# Patient Record
Sex: MALE | Race: WHITE | NOT HISPANIC OR LATINO | Employment: OTHER | URBAN - METROPOLITAN AREA
[De-identification: names, ages, dates, MRNs, and addresses within clinical notes are randomized per-mention and may not be internally consistent; named-entity substitution may affect disease eponyms.]

---

## 2017-03-24 ENCOUNTER — CONVERSION ENCOUNTER (OUTPATIENT)
Dept: OTHER | Facility: HOSPITAL | Age: 67
End: 2017-03-24

## 2017-03-24 ENCOUNTER — HOSPITAL ENCOUNTER (OUTPATIENT)
Dept: OTHER | Facility: HOSPITAL | Age: 67
Setting detail: SPECIMEN
Discharge: HOME | End: 2017-03-24
Attending: NURSE PRACTITIONER | Admitting: NURSE PRACTITIONER

## 2017-03-24 LAB
ALBUMIN SERPL-MCNC: 4.2 G/DL (ref 3.5–4.8)
ALBUMIN/GLOB SERPL: 1.5 {RATIO} (ref 1–1.7)
ALP SERPL-CCNC: 49 IU/L (ref 32–91)
ALT SERPL-CCNC: 20 IU/L (ref 17–63)
ANION GAP SERPL CALC-SCNC: 18.4 MMOL/L (ref 10–20)
AST SERPL-CCNC: 24 IU/L (ref 15–41)
BILIRUB SERPL-MCNC: 0.8 MG/DL (ref 0.3–1.2)
BUN SERPL-MCNC: 19 MG/DL (ref 8–20)
BUN/CREAT SERPL: 21.1 (ref 6.2–20.3)
CALCIUM SERPL-MCNC: 10.3 MG/DL (ref 8.9–10.3)
CHLORIDE SERPL-SCNC: 103 MMOL/L (ref 101–111)
CHOLEST SERPL-MCNC: 144 MG/DL
CHOLEST/HDLC SERPL: 4.1 {RATIO}
CONV CO2: 23 MMOL/L (ref 22–32)
CONV LDL CHOLESTEROL DIRECT: 83 MG/DL (ref 0–100)
CONV MICROALBUM.,U,RANDOM: <2 MG/L
CONV TOTAL PROTEIN: 7 G/DL (ref 6.1–7.9)
CREAT 24H UR-MCNC: 17.9 MG/DL
CREAT UR-MCNC: 0.9 MG/DL (ref 0.7–1.2)
GLOBULIN UR ELPH-MCNC: 2.8 G/DL (ref 2.5–3.8)
GLUCOSE SERPL-MCNC: 84 MG/DL (ref 65–99)
HDLC SERPL-MCNC: 36 MG/DL
LDLC/HDLC SERPL: 2.3 {RATIO}
LIPID INTERPRETATION: ABNORMAL
MICROALBUMIN/CREAT UR: NORMAL UG/MG
POTASSIUM SERPL-SCNC: 4.4 MMOL/L (ref 3.6–5.1)
PSA SERPL-MCNC: 0.74 NG/ML (ref 0–4)
SODIUM SERPL-SCNC: 140 MMOL/L (ref 136–144)
TRIGL SERPL-MCNC: 171 MG/DL
VLDLC SERPL CALC-MCNC: 25.7 MG/DL

## 2017-04-07 ENCOUNTER — CONVERSION ENCOUNTER (OUTPATIENT)
Dept: OTHER | Facility: HOSPITAL | Age: 67
End: 2017-04-07

## 2017-06-12 ENCOUNTER — CONVERSION ENCOUNTER (OUTPATIENT)
Dept: OTHER | Facility: HOSPITAL | Age: 67
End: 2017-06-12

## 2017-06-12 ENCOUNTER — HOSPITAL ENCOUNTER (OUTPATIENT)
Dept: OTHER | Facility: HOSPITAL | Age: 67
Setting detail: SPECIMEN
Discharge: HOME | End: 2017-06-12
Attending: NURSE PRACTITIONER | Admitting: NURSE PRACTITIONER

## 2017-06-12 LAB
ALBUMIN SERPL-MCNC: 4.7 G/DL (ref 3.5–4.8)
ALBUMIN/GLOB SERPL: 1.5 {RATIO} (ref 1–1.7)
ALP SERPL-CCNC: 45 IU/L (ref 32–91)
ALT SERPL-CCNC: 23 IU/L (ref 17–63)
ANION GAP SERPL CALC-SCNC: 14.9 MMOL/L (ref 10–20)
AST SERPL-CCNC: 23 IU/L (ref 15–41)
BILIRUB SERPL-MCNC: 0.9 MG/DL (ref 0.3–1.2)
BUN SERPL-MCNC: 21 MG/DL (ref 8–20)
BUN/CREAT SERPL: 21 (ref 6.2–20.3)
CALCIUM SERPL-MCNC: 10.3 MG/DL (ref 8.9–10.3)
CHLORIDE SERPL-SCNC: 101 MMOL/L (ref 101–111)
CHOLEST SERPL-MCNC: 156 MG/DL
CHOLEST/HDLC SERPL: 4 {RATIO}
CONV CO2: 26 MMOL/L (ref 22–32)
CONV LDL CHOLESTEROL DIRECT: 95 MG/DL (ref 0–100)
CONV TOTAL PROTEIN: 7.8 G/DL (ref 6.1–7.9)
CREAT UR-MCNC: 1 MG/DL (ref 0.7–1.2)
GLOBULIN UR ELPH-MCNC: 3.1 G/DL (ref 2.5–3.8)
GLUCOSE SERPL-MCNC: 127 MG/DL (ref 65–99)
HDLC SERPL-MCNC: 39 MG/DL
LDLC/HDLC SERPL: 2.4 {RATIO}
LIPID INTERPRETATION: ABNORMAL
POTASSIUM SERPL-SCNC: 5.9 MMOL/L (ref 3.6–5.1)
SODIUM SERPL-SCNC: 136 MMOL/L (ref 136–144)
TRIGL SERPL-MCNC: 200 MG/DL
VLDLC SERPL CALC-MCNC: 21.7 MG/DL

## 2017-06-19 ENCOUNTER — HOSPITAL ENCOUNTER (OUTPATIENT)
Dept: OTHER | Facility: HOSPITAL | Age: 67
Setting detail: SPECIMEN
Discharge: HOME | End: 2017-06-19
Attending: NURSE PRACTITIONER | Admitting: NURSE PRACTITIONER

## 2017-06-19 LAB
ALBUMIN SERPL-MCNC: 4.3 G/DL (ref 3.5–4.8)
ALBUMIN/GLOB SERPL: 1.7 {RATIO} (ref 1–1.7)
ALP SERPL-CCNC: 43 IU/L (ref 32–91)
ALT SERPL-CCNC: 19 IU/L (ref 17–63)
ANION GAP SERPL CALC-SCNC: 15.5 MMOL/L (ref 10–20)
AST SERPL-CCNC: 20 IU/L (ref 15–41)
BILIRUB SERPL-MCNC: 0.9 MG/DL (ref 0.3–1.2)
BUN SERPL-MCNC: 20 MG/DL (ref 8–20)
BUN/CREAT SERPL: 22.2 (ref 6.2–20.3)
CALCIUM SERPL-MCNC: 9.7 MG/DL (ref 8.9–10.3)
CHLORIDE SERPL-SCNC: 103 MMOL/L (ref 101–111)
CONV CO2: 22 MMOL/L (ref 22–32)
CONV TOTAL PROTEIN: 6.9 G/DL (ref 6.1–7.9)
CREAT UR-MCNC: 0.9 MG/DL (ref 0.7–1.2)
GLOBULIN UR ELPH-MCNC: 2.6 G/DL (ref 2.5–3.8)
GLUCOSE SERPL-MCNC: 111 MG/DL (ref 65–99)
POTASSIUM SERPL-SCNC: 4.5 MMOL/L (ref 3.6–5.1)
SODIUM SERPL-SCNC: 136 MMOL/L (ref 136–144)

## 2017-09-13 ENCOUNTER — CONVERSION ENCOUNTER (OUTPATIENT)
Dept: OTHER | Facility: HOSPITAL | Age: 67
End: 2017-09-13

## 2017-09-13 ENCOUNTER — HOSPITAL ENCOUNTER (OUTPATIENT)
Dept: OTHER | Facility: HOSPITAL | Age: 67
Setting detail: SPECIMEN
Discharge: HOME | End: 2017-09-13
Attending: NURSE PRACTITIONER | Admitting: NURSE PRACTITIONER

## 2017-09-14 ENCOUNTER — HOSPITAL ENCOUNTER (OUTPATIENT)
Dept: ULTRASOUND IMAGING | Facility: HOSPITAL | Age: 67
Discharge: HOME | End: 2017-09-14
Attending: NURSE PRACTITIONER | Admitting: NURSE PRACTITIONER

## 2017-10-16 ENCOUNTER — HOSPITAL ENCOUNTER (OUTPATIENT)
Dept: OTHER | Facility: HOSPITAL | Age: 67
Setting detail: SPECIMEN
Discharge: HOME | End: 2017-10-16
Attending: NURSE PRACTITIONER | Admitting: NURSE PRACTITIONER

## 2017-10-16 LAB
ALBUMIN SERPL-MCNC: 4.1 G/DL (ref 3.5–4.8)
ALBUMIN/GLOB SERPL: 1.6 {RATIO} (ref 1–1.7)
ALP SERPL-CCNC: 48 IU/L (ref 32–91)
ALT SERPL-CCNC: 22 IU/L (ref 17–63)
ANION GAP SERPL CALC-SCNC: 14.3 MMOL/L (ref 10–20)
AST SERPL-CCNC: 23 IU/L (ref 15–41)
BILIRUB SERPL-MCNC: 0.5 MG/DL (ref 0.3–1.2)
BUN SERPL-MCNC: 21 MG/DL (ref 8–20)
BUN/CREAT SERPL: 19.1 (ref 6.2–20.3)
CALCIUM SERPL-MCNC: 9.4 MG/DL (ref 8.9–10.3)
CHLORIDE SERPL-SCNC: 102 MMOL/L (ref 101–111)
CONV CO2: 23 MMOL/L (ref 22–32)
CONV TOTAL PROTEIN: 6.6 G/DL (ref 6.1–7.9)
CREAT UR-MCNC: 1.1 MG/DL (ref 0.7–1.2)
GLOBULIN UR ELPH-MCNC: 2.5 G/DL (ref 2.5–3.8)
GLUCOSE SERPL-MCNC: 239 MG/DL (ref 65–99)
POTASSIUM SERPL-SCNC: 5.3 MMOL/L (ref 3.6–5.1)
SODIUM SERPL-SCNC: 134 MMOL/L (ref 136–144)

## 2017-10-19 ENCOUNTER — CONVERSION ENCOUNTER (OUTPATIENT)
Dept: OTHER | Facility: HOSPITAL | Age: 67
End: 2017-10-19

## 2017-11-09 ENCOUNTER — CONVERSION ENCOUNTER (OUTPATIENT)
Dept: OTHER | Facility: HOSPITAL | Age: 67
End: 2017-11-09

## 2017-12-01 ENCOUNTER — CONVERSION ENCOUNTER (OUTPATIENT)
Dept: OTHER | Facility: HOSPITAL | Age: 67
End: 2017-12-01

## 2017-12-13 ENCOUNTER — CONVERSION ENCOUNTER (OUTPATIENT)
Dept: OTHER | Facility: HOSPITAL | Age: 67
End: 2017-12-13

## 2018-03-16 ENCOUNTER — HOSPITAL ENCOUNTER (OUTPATIENT)
Dept: OTHER | Facility: HOSPITAL | Age: 68
Setting detail: SPECIMEN
Discharge: HOME | End: 2018-03-16
Attending: NURSE PRACTITIONER | Admitting: NURSE PRACTITIONER

## 2018-03-16 ENCOUNTER — HOSPITAL ENCOUNTER (OUTPATIENT)
Dept: OTHER | Facility: HOSPITAL | Age: 68
Discharge: HOME | End: 2018-03-16
Attending: NURSE PRACTITIONER | Admitting: NURSE PRACTITIONER

## 2018-03-16 ENCOUNTER — CONVERSION ENCOUNTER (OUTPATIENT)
Dept: OTHER | Facility: HOSPITAL | Age: 68
End: 2018-03-16

## 2018-03-20 ENCOUNTER — HOSPITAL ENCOUNTER (OUTPATIENT)
Dept: CARDIOLOGY | Facility: HOSPITAL | Age: 68
Discharge: HOME | End: 2018-03-20
Attending: NURSE PRACTITIONER | Admitting: NURSE PRACTITIONER

## 2018-04-19 ENCOUNTER — CONVERSION ENCOUNTER (OUTPATIENT)
Dept: OTHER | Facility: HOSPITAL | Age: 68
End: 2018-04-19

## 2018-05-10 ENCOUNTER — HOSPITAL ENCOUNTER (OUTPATIENT)
Dept: LAB | Facility: HOSPITAL | Age: 68
Discharge: HOME | End: 2018-05-10
Attending: NURSE PRACTITIONER | Admitting: NURSE PRACTITIONER

## 2018-05-10 ENCOUNTER — CONVERSION ENCOUNTER (OUTPATIENT)
Dept: OTHER | Facility: HOSPITAL | Age: 68
End: 2018-05-10

## 2018-05-10 LAB
BACTERIA SPEC AEROBE CULT: NORMAL
Lab: NORMAL
MICRO REPORT STATUS: NORMAL
SPECIMEN SOURCE: NORMAL

## 2018-05-11 LAB
C DIFF TOX GENS STL QL NAA+PROBE: NEGATIVE
CDIFF INTERPRETATION: NORMAL
CONV CDIFF GDH AG: NEGATIVE
CRYPTOSP AG STL QL IA: NEGATIVE

## 2018-06-20 ENCOUNTER — CONVERSION ENCOUNTER (OUTPATIENT)
Dept: OTHER | Facility: HOSPITAL | Age: 68
End: 2018-06-20

## 2018-09-17 ENCOUNTER — CONVERSION ENCOUNTER (OUTPATIENT)
Dept: OTHER | Facility: HOSPITAL | Age: 68
End: 2018-09-17

## 2018-09-17 ENCOUNTER — HOSPITAL ENCOUNTER (OUTPATIENT)
Dept: OTHER | Facility: HOSPITAL | Age: 68
Setting detail: SPECIMEN
Discharge: HOME | End: 2018-09-17
Attending: NURSE PRACTITIONER | Admitting: NURSE PRACTITIONER

## 2018-09-17 LAB
ALBUMIN SERPL-MCNC: 4.4 G/DL (ref 3.5–4.8)
ALBUMIN/GLOB SERPL: 1.3 {RATIO} (ref 1–1.7)
ALP SERPL-CCNC: 46 IU/L (ref 32–91)
ALT SERPL-CCNC: 28 IU/L (ref 17–63)
ANION GAP SERPL CALC-SCNC: 13 MMOL/L (ref 10–20)
AST SERPL-CCNC: 29 IU/L (ref 15–41)
BASOPHILS # BLD AUTO: 0.1 10*3/UL (ref 0–0.2)
BASOPHILS NFR BLD AUTO: 1 % (ref 0–2)
BILIRUB SERPL-MCNC: 0.6 MG/DL (ref 0.3–1.2)
BUN SERPL-MCNC: 22 MG/DL (ref 8–20)
BUN/CREAT SERPL: 20 (ref 6.2–20.3)
CALCIUM SERPL-MCNC: 9.8 MG/DL (ref 8.9–10.3)
CHLORIDE SERPL-SCNC: 102 MMOL/L (ref 101–111)
CHOLEST SERPL-MCNC: 188 MG/DL
CHOLEST/HDLC SERPL: 5.2 {RATIO}
CONV CO2: 27 MMOL/L (ref 22–32)
CONV LDL CHOLESTEROL DIRECT: 123 MG/DL (ref 0–100)
CONV TOTAL PROTEIN: 7.7 G/DL (ref 6.1–7.9)
CREAT UR-MCNC: 1.1 MG/DL (ref 0.7–1.2)
DIFFERENTIAL METHOD BLD: (no result)
EOSINOPHIL # BLD AUTO: 0.2 10*3/UL (ref 0–0.3)
EOSINOPHIL # BLD AUTO: 2 % (ref 0–3)
ERYTHROCYTE [DISTWIDTH] IN BLOOD BY AUTOMATED COUNT: 12.9 % (ref 11.5–14.5)
GLOBULIN UR ELPH-MCNC: 3.3 G/DL (ref 2.5–3.8)
GLUCOSE SERPL-MCNC: 85 MG/DL (ref 65–99)
HCT VFR BLD AUTO: 39.6 % (ref 40–54)
HDLC SERPL-MCNC: 37 MG/DL
HGB BLD-MCNC: 13.3 G/DL (ref 14–18)
LDLC/HDLC SERPL: 3.4 {RATIO}
LIPID INTERPRETATION: ABNORMAL
LYMPHOCYTES # BLD AUTO: 2.1 10*3/UL (ref 0.8–4.8)
LYMPHOCYTES NFR BLD AUTO: 28 % (ref 18–42)
MCH RBC QN AUTO: 29 PG (ref 26–32)
MCHC RBC AUTO-ENTMCNC: 33.6 G/DL (ref 32–36)
MCV RBC AUTO: 86.3 FL (ref 80–94)
MONOCYTES # BLD AUTO: 0.6 10*3/UL (ref 0.1–1.3)
MONOCYTES NFR BLD AUTO: 8 % (ref 2–11)
NEUTROPHILS # BLD AUTO: 4.6 10*3/UL (ref 2.3–8.6)
NEUTROPHILS NFR BLD AUTO: 61 % (ref 50–75)
NRBC BLD AUTO-RTO: 0 /100{WBCS}
NRBC/RBC NFR BLD MANUAL: 0 10*3/UL
PLATELET # BLD AUTO: 262 10*3/UL (ref 150–450)
PMV BLD AUTO: 8.7 FL (ref 7.4–10.4)
POTASSIUM SERPL-SCNC: 5 MMOL/L (ref 3.6–5.1)
RBC # BLD AUTO: 4.58 10*6/UL (ref 4.6–6)
SODIUM SERPL-SCNC: 137 MMOL/L (ref 136–144)
TRIGL SERPL-MCNC: 166 MG/DL
VLDLC SERPL CALC-MCNC: 28.4 MG/DL
WBC # BLD AUTO: 7.6 10*3/UL (ref 4.5–11.5)

## 2018-12-19 ENCOUNTER — CONVERSION ENCOUNTER (OUTPATIENT)
Dept: OTHER | Facility: HOSPITAL | Age: 68
End: 2018-12-19

## 2018-12-23 ENCOUNTER — CONVERSION ENCOUNTER (OUTPATIENT)
Dept: OTHER | Facility: HOSPITAL | Age: 68
End: 2018-12-23

## 2019-03-21 ENCOUNTER — CONVERSION ENCOUNTER (OUTPATIENT)
Dept: OTHER | Facility: HOSPITAL | Age: 69
End: 2019-03-21

## 2019-03-21 ENCOUNTER — HOSPITAL ENCOUNTER (OUTPATIENT)
Dept: OTHER | Facility: HOSPITAL | Age: 69
Setting detail: SPECIMEN
Discharge: HOME | End: 2019-03-21
Attending: NURSE PRACTITIONER | Admitting: NURSE PRACTITIONER

## 2019-03-21 LAB
ALBUMIN SERPL-MCNC: 4.2 G/DL (ref 3.5–4.8)
ALBUMIN/GLOB SERPL: 1.3 {RATIO} (ref 1–1.7)
ALP SERPL-CCNC: 48 IU/L (ref 32–91)
ALT SERPL-CCNC: 23 IU/L (ref 17–63)
ANION GAP SERPL CALC-SCNC: 17.6 MMOL/L (ref 10–20)
AST SERPL-CCNC: 30 IU/L (ref 15–41)
BASOPHILS # BLD AUTO: 0.1 10*3/UL (ref 0–0.2)
BASOPHILS NFR BLD AUTO: 1 % (ref 0–2)
BILIRUB SERPL-MCNC: 0.9 MG/DL (ref 0.3–1.2)
BUN SERPL-MCNC: 23 MG/DL (ref 8–20)
BUN/CREAT SERPL: 19.2 (ref 6.2–20.3)
CALCIUM SERPL-MCNC: 9.8 MG/DL (ref 8.9–10.3)
CHLORIDE SERPL-SCNC: 97 MMOL/L (ref 101–111)
CHOLEST SERPL-MCNC: 185 MG/DL
CHOLEST/HDLC SERPL: 4.8 {RATIO}
CONV CO2: 22 MMOL/L (ref 22–32)
CONV LDL CHOLESTEROL DIRECT: 141 MG/DL (ref 0–100)
CONV TOTAL PROTEIN: 7.5 G/DL (ref 6.1–7.9)
CREAT UR-MCNC: 1.2 MG/DL (ref 0.7–1.2)
DIFFERENTIAL METHOD BLD: (no result)
EOSINOPHIL # BLD AUTO: 0.1 10*3/UL (ref 0–0.3)
EOSINOPHIL # BLD AUTO: 1 % (ref 0–3)
ERYTHROCYTE [DISTWIDTH] IN BLOOD BY AUTOMATED COUNT: 13.1 % (ref 11.5–14.5)
GLOBULIN UR ELPH-MCNC: 3.3 G/DL (ref 2.5–3.8)
GLUCOSE SERPL-MCNC: 104 MG/DL (ref 65–99)
HCT VFR BLD AUTO: 38.9 % (ref 40–54)
HDLC SERPL-MCNC: 39 MG/DL
HGB BLD-MCNC: 13.4 G/DL (ref 14–18)
IRON SATN MFR SERPL: 27 % (ref 20–50)
IRON SERPL-MCNC: 96 UG/DL (ref 45–182)
LDLC/HDLC SERPL: 3.7 {RATIO}
LIPID INTERPRETATION: ABNORMAL
LYMPHOCYTES # BLD AUTO: 2.2 10*3/UL (ref 0.8–4.8)
LYMPHOCYTES NFR BLD AUTO: 26 % (ref 18–42)
MCH RBC QN AUTO: 30.1 PG (ref 26–32)
MCHC RBC AUTO-ENTMCNC: 34.5 G/DL (ref 32–36)
MCV RBC AUTO: 87.4 FL (ref 80–94)
MONOCYTES # BLD AUTO: 0.8 10*3/UL (ref 0.1–1.3)
MONOCYTES NFR BLD AUTO: 10 % (ref 2–11)
NEUTROPHILS # BLD AUTO: 5.2 10*3/UL (ref 2.3–8.6)
NEUTROPHILS NFR BLD AUTO: 62 % (ref 50–75)
NRBC BLD AUTO-RTO: 0 /100{WBCS}
NRBC/RBC NFR BLD MANUAL: 0 10*3/UL
PLATELET # BLD AUTO: 258 10*3/UL (ref 150–450)
PMV BLD AUTO: 8.6 FL (ref 7.4–10.4)
POTASSIUM SERPL-SCNC: 4.6 MMOL/L (ref 3.6–5.1)
RBC # BLD AUTO: 4.45 10*6/UL (ref 4.6–6)
SODIUM SERPL-SCNC: 132 MMOL/L (ref 136–144)
TIBC SERPL-MCNC: 349 UG/DL (ref 228–428)
TRIGL SERPL-MCNC: 143 MG/DL
VLDLC SERPL CALC-MCNC: 5 MG/DL
WBC # BLD AUTO: 8.4 10*3/UL (ref 4.5–11.5)

## 2019-05-17 ENCOUNTER — HOSPITAL ENCOUNTER (OUTPATIENT)
Dept: OTHER | Facility: HOSPITAL | Age: 69
Discharge: HOME | End: 2019-05-17

## 2019-06-03 VITALS
WEIGHT: 208 LBS | SYSTOLIC BLOOD PRESSURE: 138 MMHG | BODY MASS INDEX: 31.39 KG/M2 | OXYGEN SATURATION: 96 % | OXYGEN SATURATION: 96 % | OXYGEN SATURATION: 99 % | SYSTOLIC BLOOD PRESSURE: 132 MMHG | HEART RATE: 74 BPM | SYSTOLIC BLOOD PRESSURE: 138 MMHG | BODY MASS INDEX: 32.58 KG/M2 | BODY MASS INDEX: 31.01 KG/M2 | OXYGEN SATURATION: 98 % | HEART RATE: 82 BPM | WEIGHT: 209 LBS | RESPIRATION RATE: 18 BRPM | HEART RATE: 81 BPM | HEART RATE: 76 BPM | OXYGEN SATURATION: 97 % | OXYGEN SATURATION: 98 % | DIASTOLIC BLOOD PRESSURE: 87 MMHG | HEART RATE: 80 BPM | HEART RATE: 108 BPM | DIASTOLIC BLOOD PRESSURE: 78 MMHG | WEIGHT: 211 LBS | WEIGHT: 200 LBS | BODY MASS INDEX: 31.98 KG/M2 | SYSTOLIC BLOOD PRESSURE: 138 MMHG | BODY MASS INDEX: 30.7 KG/M2 | WEIGHT: 204 LBS | DIASTOLIC BLOOD PRESSURE: 73 MMHG | OXYGEN SATURATION: 98 % | WEIGHT: 209 LBS | SYSTOLIC BLOOD PRESSURE: 149 MMHG | DIASTOLIC BLOOD PRESSURE: 77 MMHG | RESPIRATION RATE: 16 BRPM | DIASTOLIC BLOOD PRESSURE: 81 MMHG | WEIGHT: 212.4 LBS | WEIGHT: 206 LBS | OXYGEN SATURATION: 98 % | WEIGHT: 198 LBS | DIASTOLIC BLOOD PRESSURE: 84 MMHG | DIASTOLIC BLOOD PRESSURE: 79 MMHG | DIASTOLIC BLOOD PRESSURE: 96 MMHG | BODY MASS INDEX: 31.78 KG/M2 | DIASTOLIC BLOOD PRESSURE: 78 MMHG | WEIGHT: 199.3 LBS | SYSTOLIC BLOOD PRESSURE: 146 MMHG | HEIGHT: 68 IN | SYSTOLIC BLOOD PRESSURE: 165 MMHG | DIASTOLIC BLOOD PRESSURE: 78 MMHG | SYSTOLIC BLOOD PRESSURE: 154 MMHG | BODY MASS INDEX: 31.78 KG/M2 | SYSTOLIC BLOOD PRESSURE: 148 MMHG | OXYGEN SATURATION: 96 % | HEART RATE: 93 BPM | HEART RATE: 90 BPM | OXYGEN SATURATION: 98 % | BODY MASS INDEX: 31.01 KG/M2 | WEIGHT: 211 LBS | HEART RATE: 70 BPM | DIASTOLIC BLOOD PRESSURE: 75 MMHG | BODY MASS INDEX: 32.26 KG/M2 | HEART RATE: 74 BPM | SYSTOLIC BLOOD PRESSURE: 139 MMHG | SYSTOLIC BLOOD PRESSURE: 145 MMHG | SYSTOLIC BLOOD PRESSURE: 117 MMHG | SYSTOLIC BLOOD PRESSURE: 156 MMHG | SYSTOLIC BLOOD PRESSURE: 163 MMHG | WEIGHT: 196 LBS | SYSTOLIC BLOOD PRESSURE: 148 MMHG | WEIGHT: 202.5 LBS | HEART RATE: 82 BPM | HEART RATE: 84 BPM | HEART RATE: 78 BPM | HEART RATE: 81 BPM | OXYGEN SATURATION: 97 % | OXYGEN SATURATION: 97 % | OXYGEN SATURATION: 97 % | SYSTOLIC BLOOD PRESSURE: 130 MMHG | BODY MASS INDEX: 32.08 KG/M2 | DIASTOLIC BLOOD PRESSURE: 88 MMHG | OXYGEN SATURATION: 97 % | DIASTOLIC BLOOD PRESSURE: 81 MMHG | BODY MASS INDEX: 32.08 KG/M2 | BODY MASS INDEX: 31.02 KG/M2 | DIASTOLIC BLOOD PRESSURE: 88 MMHG | HEIGHT: 67 IN | OXYGEN SATURATION: 98 % | HEART RATE: 88 BPM | WEIGHT: 211 LBS | DIASTOLIC BLOOD PRESSURE: 83 MMHG | WEIGHT: 197 LBS | HEART RATE: 73 BPM | DIASTOLIC BLOOD PRESSURE: 78 MMHG | BODY MASS INDEX: 32.3 KG/M2 | WEIGHT: 198 LBS

## 2019-06-14 ENCOUNTER — TRANSCRIBE ORDERS (OUTPATIENT)
Dept: CARDIOLOGY | Facility: CLINIC | Age: 69
End: 2019-06-14

## 2019-06-14 DIAGNOSIS — R06.02 SHORTNESS OF BREATH: Primary | ICD-10-CM

## 2019-06-25 RX ORDER — FUROSEMIDE 20 MG/1
TABLET ORAL
Qty: 30 TABLET | Refills: 0 | Status: ON HOLD | OUTPATIENT
Start: 2019-06-25 | End: 2019-07-19 | Stop reason: SDUPTHER

## 2019-06-27 RX ORDER — HYDRALAZINE HYDROCHLORIDE 25 MG/1
TABLET, FILM COATED ORAL
Qty: 180 TABLET | Refills: 1 | Status: SHIPPED | OUTPATIENT
Start: 2019-06-27 | End: 2019-08-13 | Stop reason: HOSPADM

## 2019-07-15 ENCOUNTER — HOSPITAL ENCOUNTER (OUTPATIENT)
Dept: CARDIOLOGY | Facility: HOSPITAL | Age: 69
Discharge: HOME | End: 2019-07-15

## 2019-07-15 DIAGNOSIS — R06.02 SHORTNESS OF BREATH: ICD-10-CM

## 2019-07-15 PROCEDURE — A9500 TC99M SESTAMIBI: HCPCS | Performed by: INTERNAL MEDICINE

## 2019-07-15 PROCEDURE — 78452 HT MUSCLE IMAGE SPECT MULT: CPT | Performed by: INTERNAL MEDICINE

## 2019-07-15 PROCEDURE — 0 TECHNETIUM SESTAMIBI: Performed by: INTERNAL MEDICINE

## 2019-07-15 PROCEDURE — 93016 CV STRESS TEST SUPVJ ONLY: CPT | Performed by: INTERNAL MEDICINE

## 2019-07-15 PROCEDURE — 25010000002 REGADENOSON 0.4 MG/5ML SOLUTION: Performed by: INTERNAL MEDICINE

## 2019-07-15 PROCEDURE — 93017 CV STRESS TEST TRACING ONLY: CPT

## 2019-07-15 PROCEDURE — 93018 CV STRESS TEST I&R ONLY: CPT | Performed by: INTERNAL MEDICINE

## 2019-07-15 PROCEDURE — 78452 HT MUSCLE IMAGE SPECT MULT: CPT

## 2019-07-15 RX ADMIN — REGADENOSON 0.4 MG: 0.08 INJECTION, SOLUTION INTRAVENOUS at 13:45

## 2019-07-15 RX ADMIN — TECHNETIUM TC 99M SESTAMIBI 1 DOSE: 1 INJECTION INTRAVENOUS at 13:45

## 2019-07-15 RX ADMIN — TECHNETIUM TC 99M SESTAMIBI 1 DOSE: 1 INJECTION INTRAVENOUS at 13:30

## 2019-07-17 LAB
BH CV STRESS COMMENTS STAGE 1: NORMAL
BH CV STRESS DOSE REGADENOSON STAGE 1: 0.4
BH CV STRESS DURATION MIN STAGE 1: 0
BH CV STRESS DURATION SEC STAGE 1: 10
BH CV STRESS PROTOCOL 1: NORMAL
BH CV STRESS RECOVERY BP: NORMAL MMHG
BH CV STRESS RECOVERY HR: 96 BPM
BH CV STRESS STAGE 1: 1
LV EF NUC BP: 62 %
MAXIMAL PREDICTED HEART RATE: 152 BPM
STRESS BASELINE BP: NORMAL MMHG
STRESS BASELINE HR: 81 BPM
STRESS TARGET HR: 129 BPM

## 2019-07-17 RX ORDER — ATORVASTATIN CALCIUM 10 MG/1
10 TABLET, FILM COATED ORAL DAILY
Qty: 30 TABLET | Refills: 3 | Status: ON HOLD | OUTPATIENT
Start: 2019-07-17 | End: 2019-08-13 | Stop reason: SDUPTHER

## 2019-07-17 RX ORDER — ATORVASTATIN CALCIUM 10 MG/1
10 TABLET, FILM COATED ORAL DAILY
Refills: 3 | COMMUNITY
Start: 2019-04-16 | End: 2019-07-17 | Stop reason: SDUPTHER

## 2019-07-18 DIAGNOSIS — R94.39 ABNORMAL NUCLEAR STRESS TEST: ICD-10-CM

## 2019-07-18 DIAGNOSIS — I20.8 ANGINA AT REST (HCC): Primary | ICD-10-CM

## 2019-07-18 PROBLEM — I20.89 ANGINA AT REST: Status: ACTIVE | Noted: 2019-07-18

## 2019-07-18 RX ORDER — AMLODIPINE BESYLATE 5 MG/1
5 TABLET ORAL DAILY
COMMUNITY
End: 2019-07-29 | Stop reason: SDUPTHER

## 2019-07-18 RX ORDER — ASPIRIN 81 MG/1
81 TABLET, CHEWABLE ORAL NIGHTLY
COMMUNITY

## 2019-07-18 RX ORDER — MULTIVIT WITH MINERALS/LUTEIN
1000 TABLET ORAL DAILY
COMMUNITY
End: 2019-10-10

## 2019-07-18 RX ORDER — GLIPIZIDE 10 MG/1
5 TABLET ORAL
COMMUNITY
End: 2019-07-29 | Stop reason: SDUPTHER

## 2019-07-18 RX ORDER — LISINOPRIL 40 MG/1
40 TABLET ORAL DAILY
COMMUNITY
End: 2019-08-13 | Stop reason: HOSPADM

## 2019-07-18 RX ORDER — INSULIN DEGLUDEC 100 U/ML
32 INJECTION, SOLUTION SUBCUTANEOUS DAILY
COMMUNITY
End: 2019-10-08 | Stop reason: SDUPTHER

## 2019-07-18 RX ORDER — POTASSIUM CHLORIDE 750 MG/1
10 TABLET, EXTENDED RELEASE ORAL DAILY
COMMUNITY
End: 2019-08-13 | Stop reason: HOSPADM

## 2019-07-19 ENCOUNTER — LAB (OUTPATIENT)
Dept: LAB | Facility: HOSPITAL | Age: 69
End: 2019-07-19

## 2019-07-19 ENCOUNTER — HOSPITAL ENCOUNTER (OUTPATIENT)
Dept: CARDIOLOGY | Facility: HOSPITAL | Age: 69
Setting detail: HOSPITAL OUTPATIENT SURGERY
Discharge: HOME | End: 2019-07-19

## 2019-07-19 ENCOUNTER — HOSPITAL ENCOUNTER (OUTPATIENT)
Facility: HOSPITAL | Age: 69
Setting detail: HOSPITAL OUTPATIENT SURGERY
Discharge: HOME | End: 2019-07-19
Attending: INTERNAL MEDICINE | Admitting: INTERNAL MEDICINE

## 2019-07-19 VITALS
HEIGHT: 66 IN | BODY MASS INDEX: 33.87 KG/M2 | WEIGHT: 210.76 LBS | SYSTOLIC BLOOD PRESSURE: 148 MMHG | DIASTOLIC BLOOD PRESSURE: 80 MMHG | TEMPERATURE: 98.6 F | OXYGEN SATURATION: 96 % | RESPIRATION RATE: 16 BRPM | HEART RATE: 91 BPM

## 2019-07-19 DIAGNOSIS — I20.8 ANGINA AT REST (HCC): ICD-10-CM

## 2019-07-19 DIAGNOSIS — R94.39 ABNORMAL NUCLEAR STRESS TEST: ICD-10-CM

## 2019-07-19 LAB
ANION GAP SERPL CALCULATED.3IONS-SCNC: 16.5 MMOL/L (ref 5–15)
ARTICHOKE IGE QN: 58 MG/DL (ref 0–100)
BASOPHILS # BLD AUTO: 0.1 10*3/MM3 (ref 0–0.2)
BASOPHILS NFR BLD AUTO: 0.6 % (ref 0–1.5)
BH CV XLRA MEAS - DIST GSV THIGH DIST LEFT: 0.25 CM
BH CV XLRA MEAS - DIST GSV THIGH DIST RIGHT: 0.22 CM
BH CV XLRA MEAS - GSV ANKLE DIST LEFT: 0.15 CM
BH CV XLRA MEAS - GSV ANKLE DIST RIGHT: 0.13 CM
BH CV XLRA MEAS - MID GSV CALF LEFT: 0.1 CM
BH CV XLRA MEAS - MID GSV CALF RIGHT: 0.19 CM
BH CV XLRA MEAS - MID GSV THIGH  LEFT: 0.27 CM
BH CV XLRA MEAS - MID GSV THIGH  RIGHT: 0.23 CM
BH CV XLRA MEAS - PROX GSV CALF DIST LEFT: 0.13 CM
BH CV XLRA MEAS - PROX GSV CALF DIST RIGHT: 0.19 CM
BH CV XLRA MEAS - PROX GSV THIGH  LEFT: 0.31 CM
BH CV XLRA MEAS - PROX GSV THIGH  RIGHT: 0.38 CM
BH CV XLRA MEAS LEFT CCA RATIO VEL: -112 CM/SEC
BH CV XLRA MEAS LEFT DIST CCA EDV: -19.2 CM/SEC
BH CV XLRA MEAS LEFT DIST CCA PSV: -99.9 CM/SEC
BH CV XLRA MEAS LEFT DIST ICA EDV: -16.5 CM/SEC
BH CV XLRA MEAS LEFT DIST ICA PSV: -56 CM/SEC
BH CV XLRA MEAS LEFT ICA RATIO VEL: 104 CM/SEC
BH CV XLRA MEAS LEFT ICA/CCA RATIO: -0.93
BH CV XLRA MEAS LEFT PROX CCA EDV: -14.1 CM/SEC
BH CV XLRA MEAS LEFT PROX CCA PSV: -112 CM/SEC
BH CV XLRA MEAS LEFT PROX ECA PSV: -98.8 CM/SEC
BH CV XLRA MEAS LEFT PROX ICA EDV: 19.1 CM/SEC
BH CV XLRA MEAS LEFT PROX ICA PSV: 104 CM/SEC
BH CV XLRA MEAS LEFT PROX SCLA PSV: 128 CM/SEC
BH CV XLRA MEAS LEFT VERTEBRAL A PSV: -56.5 CM/SEC
BH CV XLRA MEAS RIGHT CCA RATIO VEL: 106 CM/SEC
BH CV XLRA MEAS RIGHT DIST CCA EDV: 18 CM/SEC
BH CV XLRA MEAS RIGHT DIST CCA PSV: 100 CM/SEC
BH CV XLRA MEAS RIGHT DIST ICA EDV: -23.8 CM/SEC
BH CV XLRA MEAS RIGHT DIST ICA PSV: -74.5 CM/SEC
BH CV XLRA MEAS RIGHT ICA RATIO VEL: -95.1 CM/SEC
BH CV XLRA MEAS RIGHT ICA/CCA RATIO: -0.9
BH CV XLRA MEAS RIGHT PROX CCA EDV: 14.9 CM/SEC
BH CV XLRA MEAS RIGHT PROX CCA PSV: 106 CM/SEC
BH CV XLRA MEAS RIGHT PROX ECA PSV: 125 CM/SEC
BH CV XLRA MEAS RIGHT PROX ICA EDV: -24.9 CM/SEC
BH CV XLRA MEAS RIGHT PROX ICA PSV: -95.1 CM/SEC
BH CV XLRA MEAS RIGHT PROX SCLA PSV: 108 CM/SEC
BH CV XLRA MEAS RIGHT VERTEBRAL A PSV: -51.1 CM/SEC
BUN BLD-MCNC: 21 MG/DL (ref 8–20)
BUN/CREAT SERPL: 19.1 (ref 6.2–20.3)
CALCIUM SPEC-SCNC: 9.6 MG/DL (ref 8.9–10.3)
CHLORIDE SERPL-SCNC: 97 MMOL/L (ref 101–111)
CHOLEST SERPL-MCNC: 107 MG/DL
CO2 SERPL-SCNC: 23 MMOL/L (ref 22–32)
CREAT BLD-MCNC: 1.1 MG/DL (ref 0.7–1.2)
DEPRECATED RDW RBC AUTO: 40.7 FL (ref 37–54)
EOSINOPHIL # BLD AUTO: 0.1 10*3/MM3 (ref 0–0.4)
EOSINOPHIL NFR BLD AUTO: 1.6 % (ref 0.3–6.2)
ERYTHROCYTE [DISTWIDTH] IN BLOOD BY AUTOMATED COUNT: 12.9 % (ref 12.3–15.4)
GFR SERPL CREATININE-BSD FRML MDRD: 67 ML/MIN/1.73
GLUCOSE BLD-MCNC: 142 MG/DL (ref 65–99)
GLUCOSE BLDC GLUCOMTR-MCNC: 76 MG/DL (ref 70–105)
HCT VFR BLD AUTO: 38.8 % (ref 37.5–51)
HDLC SERPL QL: 3.34
HDLC SERPL-MCNC: 32 MG/DL
HGB BLD-MCNC: 13.2 G/DL (ref 13–17.7)
INR PPP: 0.93 (ref 0.9–1.1)
LDLC/HDLC SERPL: 1.54 {RATIO}
LYMPHOCYTES # BLD AUTO: 2.2 10*3/MM3 (ref 0.7–3.1)
LYMPHOCYTES NFR BLD AUTO: 26.4 % (ref 19.6–45.3)
MCH RBC QN AUTO: 30 PG (ref 26.6–33)
MCHC RBC AUTO-ENTMCNC: 34 G/DL (ref 31.5–35.7)
MCV RBC AUTO: 88.3 FL (ref 79–97)
MONOCYTES # BLD AUTO: 0.8 10*3/MM3 (ref 0.1–0.9)
MONOCYTES NFR BLD AUTO: 9.4 % (ref 5–12)
NEUTROPHILS # BLD AUTO: 5.2 10*3/MM3 (ref 1.7–7)
NEUTROPHILS NFR BLD AUTO: 62 % (ref 42.7–76)
NRBC BLD AUTO-RTO: 0 /100 WBC (ref 0–0.2)
PLATELET # BLD AUTO: 246 10*3/MM3 (ref 140–450)
PMV BLD AUTO: 8.6 FL (ref 6–12)
POTASSIUM BLD-SCNC: 4.5 MMOL/L (ref 3.6–5.1)
PROTHROMBIN TIME: 9.7 SECONDS (ref 9.6–11.7)
RBC # BLD AUTO: 4.4 10*6/MM3 (ref 4.14–5.8)
SODIUM BLD-SCNC: 132 MMOL/L (ref 136–144)
TRIGL SERPL-MCNC: 128 MG/DL
VLDLC SERPL-MCNC: 25.6 MG/DL
WBC NRBC COR # BLD: 8.4 10*3/MM3 (ref 3.4–10.8)

## 2019-07-19 PROCEDURE — 85025 COMPLETE CBC W/AUTO DIFF WBC: CPT

## 2019-07-19 PROCEDURE — 93458 L HRT ARTERY/VENTRICLE ANGIO: CPT | Performed by: INTERNAL MEDICINE

## 2019-07-19 PROCEDURE — 80061 LIPID PANEL: CPT

## 2019-07-19 PROCEDURE — C1894 INTRO/SHEATH, NON-LASER: HCPCS | Performed by: INTERNAL MEDICINE

## 2019-07-19 PROCEDURE — 82962 GLUCOSE BLOOD TEST: CPT

## 2019-07-19 PROCEDURE — 93880 EXTRACRANIAL BILAT STUDY: CPT

## 2019-07-19 PROCEDURE — 80048 BASIC METABOLIC PNL TOTAL CA: CPT

## 2019-07-19 PROCEDURE — 99219 PR INITIAL OBSERVATION CARE/DAY 50 MINUTES: CPT | Performed by: NURSE PRACTITIONER

## 2019-07-19 PROCEDURE — 93970 EXTREMITY STUDY: CPT

## 2019-07-19 PROCEDURE — 85610 PROTHROMBIN TIME: CPT

## 2019-07-19 PROCEDURE — C1769 GUIDE WIRE: HCPCS | Performed by: INTERNAL MEDICINE

## 2019-07-19 PROCEDURE — 0 IOPAMIDOL PER 1 ML: Performed by: INTERNAL MEDICINE

## 2019-07-19 PROCEDURE — 25010000002 FENTANYL CITRATE (PF) 100 MCG/2ML SOLUTION: Performed by: INTERNAL MEDICINE

## 2019-07-19 PROCEDURE — 36415 COLL VENOUS BLD VENIPUNCTURE: CPT

## 2019-07-19 PROCEDURE — 25010000002 MIDAZOLAM PER 1 MG: Performed by: INTERNAL MEDICINE

## 2019-07-19 RX ORDER — FUROSEMIDE 20 MG/1
20 TABLET ORAL AS NEEDED
Qty: 90 TABLET | Refills: 0 | Status: SHIPPED | OUTPATIENT
Start: 2019-07-19 | End: 2019-08-13 | Stop reason: HOSPADM

## 2019-07-19 RX ORDER — ATROPINE SULFATE 1 MG/ML
.5-1 INJECTION, SOLUTION INTRAMUSCULAR; INTRAVENOUS; SUBCUTANEOUS
Status: DISCONTINUED | OUTPATIENT
Start: 2019-07-19 | End: 2019-07-19 | Stop reason: HOSPADM

## 2019-07-19 RX ORDER — MIDAZOLAM HYDROCHLORIDE 1 MG/ML
INJECTION INTRAMUSCULAR; INTRAVENOUS AS NEEDED
Status: DISCONTINUED | OUTPATIENT
Start: 2019-07-19 | End: 2019-07-19 | Stop reason: HOSPADM

## 2019-07-19 RX ORDER — FENTANYL CITRATE 50 UG/ML
INJECTION, SOLUTION INTRAMUSCULAR; INTRAVENOUS AS NEEDED
Status: DISCONTINUED | OUTPATIENT
Start: 2019-07-19 | End: 2019-07-19 | Stop reason: HOSPADM

## 2019-07-19 RX ORDER — SODIUM CHLORIDE 9 MG/ML
250 INJECTION, SOLUTION INTRAVENOUS ONCE AS NEEDED
Status: DISCONTINUED | OUTPATIENT
Start: 2019-07-19 | End: 2019-07-19 | Stop reason: HOSPADM

## 2019-07-19 RX ORDER — SODIUM CHLORIDE 9 MG/ML
75 INJECTION, SOLUTION INTRAVENOUS CONTINUOUS
Status: DISCONTINUED | OUTPATIENT
Start: 2019-07-19 | End: 2019-07-19 | Stop reason: HOSPADM

## 2019-07-19 RX ORDER — SODIUM CHLORIDE 9 MG/ML
100 INJECTION, SOLUTION INTRAVENOUS CONTINUOUS
Status: DISCONTINUED | OUTPATIENT
Start: 2019-07-19 | End: 2019-07-19 | Stop reason: HOSPADM

## 2019-07-19 RX ORDER — LIDOCAINE HYDROCHLORIDE 20 MG/ML
INJECTION, SOLUTION INFILTRATION; PERINEURAL AS NEEDED
Status: DISCONTINUED | OUTPATIENT
Start: 2019-07-19 | End: 2019-07-19 | Stop reason: HOSPADM

## 2019-07-19 NOTE — DISCHARGE INSTR - ACTIVITY
Post Cath Instructions    1) Drink plenty of fluids for the next 24 hours.  This helps to eliminate the dye used in your procedure through urination.  You may resume a normal diet; however, try to avoid foods that would cause gas or constipation.    2) Sedative medication given to you during your catheterization may decrease your judgement and reaction time for up to 24-48 hours.  Therefore:  a. DO NOT drive or operate hazardous machinery (48 hours)  b. DO NOT consume alcoholic beverages  c. DO NOT make any important/legal decisions  d. Have someone stay with you for at least 24 hours    3) To allow proper healing and prevent bleeding, the following activities are to be strictly avoided for the next 24-48 hours:  a. Excessive bending at wound site  b. Straining (anything that would tense up muscles around the affected puncture site)  c. Lifting objects greater than 5 pounds, pushing, or pulling for 5 days  For Groin Cases:  1. Refrain from sexual activity  2. Refrain from running or vigorous walking  3. No prolonged sitting or standing  4. Limit stair climbing as much as possible    4) Keep the puncture site clean and dry.  You may remove the dressing tomorrow and replace it with a band-aid for at least one additional day.  Gently clean the site with mild soap and water.  No scrubbing/rubbing and lightly pat the area dry.  Showers are acceptable; however, avoid submerging in water (tub baths, hot tubs, swimming pools, dishwater, etc…) for at least one week.  The site should be completely healed before resuming these activities to reduce the risk of infection.  Check the site often.  Watch for signs and symptoms of infection and notify your physician if any of the following occur:  a. Bleeding or an increase in swelling at the puncture site  b. Fever  c. Increased soreness around puncture site  d. Foul odor or significant drainage from the puncture site  e. Swelling, redness, or warmth at the puncture site    **A  bruise or small “pea sized” lump under the skin at the puncture site is not unusual.  This should disappear within 3-4 weeks.**  5) CONTACT YOUR PHYSICIAN OR CALL 911 IF YOU EXPERIENCE ANY OF THE FOLLOWING:  a. Increased angina (chest pain) or frequent sensations of pressure, burning, pain, or other discomfort in the chest, arm, jaws, or stomach  b. Lightheadedness, dizziness, faint feeling, sweating, or difficulty breathing  c. Odd sensation changes like numbness, tingling, coldness, or pain in the arm or leg in which the catheter was inserted  d. Limb in which the catheter was inserted becomes pale/bluish in color    IMPORTANT:  Although this occurs very rarely, if you should develop bright red or excessive bleeding, feel a “pop” inside at the insertion site, or notice a sudden increase in swelling larger than a walnut, you should call 911.  Hold continuous firm pressure to the access site until emergency personnel arrive.  It is best if someone else can do this for you.

## 2019-07-19 NOTE — CONSULTS
Patient Care Team:  Tangela Jc NP as PCP - General  Tangela Jc NP as PCP - Family Medicine  Referring Provider:  Dr. Lopez  Reason for consultation:  CAD    Chief complaint: Leg pain    Subjective     History of Present Illness: Patient is a 69 year old  male with known PMH of HTN, HLD, DMII, and PVD. He states he presented to his PCP for calf pain that began about a year ago. The pain begins after he was walks about a half mile. No associated symptoms. Rest is the alleviating factor and continued exertion is the aggravating factor. He states he was referred to Dr. Lopez who ordered a stress test. The stress test had abnormal results and the patient was referred for a cardiac cath. The cath revealed 3V CAD and [facility] was consulted. He denies any current or previous cardiac symptoms, such as CP or SOA.    Review of Systems   Constitutional: Positive for activity change.   Respiratory: Negative for shortness of breath.    Cardiovascular: Negative for chest pain.   Musculoskeletal: Positive for gait problem.        Past Medical History:   Diagnosis Date   • Diabetes mellitus, type II (CMS/HCC)    • H/O cataract     cataracts    • Hyperlipidemia    • Hypertension      Past Surgical History:   Procedure Laterality Date   • HERNIA REPAIR     • OTHER SURGICAL HISTORY  12/2015    2d echo-abstracted cent.      Family History   Problem Relation Age of Onset   • Heart failure Mother    • Hypertension Brother    • Cancer Brother      Social History     Tobacco Use   • Smoking status: Never Smoker   Substance Use Topics   • Alcohol use: No     Frequency: Never   • Drug use: No     Medications Prior to Admission   Medication Sig Dispense Refill Last Dose   • amLODIPine (NORVASC) 5 MG tablet Take 5 mg by mouth Daily.   7/19/2019 at Unknown time   • ascorbic acid (VITAMIN C) 1000 MG tablet Take 1,000 mg by mouth Daily.   7/18/2019 at Unknown time   • aspirin 81 MG chewable tablet Chew 81 mg  "Daily.   7/18/2019 at Unknown time   • atorvastatin (LIPITOR) 10 MG tablet Take 1 tablet by mouth Daily. 30 tablet 3 7/18/2019 at Unknown time   • calcium carbonate-cholecalciferol 500-400 MG-UNIT tablet tablet Take 1 tablet by mouth Daily.   7/18/2019 at Unknown time   • Cinnamon 500 MG tablet Take 500 mg by mouth Daily.   7/18/2019 at Unknown time   • glipiZIDE (GLUCOTROL) 10 MG tablet Take 5 mg by mouth 2 (Two) Times a Day Before Meals. Take 10mg Qam and 5mg Qpm   7/18/2019 at Unknown time   • hydrALAZINE (APRESOLINE) 25 MG tablet TAKE 1 TABLET BY MOUTH TWICE A  tablet 1 7/19/2019 at Unknown time   • Insulin Degludec (TRESIBA) 100 UNIT/ML solution Inject 30 Units under the skin into the appropriate area as directed Daily.   7/18/2019 at Unknown time   • Iron Combinations (IRON COMPLEX PO) Take 1 tablet by mouth Daily.   7/18/2019 at Unknown time   • lisinopril (PRINIVIL,ZESTRIL) 40 MG tablet Take 40 mg by mouth Daily.   7/19/2019 at Unknown time   • metFORMIN (GLUCOPHAGE) 1000 MG tablet Take 1,000 mg by mouth 2 (Two) Times a Day With Meals.   7/18/2019 at Unknown time   • potassium chloride (K-DUR,KLOR-CON) 10 MEQ CR tablet Take 10 mEq by mouth Daily.   7/18/2019 at Unknown time        Allergies:  Patient has no known allergies.    Objective      Vital Signs  Temp:  [98.6 °F (37 °C)] 98.6 °F (37 °C)  Heart Rate:  [68-85] 85  Resp:  [16-18] 16  BP: (128-160)/(66-84) 160/84    Flowsheet Rows      First Filed Value   Admission Height  167.6 cm (66\") Documented at 07/19/2019 1133   Admission Weight  95.6 kg (210 lb 12.2 oz) Documented at 07/19/2019 1133        167.6 cm (66\")    Physical Exam   Constitutional: He is oriented to person, place, and time. He appears well-developed and well-nourished.   HENT:   Head: Normocephalic and atraumatic.   Eyes: EOM are normal. Pupils are equal, round, and reactive to light.   Neck: Normal range of motion. Neck supple.   Cardiovascular: Normal rate, regular rhythm, normal " heart sounds and intact distal pulses.   Pulmonary/Chest: Effort normal and breath sounds normal.   Abdominal: Soft. Bowel sounds are normal.   Musculoskeletal: Normal range of motion.   Neurological: He is alert and oriented to person, place, and time.   Skin: Skin is warm and dry.   Psychiatric: He has a normal mood and affect. His behavior is normal.       Results Review:   Lab Results (last 24 hours)     Procedure Component Value Units Date/Time    POC Glucose Once [[id number]]  (Normal) Collected:  07/19/19 1424    Specimen:  Blood Updated:  07/19/19 1430     Glucose 76 mg/dL      Comment: Serial Number: [id number]:  [id number]                 Assessment/Plan       Angina at rest (CMS/HCC)    Abnormal nuclear stress test    MV CAD-- surgical workup ongoing  HTN  HLD  DM II  PVD    Assessment & Plan  Patient is currently undergoing workup for CABG with Dr. Mcarthur. Dr. Mcarthur met the patient and explained risks and benefits. Patient is agreeable to proceed with CABG. Patient may be discharged to home per Dr. Mcarthur. Our office with contact him for surgery appointment  Thank you for allowing us to participate in the care of this patient.      ERICA MONTERROSO  07/19/19  3:41 PM    **all problems new to this examiner  **EKG and CXR independently reviewed and interpreted

## 2019-07-19 NOTE — CONSULTS
Name: Shaka Trinidad ADMIT: 2019   : 1950  PCP: Tangela Jc NP    MRN: [id number] LOS: 0 days   AGE/SEX: 69 y.o. male  ROOM: [facility]     No chief complaint on file.      HPI: Patient is a 69 y.o. male presents with history of diabetes hypertension hyperlipidemia presented to my office for a new consultation was having symptoms of shortness of breath and swelling of the lower extremities.  Patient had a stress Myoview which was abnormal and hence he was referred for cardiac catheterization.  Patient does not have any symptoms of chest pain.  No complaint of any PND orthopnea.  No palpitations dizziness syncope.  He has been taking his medicines regularly.    Past Medical History:   Diagnosis Date   • Diabetes mellitus, type II (CMS/HCC)    • H/O cataract     cataracts    • Hyperlipidemia    • Hypertension      Past Surgical History:   Procedure Laterality Date   • HERNIA REPAIR     • OTHER SURGICAL HISTORY  2015    2d echo-abstracted cent.      Family History   Problem Relation Age of Onset   • Heart failure Mother    • Hypertension Brother    • Cancer Brother      Social History     Tobacco Use   • Smoking status: Never Smoker   Substance Use Topics   • Alcohol use: No     Frequency: Never   • Drug use: No     Medications Prior to Admission   Medication Sig Dispense Refill Last Dose   • amLODIPine (NORVASC) 5 MG tablet Take 5 mg by mouth Daily.   2019 at Unknown time   • ascorbic acid (VITAMIN C) 1000 MG tablet Take 1,000 mg by mouth Daily.   2019 at Unknown time   • aspirin 81 MG chewable tablet Chew 81 mg Daily.   2019 at Unknown time   • atorvastatin (LIPITOR) 10 MG tablet Take 1 tablet by mouth Daily. 30 tablet 3 2019 at Unknown time   • calcium carbonate-cholecalciferol 500-400 MG-UNIT tablet tablet Take 1 tablet by mouth Daily.   2019 at Unknown time   • Cinnamon 500 MG tablet Take 500 mg by mouth Daily.   2019 at Unknown time   • furosemide (LASIX) 20 MG  tablet TAKE 1 TABLET BY MOUTH AS NEEDED FOR SWELLING 30 tablet 0 7/18/2019 at Unknown time   • glipiZIDE (GLUCOTROL) 10 MG tablet Take 5 mg by mouth 2 (Two) Times a Day Before Meals. Take 10mg Qam and 5mg Qpm   7/18/2019 at Unknown time   • hydrALAZINE (APRESOLINE) 25 MG tablet TAKE 1 TABLET BY MOUTH TWICE A  tablet 1 7/19/2019 at Unknown time   • Insulin Degludec (TRESIBA) 100 UNIT/ML solution Inject 30 Units under the skin into the appropriate area as directed Daily.   7/18/2019 at Unknown time   • Iron Combinations (IRON COMPLEX PO) Take 1 tablet by mouth Daily.   7/18/2019 at Unknown time   • lisinopril (PRINIVIL,ZESTRIL) 40 MG tablet Take 40 mg by mouth Daily.   7/19/2019 at Unknown time   • metFORMIN (GLUCOPHAGE) 1000 MG tablet Take 1,000 mg by mouth 2 (Two) Times a Day With Meals.   7/18/2019 at Unknown time   • potassium chloride (K-DUR,KLOR-CON) 10 MEQ CR tablet Take 10 mEq by mouth Daily.   7/18/2019 at Unknown time     Allergies:  Patient has no known allergies.    Review of Systems   Respiratory: Positive for shortness of breath.    All other systems reviewed and are negative.       Objective    Vital Signs  Temp:  [98.6 °F (37 °C)] 98.6 °F (37 °C)  Heart Rate:  [77] 77  Resp:  [18] 18  BP: (135)/(72) 135/72  SpO2:  [97 %] 97 %  on   ;      Body mass index is 34.02 kg/m².    Physical Exam   Constitutional: He appears well-developed.   Eyes: Pupils are equal, round, and reactive to light.   Neck: Normal range of motion.   Cardiovascular: Normal rate.   Pulmonary/Chest: Breath sounds normal.   Neurological: He is alert.   Skin: Skin is warm.   Psychiatric: He has a normal mood and affect.       Results Review:  Lab:     Cr Clearance Estimated Creatinine Clearance: 69.5 mL/min (by C-G formula based on SCr of 1.1 mg/dL).    Radiology:     Cardiology: Sinus rhythm      Assessment: #1 shortness of breath with swelling of the feet  2.  Abnormal IV study  3.  Diabetes  4.  Hypertension  5.   Hyperlipidemia    Plan: Cardiac catheterization      Angina at rest (CMS/[facility])    Abnormal nuclear stress test      Assessment & Plan        I discussed the patients findings and my recommendations with patient and family.          Bautitsa Lopez MD  07/19/19  12:13 PM

## 2019-07-23 ENCOUNTER — TELEPHONE (OUTPATIENT)
Dept: CARDIOLOGY | Facility: CLINIC | Age: 69
End: 2019-07-23

## 2019-07-24 ENCOUNTER — TELEPHONE (OUTPATIENT)
Dept: CARDIAC SURGERY | Facility: CLINIC | Age: 69
End: 2019-07-24

## 2019-07-24 ENCOUNTER — PREP FOR SURGERY (OUTPATIENT)
Dept: OTHER | Facility: HOSPITAL | Age: 69
End: 2019-07-24

## 2019-07-24 DIAGNOSIS — R79.1 ABNORMAL COAGULATION PROFILE: ICD-10-CM

## 2019-07-24 DIAGNOSIS — R79.9 ABNORMAL FINDING OF BLOOD CHEMISTRY: ICD-10-CM

## 2019-07-24 DIAGNOSIS — I25.118 CORONARY ARTERY DISEASE OF NATIVE ARTERY OF NATIVE HEART WITH STABLE ANGINA PECTORIS (HCC): Primary | ICD-10-CM

## 2019-07-24 RX ORDER — SODIUM CHLORIDE 0.9 % (FLUSH) 0.9 %
30 SYRINGE (ML) INJECTION ONCE AS NEEDED
Status: CANCELLED | OUTPATIENT
Start: 2019-07-24

## 2019-07-24 RX ORDER — ACETAMINOPHEN 325 MG/1
650 TABLET ORAL EVERY 4 HOURS PRN
Status: CANCELLED | OUTPATIENT
Start: 2019-07-24

## 2019-07-24 RX ORDER — SODIUM CHLORIDE 9 MG/ML
30 INJECTION, SOLUTION INTRAVENOUS CONTINUOUS PRN
Status: CANCELLED | OUTPATIENT
Start: 2019-07-24

## 2019-07-24 RX ORDER — ALPRAZOLAM 0.25 MG/1
0.25 TABLET ORAL ONCE
Status: CANCELLED | OUTPATIENT
Start: 2019-07-24 | End: 2019-07-24

## 2019-07-24 RX ORDER — SODIUM CHLORIDE 0.9 % (FLUSH) 0.9 %
3-10 SYRINGE (ML) INJECTION AS NEEDED
Status: CANCELLED | OUTPATIENT
Start: 2019-07-24

## 2019-07-24 RX ORDER — CHLORHEXIDINE GLUCONATE 0.12 MG/ML
15 RINSE ORAL EVERY 12 HOURS
Status: CANCELLED | OUTPATIENT
Start: 2019-07-24 | End: 2019-07-25

## 2019-07-24 RX ORDER — CHLORHEXIDINE GLUCONATE 500 MG/1
1 CLOTH TOPICAL EVERY 12 HOURS PRN
Status: CANCELLED | OUTPATIENT
Start: 2019-07-24

## 2019-07-24 RX ORDER — NITROGLYCERIN 0.4 MG/1
0.4 TABLET SUBLINGUAL
Status: CANCELLED | OUTPATIENT
Start: 2019-07-24

## 2019-07-24 RX ORDER — SODIUM CHLORIDE 0.9 % (FLUSH) 0.9 %
3 SYRINGE (ML) INJECTION EVERY 12 HOURS SCHEDULED
Status: CANCELLED | OUTPATIENT
Start: 2019-07-24

## 2019-07-24 RX ORDER — CHLORHEXIDINE GLUCONATE 0.12 MG/ML
15 RINSE ORAL ONCE
Status: CANCELLED | OUTPATIENT
Start: 2019-07-24 | End: 2019-07-24

## 2019-07-24 NOTE — TELEPHONE ENCOUNTER
Called and spoke to Mr. Trinidad on Tuesday, July 23 about a possible surgery date.  Explained to him that I was going to check with Lyubov about adding him on as a to follow case when I saw him next.  Darling called me on Wednesday, July 24 to ask about if I had talked to him. I called the patient and left message to let him know that I am still waiting to see Lyubov.

## 2019-07-26 PROBLEM — I25.118 CORONARY ARTERY DISEASE OF NATIVE ARTERY OF NATIVE HEART WITH STABLE ANGINA PECTORIS: Status: ACTIVE | Noted: 2019-07-26

## 2019-07-29 ENCOUNTER — TELEPHONE (OUTPATIENT)
Dept: CARDIAC SURGERY | Facility: CLINIC | Age: 69
End: 2019-07-29

## 2019-07-29 RX ORDER — GLIPIZIDE 10 MG/1
TABLET ORAL
Qty: 270 TABLET | Refills: 0 | Status: SHIPPED | OUTPATIENT
Start: 2019-07-29 | End: 2019-08-13 | Stop reason: HOSPADM

## 2019-07-29 RX ORDER — AMLODIPINE BESYLATE 5 MG/1
5 TABLET ORAL DAILY
Qty: 90 TABLET | Refills: 0 | Status: SHIPPED | OUTPATIENT
Start: 2019-07-29 | End: 2019-10-21 | Stop reason: SDUPTHER

## 2019-07-29 NOTE — TELEPHONE ENCOUNTER
"Refill?    The pharmacy changed SIG from \"5 mg bid before meals\" to \"take 2 tabs po every AM and take 1 tab po every evening\".  "

## 2019-07-29 NOTE — TELEPHONE ENCOUNTER
Spoke to Vladimir Amos to let him know that his surgery date has been changed from 8-7-2019 to 8-8-2019 per Dr. Mcarthur's request.  He expressed a verbal understanding of this.

## 2019-08-06 ENCOUNTER — PREP FOR SURGERY (OUTPATIENT)
Dept: OTHER | Facility: HOSPITAL | Age: 69
End: 2019-08-06

## 2019-08-06 ENCOUNTER — LAB (OUTPATIENT)
Dept: LAB | Facility: HOSPITAL | Age: 69
End: 2019-08-06

## 2019-08-06 ENCOUNTER — HOSPITAL ENCOUNTER (OUTPATIENT)
Dept: CARDIOLOGY | Facility: HOSPITAL | Age: 69
Discharge: HOME | End: 2019-08-06

## 2019-08-06 ENCOUNTER — HOSPITAL ENCOUNTER (OUTPATIENT)
Dept: GENERAL RADIOLOGY | Facility: HOSPITAL | Age: 69
Discharge: HOME | End: 2019-08-06
Admitting: NURSE PRACTITIONER

## 2019-08-06 ENCOUNTER — APPOINTMENT (OUTPATIENT)
Dept: PREADMISSION TESTING | Facility: HOSPITAL | Age: 69
End: 2019-08-06

## 2019-08-06 VITALS
OXYGEN SATURATION: 96 % | RESPIRATION RATE: 16 BRPM | DIASTOLIC BLOOD PRESSURE: 79 MMHG | HEART RATE: 75 BPM | TEMPERATURE: 98.2 F | WEIGHT: 211.2 LBS | BODY MASS INDEX: 33.15 KG/M2 | HEIGHT: 67 IN | SYSTOLIC BLOOD PRESSURE: 133 MMHG

## 2019-08-06 DIAGNOSIS — I25.118 CORONARY ARTERY DISEASE OF NATIVE ARTERY OF NATIVE HEART WITH STABLE ANGINA PECTORIS (HCC): ICD-10-CM

## 2019-08-06 DIAGNOSIS — R79.9 ABNORMAL FINDING OF BLOOD CHEMISTRY: ICD-10-CM

## 2019-08-06 DIAGNOSIS — R79.1 ABNORMAL COAGULATION PROFILE: ICD-10-CM

## 2019-08-06 LAB
ABO GROUP BLD: NORMAL
ALBUMIN SERPL-MCNC: 3.8 G/DL (ref 3.5–4.8)
ALBUMIN/GLOB SERPL: 1.2 G/DL (ref 1–1.7)
ALP SERPL-CCNC: 47 U/L (ref 32–91)
ALT SERPL W P-5'-P-CCNC: 23 U/L (ref 17–63)
ANION GAP SERPL CALCULATED.3IONS-SCNC: 16.8 MMOL/L (ref 5–15)
APTT PPP: 24.8 SECONDS (ref 24–31)
ARTERIAL PATENCY WRIST A: POSITIVE
AST SERPL-CCNC: 23 U/L (ref 15–41)
ATMOSPHERIC PRESS: ABNORMAL MMHG
BASE EXCESS BLDA CALC-SCNC: 0.5 MMOL/L (ref 0–3)
BASOPHILS # BLD AUTO: 0.1 10*3/MM3 (ref 0–0.2)
BASOPHILS NFR BLD AUTO: 1.1 % (ref 0–1.5)
BDY SITE: ABNORMAL
BILIRUB SERPL-MCNC: 0.7 MG/DL (ref 0.3–1.2)
BILIRUB UR QL STRIP: NEGATIVE
BLD GP AB SCN SERPL QL: NEGATIVE
BUN BLD-MCNC: 20 MG/DL (ref 8–20)
BUN/CREAT SERPL: 15.4 (ref 6.2–20.3)
CALCIUM SPEC-SCNC: 9 MG/DL (ref 8.9–10.3)
CHLORIDE SERPL-SCNC: 100 MMOL/L (ref 101–111)
CLARITY UR: CLEAR
CLOSE TME COLL+ADP + EPINEP PNL BLD: 96 % (ref 86–100)
CO2 BLDA-SCNC: 26 MMOL/L (ref 22–29)
CO2 SERPL-SCNC: 22 MMOL/L (ref 22–32)
COLOR UR: YELLOW
CREAT BLD-MCNC: 1.3 MG/DL (ref 0.7–1.2)
DEPRECATED RDW RBC AUTO: 41.1 FL (ref 37–54)
EOSINOPHIL # BLD AUTO: 0.1 10*3/MM3 (ref 0–0.4)
EOSINOPHIL NFR BLD AUTO: 1.8 % (ref 0.3–6.2)
ERYTHROCYTE [DISTWIDTH] IN BLOOD BY AUTOMATED COUNT: 13.2 % (ref 12.3–15.4)
GFR SERPL CREATININE-BSD FRML MDRD: 55 ML/MIN/1.73
GLOBULIN UR ELPH-MCNC: 3.1 GM/DL (ref 2.5–3.8)
GLUCOSE BLD-MCNC: 241 MG/DL (ref 65–99)
GLUCOSE UR STRIP-MCNC: ABNORMAL MG/DL
HBA1C MFR BLD: 7.4 % (ref 3.5–5.6)
HCO3 BLDA-SCNC: 24.8 MMOL/L (ref 21–28)
HCT VFR BLD AUTO: 37.5 % (ref 37.5–51)
HEMODILUTION: NO
HGB BLD-MCNC: 12.6 G/DL (ref 13–17.7)
HGB UR QL STRIP.AUTO: NEGATIVE
INR PPP: 1.03 (ref 0.9–1.1)
KETONES UR QL STRIP: NEGATIVE
LEUKOCYTE ESTERASE UR QL STRIP.AUTO: NEGATIVE
LYMPHOCYTES # BLD AUTO: 1.4 10*3/MM3 (ref 0.7–3.1)
LYMPHOCYTES NFR BLD AUTO: 21.7 % (ref 19.6–45.3)
MCH RBC QN AUTO: 29.9 PG (ref 26.6–33)
MCHC RBC AUTO-ENTMCNC: 33.7 G/DL (ref 31.5–35.7)
MCV RBC AUTO: 88.8 FL (ref 79–97)
MODALITY: ABNORMAL
MONOCYTES # BLD AUTO: 0.7 10*3/MM3 (ref 0.1–0.9)
MONOCYTES NFR BLD AUTO: 9.8 % (ref 5–12)
NEUTROPHILS # BLD AUTO: 4.4 10*3/MM3 (ref 1.7–7)
NEUTROPHILS NFR BLD AUTO: 65.6 % (ref 42.7–76)
NITRITE UR QL STRIP: NEGATIVE
NRBC BLD AUTO-RTO: 0 /100 WBC (ref 0–0.2)
PCO2 BLDA: 38 MM HG (ref 35–48)
PH BLDA: 7.42 PH UNITS (ref 7.35–7.45)
PH UR STRIP.AUTO: 6 [PH] (ref 5–8)
PLATELET # BLD AUTO: 227 10*3/MM3 (ref 140–450)
PMV BLD AUTO: 8.3 FL (ref 6–12)
PO2 BLDA: 73 MM HG (ref 83–108)
POTASSIUM BLD-SCNC: 4.8 MMOL/L (ref 3.6–5.1)
PROT SERPL-MCNC: 6.9 G/DL (ref 6.1–7.9)
PROT UR QL STRIP: NEGATIVE
PROTHROMBIN TIME: 10.5 SECONDS (ref 9.6–11.7)
RBC # BLD AUTO: 4.23 10*6/MM3 (ref 4.14–5.8)
RH BLD: NEGATIVE
SAO2 % BLDCOA: 94.9 % (ref 94–98)
SODIUM BLD-SCNC: 134 MMOL/L (ref 136–144)
SP GR UR STRIP: 1.01 (ref 1–1.03)
T&S EXPIRATION DATE: NORMAL
UROBILINOGEN UR QL STRIP: ABNORMAL
WBC NRBC COR # BLD: 6.7 10*3/MM3 (ref 3.4–10.8)

## 2019-08-06 PROCEDURE — 86900 BLOOD TYPING SEROLOGIC ABO: CPT | Performed by: NURSE PRACTITIONER

## 2019-08-06 PROCEDURE — 80053 COMPREHEN METABOLIC PANEL: CPT | Performed by: NURSE PRACTITIONER

## 2019-08-06 PROCEDURE — 36600 WITHDRAWAL OF ARTERIAL BLOOD: CPT | Performed by: THORACIC SURGERY (CARDIOTHORACIC VASCULAR SURGERY)

## 2019-08-06 PROCEDURE — 85610 PROTHROMBIN TIME: CPT | Performed by: NURSE PRACTITIONER

## 2019-08-06 PROCEDURE — 85576 BLOOD PLATELET AGGREGATION: CPT | Performed by: NURSE PRACTITIONER

## 2019-08-06 PROCEDURE — 71046 X-RAY EXAM CHEST 2 VIEWS: CPT

## 2019-08-06 PROCEDURE — 93005 ELECTROCARDIOGRAM TRACING: CPT | Performed by: NURSE PRACTITIONER

## 2019-08-06 PROCEDURE — 87081 CULTURE SCREEN ONLY: CPT | Performed by: NURSE PRACTITIONER

## 2019-08-06 PROCEDURE — 93010 ELECTROCARDIOGRAM REPORT: CPT | Performed by: INTERNAL MEDICINE

## 2019-08-06 PROCEDURE — 83036 HEMOGLOBIN GLYCOSYLATED A1C: CPT | Performed by: NURSE PRACTITIONER

## 2019-08-06 PROCEDURE — 86900 BLOOD TYPING SEROLOGIC ABO: CPT

## 2019-08-06 PROCEDURE — 86901 BLOOD TYPING SEROLOGIC RH(D): CPT | Performed by: NURSE PRACTITIONER

## 2019-08-06 PROCEDURE — 86850 RBC ANTIBODY SCREEN: CPT | Performed by: NURSE PRACTITIONER

## 2019-08-06 PROCEDURE — 86923 COMPATIBILITY TEST ELECTRIC: CPT

## 2019-08-06 PROCEDURE — 85730 THROMBOPLASTIN TIME PARTIAL: CPT | Performed by: NURSE PRACTITIONER

## 2019-08-06 PROCEDURE — 81003 URINALYSIS AUTO W/O SCOPE: CPT | Performed by: NURSE PRACTITIONER

## 2019-08-06 PROCEDURE — 82803 BLOOD GASES ANY COMBINATION: CPT | Performed by: THORACIC SURGERY (CARDIOTHORACIC VASCULAR SURGERY)

## 2019-08-06 PROCEDURE — 36415 COLL VENOUS BLD VENIPUNCTURE: CPT

## 2019-08-06 PROCEDURE — 86901 BLOOD TYPING SEROLOGIC RH(D): CPT

## 2019-08-06 PROCEDURE — 85025 COMPLETE CBC W/AUTO DIFF WBC: CPT

## 2019-08-07 ENCOUNTER — ANESTHESIA EVENT (OUTPATIENT)
Dept: PERIOP | Facility: HOSPITAL | Age: 69
End: 2019-08-07

## 2019-08-08 ENCOUNTER — APPOINTMENT (OUTPATIENT)
Dept: GENERAL RADIOLOGY | Facility: HOSPITAL | Age: 69
End: 2019-08-08

## 2019-08-08 ENCOUNTER — ANESTHESIA (OUTPATIENT)
Dept: PERIOP | Facility: HOSPITAL | Age: 69
End: 2019-08-08

## 2019-08-08 ENCOUNTER — HOSPITAL ENCOUNTER (INPATIENT)
Facility: HOSPITAL | Age: 69
LOS: 5 days | Discharge: NURSING, REHAB OR LONG-TERM CARE | End: 2019-08-13
Attending: THORACIC SURGERY (CARDIOTHORACIC VASCULAR SURGERY) | Admitting: THORACIC SURGERY (CARDIOTHORACIC VASCULAR SURGERY)

## 2019-08-08 DIAGNOSIS — Z95.1 S/P CABG (CORONARY ARTERY BYPASS GRAFT): Primary | ICD-10-CM

## 2019-08-08 DIAGNOSIS — I25.118 CORONARY ARTERY DISEASE OF NATIVE ARTERY OF NATIVE HEART WITH STABLE ANGINA PECTORIS (HCC): ICD-10-CM

## 2019-08-08 PROBLEM — I25.10 CAD (CORONARY ARTERY DISEASE): Status: ACTIVE | Noted: 2019-08-08

## 2019-08-08 LAB
ABO + RH BLD: NORMAL
ABO + RH BLD: NORMAL
ACT BLD: 114 SECONDS (ref 89–137)
ACT BLD: 131 SECONDS (ref 89–137)
ACT BLD: 324 SECONDS (ref 89–137)
ACT BLD: 412 SECONDS (ref 89–137)
ACT BLD: 428 SECONDS (ref 89–137)
ALBUMIN SERPL-MCNC: 3.9 G/DL (ref 3.5–4.8)
ANION GAP SERPL CALCULATED.3IONS-SCNC: 13.8 MMOL/L (ref 5–15)
APTT PPP: 24.4 SECONDS (ref 24–31)
APTT PPP: 25.3 SECONDS (ref 24–31)
ARTERIAL PATENCY WRIST A: ABNORMAL
ATMOSPHERIC PRESS: ABNORMAL MMHG
BASE DEFICIT: ABNORMAL MEQ/LITER
BASE EXCESS BLDA CALC-SCNC: -0.6 MMOL/L (ref 0–3)
BASE EXCESS BLDA CALC-SCNC: -0.7 MMOL/L (ref 0–3)
BASE EXCESS BLDA CALC-SCNC: -0.7 MMOL/L (ref 0–3)
BASE EXCESS BLDA CALC-SCNC: -1.5 MMOL/L (ref 0–3)
BASE EXCESS BLDA CALC-SCNC: -1.7 MMOL/L (ref 0–3)
BASE EXCESS BLDA CALC-SCNC: -3.1 MMOL/L (ref 0–3)
BASE EXCESS BLDA CALC-SCNC: 1 MMOL/L (ref 0–3)
BASE EXCESS BLDA CALC-SCNC: 3 MMOL/L (ref 0–3)
BASE EXCESS BLDA CALC-SCNC: 6 MMOL/L (ref 0–3)
BASE EXCESS BLDA CALC-SCNC: <0 MMOL/L (ref 0–3)
BASE EXCESS BLDV CALC-SCNC: ABNORMAL MMOL/L (ref 0–3)
BDY SITE: ABNORMAL
BH BB BLOOD EXPIRATION DATE: NORMAL
BH BB BLOOD EXPIRATION DATE: NORMAL
BH BB BLOOD TYPE BARCODE: 600
BH BB BLOOD TYPE BARCODE: 600
BH BB DISPENSE STATUS: NORMAL
BH BB DISPENSE STATUS: NORMAL
BH BB PRODUCT CODE: NORMAL
BH BB PRODUCT CODE: NORMAL
BH BB UNIT NUMBER: NORMAL
BH BB UNIT NUMBER: NORMAL
BODY TEMPERATURE: 93.5 C
BODY TEMPERATURE: 94.3 C
BUN BLD-MCNC: 13 MG/DL (ref 8–20)
BUN/CREAT SERPL: 13 (ref 6.2–20.3)
CA-I BLDA-SCNC: 0.92 MMOL/L (ref 1.15–1.33)
CA-I BLDA-SCNC: 1.02 MMOL/L (ref 1.12–1.32)
CA-I BLDA-SCNC: 1.02 MMOL/L (ref 1.15–1.33)
CA-I BLDA-SCNC: 1.05 MMOL/L (ref 1.15–1.33)
CA-I BLDA-SCNC: 1.07 MMOL/L (ref 1.15–1.33)
CA-I BLDA-SCNC: 1.09 MMOL/L (ref 1.12–1.32)
CA-I BLDA-SCNC: 1.1 MMOL/L (ref 1.12–1.32)
CA-I BLDA-SCNC: 1.16 MMOL/L (ref 1.15–1.33)
CA-I BLDA-SCNC: 1.17 MMOL/L (ref 1.12–1.32)
CA-I BLDA-SCNC: 1.22 MMOL/L (ref 1.15–1.33)
CA-I BLDA-SCNC: 1.41 MMOL/L (ref 1.12–1.32)
CA-I SERPL ISE-MCNC: 1.27 MMOL/L (ref 1.2–1.3)
CALCIUM SPEC-SCNC: 8.9 MG/DL (ref 8.9–10.3)
CHLORIDE SERPL-SCNC: 105 MMOL/L (ref 101–111)
CLOSE TME COLL+ADP + EPINEP PNL BLD: 93 % (ref 86–100)
CO2 BLDA-SCNC: 20.7 MMOL/L (ref 22–29)
CO2 BLDA-SCNC: 24 MMOL/L (ref 22–29)
CO2 BLDA-SCNC: 25 MMOL/L (ref 22–29)
CO2 BLDA-SCNC: 25 MMOL/L (ref 23–27)
CO2 BLDA-SCNC: 25.1 MMOL/L (ref 22–29)
CO2 BLDA-SCNC: 25.2 MMOL/L (ref 22–29)
CO2 BLDA-SCNC: 25.3 MMOL/L (ref 22–29)
CO2 BLDA-SCNC: 27 MMOL/L (ref 23–27)
CO2 BLDA-SCNC: 30 MMOL/L (ref 23–27)
CO2 BLDA-SCNC: 33 MMOL/L (ref 23–27)
CO2 CONTENT VENOUS: 29 MMOL/L (ref 24–29)
CO2 SERPL-SCNC: 21 MMOL/L (ref 22–32)
CREAT BLD-MCNC: 1 MG/DL (ref 0.7–1.2)
DEPRECATED RDW RBC AUTO: 39.8 FL (ref 37–54)
DEPRECATED RDW RBC AUTO: 39.8 FL (ref 37–54)
ERYTHROCYTE [DISTWIDTH] IN BLOOD BY AUTOMATED COUNT: 12.9 % (ref 12.3–15.4)
ERYTHROCYTE [DISTWIDTH] IN BLOOD BY AUTOMATED COUNT: 13.1 % (ref 12.3–15.4)
FIBRINOGEN PPP-MCNC: 217 MG/DL (ref 210–450)
GFR SERPL CREATININE-BSD FRML MDRD: 74 ML/MIN/1.73
GLUCOSE BLD-MCNC: 119 MG/DL (ref 65–99)
GLUCOSE BLDC GLUCOMTR-MCNC: 111 MG/DL (ref 70–105)
GLUCOSE BLDC GLUCOMTR-MCNC: 117 MG/DL (ref 70–105)
GLUCOSE BLDC GLUCOMTR-MCNC: 119 MG/DL (ref 70–105)
GLUCOSE BLDC GLUCOMTR-MCNC: 128 MG/DL (ref 74–100)
GLUCOSE BLDC GLUCOMTR-MCNC: 131 MG/DL (ref 70–105)
GLUCOSE BLDC GLUCOMTR-MCNC: 131 MG/DL (ref 70–105)
GLUCOSE BLDC GLUCOMTR-MCNC: 133 MG/DL (ref 70–105)
GLUCOSE BLDC GLUCOMTR-MCNC: 139 MG/DL (ref 74–100)
GLUCOSE BLDC GLUCOMTR-MCNC: 141 MG/DL (ref 74–100)
GLUCOSE BLDC GLUCOMTR-MCNC: 142 MG/DL (ref 74–100)
GLUCOSE BLDC GLUCOMTR-MCNC: 143 MG/DL (ref 70–105)
GLUCOSE BLDC GLUCOMTR-MCNC: 147 MG/DL (ref 70–105)
GLUCOSE BLDC GLUCOMTR-MCNC: 150 MG/DL (ref 74–100)
GLUCOSE BLDC GLUCOMTR-MCNC: 161 MG/DL (ref 74–100)
HCO3 BLDA-SCNC: 19.8 MMOL/L (ref 21–28)
HCO3 BLDA-SCNC: 22.8 MMOL/L (ref 21–28)
HCO3 BLDA-SCNC: 23.5 MMOL/L (ref 22–26)
HCO3 BLDA-SCNC: 23.8 MMOL/L (ref 21–28)
HCO3 BLDA-SCNC: 23.9 MMOL/L (ref 21–28)
HCO3 BLDA-SCNC: 24 MMOL/L (ref 21–28)
HCO3 BLDA-SCNC: 24 MMOL/L (ref 21–28)
HCO3 BLDA-SCNC: 26.1 MMOL/L (ref 22–26)
HCO3 BLDA-SCNC: 28.1 MMOL/L (ref 22–26)
HCO3 BLDA-SCNC: 31.3 MMOL/L (ref 22–26)
HCO3 BLDV-SCNC: 27.9 MMOL/L (ref 23–28)
HCT VFR BLD AUTO: 24.8 % (ref 37.5–51)
HCT VFR BLD AUTO: 24.8 % (ref 37.5–51)
HCT VFR BLD AUTO: 30.4 % (ref 37.5–51)
HCT VFR BLDA CALC: 25 % (ref 38–51)
HCT VFR BLDA CALC: 26 % (ref 38–51)
HCT VFR BLDA CALC: 26 % (ref 38–51)
HCT VFR BLDA CALC: 27 % (ref 38–51)
HCT VFR BLDA CALC: 29 % (ref 38–51)
HCT VFR BLDA CALC: 30 % (ref 38–51)
HCT VFR BLDA CALC: 30 % (ref 38–51)
HCT VFR BLDA CALC: 31 % (ref 38–51)
HCT VFR BLDA CALC: 32 % (ref 38–51)
HEMODILUTION: YES
HGB BLD-MCNC: 10.3 G/DL (ref 13–17.7)
HGB BLD-MCNC: 8.7 G/DL (ref 13–17.7)
HGB BLD-MCNC: 8.7 G/DL (ref 13–17.7)
HGB BLDA-MCNC: 10.1 G/DL (ref 12–17)
HGB BLDA-MCNC: 10.2 G/DL (ref 12–17)
HGB BLDA-MCNC: 10.5 G/DL (ref 12–17)
HGB BLDA-MCNC: 10.6 G/DL (ref 12–17)
HGB BLDA-MCNC: 10.6 G/DL (ref 12–17)
HGB BLDA-MCNC: 10.7 G/DL (ref 12–17)
HGB BLDA-MCNC: 8.5 G/DL (ref 12–17)
HGB BLDA-MCNC: 8.8 G/DL (ref 12–17)
HGB BLDA-MCNC: 8.8 G/DL (ref 12–17)
HGB BLDA-MCNC: 9.2 G/DL (ref 12–17)
HGB BLDA-MCNC: 9.7 G/DL (ref 12–17)
HOROWITZ INDEX BLD+IHG-RTO: 40 %
HOROWITZ INDEX BLD+IHG-RTO: 40 %
HOROWITZ INDEX BLD+IHG-RTO: 50 %
HOROWITZ INDEX BLD+IHG-RTO: 50 %
HOROWITZ INDEX BLD+IHG-RTO: 70 %
HOROWITZ INDEX BLD+IHG-RTO: 70 %
INR PPP: 1.18 (ref 0.9–1.1)
INR PPP: 1.37 (ref 0.9–1.1)
LYMPHOCYTES # BLD MANUAL: 2.26 10*3/MM3 (ref 0.7–3.1)
LYMPHOCYTES NFR BLD MANUAL: 19 % (ref 19.6–45.3)
LYMPHOCYTES NFR BLD MANUAL: 6 % (ref 5–12)
MCH RBC QN AUTO: 29.3 PG (ref 26.6–33)
MCH RBC QN AUTO: 30.7 PG (ref 26.6–33)
MCHC RBC AUTO-ENTMCNC: 33.8 G/DL (ref 31.5–35.7)
MCHC RBC AUTO-ENTMCNC: 35.2 G/DL (ref 31.5–35.7)
MCV RBC AUTO: 86.7 FL (ref 79–97)
MCV RBC AUTO: 87.2 FL (ref 79–97)
MODALITY: ABNORMAL
MONOCYTES # BLD AUTO: 0.71 10*3/MM3 (ref 0.1–0.9)
MRSA SPEC QL CULT: NORMAL
NEUTROPHILS # BLD AUTO: 8.93 10*3/MM3 (ref 1.7–7)
NEUTROPHILS NFR BLD MANUAL: 74 % (ref 42.7–76)
NEUTS BAND NFR BLD MANUAL: 1 % (ref 0–5)
PCO2 BLDA: 28.2 MM HG (ref 35–48)
PCO2 BLDA: 36.8 MM HG (ref 35–48)
PCO2 BLDA: 37.2 MM HG (ref 35–48)
PCO2 BLDA: 38.4 MM HG (ref 35–48)
PCO2 BLDA: 38.8 MM HG (ref 35–48)
PCO2 BLDA: 42 MM HG (ref 35–45)
PCO2 BLDA: 43 MM HG (ref 35–45)
PCO2 BLDA: 43 MM HG (ref 35–48)
PCO2 BLDA: 47 MM HG (ref 35–45)
PCO2 BLDA: 52 MM HG (ref 35–45)
PCO2 BLDV: 51 MM HG (ref 41–51)
PCO2 TEMP ADJ BLD: 33.6 MM HG (ref 35–48)
PCO2 TEMP ADJ BLD: 34.3 MM HG (ref 35–48)
PEEP RESPIRATORY: 8 CM[H2O]
PH BLDA: 7.35 PH UNITS (ref 7.35–7.45)
PH BLDA: 7.36 PH UNITS (ref 7.35–7.45)
PH BLDA: 7.39 PH UNITS (ref 7.35–7.45)
PH BLDA: 7.39 PH UNITS (ref 7.35–7.45)
PH BLDA: 7.4 PH UNITS (ref 7.35–7.45)
PH BLDA: 7.41 PH UNITS (ref 7.35–7.45)
PH BLDA: 7.46 PH UNITS (ref 7.35–7.45)
PH BLDV: 7.34 PH UNITS (ref 7.31–7.41)
PH, TEMP CORRECTED: 7.44 PH UNITS (ref 7.35–7.45)
PH, TEMP CORRECTED: 7.45 PH UNITS (ref 7.35–7.45)
PHOSPHATE SERPL-MCNC: 3.4 MG/DL (ref 2.4–4.7)
PLAT MORPH BLD: NORMAL
PLATELET # BLD AUTO: 124 10*3/MM3 (ref 140–450)
PLATELET # BLD AUTO: 124 10*3/MM3 (ref 140–450)
PLATELET # BLD AUTO: 139 10*3/MM3 (ref 140–450)
PMV BLD AUTO: 8 FL (ref 6–12)
PMV BLD AUTO: 8.3 FL (ref 6–12)
PO2 BLDA: 102.6 MM HG (ref 83–108)
PO2 BLDA: 103.3 MM HG (ref 83–108)
PO2 BLDA: 123.6 MM HG (ref 83–108)
PO2 BLDA: 164 MM HG (ref 83–108)
PO2 BLDA: 173.9 MM HG (ref 83–108)
PO2 BLDA: 284 MM HG (ref 80–105)
PO2 BLDA: 321 MM HG (ref 80–105)
PO2 BLDA: 358 MM HG (ref 80–105)
PO2 BLDA: 463 MM HG (ref 80–105)
PO2 BLDA: 89.9 MM HG (ref 83–108)
PO2 BLDV: 49 MM HG
PO2 TEMP ADJ BLD: 161.2 MM HG (ref 83–108)
PO2 TEMP ADJ BLD: 87.5 MM HG (ref 83–108)
POTASSIUM BLD-SCNC: 3.8 MMOL/L (ref 3.6–5.1)
POTASSIUM BLDA-SCNC: 3.6 MMOL/L (ref 3.5–4.5)
POTASSIUM BLDA-SCNC: 3.8 MMOL/L (ref 3.5–4.5)
POTASSIUM BLDA-SCNC: 3.8 MMOL/L (ref 3.5–4.9)
POTASSIUM BLDA-SCNC: 3.9 MMOL/L (ref 3.5–4.5)
POTASSIUM BLDA-SCNC: 4 MMOL/L (ref 3.5–4.5)
POTASSIUM BLDA-SCNC: 4 MMOL/L (ref 3.5–4.5)
POTASSIUM BLDA-SCNC: 4.2 MMOL/L (ref 3.5–4.5)
POTASSIUM BLDA-SCNC: 4.2 MMOL/L (ref 3.5–4.9)
POTASSIUM BLDA-SCNC: 4.2 MMOL/L (ref 3.5–4.9)
POTASSIUM BLDA-SCNC: 5 MMOL/L (ref 3.5–4.9)
POTASSIUM BLDA-SCNC: 5.3 MMOL/L (ref 3.5–4.9)
PROTHROMBIN TIME: 11.9 SECONDS (ref 9.6–11.7)
PROTHROMBIN TIME: 13.5 SECONDS (ref 9.6–11.7)
PSV: 10 CMH2O
RBC # BLD AUTO: 2.85 10*6/MM3 (ref 4.14–5.8)
RBC # BLD AUTO: 3.51 10*6/MM3 (ref 4.14–5.8)
RBC MORPH BLD: NORMAL
RESPIRATORY RATE: 14
SAO2 % BLDCOA: 100 % (ref 95–98)
SAO2 % BLDCOA: 97.5 % (ref 94–98)
SAO2 % BLDCOA: 97.6 % (ref 94–98)
SAO2 % BLDCOA: 98 % (ref 94–98)
SAO2 % BLDCOA: 98.8 % (ref 94–98)
SAO2 % BLDCOA: 99.5 % (ref 94–98)
SAO2 % BLDCOA: 99.6 % (ref 94–98)
SAO2 % BLDCOV: 81 %
SCAN SLIDE: NORMAL
SODIUM BLD-SCNC: 136 MMOL/L (ref 136–144)
SODIUM BLDA-SCNC: 134 MMOL/L (ref 138–146)
SODIUM BLDA-SCNC: 138 MMOL/L (ref 138–146)
SODIUM BLDA-SCNC: 139 MMOL/L (ref 138–146)
SODIUM BLDA-SCNC: 139 MMOL/L (ref 138–146)
SODIUM BLDA-SCNC: 141 MMOL/L (ref 138–146)
UNIT  ABO: NORMAL
UNIT  ABO: NORMAL
UNIT  RH: NORMAL
UNIT  RH: NORMAL
VENTILATOR MODE: ABNORMAL
VT ON VENT VENT: 600 ML
WBC MORPH BLD: NORMAL
WBC NRBC COR # BLD: 10.6 10*3/MM3 (ref 3.4–10.8)
WBC NRBC COR # BLD: 11.9 10*3/MM3 (ref 3.4–10.8)

## 2019-08-08 PROCEDURE — 99233 SBSQ HOSP IP/OBS HIGH 50: CPT | Performed by: INTERNAL MEDICINE

## 2019-08-08 PROCEDURE — 33519 CABG ARTERY-VEIN THREE: CPT | Performed by: PHYSICIAN ASSISTANT

## 2019-08-08 PROCEDURE — 25010000002 HEPARIN (PORCINE) PER 1000 UNITS: Performed by: ANESTHESIOLOGY

## 2019-08-08 PROCEDURE — 93005 ELECTROCARDIOGRAM TRACING: CPT | Performed by: NURSE PRACTITIONER

## 2019-08-08 PROCEDURE — C1751 CATH, INF, PER/CENT/MIDLINE: HCPCS | Performed by: ANESTHESIOLOGY

## 2019-08-08 PROCEDURE — P9041 ALBUMIN (HUMAN),5%, 50ML: HCPCS | Performed by: NURSE PRACTITIONER

## 2019-08-08 PROCEDURE — 5A1221Z PERFORMANCE OF CARDIAC OUTPUT, CONTINUOUS: ICD-10-PCS | Performed by: THORACIC SURGERY (CARDIOTHORACIC VASCULAR SURGERY)

## 2019-08-08 PROCEDURE — 85610 PROTHROMBIN TIME: CPT | Performed by: THORACIC SURGERY (CARDIOTHORACIC VASCULAR SURGERY)

## 2019-08-08 PROCEDURE — 33533 CABG ARTERIAL SINGLE: CPT | Performed by: PHYSICIAN ASSISTANT

## 2019-08-08 PROCEDURE — 25010000002 FENTANYL CITRATE (PF) 250 MCG/5ML SOLUTION: Performed by: ANESTHESIOLOGY

## 2019-08-08 PROCEDURE — 33508 ENDOSCOPIC VEIN HARVEST: CPT | Performed by: THORACIC SURGERY (CARDIOTHORACIC VASCULAR SURGERY)

## 2019-08-08 PROCEDURE — 85576 BLOOD PLATELET AGGREGATION: CPT | Performed by: THORACIC SURGERY (CARDIOTHORACIC VASCULAR SURGERY)

## 2019-08-08 PROCEDURE — 82962 GLUCOSE BLOOD TEST: CPT

## 2019-08-08 PROCEDURE — C1713 ANCHOR/SCREW BN/BN,TIS/BN: HCPCS | Performed by: THORACIC SURGERY (CARDIOTHORACIC VASCULAR SURGERY)

## 2019-08-08 PROCEDURE — 85384 FIBRINOGEN ACTIVITY: CPT | Performed by: THORACIC SURGERY (CARDIOTHORACIC VASCULAR SURGERY)

## 2019-08-08 PROCEDURE — 85025 COMPLETE CBC W/AUTO DIFF WBC: CPT | Performed by: NURSE PRACTITIONER

## 2019-08-08 PROCEDURE — 84132 ASSAY OF SERUM POTASSIUM: CPT

## 2019-08-08 PROCEDURE — 93318 ECHO TRANSESOPHAGEAL INTRAOP: CPT | Performed by: ANESTHESIOLOGY

## 2019-08-08 PROCEDURE — 06BQ4ZZ EXCISION OF LEFT SAPHENOUS VEIN, PERCUTANEOUS ENDOSCOPIC APPROACH: ICD-10-PCS | Performed by: THORACIC SURGERY (CARDIOTHORACIC VASCULAR SURGERY)

## 2019-08-08 PROCEDURE — 94799 UNLISTED PULMONARY SVC/PX: CPT

## 2019-08-08 PROCEDURE — 25010000002 MAGNESIUM SULFATE IN D5W 1G/100ML (PREMIX) 1-5 GM/100ML-% SOLUTION: Performed by: NURSE PRACTITIONER

## 2019-08-08 PROCEDURE — 33533 CABG ARTERIAL SINGLE: CPT | Performed by: THORACIC SURGERY (CARDIOTHORACIC VASCULAR SURGERY)

## 2019-08-08 PROCEDURE — 25010000002 PROPOFOL 200 MG/20ML EMULSION: Performed by: ANESTHESIOLOGY

## 2019-08-08 PROCEDURE — 85730 THROMBOPLASTIN TIME PARTIAL: CPT | Performed by: NURSE PRACTITIONER

## 2019-08-08 PROCEDURE — 85049 AUTOMATED PLATELET COUNT: CPT | Performed by: THORACIC SURGERY (CARDIOTHORACIC VASCULAR SURGERY)

## 2019-08-08 PROCEDURE — 93010 ELECTROCARDIOGRAM REPORT: CPT | Performed by: INTERNAL MEDICINE

## 2019-08-08 PROCEDURE — 85347 COAGULATION TIME ACTIVATED: CPT

## 2019-08-08 PROCEDURE — 85027 COMPLETE CBC AUTOMATED: CPT | Performed by: THORACIC SURGERY (CARDIOTHORACIC VASCULAR SURGERY)

## 2019-08-08 PROCEDURE — 84295 ASSAY OF SERUM SODIUM: CPT

## 2019-08-08 PROCEDURE — A4648 IMPLANTABLE TISSUE MARKER: HCPCS | Performed by: THORACIC SURGERY (CARDIOTHORACIC VASCULAR SURGERY)

## 2019-08-08 PROCEDURE — 85007 BL SMEAR W/DIFF WBC COUNT: CPT | Performed by: NURSE PRACTITIONER

## 2019-08-08 PROCEDURE — 25010000002 MAGNESIUM SULFATE PER 500 MG OF MAGNESIUM: Performed by: ANESTHESIOLOGY

## 2019-08-08 PROCEDURE — 82330 ASSAY OF CALCIUM: CPT | Performed by: NURSE PRACTITIONER

## 2019-08-08 PROCEDURE — 85018 HEMOGLOBIN: CPT | Performed by: THORACIC SURGERY (CARDIOTHORACIC VASCULAR SURGERY)

## 2019-08-08 PROCEDURE — 25010000002 ONDANSETRON PER 1 MG: Performed by: ANESTHESIOLOGY

## 2019-08-08 PROCEDURE — 25010000003 POTASSIUM CHLORIDE 10 MEQ/100ML SOLUTION: Performed by: NURSE PRACTITIONER

## 2019-08-08 PROCEDURE — 85730 THROMBOPLASTIN TIME PARTIAL: CPT | Performed by: THORACIC SURGERY (CARDIOTHORACIC VASCULAR SURGERY)

## 2019-08-08 PROCEDURE — 85610 PROTHROMBIN TIME: CPT | Performed by: NURSE PRACTITIONER

## 2019-08-08 PROCEDURE — 85014 HEMATOCRIT: CPT

## 2019-08-08 PROCEDURE — 94002 VENT MGMT INPAT INIT DAY: CPT

## 2019-08-08 PROCEDURE — 021109W BYPASS CORONARY ARTERY, TWO ARTERIES FROM AORTA WITH AUTOLOGOUS VENOUS TISSUE, OPEN APPROACH: ICD-10-PCS | Performed by: THORACIC SURGERY (CARDIOTHORACIC VASCULAR SURGERY)

## 2019-08-08 PROCEDURE — 82803 BLOOD GASES ANY COMBINATION: CPT

## 2019-08-08 PROCEDURE — 82947 ASSAY GLUCOSE BLOOD QUANT: CPT

## 2019-08-08 PROCEDURE — 80051 ELECTROLYTE PANEL: CPT

## 2019-08-08 PROCEDURE — 82330 ASSAY OF CALCIUM: CPT

## 2019-08-08 PROCEDURE — 25010000002 MIDAZOLAM PER 1 MG: Performed by: ANESTHESIOLOGY

## 2019-08-08 PROCEDURE — 80069 RENAL FUNCTION PANEL: CPT | Performed by: NURSE PRACTITIONER

## 2019-08-08 PROCEDURE — 25010000002 ALBUMIN HUMAN 5% PER 50 ML: Performed by: NURSE PRACTITIONER

## 2019-08-08 PROCEDURE — 25010000002 PAPAVERINE PER 60 MG: Performed by: THORACIC SURGERY (CARDIOTHORACIC VASCULAR SURGERY)

## 2019-08-08 PROCEDURE — 06BP4ZZ EXCISION OF RIGHT SAPHENOUS VEIN, PERCUTANEOUS ENDOSCOPIC APPROACH: ICD-10-PCS | Performed by: THORACIC SURGERY (CARDIOTHORACIC VASCULAR SURGERY)

## 2019-08-08 PROCEDURE — 25010000002 MORPHINE PER 10 MG: Performed by: NURSE PRACTITIONER

## 2019-08-08 PROCEDURE — C1729 CATH, DRAINAGE: HCPCS | Performed by: THORACIC SURGERY (CARDIOTHORACIC VASCULAR SURGERY)

## 2019-08-08 PROCEDURE — 25010000002 CEFAZOLIN PER 500 MG: Performed by: NURSE PRACTITIONER

## 2019-08-08 PROCEDURE — 33519 CABG ARTERY-VEIN THREE: CPT | Performed by: THORACIC SURGERY (CARDIOTHORACIC VASCULAR SURGERY)

## 2019-08-08 PROCEDURE — 71045 X-RAY EXAM CHEST 1 VIEW: CPT

## 2019-08-08 PROCEDURE — 25010000002 PROTAMINE SULFATE PER 10 MG: Performed by: ANESTHESIOLOGY

## 2019-08-08 PROCEDURE — 25010000002 HEPARIN (PORCINE) PER 1000 UNITS: Performed by: THORACIC SURGERY (CARDIOTHORACIC VASCULAR SURGERY)

## 2019-08-08 PROCEDURE — 33508 ENDOSCOPIC VEIN HARVEST: CPT | Performed by: PHYSICIAN ASSISTANT

## 2019-08-08 PROCEDURE — 63710000001 INSULIN REGULAR HUMAN PER 5 UNITS: Performed by: ANESTHESIOLOGY

## 2019-08-08 PROCEDURE — 02110Z9 BYPASS CORONARY ARTERY, TWO ARTERIES FROM LEFT INTERNAL MAMMARY, OPEN APPROACH: ICD-10-PCS | Performed by: THORACIC SURGERY (CARDIOTHORACIC VASCULAR SURGERY)

## 2019-08-08 PROCEDURE — 85014 HEMATOCRIT: CPT | Performed by: THORACIC SURGERY (CARDIOTHORACIC VASCULAR SURGERY)

## 2019-08-08 PROCEDURE — 85018 HEMOGLOBIN: CPT

## 2019-08-08 DEVICE — SS SUTURE, 6 PER SLEEVE
Type: IMPLANTABLE DEVICE | Status: FUNCTIONAL
Brand: MYO/WIRE II

## 2019-08-08 DEVICE — WAX,BONE,NATURAL
Type: IMPLANTABLE DEVICE | Status: FUNCTIONAL
Brand: MEDLINE INDUSTRIES

## 2019-08-08 DEVICE — SS SUTURE, 3 PER SLEEVE
Type: IMPLANTABLE DEVICE | Status: FUNCTIONAL
Brand: MYO/WIRE II

## 2019-08-08 RX ORDER — SODIUM CHLORIDE 9 MG/ML
9 INJECTION, SOLUTION INTRAVENOUS CONTINUOUS PRN
Status: DISCONTINUED | OUTPATIENT
Start: 2019-08-08 | End: 2019-08-13 | Stop reason: HOSPADM

## 2019-08-08 RX ORDER — SODIUM CHLORIDE 9 MG/ML
30 INJECTION, SOLUTION INTRAVENOUS CONTINUOUS PRN
Status: DISCONTINUED | OUTPATIENT
Start: 2019-08-08 | End: 2019-08-13 | Stop reason: HOSPADM

## 2019-08-08 RX ORDER — POTASSIUM CHLORIDE 1.5 G/1.77G
20 POWDER, FOR SOLUTION ORAL AS NEEDED
Status: DISCONTINUED | OUTPATIENT
Start: 2019-08-09 | End: 2019-08-13 | Stop reason: HOSPADM

## 2019-08-08 RX ORDER — CHLORHEXIDINE GLUCONATE 500 MG/1
1 CLOTH TOPICAL EVERY 12 HOURS PRN
Status: DISCONTINUED | OUTPATIENT
Start: 2019-08-08 | End: 2019-08-08 | Stop reason: HOSPADM

## 2019-08-08 RX ORDER — SODIUM CHLORIDE 0.9 % (FLUSH) 0.9 %
3 SYRINGE (ML) INJECTION EVERY 12 HOURS SCHEDULED
Status: DISCONTINUED | OUTPATIENT
Start: 2019-08-08 | End: 2019-08-08

## 2019-08-08 RX ORDER — ATORVASTATIN CALCIUM 40 MG/1
40 TABLET, FILM COATED ORAL NIGHTLY
Status: DISCONTINUED | OUTPATIENT
Start: 2019-08-09 | End: 2019-08-13 | Stop reason: HOSPADM

## 2019-08-08 RX ORDER — SODIUM CHLORIDE, SODIUM LACTATE, POTASSIUM CHLORIDE, CALCIUM CHLORIDE 600; 310; 30; 20 MG/100ML; MG/100ML; MG/100ML; MG/100ML
9 INJECTION, SOLUTION INTRAVENOUS CONTINUOUS PRN
Status: DISCONTINUED | OUTPATIENT
Start: 2019-08-08 | End: 2019-08-13 | Stop reason: HOSPADM

## 2019-08-08 RX ORDER — VECURONIUM BROMIDE 1 MG/ML
INJECTION, POWDER, LYOPHILIZED, FOR SOLUTION INTRAVENOUS AS NEEDED
Status: DISCONTINUED | OUTPATIENT
Start: 2019-08-08 | End: 2019-08-08 | Stop reason: SURG

## 2019-08-08 RX ORDER — ALBUMIN, HUMAN INJ 5% 5 %
1000 SOLUTION INTRAVENOUS AS NEEDED
Status: DISCONTINUED | OUTPATIENT
Start: 2019-08-08 | End: 2019-08-09

## 2019-08-08 RX ORDER — SODIUM CHLORIDE 0.9 % (FLUSH) 0.9 %
3 SYRINGE (ML) INJECTION EVERY 12 HOURS SCHEDULED
Status: DISCONTINUED | OUTPATIENT
Start: 2019-08-08 | End: 2019-08-08 | Stop reason: HOSPADM

## 2019-08-08 RX ORDER — POTASSIUM CHLORIDE 7.45 MG/ML
10 INJECTION INTRAVENOUS
Status: DISCONTINUED | OUTPATIENT
Start: 2019-08-08 | End: 2019-08-13 | Stop reason: HOSPADM

## 2019-08-08 RX ORDER — MEPERIDINE HYDROCHLORIDE 25 MG/ML
25 INJECTION INTRAMUSCULAR; INTRAVENOUS; SUBCUTANEOUS EVERY 4 HOURS PRN
Status: DISCONTINUED | OUTPATIENT
Start: 2019-08-08 | End: 2019-08-09

## 2019-08-08 RX ORDER — FENTANYL CITRATE 50 UG/ML
INJECTION, SOLUTION INTRAMUSCULAR; INTRAVENOUS AS NEEDED
Status: DISCONTINUED | OUTPATIENT
Start: 2019-08-08 | End: 2019-08-08 | Stop reason: SURG

## 2019-08-08 RX ORDER — LIDOCAINE HYDROCHLORIDE 10 MG/ML
0.5 INJECTION, SOLUTION EPIDURAL; INFILTRATION; INTRACAUDAL; PERINEURAL ONCE AS NEEDED
Status: DISCONTINUED | OUTPATIENT
Start: 2019-08-08 | End: 2019-08-08 | Stop reason: HOSPADM

## 2019-08-08 RX ORDER — NALOXONE HCL 0.4 MG/ML
0.4 VIAL (ML) INJECTION
Status: DISCONTINUED | OUTPATIENT
Start: 2019-08-08 | End: 2019-08-09

## 2019-08-08 RX ORDER — PANTOPRAZOLE SODIUM 40 MG/1
40 TABLET, DELAYED RELEASE ORAL
Status: DISCONTINUED | OUTPATIENT
Start: 2019-08-09 | End: 2019-08-13 | Stop reason: HOSPADM

## 2019-08-08 RX ORDER — KETAMINE HYDROCHLORIDE 10 MG/ML
INJECTION INTRAMUSCULAR; INTRAVENOUS AS NEEDED
Status: DISCONTINUED | OUTPATIENT
Start: 2019-08-08 | End: 2019-08-08 | Stop reason: SURG

## 2019-08-08 RX ORDER — DOCUSATE SODIUM 100 MG/1
100 CAPSULE, LIQUID FILLED ORAL 2 TIMES DAILY
Status: DISCONTINUED | OUTPATIENT
Start: 2019-08-08 | End: 2019-08-13 | Stop reason: HOSPADM

## 2019-08-08 RX ORDER — SODIUM CHLORIDE 0.9 % (FLUSH) 0.9 %
3-10 SYRINGE (ML) INJECTION AS NEEDED
Status: DISCONTINUED | OUTPATIENT
Start: 2019-08-08 | End: 2019-08-08

## 2019-08-08 RX ORDER — ASPIRIN 325 MG
325 TABLET ORAL ONCE
Status: COMPLETED | OUTPATIENT
Start: 2019-08-08 | End: 2019-08-08

## 2019-08-08 RX ORDER — ACETAMINOPHEN 650 MG
TABLET, EXTENDED RELEASE ORAL AS NEEDED
Status: DISCONTINUED | OUTPATIENT
Start: 2019-08-08 | End: 2019-08-08 | Stop reason: HOSPADM

## 2019-08-08 RX ORDER — ALBUMIN, HUMAN INJ 5% 5 %
SOLUTION INTRAVENOUS
Status: COMPLETED
Start: 2019-08-08 | End: 2019-08-08

## 2019-08-08 RX ORDER — ASPIRIN 81 MG/1
81 TABLET ORAL DAILY
Status: DISCONTINUED | OUTPATIENT
Start: 2019-08-09 | End: 2019-08-13 | Stop reason: HOSPADM

## 2019-08-08 RX ORDER — MIDAZOLAM HYDROCHLORIDE 1 MG/ML
INJECTION INTRAMUSCULAR; INTRAVENOUS AS NEEDED
Status: DISCONTINUED | OUTPATIENT
Start: 2019-08-08 | End: 2019-08-08 | Stop reason: SURG

## 2019-08-08 RX ORDER — AMINOCAPROIC ACID 250 MG/ML
INJECTION, SOLUTION INTRAVENOUS AS NEEDED
Status: DISCONTINUED | OUTPATIENT
Start: 2019-08-08 | End: 2019-08-08 | Stop reason: SURG

## 2019-08-08 RX ORDER — HEPARIN SODIUM 1000 [USP'U]/ML
INJECTION, SOLUTION INTRAVENOUS; SUBCUTANEOUS AS NEEDED
Status: DISCONTINUED | OUTPATIENT
Start: 2019-08-08 | End: 2019-08-08 | Stop reason: SURG

## 2019-08-08 RX ORDER — SODIUM CHLORIDE 0.9 % (FLUSH) 0.9 %
30 SYRINGE (ML) INJECTION ONCE AS NEEDED
Status: DISCONTINUED | OUTPATIENT
Start: 2019-08-08 | End: 2019-08-08 | Stop reason: HOSPADM

## 2019-08-08 RX ORDER — SODIUM CHLORIDE 9 MG/ML
30 INJECTION, SOLUTION INTRAVENOUS CONTINUOUS
Status: DISCONTINUED | OUTPATIENT
Start: 2019-08-08 | End: 2019-08-13 | Stop reason: HOSPADM

## 2019-08-08 RX ORDER — AMIODARONE HCL/D5W 450 MG/250
1 PLASTIC BAG, INJECTION (ML) INTRAVENOUS CONTINUOUS
Status: DISCONTINUED | OUTPATIENT
Start: 2019-08-08 | End: 2019-08-08

## 2019-08-08 RX ORDER — NITROGLYCERIN 10 MG/100ML
5-50 INJECTION INTRAVENOUS CONTINUOUS PRN
Status: DISCONTINUED | OUTPATIENT
Start: 2019-08-08 | End: 2019-08-09

## 2019-08-08 RX ORDER — ONDANSETRON 2 MG/ML
INJECTION INTRAMUSCULAR; INTRAVENOUS AS NEEDED
Status: DISCONTINUED | OUTPATIENT
Start: 2019-08-08 | End: 2019-08-08 | Stop reason: SURG

## 2019-08-08 RX ORDER — MORPHINE SULFATE 4 MG/ML
2 INJECTION, SOLUTION INTRAMUSCULAR; INTRAVENOUS
Status: DISCONTINUED | OUTPATIENT
Start: 2019-08-08 | End: 2019-08-09

## 2019-08-08 RX ORDER — PANTOPRAZOLE SODIUM 40 MG/10ML
40 INJECTION, POWDER, LYOPHILIZED, FOR SOLUTION INTRAVENOUS
Status: COMPLETED | OUTPATIENT
Start: 2019-08-08 | End: 2019-08-08

## 2019-08-08 RX ORDER — AMIODARONE HCL/D5W 450 MG/250
0.5 PLASTIC BAG, INJECTION (ML) INTRAVENOUS CONTINUOUS
Status: DISCONTINUED | OUTPATIENT
Start: 2019-08-08 | End: 2019-08-09

## 2019-08-08 RX ORDER — XYLITOL/YERBA SANTA
2 AEROSOL, SPRAY WITH PUMP (ML) MUCOUS MEMBRANE EVERY 4 HOURS PRN
Status: DISCONTINUED | OUTPATIENT
Start: 2019-08-08 | End: 2019-08-09

## 2019-08-08 RX ORDER — CHLORHEXIDINE GLUCONATE 0.12 MG/ML
15 RINSE ORAL EVERY 12 HOURS
Status: DISCONTINUED | OUTPATIENT
Start: 2019-08-09 | End: 2019-08-11

## 2019-08-08 RX ORDER — NITROGLYCERIN 0.4 MG/1
0.4 TABLET SUBLINGUAL
Status: DISCONTINUED | OUTPATIENT
Start: 2019-08-08 | End: 2019-08-08 | Stop reason: HOSPADM

## 2019-08-08 RX ORDER — POTASSIUM CHLORIDE 7.45 MG/ML
INJECTION INTRAVENOUS
Status: DISPENSED
Start: 2019-08-08 | End: 2019-08-09

## 2019-08-08 RX ORDER — SODIUM CHLORIDE 0.9 % (FLUSH) 0.9 %
3-10 SYRINGE (ML) INJECTION AS NEEDED
Status: DISCONTINUED | OUTPATIENT
Start: 2019-08-08 | End: 2019-08-08 | Stop reason: HOSPADM

## 2019-08-08 RX ORDER — ALPRAZOLAM 0.25 MG/1
0.25 TABLET ORAL ONCE
Status: DISCONTINUED | OUTPATIENT
Start: 2019-08-08 | End: 2019-08-08 | Stop reason: HOSPADM

## 2019-08-08 RX ORDER — BISACODYL 10 MG
10 SUPPOSITORY, RECTAL RECTAL DAILY PRN
Status: DISCONTINUED | OUTPATIENT
Start: 2019-08-09 | End: 2019-08-13 | Stop reason: HOSPADM

## 2019-08-08 RX ORDER — SENNA PLUS 8.6 MG/1
2 TABLET ORAL NIGHTLY
Status: DISCONTINUED | OUTPATIENT
Start: 2019-08-09 | End: 2019-08-13 | Stop reason: HOSPADM

## 2019-08-08 RX ORDER — MAGNESIUM SULFATE 1 G/100ML
1 INJECTION INTRAVENOUS EVERY 8 HOURS
Status: COMPLETED | OUTPATIENT
Start: 2019-08-08 | End: 2019-08-09

## 2019-08-08 RX ORDER — ONDANSETRON 2 MG/ML
4 INJECTION INTRAMUSCULAR; INTRAVENOUS EVERY 6 HOURS PRN
Status: DISCONTINUED | OUTPATIENT
Start: 2019-08-08 | End: 2019-08-13 | Stop reason: HOSPADM

## 2019-08-08 RX ORDER — HYDROCODONE BITARTRATE AND ACETAMINOPHEN 5; 325 MG/1; MG/1
1 TABLET ORAL EVERY 4 HOURS PRN
Status: DISCONTINUED | OUTPATIENT
Start: 2019-08-08 | End: 2019-08-13 | Stop reason: HOSPADM

## 2019-08-08 RX ORDER — ACETAMINOPHEN 325 MG/1
650 TABLET ORAL EVERY 4 HOURS PRN
Status: DISCONTINUED | OUTPATIENT
Start: 2019-08-08 | End: 2019-08-08 | Stop reason: HOSPADM

## 2019-08-08 RX ORDER — NICARDIPINE HYDROCHLORIDE 2.5 MG/ML
INJECTION INTRAVENOUS AS NEEDED
Status: DISCONTINUED | OUTPATIENT
Start: 2019-08-08 | End: 2019-08-08 | Stop reason: SURG

## 2019-08-08 RX ORDER — NOREPINEPHRINE BITARTRATE/D5W 8 MG/250ML
PLASTIC BAG, INJECTION (ML) INTRAVENOUS CONTINUOUS PRN
Status: DISCONTINUED | OUTPATIENT
Start: 2019-08-08 | End: 2019-08-08 | Stop reason: SURG

## 2019-08-08 RX ORDER — POLYETHYLENE GLYCOL 3350 17 G/17G
17 POWDER, FOR SOLUTION ORAL 2 TIMES DAILY
Status: DISCONTINUED | OUTPATIENT
Start: 2019-08-09 | End: 2019-08-13 | Stop reason: HOSPADM

## 2019-08-08 RX ORDER — PROPOFOL 10 MG/ML
INJECTION, EMULSION INTRAVENOUS AS NEEDED
Status: DISCONTINUED | OUTPATIENT
Start: 2019-08-08 | End: 2019-08-08 | Stop reason: SURG

## 2019-08-08 RX ORDER — POTASSIUM CHLORIDE 20 MEQ/1
20 TABLET, EXTENDED RELEASE ORAL AS NEEDED
Status: DISCONTINUED | OUTPATIENT
Start: 2019-08-09 | End: 2019-08-13 | Stop reason: HOSPADM

## 2019-08-08 RX ORDER — CHLORHEXIDINE GLUCONATE 0.12 MG/ML
15 RINSE ORAL ONCE
Status: DISCONTINUED | OUTPATIENT
Start: 2019-08-08 | End: 2019-08-08 | Stop reason: HOSPADM

## 2019-08-08 RX ORDER — DEXMEDETOMIDINE HYDROCHLORIDE 4 UG/ML
.2-.7 INJECTION, SOLUTION INTRAVENOUS
Status: DISCONTINUED | OUTPATIENT
Start: 2019-08-08 | End: 2019-08-09

## 2019-08-08 RX ADMIN — MAGNESIUM SULFATE HEPTAHYDRATE 1 G: 1 INJECTION, SOLUTION INTRAVENOUS at 22:31

## 2019-08-08 RX ADMIN — METOPROLOL TARTRATE 12.5 MG: 25 TABLET, FILM COATED ORAL at 08:03

## 2019-08-08 RX ADMIN — ALBUMIN HUMAN 250 ML: 0.05 INJECTION, SOLUTION INTRAVENOUS at 23:57

## 2019-08-08 RX ADMIN — POTASSIUM CHLORIDE 10 MEQ: 10 INJECTION, SOLUTION INTRAVENOUS at 21:09

## 2019-08-08 RX ADMIN — ASPIRIN 325 MG ORAL TABLET 325 MG: 325 PILL ORAL at 18:41

## 2019-08-08 RX ADMIN — POTASSIUM CHLORIDE 10 MEQ: 10 INJECTION, SOLUTION INTRAVENOUS at 17:13

## 2019-08-08 RX ADMIN — FENTANYL CITRATE 250 MCG: 50 INJECTION, SOLUTION INTRAMUSCULAR; INTRAVENOUS at 12:45

## 2019-08-08 RX ADMIN — FENTANYL CITRATE 500 MCG: 50 INJECTION, SOLUTION INTRAMUSCULAR; INTRAVENOUS at 15:02

## 2019-08-08 RX ADMIN — VECURONIUM BROMIDE 10 MG: 1 INJECTION, POWDER, LYOPHILIZED, FOR SOLUTION INTRAVENOUS at 11:53

## 2019-08-08 RX ADMIN — SODIUM CHLORIDE 2 UNITS/HR: 900 INJECTION INTRAVENOUS at 14:44

## 2019-08-08 RX ADMIN — SODIUM CHLORIDE: 0.9 INJECTION, SOLUTION INTRAVENOUS at 11:43

## 2019-08-08 RX ADMIN — NOREPINEPHRINE BITARTRATE 0.03 MCG/KG/MIN: 8 SOLUTION at 11:59

## 2019-08-08 RX ADMIN — PROTAMINE SULFATE 400 MG: 10 INJECTION, SOLUTION INTRAVENOUS at 15:10

## 2019-08-08 RX ADMIN — ALBUMIN HUMAN 250 ML: 0.05 INJECTION, SOLUTION INTRAVENOUS at 17:15

## 2019-08-08 RX ADMIN — MORPHINE SULFATE 2 MG: 4 INJECTION INTRAVENOUS at 22:58

## 2019-08-08 RX ADMIN — MAGNESIUM SULFATE HEPTAHYDRATE 2 G: 500 INJECTION, SOLUTION INTRAMUSCULAR; INTRAVENOUS at 15:02

## 2019-08-08 RX ADMIN — MIDAZOLAM HYDROCHLORIDE 4 MG: 1 INJECTION, SOLUTION INTRAMUSCULAR; INTRAVENOUS at 15:02

## 2019-08-08 RX ADMIN — MUPIROCIN 1 APPLICATION: 20 OINTMENT TOPICAL at 20:43

## 2019-08-08 RX ADMIN — SODIUM CHLORIDE 0.5 MCG/HR: 900 INJECTION INTRAVENOUS at 12:22

## 2019-08-08 RX ADMIN — MIDAZOLAM HYDROCHLORIDE 4 MG: 1 INJECTION, SOLUTION INTRAMUSCULAR; INTRAVENOUS at 11:43

## 2019-08-08 RX ADMIN — MEPERIDINE HYDROCHLORIDE 25 MG: 25 INJECTION INTRAMUSCULAR; INTRAVENOUS; SUBCUTANEOUS at 19:38

## 2019-08-08 RX ADMIN — FENTANYL CITRATE 200 MCG: 50 INJECTION, SOLUTION INTRAMUSCULAR; INTRAVENOUS at 11:56

## 2019-08-08 RX ADMIN — AMINOCAPROIC ACID 10 G: 250 INJECTION, SOLUTION INTRAVENOUS at 15:19

## 2019-08-08 RX ADMIN — POTASSIUM CHLORIDE 10 MEQ: 10 INJECTION, SOLUTION INTRAVENOUS at 18:41

## 2019-08-08 RX ADMIN — CEFAZOLIN SODIUM 2 G: 1 INJECTION, POWDER, FOR SOLUTION INTRAMUSCULAR; INTRAVENOUS at 15:19

## 2019-08-08 RX ADMIN — FENTANYL CITRATE 500 MCG: 50 INJECTION, SOLUTION INTRAMUSCULAR; INTRAVENOUS at 12:30

## 2019-08-08 RX ADMIN — KETAMINE HYDROCHLORIDE 50 MG: 10 INJECTION INTRAMUSCULAR; INTRAVENOUS at 11:53

## 2019-08-08 RX ADMIN — CEFAZOLIN SODIUM 2 G: 10 INJECTION, POWDER, FOR SOLUTION INTRAVENOUS at 22:30

## 2019-08-08 RX ADMIN — AMINOCAPROIC ACID 10 G: 250 INJECTION, SOLUTION INTRAVENOUS at 12:09

## 2019-08-08 RX ADMIN — SODIUM CHLORIDE 30 ML/HR: 900 INJECTION, SOLUTION INTRAVENOUS at 18:46

## 2019-08-08 RX ADMIN — HEPARIN SODIUM 29000 UNITS: 1000 INJECTION, SOLUTION INTRAVENOUS; SUBCUTANEOUS at 13:53

## 2019-08-08 RX ADMIN — VECURONIUM BROMIDE 10 MG: 1 INJECTION, POWDER, LYOPHILIZED, FOR SOLUTION INTRAVENOUS at 13:57

## 2019-08-08 RX ADMIN — CEFAZOLIN SODIUM 2 G: 1 INJECTION, POWDER, FOR SOLUTION INTRAMUSCULAR; INTRAVENOUS at 12:21

## 2019-08-08 RX ADMIN — VECURONIUM BROMIDE 10 MG: 1 INJECTION, POWDER, LYOPHILIZED, FOR SOLUTION INTRAVENOUS at 15:02

## 2019-08-08 RX ADMIN — FENTANYL CITRATE 50 MCG: 50 INJECTION, SOLUTION INTRAMUSCULAR; INTRAVENOUS at 11:43

## 2019-08-08 RX ADMIN — PROPOFOL 70 MG: 10 INJECTION, EMULSION INTRAVENOUS at 11:53

## 2019-08-08 RX ADMIN — ONDANSETRON 4 MG: 2 INJECTION INTRAMUSCULAR; INTRAVENOUS at 15:46

## 2019-08-08 RX ADMIN — Medication 1 MG/MIN: at 15:23

## 2019-08-08 RX ADMIN — NICARDIPINE HYDROCHLORIDE 1 MG: 2.5 INJECTION INTRAVENOUS at 12:31

## 2019-08-08 RX ADMIN — PANTOPRAZOLE SODIUM 40 MG: 40 INJECTION, POWDER, FOR SOLUTION INTRAVENOUS at 18:42

## 2019-08-08 RX ADMIN — Medication 10000 UNITS: at 12:57

## 2019-08-08 NOTE — ANESTHESIA POSTPROCEDURE EVALUATION
Patient: Shaka Trinidad    Procedure Summary     Date:  08/08/19 Room / Location:  [facility] CVOR 01 / [facility]    Anesthesia Start:  1143 Anesthesia Stop:  1614    Procedure:  CORONARY ARTERY BYPASS GRAFTING (N/A Chest) Diagnosis:       Coronary artery disease of native artery of native heart with stable angina pectoris (CMS/HCC)      (Coronary artery disease of native artery of native heart with stable angina pectoris (CMS/HCC) [I25.118])    Surgeon:  Chet Mcarthur MD Provider:  Mike Rainey MD    Anesthesia Type:  general ASA Status:  4          Anesthesia Type: general  Last vitals  BP   148/78 (08/08/19 0746)   Temp   97.9 °F (36.6 °C) (08/08/19 0746)   Pulse   77 (08/08/19 0746)   Resp   21 (08/08/19 0746)     SpO2   95 % (08/08/19 0746)     Post Anesthesia Care and Evaluation    Patient location during evaluation: ICU  Patient participation: complete - patient cannot participate  Level of consciousness: obtunded/minimal responses  Pain scale: See nurse's notes for pain score.  Pain management: adequate  Airway patency: patent  Anesthetic complications: No anesthetic complications  PONV Status: none  Cardiovascular status: acceptable  Respiratory status: acceptable  Hydration status: acceptable    Comments: Patient seen and examined postoperatively; vital signs stable; SpO2 greater than or equal to 90%; cardiopulmonary status stable; nausea/vomiting adequately controlled; pain adequately controlled; no apparent anesthesia complications; patient discharged from anesthesia care when discharge criteria were met

## 2019-08-08 NOTE — ANESTHESIA PREPROCEDURE EVALUATION
Anesthesia Evaluation     Patient summary reviewed   no history of anesthetic complications:  NPO Solid Status: > 8 hours  NPO Liquid Status: > 8 hours           Airway   Mallampati: II  TM distance: >3 FB  Neck ROM: full  No difficulty expected  Dental      Pulmonary - normal exam   (+) sleep apnea,   (-) not a smoker  Cardiovascular - normal exam    ECG reviewed  Beta blocker given within 24 hours of surgery    (+) hypertension poorly controlled, CAD, angina with exertion, hyperlipidemia,       Neuro/Psych  GI/Hepatic/Renal/Endo    (+)   diabetes mellitus type 2 well controlled,     Musculoskeletal     Abdominal  - normal exam   Substance History      OB/GYN          Other                      Anesthesia Plan    ASA 4     general   (A line. Central line. [place]. JOHN.)  intravenous induction   Anesthetic plan, all risks, benefits, and alternatives have been provided, discussed and informed consent has been obtained with: patient.

## 2019-08-08 NOTE — ANESTHESIA PROCEDURE NOTES
Central Line      Patient location during procedure: OR  Indications: central pressure monitoring, vascular access and MD/Surgeon request  Staff  Anesthesiologist: Mike Rainey MD  Preanesthetic Checklist  Completed: patient identified, site marked, surgical consent, pre-op evaluation, timeout performed, IV checked, risks and benefits discussed and monitors and equipment checked  Central Line Prep  Sterile Tech:gloves, cap, gown, mask and sterile barriers  Prep: chloraprep  Patient monitoring: blood pressure monitoring, continuous pulse oximetry and EKG  Central Line Procedure  Laterality:right  Location:internal jugular  Catheter Type:MAC  Catheter Size:9 Fr  Guidance:ultrasound guided  PROCEDURE NOTE/ULTRASOUND INTERPRETATION.  Using ultrasound guidance the potential vascular sites for insertion of the catheter were visualized to determine the patency of the vessel to be used for vascular access.  After selecting the appropriate site for insertion, the needle was visualized under ultrasound being inserted into the internal jugular vein, followed by ultrasound confirmation of wire and catheter placement. There were no abnormalities seen on ultrasound; an image was taken; and the patient tolerated the procedure with no complications. Images: still images not obtained  Assessment  Post procedure:biopatch applied, line sutured and occlusive dressing applied  Assessement:blood return through all ports, free fluid flow, chest x-ray ordered and Mario Test  Complications:no  Patient Tolerance:patient tolerated the procedure well with no apparent complications

## 2019-08-08 NOTE — PROGRESS NOTES
Referring Provider: Cardiac surgeon    Reason for follow-up: Coronary artery bypass surgery     Patient Care Team:  Tangela Jc NP as PCP - General  Tangela Jc NP as PCP - Claims Attributed  Tangela Jc NP as PCP - Family Medicine    Subjective .  Intubated    Objective  Intubated     Review of Systems   Unable to perform ROS: intubated       Patient has no known allergies.    Scheduled Meds:    [START ON 8/9/2019] aspirin 81 mg Oral Daily   aspirin 325 mg Oral Once   [START ON 8/9/2019] atorvastatin 40 mg Oral Nightly   ceFAZolin 2 g Intravenous Q8H   [START ON 8/9/2019] chlorhexidine 15 mL Mouth/Throat Q12H   docusate sodium 100 mg Oral BID   [START ON 8/10/2019] enoxaparin 40 mg Subcutaneous Daily   [START ON 8/11/2019] insulin lispro 0-9 Units Subcutaneous 4x Daily With Meals & Nightly   magnesium sulfate 1 g Intravenous Q8H   mupirocin 1 application Each Nare BID   [START ON 8/9/2019] pantoprazole 40 mg Oral Q AM   pantoprazole 40 mg Intravenous Q AM   [START ON 8/9/2019] polyethylene glycol 17 g Oral BID   potassium chloride      [START ON 8/9/2019] senna 2 tablet Oral Nightly     Continuous Infusions:    amiodarone 0.5 mg/min   dexmedetomidine 0.2-0.7 mcg/kg/hr   insulin 0-50 Units/hr   insulin 0-50 Units/hr   lactated ringers 9 mL/hr   nitroglycerin 5-50 mcg/min   sodium chloride 9 mL/hr   sodium chloride 30 mL/hr   sodium chloride 30 mL/hr     PRN Meds:.•  albumin human  •  [START ON 8/9/2019] bisacodyl  •  HYDROcodone-acetaminophen  •  insulin  •  insulin  •  lactated ringers  •  meperidine  •  Morphine **AND** naloxone  •  MOUTH KOTE  •  nitroglycerin  •  ondansetron  •  [START ON 8/9/2019] potassium chloride **OR** [START ON 8/9/2019] potassium chloride  •  potassium chloride **OR** potassium chloride  •  sodium chloride  •  sodium chloride        VITAL SIGNS  Vitals:    08/08/19 0746   BP: 148/78   BP Location: Right arm   Patient Position: Lying   Pulse: 77   Resp: 21   Temp: 97.9 °F (36.6  "°C)   TempSrc: Temporal   SpO2: 95%   Weight: 95.8 kg (211 lb 3.2 oz)   Height: 170.2 cm (67\")       Flowsheet Rows      First Filed Value   Admission Height  170.2 cm (67\") Documented at 08/08/2019 0746   Admission Weight  95.8 kg (211 lb 3.2 oz) Documented at 08/08/2019 0746           TELEMETRY: Ventricular pacemaker rhythm    Physical Exam:  Physical Exam   Constitutional: He appears well-developed.   HENT:   Head: Normocephalic and atraumatic.   Eyes: Conjunctivae are normal. No scleral icterus.   Neck: Neck supple. No JVD present. Carotid bruit is not present.   Cardiovascular: Normal rate, regular rhythm, S1 normal, S2 normal, normal heart sounds and intact distal pulses. PMI is not displaced.   Pulmonary/Chest: Effort normal and breath sounds normal. He has no wheezes. He has no rales.   Abdominal: Soft. Bowel sounds are normal.   Musculoskeletal: Normal range of motion.   Neurological: He is alert. He has normal strength.   Intubated   Skin: Skin is warm and dry. No rash noted.   Psychiatric:   Intubated        Results Review:   I reviewed the patient's new clinical results.  Lab Results (last 24 hours)     Procedure Component Value Units Date/Time    Calcium, Ionized [[id number]]  (Normal) Collected:  08/08/19 1635    Specimen:  Blood Updated:  08/08/19 1729     Ionized Calcium 1.27 mmol/L     CBC & Differential [[id number]] Collected:  08/08/19 1635    Specimen:  Blood Updated:  08/08/19 1716    Narrative:       The following orders were created for panel order CBC & Differential.  Procedure                               Abnormality         Status                     ---------                               -----------         ------                     Scan Slide[[id number]]                                       Final result               CBC Auto Differential[[id number]]        Abnormal            Final result                 Please view results for these tests on the individual orders.    CBC Auto " Differential [[id number]]  (Abnormal) Collected:  08/08/19 1635    Specimen:  Blood Updated:  08/08/19 1716     WBC 11.90 10*3/mm3      RBC 3.51 10*6/mm3      Hemoglobin 10.3 g/dL      Hematocrit 30.4 %      Comment: Result checked         MCV 86.7 fL      MCH 29.3 pg      MCHC 33.8 g/dL      RDW 13.1 %      RDW-SD 39.8 fl      MPV 8.3 fL      Platelets 139 10*3/mm3     Narrative:       The previously reported component NRBC is no longer being reported.    Scan Slide [[id number]] Collected:  08/08/19 1635    Specimen:  Blood Updated:  08/08/19 1716     Scan Slide --     Comment: See Manual Differential Results       Manual Differential [[id number]]  (Abnormal) Collected:  08/08/19 1635    Specimen:  Blood Updated:  08/08/19 1716     Neutrophil % 74.0 %      Lymphocyte % 19.0 %      Monocyte % 6.0 %      Bands %  1.0 %      Neutrophils Absolute 8.93 10*3/mm3      Lymphocytes Absolute 2.26 10*3/mm3      Monocytes Absolute 0.71 10*3/mm3      RBC Morphology Normal     WBC Morphology Normal     Platelet Morphology Normal    Renal Function Panel [[id number]]  (Abnormal) Collected:  08/08/19 1635    Specimen:  Blood Updated:  08/08/19 1714     Glucose 119 mg/dL      BUN 13 mg/dL      Creatinine 1.00 mg/dL      Sodium 136 mmol/L      Potassium 3.8 mmol/L      Chloride 105 mmol/L      CO2 21.0 mmol/L      Calcium 8.9 mg/dL      Albumin 3.90 g/dL      Phosphorus 3.4 mg/dL      Anion Gap 13.8 mmol/L      BUN/Creatinine Ratio 13.0     eGFR Non African Amer 74 mL/min/1.73     Blood Gas, Arterial [[id number]]  (Abnormal) Collected:  08/08/19 1651    Specimen:  Arterial Blood Updated:  08/08/19 1657     Site Arterial Line     Rayo's Test N/A     pH, Arterial 7.400 pH units      pCO2, Arterial 38.8 mm Hg      pO2, Arterial 103.3 mm Hg      HCO3, Arterial 24.0 mmol/L      Base Excess, Arterial -0.7 mmol/L      Comment: Serial Number: [id number]:  [id number]        O2 Saturation, Arterial 98.0 %      CO2 Content 25.2 mmol/L       Temperature 93.5 C      Barometric Pressure for Blood Gas -- mmHg      Comment: N/A        Modality Adult Vent     FIO2 70 %      Ventilator Mode ;AC     Set Tidal Volume 600     PEEP 8     pCO2, Temperature Corrected 34.3 mm Hg      pH, Temp Corrected 7.442 pH Units      pO2, Temperature Corrected 87.5 mm Hg      Hemodilution Yes     Respiratory Rate 14    POCT Electrolytes +HGB +HCT [[id number]]  (Abnormal) Collected:  08/08/19 1651    Specimen:  Blood Updated:  08/08/19 1657     Sodium 139 mmol/L      Potassium 3.6 mmol/L      Ionized Calcium 0.92 mmol/L      Comment: Serial Number: [id number]:  [id number]        Glucose 128 mg/dL      Hematocrit 29 %      Hemoglobin 9.7 g/dL     aPTT [[id number]]  (Normal) Collected:  08/08/19 1635    Specimen:  Blood Updated:  08/08/19 1657     PTT 24.4 seconds     Protime-INR [[id number]]  (Abnormal) Collected:  08/08/19 1635    Specimen:  Blood Updated:  08/08/19 1657     Protime 11.9 Seconds      INR 1.18    Basic Metabolic Panel [[id number]] Collected:  08/08/19 1635    Specimen:  Blood Updated:  08/08/19 1643    Protime-INR [[id number]]  (Abnormal) Collected:  08/08/19 1528    Specimen:  Blood, Arterial Line Updated:  08/08/19 1556     Protime 13.5 Seconds      Comment: Result checked         INR 1.37    Fibrinogen [[id number]]  (Normal) Collected:  08/08/19 1528    Specimen:  Blood, Arterial Line Updated:  08/08/19 1555     Fibrinogen 217 mg/dL     aPTT [[id number]]  (Normal) Collected:  08/08/19 1528    Specimen:  Blood, Arterial Line Updated:  08/08/19 1555     PTT 25.3 seconds     Platelet Function ADP [[id number]]  (Normal) Collected:  08/08/19 1528    Specimen:  Blood, Arterial Line Updated:  08/08/19 1554     ADP Aggregation, % Platelet 93 %     CBC (No Diff) [[id number]]  (Abnormal) Collected:  08/08/19 1528    Specimen:  Blood, Arterial Line Updated:  08/08/19 1543     WBC 10.60 10*3/mm3      RBC 2.85 10*6/mm3      Hemoglobin 8.7 g/dL      Hematocrit 24.8 %      MCV 87.2 fL       MCH 30.7 pg      MCHC 35.2 g/dL      RDW 12.9 %      RDW-SD 39.8 fl      MPV 8.0 fL      Platelets 124 10*3/mm3     Platelet Count [[id number]]  (Abnormal) Collected:  08/08/19 1528    Specimen:  Blood, Arterial Line Updated:  08/08/19 1543     Platelets 124 10*3/mm3     Hemoglobin & Hematocrit, Blood [[id number]]  (Abnormal) Collected:  08/08/19 1528    Specimen:  Blood, Arterial Line Updated:  08/08/19 1543     Hemoglobin 8.7 g/dL      Hematocrit 24.8 %     POC Chem 8, arterial (ISTAT) [[id number]]  (Abnormal) Collected:  08/08/19 1521    Specimen:  Blood Updated:  08/08/19 1537     Ionized Calcium 1.41 mmol/L      pH, Arterial 7.400 pH units      pCO2, Arterial 42.0 mm Hg      pO2, Arterial 284.0 mm Hg      HCO3, Arterial 26.1 mmol/L      Base Excess, Arterial 1.0 mmol/L      Base Deficit -- mEq/liter      O2 Saturation, Arterial 100.0 %      Glucose 133 mg/dL      Comment: Serial Number: [id number]:  [id number]        Sodium 138 mmol/L      Potassium 5.0 mmol/L      Hematocrit 26.0 %      Hemoglobin 8.8 g/dL      CO2 Content 27 mmol/L     POC Chem 8, arterial (ISTAT) [[id number]]  (Abnormal) Collected:  08/08/19 1443    Specimen:  Blood Updated:  08/08/19 1537     Ionized Calcium 1.10 mmol/L      pH, Arterial 7.387 pH units      pCO2, Arterial 47.0 mm Hg      pO2, Arterial 358.0 mm Hg      HCO3, Arterial 28.1 mmol/L      Base Excess, Arterial 3.0 mmol/L      Base Deficit -- mEq/liter      O2 Saturation, Arterial 100.0 %      Glucose 131 mg/dL      Comment: Serial Number: [id number]:  [id number]        Sodium 139 mmol/L      Potassium 5.3 mmol/L      Hematocrit 26.0 %      Hemoglobin 8.8 g/dL      CO2 Content 30 mmol/L     CG8 Venous, ISTAT [[id number]]  (Abnormal) Collected:  08/08/19 1418    Specimen:  Blood Updated:  08/08/19 1537     Glucose 117 mg/dL      Comment: Serial Number: [id number]:  [id number]        Sodium 138 mmol/L      Potassium 4.2 mmol/L      Ionized Calcium 1.09 mmol/L      Hematocrit 27.0 %       Hemoglobin 9.2 g/dL      pH, Venous 7.345 pH Units      pCO2, Venous 51.0 mm Hg      CO2 CONTENT  29 mmol/L      HCO3, Venous 27.9 mmol/L      Base Excess, Venous -- mmol/L      Base Deficit -- mEq/liter      O2 Saturation, Venous 81.0 %      pO2, Venous 49.0 mm Hg     POC Chem 8, arterial (ISTAT) [[id number]]  (Abnormal) Collected:  08/08/19 1414    Specimen:  Blood Updated:  08/08/19 1536     Ionized Calcium 1.02 mmol/L      pH, Arterial 7.386 pH units      pCO2, Arterial 52.0 mm Hg      pO2, Arterial 463.0 mm Hg      HCO3, Arterial 31.3 mmol/L      Base Excess, Arterial 6.0 mmol/L      Base Deficit -- mEq/liter      O2 Saturation, Arterial 100.0 %      Glucose 119 mg/dL      Comment: Serial Number: [id number]:  [id number]        Sodium 138 mmol/L      Potassium 4.2 mmol/L      Hematocrit 25.0 %      Hemoglobin 8.5 g/dL      CO2 Content 33 mmol/L     POC Chem 8, arterial (ISTAT) [[id number]]  (Abnormal) Collected:  08/08/19 1226    Specimen:  Blood Updated:  08/08/19 1536     Ionized Calcium 1.17 mmol/L      pH, Arterial 7.349 pH units      pCO2, Arterial 43.0 mm Hg      pO2, Arterial 321.0 mm Hg      HCO3, Arterial 23.5 mmol/L      Base Excess, Arterial <0.0 mmol/L      Base Deficit -- mEq/liter      O2 Saturation, Arterial 100.0 %      Glucose 111 mg/dL      Comment: Serial Number: [id number]:  [id number]        Sodium 134 mmol/L      Potassium 3.8 mmol/L      Hematocrit 30.0 %      Hemoglobin 10.2 g/dL      CO2 Content 25 mmol/L     POC Activated Clotting Time [[id number]]  (Abnormal) Collected:  08/08/19 1357    Specimen:  Blood Updated:  08/08/19 1527     Activated Clotting Time  428 Seconds      Comment: Serial Number: [id number]:  [id number]       POC Activated Clotting Time [[id number]]  (Normal) Collected:  08/08/19 1520    Specimen:  Blood Updated:  08/08/19 1527     Activated Clotting Time  114 Seconds      Comment: Serial Number: [id number]:  [id number]       POC Activated Clotting Time [[id number]]   (Abnormal) Collected:  08/08/19 1442    Specimen:  Blood Updated:  08/08/19 1527     Activated Clotting Time  412 Seconds      Comment: Serial Number: [id number]:  [id number]       POC Activated Clotting Time [[id number]]  (Abnormal) Collected:  08/08/19 1413    Specimen:  Blood Updated:  08/08/19 1527     Activated Clotting Time  324 Seconds      Comment: Serial Number: [id number]:  [id number]       POC Activated Clotting Time [[id number]]  (Normal) Collected:  08/08/19 1226    Specimen:  Blood Updated:  08/08/19 1526     Activated Clotting Time  131 Seconds      Comment: Serial Number: [id number]:  [id number]       POC Glucose Once [[id number]]  (Abnormal) Collected:  08/08/19 0756    Specimen:  Blood Updated:  08/08/19 0811     Glucose 147 mg/dL      Comment: Serial Number: [id number]:  [id number]             Imaging Results (last 24 hours)     Procedure Component Value Units Date/Time    XR Chest 1 View [[id number]] Collected:  08/08/19 1704     Updated:  08/08/19 1709    Narrative:       Examination: XR CHEST 1 VW-     Date of Exam: 8/8/2019 4:50 PM     Indication: Post-Op Check Line & Tube Placement;  I25.118-Atherosclerotic heart disease of native coronary artery with  other forms of angina pectoris.     Comparison: Chest radiograph dated 08/06/2019     Technique: Portable AP view of the chest was obtained.     Findings:  The endotracheal tube is 2.8 cm above the javid. Temporary pacing wires  are partially visualized. There is a gastric suction tube which courses  below the left hemidiaphragm. There is a midline mediastinal drain and a  left basilar chest tube. There is a right IJ introducer sheath with  Collins-Erich catheter tip projecting over the pulmonary artery outflow  tract. There are postsurgical changes of recent CABG. The  cardiomediastinal silhouette is within normal limits. There is aortic  arch atherosclerotic calcification. There are low lung volumes with  minimal bibasilar atelectasis and  probable trace effusions. There is no  evidence of pneumothorax. Median sternotomy wires are present and  intact.       Impression:       1.Support lines and tubes in expected position.  2.Low lung volumes with minimal bibasilar atelectasis and probable trace  effusions.     Electronically Signed By-Juan Cross On:8/8/2019 5:07 PM  This report was finalized on [id number] by  Juan Cross, .          EKG      I personally viewed and interpreted the patient's EKG/Telemetry data:    ECHOCARDIOGRAM:    STRESS MYOVIEW:    CARDIAC CATHETERIZATION:    OTHER:         Assessment/Plan     1.  Coronary artery disease status post coronary bypass surgery  2.  Hypertension  3.  Hyper lipedema  4.  Diabetes    Patient underwent coronary bypass surgery x4 vessels with LIMA to LAD and saphenous graft to the marginal branch to the diagonal branch and to the RCA.  Patient is currently intubated and sedated.  Patient's chest tubes are draining very well.  Patient will be extubated soon.  Blood pressure and heart rate are stable  I discussed the patients findings and my recommendations with family  Bautista Lopez MD  08/08/19  5:36 PM

## 2019-08-08 NOTE — ANESTHESIA PROCEDURE NOTES
Procedure Performed: Emergent/Open-Heart Anesthesia JOHN     Start Time:        End Time:      Preanesthesia Checklist:  Patient identified, IV assessed, risks and benefits discussed, monitors and equipment assessed, procedure being performed at surgeon's request and anesthesia consent obtained.    General Procedure Information  JOHN Placed for monitoring purposes only -- This is not a diagnostic JOHN  Diagnostic Indications for Echo:  assessment of surgical repair  Physician Requesting Echo: Chet Mcarthur MD  Location performed:  OR  Intubated  Bite block placed  Heart visualized  Probe Insertion:  Easy  Probe Type:  Multiplane  Modalities:  Color flow mapping    Echocardiographic and Doppler Measurements    Ventricles    Right Ventricle:  Cavity size normal.  Global function mildly impaired.    Left Ventricle:  Cavity size normal.  Hypertrophy not present.  Thrombus not present.  Global Function mildly impaired.  Ejection Fraction 40%.          Valves    Aortic Valve:  Annulus normal.  Stenosis not present.  Regurgitation absent.  Leaflets normal.  Leaflet motions normal.      Mitral Valve:  Annulus normal.  Stenosis not present.  Regurgitation trace.  Leaflets normal.  Leaflet motions normal.      Tricuspid Valve:  Annulus normal.  Stenosis not present.  Regurgitation trace.  Leaflets normal.  Leaflet motions normal.                          Other Findings  Pericardium:  normal  Pleural Effusion:  none      Anesthesia Information  Performed Personally  Anesthesiologist:  Mike Rainey MD      Echocardiogram Comments:       Trace MR post bypass. EF 50 %.

## 2019-08-08 NOTE — ANESTHESIA PROCEDURE NOTES
Airway  Urgency: elective    Airway not difficult    General Information and Staff    Patient location during procedure: OR  Anesthesiologist: Mike Rainey MD    Indications and Patient Condition  Indications for airway management: airway protection    Preoxygenated: yes  MILS maintained throughout  Mask difficulty assessment: 2 - vent by mask + OA or adjuvant +/- NMBA    Final Airway Details  Final airway type: endotracheal airway      Successful airway: ETT  Cuffed: yes   Successful intubation technique: direct laryngoscopy  Blade: Berenice  Blade size: 4  ETT size (mm): 8.0  Cormack-Lehane Classification: grade I - full view of glottis  Placement verified by: bronchoscopy and capnometry   Measured from: lips  ETT to lips (cm): 23  Number of attempts at approach: 1

## 2019-08-08 NOTE — ANESTHESIA PROCEDURE NOTES
Arterial Line      Patient location during procedure: OR   Line placed for ABGs/Labs/ISTAT, MD/Surgeon request and hemodynamic monitoring.  Preanesthetic Checklist  Completed: patient identified, site marked, surgical consent, pre-op evaluation, timeout performed, IV checked, risks and benefits discussed and monitors and equipment checked  Arterial Line Prep   Sterile Tech: cap, gloves and sterile barriers  Prep: ChloraPrep  Patient monitoring: EKG, continuous pulse oximetry and blood pressure monitoring  Arterial Line Procedure   Laterality:left  Location:  radial artery  Catheter size: 20 G   Guidance: palpation technique  Number of attempts: 1  Successful placement: yes  Heparin (Porcine) in NaCl 21039-0.9 UT/500ML-% for arterial line pressure bag, 10,000 Units  Post Assessment   Dressing Type: wrist guard applied, secured with tape and occlusive dressing applied.   Complications no  Circ/Move/Sens Assessment: normal and unchanged.   Patient Tolerance: patient tolerated the procedure well with no apparent complications  Additional Notes  L radial a. Line placed by OR staff.

## 2019-08-09 ENCOUNTER — APPOINTMENT (OUTPATIENT)
Dept: GENERAL RADIOLOGY | Facility: HOSPITAL | Age: 69
End: 2019-08-09

## 2019-08-09 LAB
ALBUMIN SERPL-MCNC: 3.9 G/DL (ref 3.5–4.8)
ANION GAP SERPL CALCULATED.3IONS-SCNC: 10.9 MMOL/L (ref 5–15)
BUN BLD-MCNC: 17 MG/DL (ref 8–20)
BUN/CREAT SERPL: 14.2 (ref 6.2–20.3)
CA-I SERPL ISE-MCNC: 1.24 MMOL/L (ref 1.2–1.3)
CALCIUM SPEC-SCNC: 8.6 MG/DL (ref 8.9–10.3)
CHLORIDE SERPL-SCNC: 110 MMOL/L (ref 101–111)
CO2 SERPL-SCNC: 22 MMOL/L (ref 22–32)
CREAT BLD-MCNC: 1.2 MG/DL (ref 0.7–1.2)
DEPRECATED RDW RBC AUTO: 41.6 FL (ref 37–54)
ERYTHROCYTE [DISTWIDTH] IN BLOOD BY AUTOMATED COUNT: 13.4 % (ref 12.3–15.4)
GFR SERPL CREATININE-BSD FRML MDRD: 60 ML/MIN/1.73
GLUCOSE BLD-MCNC: 121 MG/DL (ref 65–99)
GLUCOSE BLDC GLUCOMTR-MCNC: 117 MG/DL (ref 70–105)
GLUCOSE BLDC GLUCOMTR-MCNC: 123 MG/DL (ref 70–105)
GLUCOSE BLDC GLUCOMTR-MCNC: 126 MG/DL (ref 70–105)
GLUCOSE BLDC GLUCOMTR-MCNC: 135 MG/DL (ref 70–105)
GLUCOSE BLDC GLUCOMTR-MCNC: 137 MG/DL (ref 70–105)
GLUCOSE BLDC GLUCOMTR-MCNC: 138 MG/DL (ref 70–105)
GLUCOSE BLDC GLUCOMTR-MCNC: 139 MG/DL (ref 70–105)
GLUCOSE BLDC GLUCOMTR-MCNC: 189 MG/DL (ref 70–105)
GLUCOSE BLDC GLUCOMTR-MCNC: 217 MG/DL (ref 70–105)
GLUCOSE BLDC GLUCOMTR-MCNC: 225 MG/DL (ref 70–105)
GLUCOSE BLDC GLUCOMTR-MCNC: 237 MG/DL (ref 70–105)
GLUCOSE BLDC GLUCOMTR-MCNC: 257 MG/DL (ref 70–105)
GLUCOSE BLDC GLUCOMTR-MCNC: 258 MG/DL (ref 70–105)
GLUCOSE BLDC GLUCOMTR-MCNC: 267 MG/DL (ref 70–105)
GLUCOSE BLDC GLUCOMTR-MCNC: 301 MG/DL (ref 70–105)
HCT VFR BLD AUTO: 28.9 % (ref 37.5–51)
HGB BLD-MCNC: 10 G/DL (ref 13–17.7)
MAGNESIUM SERPL-MCNC: 2.6 MG/DL (ref 1.8–2.5)
MCH RBC QN AUTO: 30.6 PG (ref 26.6–33)
MCHC RBC AUTO-ENTMCNC: 34.7 G/DL (ref 31.5–35.7)
MCV RBC AUTO: 88.4 FL (ref 79–97)
PHOSPHATE SERPL-MCNC: 4.2 MG/DL (ref 2.4–4.7)
PLATELET # BLD AUTO: 140 10*3/MM3 (ref 140–450)
PMV BLD AUTO: 8.8 FL (ref 6–12)
POTASSIUM BLD-SCNC: 3.9 MMOL/L (ref 3.6–5.1)
RBC # BLD AUTO: 3.27 10*6/MM3 (ref 4.14–5.8)
SODIUM BLD-SCNC: 139 MMOL/L (ref 136–144)
WBC NRBC COR # BLD: 12.2 10*3/MM3 (ref 3.4–10.8)

## 2019-08-09 PROCEDURE — 85027 COMPLETE CBC AUTOMATED: CPT | Performed by: NURSE PRACTITIONER

## 2019-08-09 PROCEDURE — 25010000002 AMIODARONE PER 30 MG: Performed by: NURSE PRACTITIONER

## 2019-08-09 PROCEDURE — 71045 X-RAY EXAM CHEST 1 VIEW: CPT

## 2019-08-09 PROCEDURE — 82330 ASSAY OF CALCIUM: CPT | Performed by: NURSE PRACTITIONER

## 2019-08-09 PROCEDURE — 82962 GLUCOSE BLOOD TEST: CPT

## 2019-08-09 PROCEDURE — 25010000002 MORPHINE PER 10 MG: Performed by: NURSE PRACTITIONER

## 2019-08-09 PROCEDURE — 97162 PT EVAL MOD COMPLEX 30 MIN: CPT

## 2019-08-09 PROCEDURE — 99024 POSTOP FOLLOW-UP VISIT: CPT | Performed by: THORACIC SURGERY (CARDIOTHORACIC VASCULAR SURGERY)

## 2019-08-09 PROCEDURE — 93010 ELECTROCARDIOGRAM REPORT: CPT | Performed by: INTERNAL MEDICINE

## 2019-08-09 PROCEDURE — 97116 GAIT TRAINING THERAPY: CPT

## 2019-08-09 PROCEDURE — 25010000002 MAGNESIUM SULFATE IN D5W 1G/100ML (PREMIX) 1-5 GM/100ML-% SOLUTION: Performed by: NURSE PRACTITIONER

## 2019-08-09 PROCEDURE — 99233 SBSQ HOSP IP/OBS HIGH 50: CPT | Performed by: INTERNAL MEDICINE

## 2019-08-09 PROCEDURE — 83735 ASSAY OF MAGNESIUM: CPT | Performed by: NURSE PRACTITIONER

## 2019-08-09 PROCEDURE — 25010000002 CEFAZOLIN PER 500 MG: Performed by: THORACIC SURGERY (CARDIOTHORACIC VASCULAR SURGERY)

## 2019-08-09 PROCEDURE — 80069 RENAL FUNCTION PANEL: CPT | Performed by: NURSE PRACTITIONER

## 2019-08-09 PROCEDURE — 97166 OT EVAL MOD COMPLEX 45 MIN: CPT

## 2019-08-09 PROCEDURE — 93005 ELECTROCARDIOGRAM TRACING: CPT | Performed by: NURSE PRACTITIONER

## 2019-08-09 RX ORDER — DEXTROSE MONOHYDRATE 25 G/50ML
25 INJECTION, SOLUTION INTRAVENOUS
Status: DISCONTINUED | OUTPATIENT
Start: 2019-08-09 | End: 2019-08-13 | Stop reason: HOSPADM

## 2019-08-09 RX ORDER — AMIODARONE HYDROCHLORIDE 200 MG/1
400 TABLET ORAL 2 TIMES DAILY WITH MEALS
Status: DISCONTINUED | OUTPATIENT
Start: 2019-08-09 | End: 2019-08-13 | Stop reason: HOSPADM

## 2019-08-09 RX ORDER — NICOTINE POLACRILEX 4 MG
15 LOZENGE BUCCAL
Status: DISCONTINUED | OUTPATIENT
Start: 2019-08-09 | End: 2019-08-13 | Stop reason: HOSPADM

## 2019-08-09 RX ADMIN — POLYETHYLENE GLYCOL 3350 17 G: 17 POWDER, FOR SOLUTION ORAL at 20:32

## 2019-08-09 RX ADMIN — CHLORHEXIDINE GLUCONATE 15 ML: 1.2 RINSE ORAL at 20:42

## 2019-08-09 RX ADMIN — MUPIROCIN 1 APPLICATION: 20 OINTMENT TOPICAL at 20:31

## 2019-08-09 RX ADMIN — AMIODARONE HYDROCHLORIDE 400 MG: 200 TABLET ORAL at 17:27

## 2019-08-09 RX ADMIN — PANTOPRAZOLE SODIUM 40 MG: 40 TABLET, DELAYED RELEASE ORAL at 05:20

## 2019-08-09 RX ADMIN — CEFAZOLIN SODIUM 2 G: 10 INJECTION, POWDER, FOR SOLUTION INTRAVENOUS at 20:30

## 2019-08-09 RX ADMIN — HYDROCODONE BITARTRATE AND ACETAMINOPHEN 1 TABLET: 5; 325 TABLET ORAL at 18:12

## 2019-08-09 RX ADMIN — HYDROCODONE BITARTRATE AND ACETAMINOPHEN 1 TABLET: 5; 325 TABLET ORAL at 22:27

## 2019-08-09 RX ADMIN — MUPIROCIN 1 APPLICATION: 20 OINTMENT TOPICAL at 08:28

## 2019-08-09 RX ADMIN — HYDROCODONE BITARTRATE AND ACETAMINOPHEN 1 TABLET: 5; 325 TABLET ORAL at 14:08

## 2019-08-09 RX ADMIN — AMIODARONE HYDROCHLORIDE 400 MG: 200 TABLET ORAL at 11:09

## 2019-08-09 RX ADMIN — HYDROCODONE BITARTRATE AND ACETAMINOPHEN 1 TABLET: 5; 325 TABLET ORAL at 10:16

## 2019-08-09 RX ADMIN — MAGNESIUM SULFATE HEPTAHYDRATE 1 G: 1 INJECTION, SOLUTION INTRAVENOUS at 11:09

## 2019-08-09 RX ADMIN — DOCUSATE SODIUM 100 MG: 100 CAPSULE, LIQUID FILLED ORAL at 20:31

## 2019-08-09 RX ADMIN — MAGNESIUM SULFATE HEPTAHYDRATE 1 G: 1 INJECTION, SOLUTION INTRAVENOUS at 14:05

## 2019-08-09 RX ADMIN — AMIODARONE HYDROCHLORIDE 0.5 MG/MIN: 50 INJECTION, SOLUTION INTRAVENOUS at 08:27

## 2019-08-09 RX ADMIN — DOCUSATE SODIUM 100 MG: 100 CAPSULE, LIQUID FILLED ORAL at 08:28

## 2019-08-09 RX ADMIN — MORPHINE SULFATE 2 MG: 4 INJECTION INTRAVENOUS at 01:08

## 2019-08-09 RX ADMIN — MORPHINE SULFATE 2 MG: 4 INJECTION INTRAVENOUS at 05:16

## 2019-08-09 RX ADMIN — METOPROLOL TARTRATE 12.5 MG: 25 TABLET, FILM COATED ORAL at 20:31

## 2019-08-09 RX ADMIN — ATORVASTATIN CALCIUM 40 MG: 40 TABLET, FILM COATED ORAL at 20:29

## 2019-08-09 RX ADMIN — SENNOSIDES 2 TABLET: 8.6 TABLET, FILM COATED ORAL at 20:32

## 2019-08-09 RX ADMIN — ASPIRIN 81 MG: 81 TABLET, DELAYED RELEASE ORAL at 08:28

## 2019-08-09 RX ADMIN — METOPROLOL TARTRATE 12.5 MG: 25 TABLET, FILM COATED ORAL at 11:09

## 2019-08-09 RX ADMIN — CEFAZOLIN SODIUM 2 G: 10 INJECTION, POWDER, FOR SOLUTION INTRAVENOUS at 14:00

## 2019-08-09 RX ADMIN — POLYETHYLENE GLYCOL 3350 17 G: 17 POWDER, FOR SOLUTION ORAL at 08:28

## 2019-08-09 NOTE — PROGRESS NOTES
Continued Stay Note  JESUS Welch     Patient Name: Shaka Trinidad  MRN: [id number]  Today's Date: 8/9/2019    Admit Date: 8/8/2019    Discharge Plan     Row Name 08/09/19 1500       Plan    Plan  PT/OT evals pending but if rehab needed pt would like [facility]. Referral made, pending acceptance and PT/OT evals. PASRR approved if needed. No precert required.        Jaz Burris, MSW, LCSW

## 2019-08-09 NOTE — PLAN OF CARE
Problem: Cardiac Surgery (Adult)  Intervention: Prevent/Manage Postoperative Nausea and Vomiting  Patient hasnt any issues with N/V

## 2019-08-09 NOTE — PROGRESS NOTES
S/P POD# 1 CABG--Lyubov  EF 60% (cath)    Subjective:  No c/o's today, no events overnight  Wt up 3 kgs from preop    Drips:  amio .5, insulin  CI 2.62, CVP 6      Intake/Output Summary (Last 24 hours) at 8/9/2019 1330  Last data filed at 8/9/2019 0555  Gross per 24 hour   Intake 3748 ml   Output 4673 ml   Net -925 ml     Temp:  [97.1 °F (36.2 °C)-99.2 °F (37.3 °C)] 98.2 °F (36.8 °C)  Heart Rate:  [75-96] 77  Resp:  [14-20] 20  BP: ()/(41-55) 115/41  Arterial Line BP: ()/(43-81) 116/45  FiO2 (%):  [40 %-70 %] 40 %  /8    Results from last 7 days   Lab Units 08/09/19  0344 08/08/19  2239  08/08/19  1635 08/08/19  1528  08/06/19  0828   WBC 10*3/mm3 12.20*  --   --  11.90* 10.60  --  6.70   HEMOGLOBIN g/dL 10.0*  --   --  10.3* 8.7*  8.7*  --  12.6*   HEMOGLOBIN, POC g/dL  --  10.6*   < >  --   --    < >  --    HEMATOCRIT % 28.9*  --   --  30.4* 24.8*  24.8*  --  37.5   HEMATOCRIT POC %  --  31*   < >  --   --    < >  --    PLATELETS 10*3/mm3 140  --   --  139* 124*  124*  --  227   INR   --   --   --  1.18* 1.37*  --  1.03    < > = values in this interval not displayed.     Results from last 7 days   Lab Units 08/09/19  0344   CREATININE mg/dL 1.20   POTASSIUM mmol/L 3.9   SODIUM mmol/L 139   MAGNESIUM mg/dL 2.6*   PHOSPHORUS mg/dL 4.2     ica 1.28    Physical Exam:  Neuro intact, nad, up in chair, no family present  Tele:  SR 80s  Diminished bases, 4L 98%  dsg c/d/i, no drainage  Benign abd, no BM  No edema    Assessment/Plan:  Principal Problem:    Coronary artery disease of native artery of native heart with stable angina pectoris (CMS/HCC)  Active Problems:    CAD (coronary artery disease)    MV CAD, Ef 60%--s/p POD#1 CABG--hemodyn stable, add asa/bb/statin  HTN--stable  HLD--statin  DM II--preop a1c 7.4 , insulin drip  PVD--stable    Routine care--d/c rhoda caballero, chest tubes, cagle  D/w Dr. Lyubov Moore-escal pt  Anticipate home at discharge    Darling Tanner, APRN  8/9/2019  1:30  PM

## 2019-08-09 NOTE — PROGRESS NOTES
Referring Provider: Cardiac surgeon    Reason for follow-up: Coronary artery bypass surgery     Patient Care Team:  Tangela Jc NP as PCP - General  Tangela Jc NP as PCP - Claims Attributed  Tangela Jc NP as PCP - Family Medicine    Subjective .  No chest pain or shortness of breath      Objective  Lying in bed comfortably     Review of Systems   Constitution: Negative for fever and malaise/fatigue.   HENT: Negative for ear pain and nosebleeds.    Eyes: Negative for blurred vision and double vision.   Cardiovascular: Negative for chest pain, dyspnea on exertion and palpitations.   Respiratory: Negative for cough and shortness of breath.    Skin: Negative for rash.   Musculoskeletal: Negative for joint pain.   Gastrointestinal: Negative for abdominal pain, nausea and vomiting.   Neurological: Negative for focal weakness and headaches.   Psychiatric/Behavioral: Negative for depression. The patient is not nervous/anxious.    All other systems reviewed and are negative.      Patient has no known allergies.    Scheduled Meds:    amiodarone 400 mg Oral BID With Meals   aspirin 81 mg Oral Daily   atorvastatin 40 mg Oral Nightly   ceFAZolin 2 g Intravenous Q8H   chlorhexidine 15 mL Mouth/Throat Q12H   docusate sodium 100 mg Oral BID   [START ON 8/10/2019] enoxaparin 40 mg Subcutaneous Daily   [START ON 8/11/2019] insulin lispro 0-9 Units Subcutaneous 4x Daily With Meals & Nightly   metoprolol tartrate 12.5 mg Oral Q12H   mupirocin 1 application Each Nare BID   pantoprazole 40 mg Oral Q AM   polyethylene glycol 17 g Oral BID   senna 2 tablet Oral Nightly     Continuous Infusions:    insulin 0-50 Units/hr Last Rate: 5.6 Units/hr (08/09/19 1630)   lactated ringers 9 mL/hr    sodium chloride 9 mL/hr    sodium chloride 30 mL/hr    sodium chloride 30 mL/hr Last Rate: 30 mL/hr (08/08/19 1846)     PRN Meds:.bisacodyl  •  dextrose  •  dextrose  •  glucagon (human recombinant)  •  HYDROcodone-acetaminophen  •   "insulin  •  lactated ringers  •  ondansetron  •  potassium chloride **OR** potassium chloride  •  potassium chloride **OR** potassium chloride  •  sodium chloride  •  sodium chloride        VITAL SIGNS  Vitals:    08/09/19 1615 08/09/19 1630 08/09/19 1645 08/09/19 1727   BP:    114/55   BP Location:       Patient Position:       Pulse: 73 71 77 73   Resp:       Temp:       TempSrc:       SpO2: 97% 98% 98%    Weight:       Height:           Flowsheet Rows      First Filed Value   Admission Height  170.2 cm (67\") Documented at 08/08/2019 0746   Admission Weight  95.8 kg (211 lb 3.2 oz) Documented at 08/08/2019 0746           TELEMETRY: Sinus rhythm    Physical Exam:  Physical Exam   Constitutional: He appears well-developed and well-nourished.   HENT:   Head: Normocephalic and atraumatic.   Eyes: Conjunctivae and EOM are normal. Pupils are equal, round, and reactive to light. No scleral icterus.   Neck: Normal range of motion. Neck supple. No JVD present. Carotid bruit is not present.   Cardiovascular: Normal rate, regular rhythm, S1 normal, S2 normal, normal heart sounds and intact distal pulses. PMI is not displaced.   Pulmonary/Chest: Effort normal and breath sounds normal. He has no wheezes. He has no rales.   Abdominal: Soft. Bowel sounds are normal.   Musculoskeletal: Normal range of motion.   Neurological: He is alert. He has normal strength.   No focal deficits   Skin: Skin is warm and dry. No rash noted.   Psychiatric: He has a normal mood and affect.        Results Review:   I reviewed the patient's new clinical results.  Lab Results (last 24 hours)     Procedure Component Value Units Date/Time    POC Glucose Once [[id number]]  (Abnormal) Collected:  08/09/19 1634    Specimen:  Blood Updated:  08/09/19 1636     Glucose 189 mg/dL      Comment: Serial Number: [id number]:  [id number]       POC Glucose Once [[id number]]  (Abnormal) Collected:  08/09/19 1303    Specimen:  Blood Updated:  08/09/19 1305     " Glucose 258 mg/dL      Comment: Serial Number: [id number]:  [id number]       POC Glucose Once [[id number]]  (Abnormal) Collected:  08/09/19 1127    Specimen:  Blood Updated:  08/09/19 1133     Glucose 301 mg/dL      Comment: Serial Number: [id number]:  [id number]       POC Glucose Once [[id number]]  (Abnormal) Collected:  08/09/19 1018    Specimen:  Blood Updated:  08/09/19 1020     Glucose 237 mg/dL      Comment: Serial Number: [id number]:  [id number]       POC Glucose Once [[id number]]  (Abnormal) Collected:  08/09/19 0827    Specimen:  Blood Updated:  08/09/19 0828     Glucose 135 mg/dL      Comment: Serial Number: [id number]:  [id number]       POC Glucose Once [[id number]]  (Abnormal) Collected:  08/09/19 0559    Specimen:  Blood Updated:  08/09/19 0600     Glucose 138 mg/dL      Comment: Serial Number: [id number]:  [id number]       POC Glucose Once [[id number]]  (Abnormal) Collected:  08/09/19 0455    Specimen:  Blood Updated:  08/09/19 0457     Glucose 117 mg/dL      Comment: Serial Number: [id number]:  [id number]       Renal Function Panel [[id number]]  (Abnormal) Collected:  08/09/19 0344    Specimen:  Blood Updated:  08/09/19 0415     Glucose 121 mg/dL      BUN 17 mg/dL      Creatinine 1.20 mg/dL      Sodium 139 mmol/L      Potassium 3.9 mmol/L      Chloride 110 mmol/L      CO2 22.0 mmol/L      Calcium 8.6 mg/dL      Albumin 3.90 g/dL      Phosphorus 4.2 mg/dL      Anion Gap 10.9 mmol/L      BUN/Creatinine Ratio 14.2     eGFR Non African Amer 60 mL/min/1.73     Magnesium [[id number]]  (Abnormal) Collected:  08/09/19 0344    Specimen:  Blood Updated:  08/09/19 0415     Magnesium 2.6 mg/dL     CBC (No Diff) [[id number]]  (Abnormal) Collected:  08/09/19 0344    Specimen:  Blood Updated:  08/09/19 0409     WBC 12.20 10*3/mm3      RBC 3.27 10*6/mm3      Hemoglobin 10.0 g/dL      Hematocrit 28.9 %      MCV 88.4 fL      MCH 30.6 pg      MCHC 34.7 g/dL      RDW 13.4 %      RDW-SD 41.6 fl       MPV 8.8 fL      Platelets 140 10*3/mm3     Calcium, Ionized [[id number]]  (Normal) Collected:  08/09/19 0344    Specimen:  Blood Updated:  08/09/19 0356     Ionized Calcium 1.24 mmol/L     POC Glucose Once [[id number]]  (Abnormal) Collected:  08/09/19 0317    Specimen:  Blood Updated:  08/09/19 0321     Glucose 139 mg/dL      Comment: Serial Number: [id number]:  [id number]       POC Glucose Once [[id number]]  (Abnormal) Collected:  08/09/19 0214    Specimen:  Blood Updated:  08/09/19 0216     Glucose 123 mg/dL      Comment: Serial Number: [id number]:  [id number]       POC Glucose Once [[id number]]  (Abnormal) Collected:  08/09/19 0055    Specimen:  Blood Updated:  08/09/19 0059     Glucose 137 mg/dL      Comment: Serial Number: [id number]:  [id number]       POC Glucose Once [[id number]]  (Abnormal) Collected:  08/08/19 2347    Specimen:  Blood Updated:  08/08/19 2349     Glucose 143 mg/dL      Comment: Serial Number: [id number]:  [id number]       Blood Gas, Arterial [[id number]]  (Abnormal) Collected:  08/08/19 2239    Specimen:  Arterial Blood Updated:  08/08/19 2245     Site Arterial Line     Rayo's Test N/A     pH, Arterial 7.355 pH units      pCO2, Arterial 43.0 mm Hg      pO2, Arterial 102.6 mm Hg      HCO3, Arterial 24.0 mmol/L      Base Excess, Arterial -1.5 mmol/L      Comment: Serial Number: [id number]:  [id number]        O2 Saturation, Arterial 97.6 %      CO2 Content 25.3 mmol/L      Barometric Pressure for Blood Gas -- mmHg      Comment: N/A        Modality Adult Vent     FIO2 40 %      Ventilator Mode ;CPAP/PS     PEEP 8     PSV 10 cmH2O      Hemodilution Yes    POCT Electrolytes +HGB +HCT [[id number]]  (Abnormal) Collected:  08/08/19 2239    Specimen:  Blood Updated:  08/08/19 2245     Sodium 141 mmol/L      Potassium 4.2 mmol/L      Ionized Calcium 1.22 mmol/L      Comment: Serial Number: [id number]:  [id number]        Glucose 141 mg/dL      Hematocrit 31 %      Hemoglobin 10.6  g/dL     Blood Gas, Arterial [[id number]]  (Abnormal) Collected:  08/08/19 2101    Specimen:  Arterial Blood Updated:  08/08/19 2104     Site Arterial Line     Rayo's Test N/A     pH, Arterial 7.401 pH units      pCO2, Arterial 36.8 mm Hg      pO2, Arterial 123.6 mm Hg      HCO3, Arterial 22.8 mmol/L      Base Excess, Arterial -1.7 mmol/L      Comment: Serial Number: [id number]:  [id number]        O2 Saturation, Arterial 98.8 %      CO2 Content 24.0 mmol/L      Barometric Pressure for Blood Gas -- mmHg      Comment: N/A        Modality Adult Vent     FIO2 40 %      Ventilator Mode ;AC     Set Tidal Volume 600     PEEP 8     Hemodilution Yes     Respiratory Rate 14    POCT Electrolytes +HGB +HCT [[id number]]  (Abnormal) Collected:  08/08/19 2101    Specimen:  Blood Updated:  08/08/19 2104     Sodium 141 mmol/L      Potassium 3.9 mmol/L      Ionized Calcium 1.07 mmol/L      Comment: Serial Number: [id number]:  [id number]        Glucose 150 mg/dL      Hematocrit 31 %      Hemoglobin 10.5 g/dL     POC Glucose Once [[id number]]  (Abnormal) Collected:  08/08/19 2058    Specimen:  Blood Updated:  08/08/19 2059     Glucose 131 mg/dL      Comment: Serial Number: [id number]:  [id number]       Blood Gas, Arterial [[id number]]  (Abnormal) Collected:  08/08/19 1930    Specimen:  Arterial Blood Updated:  08/08/19 1936     Site Arterial Line     Rayo's Test N/A     pH, Arterial 7.402 pH units      pCO2, Arterial 38.4 mm Hg      pO2, Arterial 164.0 mm Hg      HCO3, Arterial 23.9 mmol/L      Base Excess, Arterial -0.7 mmol/L      Comment: Serial Number: [id number]:  [id number]        O2 Saturation, Arterial 99.5 %      CO2 Content 25.1 mmol/L      Barometric Pressure for Blood Gas -- mmHg      Comment: N/A        Modality Adult Vent     FIO2 50 %      Ventilator Mode ;AC     Set Tidal Volume 600     PEEP 8     Hemodilution Yes     Respiratory Rate 14    POCT Electrolytes +HGB +HCT [[id number]]  (Abnormal) Collected:  08/08/19 1930     Specimen:  Blood Updated:  08/08/19 1936     Sodium 141 mmol/L      Potassium 4.0 mmol/L      Ionized Calcium 1.05 mmol/L      Comment: Serial Number: [id number]:  [id number]        Glucose 161 mg/dL      Hematocrit 31 %      Hemoglobin 10.6 g/dL     Blood Gas, Arterial [[id number]]  (Abnormal) Collected:  08/08/19 1831    Specimen:  Arterial Blood Updated:  08/08/19 1833     Site Arterial Line     Rayo's Test N/A     pH, Arterial 7.455 pH units      pCO2, Arterial 28.2 mm Hg      pO2, Arterial 89.9 mm Hg      HCO3, Arterial 19.8 mmol/L      Base Excess, Arterial -3.1 mmol/L      Comment: Serial Number: [id number]:  [id number]        O2 Saturation, Arterial 97.5 %      CO2 Content 20.7 mmol/L      Barometric Pressure for Blood Gas -- mmHg      Comment: N/A        Modality Adult Vent     FIO2 50 %      Ventilator Mode ;AC     Set Tidal Volume 600     PEEP 8     Hemodilution Yes     Respiratory Rate 14    POCT Electrolytes +HGB +HCT [[id number]]  (Abnormal) Collected:  08/08/19 1831    Specimen:  Blood Updated:  08/08/19 1833     Sodium 141 mmol/L      Potassium 3.8 mmol/L      Ionized Calcium 1.16 mmol/L      Comment: Serial Number: [id number]:  [id number]        Glucose 142 mg/dL      Hematocrit 32 %      Hemoglobin 10.7 g/dL           Imaging Results (last 24 hours)     Procedure Component Value Units Date/Time    XR Chest 1 View [[id number]] Collected:  08/09/19 1619     Updated:  08/09/19 1623    Narrative:       DATE OF EXAM:  08/09/2019 at 3:59 PM      PROCEDURE:  XR CHEST 1 VW-     INDICATIONS:  Post chest tube removal; I25.118-Atherosclerotic heart disease of native  coronary artery with other forms of angina pectoris       COMPARISON:  AP portable chest 08/09/2019 at 04 29.     TECHNIQUE:   Single radiographic view of the chest was obtained.     FINDINGS:  Sur[place]-Erich catheter has been removed with the introducer sheath  remaining. There is no visible pneumothorax. Low volume inspiration with  linear  subsegmental atelectasis in the left perihilar region left lower  lobe. Right lung appears clear. Heart size appears mildly enlarged and  stable without signs of median sternotomy and CABG. Probable trace left  pleural effusion.       Impression:          1. [place]-Erich catheter removed with introducer sheath remaining. No  pneumothorax.  2. Mild left basilar atelectasis, improved. Stable trace left pleural  effusion.     Electronically Signed By-Dr. Delmis Macias MD On:8/9/2019 4:21 PM  This report was finalized on [id number] by Dr. Delmis Macias MD.    XR Chest 1 View [[id number]] Collected:  08/09/19 0459     Updated:  08/09/19 0504    Narrative:       DATE OF EXAM:  8/9/2019 4:27 AM     PROCEDURE:  XR CHEST 1 VW-     INDICATIONS:  Post-Op Heart Surgery; I25.118-Atherosclerotic heart disease of native  coronary artery with other forms of angina pectoris     COMPARISON:  08/08/2019, 4:54 PM.     TECHNIQUE:   Single radiographic AP view of the chest was obtained.     FINDINGS:  The right IJ Clearwater-Erich catheter tip is in the expected location of the  proximal left pulmonary artery. Pulmonary hypoinflation is seen.  Bilateral atelectasis and/or infiltrate is seen with low lung volumes.  The patient has undergone median sternotomy with CABG surgery. Chest  tubes remain in place. No pneumothorax is seen. A tiny left pleural  effusion is possible. External artifacts obscure detail. The patient has  been extubated with removal of the nasogastric tube and the endotracheal  tube.        Impression:       The patient has been extubated since 08/08/2019 with removal of  endotracheal tube and the nasogastric tube.     No significant interval change in position of the right IJ Clearwater-Erich  catheter is suspected.     Chest tubes remain in place.     No pneumothorax is seen.     New or increased atelectasis and/or infiltrate is seen bilaterally.     There may be a small left pleural effusion.     Electronically Signed By-  Eric Jordan MD On:8/9/2019 5:02 AM  This report was finalized on [id number] by Dr. Eric Jordan MD.          EKG      I personally viewed and interpreted the patient's EKG/Telemetry data:    ECHOCARDIOGRAM:    STRESS MYOVIEW:    CARDIAC CATHETERIZATION:    OTHER:         Assessment/Plan     1.  Coronary artery disease status post coronary bypass surgery  2.  Hypertension  3.  Hyper lipedema  4.  Diabetes    Patient underwent coronary bypass surgery x4 vessels with LIMA to LAD and saphenous graft to the marginal branch to the diagonal branch and to the RCA.  Patient is extubated  Patient's chest tubes are draining very well.  Patient's chest tubes will be removed soon   Patient is ambulating well.  Patient is started on oral medicines and tolerating well.  blood pressure and heart rate are stable  I discussed the patients findings and my recommendations with family      Bautista Lopez MD  08/09/19  6:03 PM

## 2019-08-09 NOTE — PROGRESS NOTES
Continued Stay Note  JESUS Welch     Patient Name: Shaka Trinidad  MRN: [id number]  Today's Date: 8/9/2019    Admit Date: 8/8/2019    Discharge Plan     Row Name 08/09/19 1641       Plan    Plan  PT/OT evals pending but if rehab needed pt would like [facility]. Referral made, pending acceptance and PT/OT evals. PASRR approved if needed. No precert required.    Patient/Family in Agreement with Plan  yes    Row Name 08/09/19 1500       Plan    Plan  PT/OT evals pending but if rehab needed pt would like [facility]. Referral made, pending acceptance and PT/OT evals. PASRR approved if needed. No precert required.        Discharge Codes    No documentation.             Mayi Hale RN

## 2019-08-09 NOTE — PLAN OF CARE
Problem: Cardiac Surgery (Adult)  Goal: Anesthesia/Sedation Recovery  Outcome: Outcome(s) achieved Date Met: 08/09/19  Patient awake and oriented extubated at 2145 on 8/8

## 2019-08-09 NOTE — PLAN OF CARE
Problem: Patient Care Overview  Goal: Plan of Care Review  Outcome: Ongoing (interventions implemented as appropriate)   08/09/19 1658   Coping/Psychosocial   Plan of Care Reviewed With patient   Plan of Care Review   Progress improving   OTHER   Outcome Summary Patient has chest tubes, rhoda, f/c and swan out at this time. Patient moves with 2 assist. Patient remains on insulin drip at this time. Patient is off of amio drip and on PO amio. Will continue to adjust activity per tolerance.      Goal: Interprofessional Rounds/Family Conf  Outcome: Ongoing (interventions implemented as appropriate)   08/09/19 1658   Interdisciplinary Rounds/Family Conf   Summary Patient remains on insulin drip at this time. Patient has been switched to amio po. AV wires remain. Chest tubes, a-line, and swan all pulled today.    Participants family;physician;patient;nursing;pharmacy       Problem: Skin Injury Risk (Adult)  Goal: Skin Health and Integrity  Outcome: Ongoing (interventions implemented as appropriate)      Problem: Fall Risk (Adult)  Goal: Absence of Fall  Outcome: Ongoing (interventions implemented as appropriate)

## 2019-08-09 NOTE — DISCHARGE PLACEMENT REQUEST
"Shaka Shi (69 y.o. Male)     Date of Birth Social Security Number Address Home Phone MRN    1950  [address]  [address] [address] [phone] [id number]    [employer] Marital Status          None        Admission Date Admission Type Admitting Provider Attending Provider Department, Room/Bed    8/8/19 Elective Chet Mcarthur MD Pagni, Sebastian, MD [facility] CARDIOVASCULAR CARE UNIT, 2216/1    Discharge Date Discharge Disposition Discharge Destination                       Attending Provider:  Chet Mcarthur MD    Allergies:  No Known Allergies    Isolation:  None   Infection:  None   Code Status:  CPR    Ht:  170.2 cm (67\")   Wt:  98.4 kg (216 lb 14.9 oz)    Admission Cmt:  None   Principal Problem:  Coronary artery disease of native artery of native heart with stable angina pectoris (CMS/[facility]) [I25.118] More...                 Active Insurance as of 8/8/2019     Primary Coverage     Payor Plan Insurance Group Employer/Plan Group    MEDICARE MEDICARE A & B      Payor Plan Address Payor Plan Phone Number Payor Plan Fax Number Effective Dates    [address] [phone]  4/1/2016 - None Entered    [facility] [id number]       Subscriber Name Subscriber Birth Date Member ID       SHAKA SHI 1950 [id number]           Secondary Coverage     Payor Plan Insurance Group Employer/Plan Group    [employer] COMMERCIAL [employer]  [employer]      Payor Plan Address Payor Plan Phone Number Payor Plan Fax Number Effective Dates    [address] [phone]  4/1/2016 - None Entered    [facility] [id number]       Subscriber Name Subscriber Birth Date Member ID       SHAKA SHI 1950 [id number]                 Emergency Contacts      (Rel.) Home Phone Work Phone Mobile Phone    PATRICK SHI (Spouse) [phone] -- --            "

## 2019-08-09 NOTE — PLAN OF CARE
Problem: Patient Care Overview  Goal: Plan of Care Review  Outcome: Ongoing (interventions implemented as appropriate)   08/09/19 0292   Coping/Psychosocial   Plan of Care Reviewed With patient   OTHER   Outcome Summary Pt is a 70 y/o male admitted for CABG x4. He required mod assist for bed mobility, though able to stand and mobilize with CGA for safety. He requires assist for toileting at this time due to decreased functional reach. Anticipate he will require cues due to decreased carryover for sternal precauitons. Anticipate he will progress to return home with family at discharge. Reports he can have multiple family members assist.

## 2019-08-09 NOTE — THERAPY EVALUATION
Acute Care - Occupational Therapy Initial Evaluation  [facility]     Patient Name: Shaka Trinidad  : 1950  MRN: [id number]  Today's Date: 2019             Admit Date: 2019       ICD-10-CM ICD-9-CM   1. Coronary artery disease of native artery of native heart with stable angina pectoris (CMS/HCC) I25.118 414.01     413.9     Patient Active Problem List   Diagnosis   • Angina at rest (CMS/[facility])   • Abnormal nuclear stress test   • Coronary artery disease of native artery of native heart with stable angina pectoris (CMS/[facility])   • CAD (coronary artery disease)     Past Medical History:   Diagnosis Date   • Coronary artery disease    • Diabetes mellitus, type II (CMS/[facility])    • Elevated cholesterol    • H/O cataract     cataracts    • Hyperlipidemia    • Hypertension    • Peripheral edema    • Peripheral edema    • Sleep apnea     oral appliance     Past Surgical History:   Procedure Laterality Date   • CARDIAC CATHETERIZATION N/A 2019    Procedure: Left Heart Cath with angiogram;  Surgeon: Bautista Lopez MD;  Location: [facility] CATH INVASIVE LOCATION;  Service: Cardiovascular   • CARDIAC CATHETERIZATION N/A 2019    Procedure: Coronary angiography;  Surgeon: Bautista Lopez MD;  Location: [facility] CATH INVASIVE LOCATION;  Service: Cardiovascular   • CARDIAC CATHETERIZATION N/A 2019    Procedure: Left ventriculography;  Surgeon: Bautista Lopez MD;  Location: [facility] CATH INVASIVE LOCATION;  Service: Cardiovascular   • COLONOSCOPY     • HERNIA REPAIR     • OTHER SURGICAL HISTORY  2015    2d echo-abstracted cent.           OT ASSESSMENT FLOWSHEET (last 12 hours)      Occupational Therapy Evaluation     Row Name 19 1434 19 1433                OT Evaluation Time/Intention    Subjective Information  --  complains of;dyspnea  -SR       Document Type  --  evaluation  -SR       Mode of Treatment  --  co-treatment  -SR          General Information    Prior Level of Function  --   independent:;ADL's;all household mobility;community mobility  -SR       Equipment Currently Used at Home  --  none walk in shower   -SR          Relationship/Environment    Lives With  --  -- requires min assist   -SR          Resource/Environmental Concerns    Current Living Arrangements  --  home/apartment/condo  -SR          Cognitive Assessment/Intervention- PT/OT    Orientation Status (Cognition)  --  oriented x 4  -SR          Bed Mobility Assessment/Treatment    Bed Mobility Assessment/Treatment  --  supine-sit  -SR       Supine-Sit [place] (Bed Mobility)  --  moderate assist (50% patient effort)  -SR          ADL Assessment/Intervention    BADL Assessment/Intervention  --  toileting;feeding  -SR          Self-Feeding Assessment/Training    [place] Level (Feeding)  feeding skills;supervision  -SR  --          Toileting Assessment/Training    Comment (Toileting)  Unable to reach posterior gustavo area at this time.    -SR  --          Pain Assessment    Additional Documentation  --  Pain Scale: Numbers Pre/Post-Treatment (Group)  -SR          Pain Scale: Numbers Pre/Post-Treatment    Pain Scale: Numbers, Pretreatment  --  8/10  -SR       Pain Location  --  chest  -SR          Wound 08/08/19 1256 Other (See comments) torso Incision    Wound - Properties Group Date first assessed: 08/08/19  -AT Time first assessed: 1256  -AT Side: Other (See comments)  -AT Location: torso  -AT Primary Wound Type: Incision  -AT       Clinical Impression (OT)    Criteria for Skilled Therapeutic Interventions Met (OT Eval)  yes;treatment indicated  -SR  --       Rehab Potential (OT Eval)  good, to achieve stated therapy goals  -SR  --       Therapy Frequency (OT Eval)  5 times/wk  -SR  --       Anticipated Discharge Disposition (OT)  home with home health  -SR  --          Vital Signs    Pre Systolic BP Rehab  --  131  -SR       Pre Treatment Diastolic BP  --  75  -SR       Intra Systolic BP Rehab  --  123  -SR       Intra  Treatment Diastolic BP  --  62  -SR       Post Systolic BP Rehab  --  130  -SR       Post Treatment Diastolic BP  --  61  -SR       Pretreatment Heart Rate (beats/min)  --  77  -SR       Posttreatment Heart Rate (beats/min)  --  77  -SR       Pre SpO2 (%)  --  96  -SR       O2 Delivery Pre Treatment  --  supplemental O2 4L  -SR       Post SpO2 (%)  --  96  -SR          Planned OT Interventions    Planned Therapy Interventions (OT Eval)  activity tolerance training;transfer/mobility retraining;strengthening exercise;patient/caregiver education/training;occupation/activity based interventions;functional balance retraining;cognitive/visual perception retraining  -SR  --          OT Goals    Bed Mobility Goal Selection (OT)  bed mobility, OT goal 1  -SR  --       Bathing Goal Selection (OT)  bathing, OT goal 1  -SR  --       Dressing Goal Selection (OT)  dressing, OT goal 1  -SR  --       Toileting Goal Selection (OT)  toileting, OT goal 1  -SR  --          Bed Mobility Goal 1 (OT)    Activity/Assistive Device (Bed Mobility Goal 1, OT)  bed mobility activities, all  -SR  --       [place] Level/Cues Needed (Bed Mobility Goal 1, OT)  supervision required  -SR  --       Time Frame (Bed Mobility Goal 1, OT)  2 weeks  -SR  --          Bathing Goal 1 (OT)    Activity/Assistive Device (Bathing Goal 1, OT)  bathing skills, all  -SR  --       [place] Level/Cues Needed (Bathing Goal 1, OT)  supervision required  -SR  --       Time Frame (Bathing Goal 1, OT)  2 weeks  -SR  --          Living Environment    Home Accessibility  --  wheelchair accessible  -SR         User Key  (r) = Recorded By, (t) = Taken By, (c) = Cosigned By    Initials Name Effective Dates    SR María Mabry OT 03/01/19 -     AT Kvng Merino RN 03/01/19 -          Occupational Therapy Education     Title: PT OT SLP Therapies (In Progress)     Topic: Occupational Therapy (In Progress)     Point: Precautions (Done)     Description: Instruct  learner(s) on prescribed precautions during self-care and functional transfers.    Learning Progress Summary           Patient Acceptance, E,TB, VU,NR by  at 8/9/2019  4:08 PM                               User Key     Initials Effective Dates Name Provider Type [facility] 03/01/19 -  María Mabry OT Occupational Therapist OT                  OT Recommendation and Plan  Outcome Summary/Treatment Plan (OT)  Anticipated Discharge Disposition (OT): home with home health  Planned Therapy Interventions (OT Eval): activity tolerance training, transfer/mobility retraining, strengthening exercise, patient/caregiver education/training, occupation/activity based interventions, functional balance retraining, cognitive/visual perception retraining  Therapy Frequency (OT Eval): 5 times/wk  Plan of Care Review  Plan of Care Reviewed With: patient  Plan of Care Reviewed With: patient  Outcome Summary: Pt is a 68 y/o male admitted for CABG x4.  He required mod assist for bed mobility, though able to stand and mobilize with CGA for safety.  He requires assist for toileting at this time due to decreased functional reach.  Anticipate he will require cues due to decreased carryover for sternal precauitons.  Anticipate he will progress to return home with family at discharge.  Reports he can have multiple family members assist.          Time Calculation:   Time Calculation- OT     Row Name 08/09/19 1611             Time Calculation- OT    OT Start Time  1430  -SR      OT Stop Time  1453  -SR      OT Time Calculation (min)  23 min  -SR      Total Timed Code Minutes- OT  0 minute(s)  -SR      OT Non-Billable Time (min)  23 min  -SR      OT Received On  08/09/19  -SR      OT - Next Appointment  08/12/19  -      OT Goal Re-Cert Due Date  08/23/19  -SR        User Key  (r) = Recorded By, (t) = Taken By, (c) = Cosigned By    Initials Name Provider Type    SR María Mabry, OT Occupational Therapist        Therapy  Charges for Today     Code Description Service Date Service Provider Modifiers Qty    53629403586 HC OT EVAL MOD COMPLEXITY 4 8/9/2019 María Mabry, OT GO 1               María Mabry, OT  8/9/2019

## 2019-08-09 NOTE — THERAPY EVALUATION
Acute Care - Physical Therapy Initial Evaluation  [facility]     Patient Name: Shaka Trinidad  : 1950  MRN: [id number]  Today's Date: 2019                Admit Date: 2019    Visit Dx:     ICD-10-CM ICD-9-CM   1. Coronary artery disease of native artery of native heart with stable angina pectoris (CMS/[facility]) I25.118 414.01     413.9     Patient Active Problem List   Diagnosis   • Angina at rest (CMS/[facility])   • Abnormal nuclear stress test   • Coronary artery disease of native artery of native heart with stable angina pectoris (CMS/[facility])   • CAD (coronary artery disease)     Past Medical History:   Diagnosis Date   • Coronary artery disease    • Diabetes mellitus, type II (CMS/[facility])    • Elevated cholesterol    • H/O cataract     cataracts    • Hyperlipidemia    • Hypertension    • Peripheral edema    • Peripheral edema    • Sleep apnea     oral appliance     Past Surgical History:   Procedure Laterality Date   • CARDIAC CATHETERIZATION N/A 2019    Procedure: Left Heart Cath with angiogram;  Surgeon: Bautista Lopez MD;  Location: [facility] INVASIVE LOCATION;  Service: Cardiovascular   • CARDIAC CATHETERIZATION N/A 2019    Procedure: Coronary angiography;  Surgeon: Bautista Lopez MD;  Location: [facility] CATH INVASIVE LOCATION;  Service: Cardiovascular   • CARDIAC CATHETERIZATION N/A 2019    Procedure: Left ventriculography;  Surgeon: Bautista Lopez MD;  Location: [facility] CATH INVASIVE LOCATION;  Service: Cardiovascular   • COLONOSCOPY     • HERNIA REPAIR     • OTHER SURGICAL HISTORY  2015    2d echo-abstracted cent.         PT ASSESSMENT (last 12 hours)      Physical Therapy Evaluation     Row Name 19 1434          PT Evaluation Time/Intention    Subjective Information  complains of;pain  -HD     Document Type  evaluation  -HD     Mode of Treatment  co-treatment;physical therapy  -HD     Row Name 19 1434          General Information    Prior Level of Function  independent:  -HD      Equipment Currently Used at Home  none walk in shower   -HD     Pertinent History of Current Functional Problem  Pt is 70 yo male s/p CABG x4 8/8/19.   -HD     Existing Precautions/Restrictions  sternal  -HD     Row Name 08/09/19 1434          Relationship/Environment    Lives With  spouse  -     Row Name 08/09/19 1434          Resource/Environmental Concerns    Current Living Arrangements  home/apartment/condo  -HD     Row Name 08/09/19 1434          Living Environment    Home Accessibility  wheelchair accessible  -HD     Row Name 08/09/19 1434          Cognitive Assessment/Intervention- PT/OT    Orientation Status (Cognition)  oriented x 4  -HD     Row Name 08/09/19 1434          Bed Mobility Assessment/Treatment    Bed Mobility Assessment/Treatment  supine-sit  -HD     Supine-Sit [place] (Bed Mobility)  moderate assist (50% patient effort)  -[facility] Name 08/09/19 1434          Transfer Assessment/Treatment    Transfer Assessment/Treatment  sit-stand transfer;stand-sit transfer  -HD     Sit-Stand [place] (Transfers)  moderate assist (50% patient effort);minimum assist (75% patient effort)  -HD     Stand-Sit [place] (Transfers)  minimum assist (75% patient effort)  -     Row Name 08/09/19 1434          Gait/Stairs Assessment/Training    Gait/Stairs Assessment/Training  gait/ambulation independence;gait/ambulation assistive device  -HD     [place] Level (Gait)  minimum assist (75% patient effort)  -HD     Assistive Device (Gait)  walker, front-wheeled  -HD     Distance in Feet (Gait)  100 ft  -HD     Row Name 08/09/19 1434          General ROM    GENERAL ROM COMMENTS  BUEs limited secondary to sternal precautions, BLEs WFL  -HD     [facility] Name 08/09/19 1434          MMT (Manual Muscle Testing)    General MMT Comments  BUEs limited secondary to sternal precautions, BLEs 4/5 MMT  -HD     Row Name 08/09/19 1434          Pain Assessment    Additional Documentation  Pain Scale: Numbers  Pre/Post-Treatment (Group)  -HD     Row Name 08/09/19 1434          Pain Scale: Numbers Pre/Post-Treatment    Pain Scale: Numbers, Pretreatment  8/10  -HD     Pain Scale: Numbers, Post-Treatment  6/10  -HD     Pain Location  chest  -HD     Row Name             Wound 08/08/19 1256 Other (See comments) torso Incision    Wound - Properties Group Date first assessed: 08/08/19  -AT Time first assessed: 1256  -AT Side: Other (See comments)  -AT Location: torso  -AT Primary Wound Type: Incision  -AT    Row Name 08/09/19 1434          Plan of Care Review    Plan of Care Reviewed With  patient  -HD     Row Name 08/09/19 1434          Physical Therapy Clinical Impression    Patient/Family Goals Statement (PT Clinical Impression)  Increase strength  -HD     Criteria for Skilled Interventions Met (PT Clinical Impression)  yes;treatment indicated  -HD     Impairments Found (describe specific impairments)  gait, locomotion, and balance;aerobic capacity/endurance  -HD     Rehab Potential (PT Clinical Summary)  good, to achieve stated therapy goals  -HD     Row Name 08/09/19 1434          Vital Signs    Pre Systolic BP Rehab  131  -HD     Pre Treatment Diastolic BP  75  -HD     Intra Systolic BP Rehab  123  -HD     Intra Treatment Diastolic BP  62  -HD     Post Systolic BP Rehab  130  -HD     Post Treatment Diastolic BP  61  -HD     Pretreatment Heart Rate (beats/min)  77  -HD     Posttreatment Heart Rate (beats/min)  77  -HD     Pre SpO2 (%)  96  -HD     O2 Delivery Pre Treatment  supplemental O2 4L  -HD     Post SpO2 (%)  96  -HD     O2 Delivery Post Treatment  supplemental O2 4L  -HD     Row Name 08/09/19 1434          Physical Therapy Goals    Bed Mobility Goal Selection (PT)  bed mobility, PT goal 1  -HD     Transfer Goal Selection (PT)  transfer, PT goal 1  -HD     Gait Training Goal Selection (PT)  gait training, PT goal 1  -HD     [facility] Name 08/09/19 1434          Bed Mobility Goal 1 (PT)    Activity/Assistive Device (Bed  Mobility Goal 1, PT)  bed mobility activities, all  -HD     [place] Level/Cues Needed (Bed Mobility Goal 1, PT)  supervision required  -HD     Time Frame (Bed Mobility Goal 1, PT)  2 weeks  -HD     Row Name 08/09/19 1434          Transfer Goal 1 (PT)    Activity/Assistive Device (Transfer Goal 1, PT)  transfers, all  -HD     [place] Level/Cues Needed (Transfer Goal 1, PT)  standby assist  -HD     Time Frame (Transfer Goal 1, PT)  2 weeks  -HD     Row Name 08/09/19 1434          Gait Training Goal 1 (PT)    Activity/Assistive Device (Gait Training Goal 1, PT)  gait (walking locomotion);assistive device use  -HD     [place] Level (Gait Training Goal 1, PT)  supervision required  -HD     Distance (Gait Goal 1, PT)  300 ft  -HD     Row Name 08/09/19 1434          Positioning and Restraints    Pre-Treatment Position  in bed  -HD     Post Treatment Position  chair  -HD     In Chair  notified nsg;call light within reach;encouraged to call for assist  -HD       User Key  (r) = Recorded By, (t) = Taken By, (c) = Cosigned By    Initials Name Provider Type    HD Soledad Rodriguez, PT Physical Therapist    AT Kvng Merino RN Registered Nurse        Physical Therapy Education     Title: PT OT SLP Therapies (In Progress)     Topic: Physical Therapy (In Progress)     Point: Mobility training (In Progress)     Learning Progress Summary           Patient Acceptance, E, NR by HD at 8/9/2019  3:47 PM    Comment:  Pt will need further education on sternal precautions                   Point: Body mechanics (In Progress)     Learning Progress Summary           Patient Acceptance, E, NR by HD at 8/9/2019  3:47 PM    Comment:  Pt will need further education on sternal precautions                   Point: Precautions (In Progress)     Learning Progress Summary           Patient Acceptance, E, NR by HD at 8/9/2019  3:47 PM    Comment:  Pt will need further education on sternal precautions                               User Key      Initials Effective Dates Name Provider Type Discipline    HD 03/01/19 -  Soledad Rodriguez, PT Physical Therapist PT              PT Recommendation and Plan  Anticipated Discharge Disposition (PT): home with home health, home with assist  Planned Therapy Interventions (PT Eval): balance training, bed mobility training, gait training, neuromuscular re-education, strengthening, transfer training, patient/family education, home exercise program  Therapy Frequency (PT Clinical Impression): 3 times/wk  Outcome Summary/Treatment Plan (PT)  Anticipated Discharge Disposition (PT): home with home health, home with assist  Plan of Care Reviewed With: patient  Progress: improving  Outcome Summary: Pt is 70 yo male s/p CABG x4 on 8/8/19.  Pt requiring modA for bed mobility and [place]-modA for transfers.  Pt able to ambulate with CGA using RW.  Pt reports pain decreases with mobility and position change from bed.   It is anticipated pt will be able to d/c home with wife and family with HHPT.  PT will follow 3x/week.      Time Calculation:   PT Charges     Row Name 08/09/19 1553             Time Calculation    Start Time  1434  -HD      Stop Time  1456  -HD      Time Calculation (min)  22 min  -HD      PT Received On  08/09/19  -HD      PT - Next Appointment  08/12/19  -HD      PT Goal Re-Cert Due Date  08/23/19  -HD         Time Calculation- PT    Total Timed Code Minutes- PT  10 minute(s)  -HD        User Key  (r) = Recorded By, (t) = Taken By, (c) = Cosigned By    Initials Name Provider Type    HD Soledad Rodriguez, PT Physical Therapist        Therapy Charges for Today     Code Description Service Date Service Provider Modifiers Qty    60829422997 HC PT EVAL MOD COMPLEXITY 4 8/9/2019 Soledad Rodriguez, PT GP 1    19946224902 HC GAIT TRAINING EA 15 MIN 8/9/2019 Soledad Rodriguez, PT GP 1                 Soledad Rodriguez, PT  8/9/2019

## 2019-08-09 NOTE — PLAN OF CARE
Problem: Patient Care Overview  Goal: Plan of Care Review  Outcome: Ongoing (interventions implemented as appropriate)   08/09/19 9986   Coping/Psychosocial   Plan of Care Reviewed With patient   Plan of Care Review   Progress improving   OTHER   Outcome Summary Pt is 70 yo male s/p CABG x4 on 8/8/19. Pt requiring modA for bed mobility and [place]-modA for transfers. Pt able to ambulate with CGA using RW. Pt reports pain decreases with mobility and position change from bed. It is anticipated pt will be able to d/c home with wife and family with HHPT. PT will follow 3x/week.

## 2019-08-10 ENCOUNTER — APPOINTMENT (OUTPATIENT)
Dept: GENERAL RADIOLOGY | Facility: HOSPITAL | Age: 69
End: 2019-08-10

## 2019-08-10 LAB
ANION GAP SERPL CALCULATED.3IONS-SCNC: 12.1 MMOL/L (ref 5–15)
BUN BLD-MCNC: 20 MG/DL (ref 8–20)
BUN/CREAT SERPL: 16.7 (ref 6.2–20.3)
CALCIUM SPEC-SCNC: 7.6 MG/DL (ref 8.9–10.3)
CHLORIDE SERPL-SCNC: 100 MMOL/L (ref 101–111)
CO2 SERPL-SCNC: 21 MMOL/L (ref 22–32)
CREAT BLD-MCNC: 1.2 MG/DL (ref 0.7–1.2)
DEPRECATED RDW RBC AUTO: 42 FL (ref 37–54)
ERYTHROCYTE [DISTWIDTH] IN BLOOD BY AUTOMATED COUNT: 13.4 % (ref 12.3–15.4)
GFR SERPL CREATININE-BSD FRML MDRD: 60 ML/MIN/1.73
GLUCOSE BLD-MCNC: 125 MG/DL (ref 65–99)
GLUCOSE BLDC GLUCOMTR-MCNC: 110 MG/DL (ref 70–105)
GLUCOSE BLDC GLUCOMTR-MCNC: 116 MG/DL (ref 70–105)
GLUCOSE BLDC GLUCOMTR-MCNC: 118 MG/DL (ref 70–105)
GLUCOSE BLDC GLUCOMTR-MCNC: 120 MG/DL (ref 70–105)
GLUCOSE BLDC GLUCOMTR-MCNC: 120 MG/DL (ref 70–105)
GLUCOSE BLDC GLUCOMTR-MCNC: 129 MG/DL (ref 70–105)
GLUCOSE BLDC GLUCOMTR-MCNC: 133 MG/DL (ref 70–105)
GLUCOSE BLDC GLUCOMTR-MCNC: 182 MG/DL (ref 70–105)
GLUCOSE BLDC GLUCOMTR-MCNC: 196 MG/DL (ref 70–105)
GLUCOSE BLDC GLUCOMTR-MCNC: 223 MG/DL (ref 70–105)
GLUCOSE BLDC GLUCOMTR-MCNC: 236 MG/DL (ref 70–105)
GLUCOSE BLDC GLUCOMTR-MCNC: 238 MG/DL (ref 70–105)
HCT VFR BLD AUTO: 27 % (ref 37.5–51)
HGB BLD-MCNC: 8.9 G/DL (ref 13–17.7)
MCH RBC QN AUTO: 29.1 PG (ref 26.6–33)
MCHC RBC AUTO-ENTMCNC: 32.8 G/DL (ref 31.5–35.7)
MCV RBC AUTO: 88.7 FL (ref 79–97)
PLATELET # BLD AUTO: 138 10*3/MM3 (ref 140–450)
PMV BLD AUTO: 8.4 FL (ref 6–12)
POTASSIUM BLD-SCNC: 4.1 MMOL/L (ref 3.6–5.1)
RBC # BLD AUTO: 3.05 10*6/MM3 (ref 4.14–5.8)
SODIUM BLD-SCNC: 129 MMOL/L (ref 136–144)
WBC NRBC COR # BLD: 12.1 10*3/MM3 (ref 3.4–10.8)

## 2019-08-10 PROCEDURE — 93005 ELECTROCARDIOGRAM TRACING: CPT | Performed by: NURSE PRACTITIONER

## 2019-08-10 PROCEDURE — 85027 COMPLETE CBC AUTOMATED: CPT | Performed by: NURSE PRACTITIONER

## 2019-08-10 PROCEDURE — 25010000002 ENOXAPARIN PER 10 MG: Performed by: NURSE PRACTITIONER

## 2019-08-10 PROCEDURE — 99024 POSTOP FOLLOW-UP VISIT: CPT | Performed by: THORACIC SURGERY (CARDIOTHORACIC VASCULAR SURGERY)

## 2019-08-10 PROCEDURE — 63710000001 INSULIN REGULAR HUMAN PER 5 UNITS: Performed by: NURSE PRACTITIONER

## 2019-08-10 PROCEDURE — 71045 X-RAY EXAM CHEST 1 VIEW: CPT

## 2019-08-10 PROCEDURE — 99232 SBSQ HOSP IP/OBS MODERATE 35: CPT | Performed by: INTERNAL MEDICINE

## 2019-08-10 PROCEDURE — 82962 GLUCOSE BLOOD TEST: CPT

## 2019-08-10 PROCEDURE — 80048 BASIC METABOLIC PNL TOTAL CA: CPT | Performed by: NURSE PRACTITIONER

## 2019-08-10 PROCEDURE — 93010 ELECTROCARDIOGRAM REPORT: CPT | Performed by: INTERNAL MEDICINE

## 2019-08-10 PROCEDURE — 25010000002 FUROSEMIDE PER 20 MG: Performed by: NURSE PRACTITIONER

## 2019-08-10 PROCEDURE — 25010000002 ONDANSETRON PER 1 MG: Performed by: NURSE PRACTITIONER

## 2019-08-10 RX ORDER — FUROSEMIDE 10 MG/ML
40 INJECTION INTRAMUSCULAR; INTRAVENOUS ONCE
Status: COMPLETED | OUTPATIENT
Start: 2019-08-10 | End: 2019-08-10

## 2019-08-10 RX ADMIN — HYDROCODONE BITARTRATE AND ACETAMINOPHEN 1 TABLET: 5; 325 TABLET ORAL at 19:35

## 2019-08-10 RX ADMIN — ATORVASTATIN CALCIUM 40 MG: 40 TABLET, FILM COATED ORAL at 20:11

## 2019-08-10 RX ADMIN — SODIUM CHLORIDE 4.8 UNITS/HR: 900 INJECTION, SOLUTION INTRAVENOUS at 22:43

## 2019-08-10 RX ADMIN — MUPIROCIN 1 APPLICATION: 20 OINTMENT TOPICAL at 20:13

## 2019-08-10 RX ADMIN — POLYETHYLENE GLYCOL 3350 17 G: 17 POWDER, FOR SOLUTION ORAL at 09:20

## 2019-08-10 RX ADMIN — ENOXAPARIN SODIUM 40 MG: 40 INJECTION SUBCUTANEOUS at 16:14

## 2019-08-10 RX ADMIN — SENNOSIDES 2 TABLET: 8.6 TABLET, FILM COATED ORAL at 20:13

## 2019-08-10 RX ADMIN — PANTOPRAZOLE SODIUM 40 MG: 40 TABLET, DELAYED RELEASE ORAL at 06:36

## 2019-08-10 RX ADMIN — CHLORHEXIDINE GLUCONATE 15 ML: 1.2 RINSE ORAL at 09:21

## 2019-08-10 RX ADMIN — POLYETHYLENE GLYCOL 3350 17 G: 17 POWDER, FOR SOLUTION ORAL at 20:13

## 2019-08-10 RX ADMIN — AMIODARONE HYDROCHLORIDE 400 MG: 200 TABLET ORAL at 18:19

## 2019-08-10 RX ADMIN — CHLORHEXIDINE GLUCONATE 15 ML: 1.2 RINSE ORAL at 20:12

## 2019-08-10 RX ADMIN — ONDANSETRON 4 MG: 2 INJECTION INTRAMUSCULAR; INTRAVENOUS at 09:22

## 2019-08-10 RX ADMIN — DOCUSATE SODIUM 100 MG: 100 CAPSULE, LIQUID FILLED ORAL at 09:20

## 2019-08-10 RX ADMIN — SODIUM CHLORIDE 3 UNITS/HR: 900 INJECTION, SOLUTION INTRAVENOUS at 06:48

## 2019-08-10 RX ADMIN — HYDROCODONE BITARTRATE AND ACETAMINOPHEN 1 TABLET: 5; 325 TABLET ORAL at 02:42

## 2019-08-10 RX ADMIN — ASPIRIN 81 MG: 81 TABLET, DELAYED RELEASE ORAL at 09:20

## 2019-08-10 RX ADMIN — AMIODARONE HYDROCHLORIDE 400 MG: 200 TABLET ORAL at 08:45

## 2019-08-10 RX ADMIN — METOPROLOL TARTRATE 12.5 MG: 25 TABLET, FILM COATED ORAL at 09:21

## 2019-08-10 RX ADMIN — METOPROLOL TARTRATE 12.5 MG: 25 TABLET, FILM COATED ORAL at 20:12

## 2019-08-10 RX ADMIN — HYDROCODONE BITARTRATE AND ACETAMINOPHEN 1 TABLET: 5; 325 TABLET ORAL at 10:42

## 2019-08-10 RX ADMIN — DOCUSATE SODIUM 100 MG: 100 CAPSULE, LIQUID FILLED ORAL at 20:12

## 2019-08-10 RX ADMIN — FUROSEMIDE 40 MG: 10 INJECTION, SOLUTION INTRAVENOUS at 12:00

## 2019-08-10 RX ADMIN — MUPIROCIN 1 APPLICATION: 20 OINTMENT TOPICAL at 09:22

## 2019-08-10 NOTE — PROGRESS NOTES
Referring Provider: Cardiac surgeon    Reason for follow-up: Coronary artery bypass surgery     Patient Care Team:  Tangela Jc NP as PCP - General  Tangela Jc NP as PCP - Claims Attributed  Tangela Jc NP as PCP - Family Medicine    Subjective .  No chest pain or shortness of breath      Objective  Lying in bed comfortably     Review of Systems   Constitution: Negative for fever and malaise/fatigue.   HENT: Negative for ear pain and nosebleeds.    Eyes: Negative for blurred vision and double vision.   Cardiovascular: Negative for chest pain, dyspnea on exertion and palpitations.   Respiratory: Negative for cough and shortness of breath.    Skin: Negative for rash.   Musculoskeletal: Negative for joint pain.   Gastrointestinal: Negative for abdominal pain, nausea and vomiting.   Neurological: Negative for focal weakness and headaches.   Psychiatric/Behavioral: Negative for depression. The patient is not nervous/anxious.    All other systems reviewed and are negative.      Patient has no known allergies.    Scheduled Meds:    amiodarone 400 mg Oral BID With Meals   aspirin 81 mg Oral Daily   atorvastatin 40 mg Oral Nightly   chlorhexidine 15 mL Mouth/Throat Q12H   docusate sodium 100 mg Oral BID   enoxaparin 40 mg Subcutaneous Daily   [START ON 8/11/2019] insulin lispro 0-9 Units Subcutaneous 4x Daily With Meals & Nightly   metoprolol tartrate 12.5 mg Oral Q12H   mupirocin 1 application Each Nare BID   pantoprazole 40 mg Oral Q AM   polyethylene glycol 17 g Oral BID   senna 2 tablet Oral Nightly     Continuous Infusions:    insulin 0-50 Units/hr Last Rate: 3 Units/hr (08/10/19 0648)   lactated ringers 9 mL/hr    sodium chloride 9 mL/hr    sodium chloride 30 mL/hr    sodium chloride 30 mL/hr Last Rate: 30 mL/hr (08/08/19 1846)     PRN Meds:.bisacodyl  •  dextrose  •  dextrose  •  glucagon (human recombinant)  •  HYDROcodone-acetaminophen  •  insulin  •  lactated ringers  •  ondansetron  •  potassium  "chloride **OR** potassium chloride  •  potassium chloride **OR** potassium chloride  •  sodium chloride  •  sodium chloride        VITAL SIGNS  Vitals:    08/10/19 0300 08/10/19 0340 08/10/19 0440 08/10/19 0600   BP: 114/51 102/51 105/57    Pulse: 66 65 65    Resp: 16      Temp: 98.6 °F (37 °C)      TempSrc:       SpO2: 100% 99% 100%    Weight:    100 kg (221 lb 1.9 oz)   Height:           Flowsheet Rows      First Filed Value   Admission Height  170.2 cm (67\") Documented at 08/08/2019 0746   Admission Weight  95.8 kg (211 lb 3.2 oz) Documented at 08/08/2019 0746           TELEMETRY: Sinus rhythm    Physical Exam:  Physical Exam   Constitutional: He appears well-developed and well-nourished.   HENT:   Head: Normocephalic and atraumatic.   Eyes: Conjunctivae and EOM are normal. Pupils are equal, round, and reactive to light. No scleral icterus.   Neck: Normal range of motion. Neck supple. No JVD present. Carotid bruit is not present.   Cardiovascular: Normal rate, regular rhythm, S1 normal, S2 normal, normal heart sounds and intact distal pulses. PMI is not displaced.   Pulmonary/Chest: Effort normal and breath sounds normal. He has no wheezes. He has no rales.   Abdominal: Soft. Bowel sounds are normal.   Musculoskeletal: Normal range of motion.   Neurological: He is alert. He has normal strength.   No focal deficits   Skin: Skin is warm and dry. No rash noted.   Psychiatric: He has a normal mood and affect.        Results Review:   I reviewed the patient's new clinical results.  Lab Results (last 24 hours)     Procedure Component Value Units Date/Time    POC Glucose Once [[id number]]  (Abnormal) Collected:  08/10/19 0740    Specimen:  Blood Updated:  08/10/19 0741     Glucose 120 mg/dL      Comment: Serial Number: [id number]:  [id number]       POC Glucose Once [[id number]]  (Abnormal) Collected:  08/10/19 0652    Specimen:  Blood Updated:  08/10/19 0653     Glucose 118 mg/dL      Comment: Serial Number: " [id number]:  [id number]       POC Glucose Once [[id number]]  (Abnormal) Collected:  08/10/19 0549    Specimen:  Blood Updated:  08/10/19 0550     Glucose 120 mg/dL      Comment: Serial Number: [id number]:  [id number]       Basic Metabolic Panel [[id number]]  (Abnormal) Collected:  08/10/19 0353    Specimen:  Blood Updated:  08/10/19 0434     Glucose 125 mg/dL      BUN 20 mg/dL      Creatinine 1.20 mg/dL      Sodium 129 mmol/L      Potassium 4.1 mmol/L      Chloride 100 mmol/L      CO2 21.0 mmol/L      Calcium 7.6 mg/dL      eGFR Non African Amer 60 mL/min/1.73      BUN/Creatinine Ratio 16.7     Anion Gap 12.1 mmol/L     CBC (No Diff) [[id number]]  (Abnormal) Collected:  08/10/19 0353    Specimen:  Blood Updated:  08/10/19 0404     WBC 12.10 10*3/mm3      RBC 3.05 10*6/mm3      Hemoglobin 8.9 g/dL      Hematocrit 27.0 %      MCV 88.7 fL      MCH 29.1 pg      MCHC 32.8 g/dL      RDW 13.4 %      RDW-SD 42.0 fl      MPV 8.4 fL      Platelets 138 10*3/mm3     POC Glucose Once [[id number]]  (Abnormal) Collected:  08/10/19 0354    Specimen:  Blood Updated:  08/10/19 0355     Glucose 116 mg/dL      Comment: Serial Number: [id number]:  [id number]       POC Glucose Once [[id number]]  (Abnormal) Collected:  08/10/19 0225    Specimen:  Blood Updated:  08/10/19 0227     Glucose 129 mg/dL      Comment: Serial Number: [id number]:  [id number]       POC Glucose Once [[id number]]  (Abnormal) Collected:  08/10/19 0040    Specimen:  Blood Updated:  08/10/19 0041     Glucose 196 mg/dL      Comment: Serial Number: [id number]:  [id number]       POC Glucose Once [[id number]]  (Abnormal) Collected:  08/09/19 2339    Specimen:  Blood Updated:  08/09/19 2341     Glucose 217 mg/dL      Comment: Serial Number: [id number]:  [id number]       POC Glucose Once [[id number]]  (Abnormal) Collected:  08/09/19 2217    Specimen:  Blood Updated:  08/09/19 2218     Glucose 257 mg/dL      Comment: Serial Number:  [id number]:  [id number]       POC Glucose Once [[id number]]  (Abnormal) Collected:  08/09/19 2133    Specimen:  Blood Updated:  08/09/19 2136     Glucose 267 mg/dL      Comment: Serial Number: [id number]:  [id number]       POC Glucose Once [[id number]]  (Abnormal) Collected:  08/09/19 2026    Specimen:  Blood Updated:  08/09/19 2027     Glucose 225 mg/dL      Comment: Serial Number: [id number]:  [id number]       POC Glucose Once [[id number]]  (Abnormal) Collected:  08/09/19 1809    Specimen:  Blood Updated:  08/09/19 1833     Glucose 126 mg/dL      Comment: Serial Number: [id number]:  [id number]       POC Glucose Once [[id number]]  (Abnormal) Collected:  08/09/19 1634    Specimen:  Blood Updated:  08/09/19 1636     Glucose 189 mg/dL      Comment: Serial Number: [id number]:  [id number]       POC Glucose Once [[id number]]  (Abnormal) Collected:  08/09/19 1303    Specimen:  Blood Updated:  08/09/19 1305     Glucose 258 mg/dL      Comment: Serial Number: [id number]:  [id number]       POC Glucose Once [[id number]]  (Abnormal) Collected:  08/09/19 1127    Specimen:  Blood Updated:  08/09/19 1133     Glucose 301 mg/dL      Comment: Serial Number: [id number]:  [id number]       POC Glucose Once [[id number]]  (Abnormal) Collected:  08/09/19 1018    Specimen:  Blood Updated:  08/09/19 1020     Glucose 237 mg/dL      Comment: Serial Number: [id number]:  [id number]       POC Glucose Once [[id number]]  (Abnormal) Collected:  08/09/19 0827    Specimen:  Blood Updated:  08/09/19 0828     Glucose 135 mg/dL      Comment: Serial Number: [id number]:  [id number]             Imaging Results (last 24 hours)     Procedure Component Value Units Date/Time    XR Chest 1 View [[id number]] Collected:  08/09/19 1619     Updated:  08/09/19 1623    Narrative:       DATE OF EXAM:  08/09/2019 at 3:59 PM      PROCEDURE:  XR CHEST 1 VW-     INDICATIONS:  Post chest tube removal; I25.118-Atherosclerotic  heart disease of native  coronary artery with other forms of angina pectoris       COMPARISON:  AP portable chest 08/09/2019 at 04 29.     TECHNIQUE:   Single radiographic view of the chest was obtained.     FINDINGS:  Sidney-Erich catheter has been removed with the introducer sheath  remaining. There is no visible pneumothorax. Low volume inspiration with  linear subsegmental atelectasis in the left perihilar region left lower  lobe. Right lung appears clear. Heart size appears mildly enlarged and  stable without signs of median sternotomy and CABG. Probable trace left  pleural effusion.       Impression:          1. Sidney-Erich catheter removed with introducer sheath remaining. No  pneumothorax.  2. Mild left basilar atelectasis, improved. Stable trace left pleural  effusion.     Electronically Signed By-Dr. Delmis Macias MD On:8/9/2019 4:21 PM  This report was finalized on [id number] by Dr. Delmis Macias MD.          EKG      I personally viewed and interpreted the patient's EKG/Telemetry data:    ECHOCARDIOGRAM:    STRESS MYOVIEW:    CARDIAC CATHETERIZATION:    OTHER:         Assessment/Plan     1.  Coronary artery disease status post coronary bypass surgery  2.  Hypertension  3.  Hyper lipedema  4.  Diabetes    Patient underwent coronary bypass surgery x4 vessels with LIMA to LAD and saphenous graft to the marginal branch to the diagonal branch and to the RCA.    Patient is started on oral medicines and tolerating well.  blood pressure and heart rate are stable  Continue aggressive supportive care  Additional recommendations per Dr. João Durham, APRN  08/10/19  7:55 AM

## 2019-08-10 NOTE — OP NOTE
Operative Note    Date of Dictation: 08/09/19    Date of Procedure: 08/08/19    Referring Physician: Bautista Lopez M.D.    Preoperative diagnosis:   1.  Multilevel coronary artery disease  2.  Angina  3.  PVD  4.  HTN    Postoperative diagnosis:   Same    Procedure:   1. CABG x 4 with a LIMA sequential to the mid LAD and first diagonal and reverse individual saphenous vein graft to the distal RCA and to the obtuse marginal 1  2. EVH of the both legs    Surgeon: Chet Mcarthur MD     Assistants: PARISH Ross    Anesthesia: General endotracheal anesthesia and JOHN    Findings:  The saphenous vein was harvested endoscopically form the both  leg. The vein had a diameter of 3.5 mm and was of fair quality. The coronaries diameters were between 1.5 and 1.75 mm.    Estimated Blood Loss:  Documented in the anesthesia record    STS Data:    The patient was explained the risks (STS risk score calculated), benefits and alternatives of surgery and agreed to proceed. The antibiotics and b blockers were given in the STS required window.        Description of the procedure:     The patient was placed supine on the operative table. General anesthesia was given and lines placed. The patient was prepped and draped using the usual sterile technique. A median sternotomy was performed with a scalpel and the layers carried down to the sternum using the electrocautery. The sternum was split in the midline using a vertical oscillating saw. Hemostasis was achieved. The LIMA was harvested as a pedicle and prepared with papaverine. It was of good quality. 300 units/kg of IV heparin was given to an ACT over 400. A Favaloro retractor was placed and the mediastinum exposed, the pericardium was opened and the edges tacked to the wound. Cannulation sutures were placed in the ascending aorta and right atrium. Small cannulas were placed and aorto right atrial cardiopulmonary bypass was started. Cardioplegia cannulas were placed and then the  ascending aorta was clamped. One liter of cold blood cardioplegia was given in an antegrade fashion to achieved diastolic arrest and further doses every 15 minutes thereafter. Constructions of grafts were done in the sequence distal - proximal between the reversed saphenous vein graft and each coronary targets. Grafts were constructed to the OM1 and distal rca coronary arteries and plegia was given between graft sequences after de-airing the aortic root and tying the proximal anastomosis. The LIMA was anastomosed to the diagonal and mid LAD sequentially using 7.0 prolene continuous sutures with a small needle, then the warm dose of cardioplegia was given and then the aortic clamp removed as well as the LIMA bulldog clamp. All anastomoses were checked and had good flow and morphology. The left pleural space was suctioned and the lungs ventilated. The heart was paced till regular atrial rhythm resumed. I allowed the heart to eject and once hemodynamics were acceptable, then the CPB was discontinued and the venous and cardioplegia cannulas removed. The matching dose of protamine was given and the aortic cannula removed as well. AV temporary wires and pleural and mediastinal chest tubes were placed and the wound sprayed with platelet rich plasma.  The sternum was closed with single and double wires and soft tissue in layers of reabsorbable material. The wounds were covered with sterile dressings.       CPB time and Aortic clamp time: Documented in the perfusion record    Complications:  none           Disposition: Cardiovascular recovery room           Condition: Critical but stable.

## 2019-08-10 NOTE — PLAN OF CARE
Problem: Skin Injury Risk (Adult)  Goal: Identify Related Risk Factors and Signs and Symptoms  Outcome: Ongoing (interventions implemented as appropriate)    Goal: Skin Health and Integrity  Outcome: Ongoing (interventions implemented as appropriate)      Problem: Fall Risk (Adult)  Goal: Identify Related Risk Factors and Signs and Symptoms  Outcome: Outcome(s) achieved Date Met: 08/10/19    Goal: Absence of Fall  Outcome: Ongoing (interventions implemented as appropriate)      Problem: Cardiac Surgery (Adult)  Goal: Signs and Symptoms of Listed Potential Problems Will be Absent, Minimized or Managed (Cardiac Surgery)  Outcome: Ongoing (interventions implemented as appropriate)

## 2019-08-10 NOTE — PROGRESS NOTES
S/P POD# 2 CABG--Henriqueni  EF 60% (cath)    Subjective:    No c/o's today except soreness    No events overnight  Wt up 5 kgs from preop    Drips:  insulin      Intake/Output Summary (Last 24 hours) at 8/10/2019 1146  Last data filed at 8/10/2019 1015  Gross per 24 hour   Intake 1514 ml   Output 700 ml   Net 814 ml     Temp:  [98.3 °F (36.8 °C)-99 °F (37.2 °C)] 98.3 °F (36.8 °C)  Heart Rate:  [65-81] 67  Resp:  [14-20] 14  BP: ()/(39-62) 102/45  Arterial Line BP: ()/(40-41) 101/40      Results from last 7 days   Lab Units 08/10/19  0353 08/09/19  0344  08/08/19  1635 08/08/19  1528  08/06/19  0828   WBC 10*3/mm3 12.10* 12.20*  --  11.90* 10.60  --  6.70   HEMOGLOBIN g/dL 8.9* 10.0*  --  10.3* 8.7*  8.7*  --  12.6*   HEMOGLOBIN, POC   --   --    < >  --   --    < >  --    HEMATOCRIT % 27.0* 28.9*  --  30.4* 24.8*  24.8*  --  37.5   HEMATOCRIT POC   --   --    < >  --   --    < >  --    PLATELETS 10*3/mm3 138* 140  --  139* 124*  124*  --  227   INR   --   --   --  1.18* 1.37*  --  1.03    < > = values in this interval not displayed.     Results from last 7 days   Lab Units 08/10/19  0353 08/09/19  0344   CREATININE mg/dL 1.20 1.20   POTASSIUM mmol/L 4.1 3.9   SODIUM mmol/L 129* 139   MAGNESIUM mg/dL  --  2.6*   PHOSPHORUS mg/dL  --  4.2       Physical Exam:  Neuro intact, nad, up in chair, family present  Tele:  SR 80s  Diminished bases, 4L 90%  dsg c/d/i, no drainage--BLE dsg in place  Benign abd, no BM  No edema    Assessment/Plan:  Principal Problem:    Coronary artery disease of native artery of native heart with stable angina pectoris (CMS/HCC)  Active Problems:    CAD (coronary artery disease)    MV CAD, Ef 60%--s/p POD#2 CABG--on asa/bb/statin, amio for afib ppx  HTN--stable  HLD--statin  DM II--preop a1c 7.4 , insulin drip  PVD--stable  CKD, stage 2--stable    Routine care--d/c sally Castro  Anticipate home at discharge    Darling Tanner, APRN  8/10/2019  11:46 AM

## 2019-08-11 LAB
ANION GAP SERPL CALCULATED.3IONS-SCNC: 12.3 MMOL/L (ref 5–15)
BUN BLD-MCNC: 23 MG/DL (ref 8–20)
BUN/CREAT SERPL: 19.2 (ref 6.2–20.3)
CALCIUM SPEC-SCNC: 7.8 MG/DL (ref 8.9–10.3)
CHLORIDE SERPL-SCNC: 100 MMOL/L (ref 101–111)
CO2 SERPL-SCNC: 21 MMOL/L (ref 22–32)
CREAT BLD-MCNC: 1.2 MG/DL (ref 0.7–1.2)
DEPRECATED RDW RBC AUTO: 41.1 FL (ref 37–54)
ERYTHROCYTE [DISTWIDTH] IN BLOOD BY AUTOMATED COUNT: 13.1 % (ref 12.3–15.4)
GFR SERPL CREATININE-BSD FRML MDRD: 60 ML/MIN/1.73
GLUCOSE BLD-MCNC: 142 MG/DL (ref 65–99)
GLUCOSE BLDC GLUCOMTR-MCNC: 137 MG/DL (ref 70–105)
GLUCOSE BLDC GLUCOMTR-MCNC: 141 MG/DL (ref 70–105)
GLUCOSE BLDC GLUCOMTR-MCNC: 147 MG/DL (ref 70–105)
GLUCOSE BLDC GLUCOMTR-MCNC: 166 MG/DL (ref 70–105)
GLUCOSE BLDC GLUCOMTR-MCNC: 190 MG/DL (ref 70–105)
GLUCOSE BLDC GLUCOMTR-MCNC: 231 MG/DL (ref 70–105)
GLUCOSE BLDC GLUCOMTR-MCNC: 264 MG/DL (ref 70–105)
HCT VFR BLD AUTO: 27.3 % (ref 37.5–51)
HGB BLD-MCNC: 9.1 G/DL (ref 13–17.7)
MCH RBC QN AUTO: 29.5 PG (ref 26.6–33)
MCHC RBC AUTO-ENTMCNC: 33.2 G/DL (ref 31.5–35.7)
MCV RBC AUTO: 88.8 FL (ref 79–97)
PLATELET # BLD AUTO: 141 10*3/MM3 (ref 140–450)
PMV BLD AUTO: 8.7 FL (ref 6–12)
POTASSIUM BLD-SCNC: 4.3 MMOL/L (ref 3.6–5.1)
RBC # BLD AUTO: 3.08 10*6/MM3 (ref 4.14–5.8)
SODIUM BLD-SCNC: 129 MMOL/L (ref 136–144)
WBC NRBC COR # BLD: 12.7 10*3/MM3 (ref 3.4–10.8)

## 2019-08-11 PROCEDURE — 99232 SBSQ HOSP IP/OBS MODERATE 35: CPT | Performed by: INTERNAL MEDICINE

## 2019-08-11 PROCEDURE — 25010000002 ENOXAPARIN PER 10 MG: Performed by: INTERNAL MEDICINE

## 2019-08-11 PROCEDURE — 85027 COMPLETE CBC AUTOMATED: CPT | Performed by: NURSE PRACTITIONER

## 2019-08-11 PROCEDURE — 63710000001 INSULIN GLARGINE PER 5 UNITS: Performed by: NURSE PRACTITIONER

## 2019-08-11 PROCEDURE — 80048 BASIC METABOLIC PNL TOTAL CA: CPT | Performed by: NURSE PRACTITIONER

## 2019-08-11 PROCEDURE — 99024 POSTOP FOLLOW-UP VISIT: CPT | Performed by: THORACIC SURGERY (CARDIOTHORACIC VASCULAR SURGERY)

## 2019-08-11 PROCEDURE — 63710000001 INSULIN LISPRO (HUMAN) PER 5 UNITS: Performed by: NURSE PRACTITIONER

## 2019-08-11 PROCEDURE — 82962 GLUCOSE BLOOD TEST: CPT

## 2019-08-11 RX ORDER — BUMETANIDE 0.25 MG/ML
1 INJECTION INTRAMUSCULAR; INTRAVENOUS ONCE
Status: COMPLETED | OUTPATIENT
Start: 2019-08-11 | End: 2019-08-11

## 2019-08-11 RX ORDER — LACTULOSE 10 G/15ML
30 SOLUTION ORAL DAILY
Status: DISCONTINUED | OUTPATIENT
Start: 2019-08-11 | End: 2019-08-12

## 2019-08-11 RX ORDER — INSULIN GLARGINE 100 [IU]/ML
15 INJECTION, SOLUTION SUBCUTANEOUS EVERY MORNING
Status: DISCONTINUED | OUTPATIENT
Start: 2019-08-11 | End: 2019-08-12

## 2019-08-11 RX ORDER — POTASSIUM CHLORIDE 20 MEQ/1
20 TABLET, EXTENDED RELEASE ORAL DAILY
Status: DISCONTINUED | OUTPATIENT
Start: 2019-08-11 | End: 2019-08-12

## 2019-08-11 RX ADMIN — AMIODARONE HYDROCHLORIDE 400 MG: 200 TABLET ORAL at 08:21

## 2019-08-11 RX ADMIN — MUPIROCIN 1 APPLICATION: 20 OINTMENT TOPICAL at 21:56

## 2019-08-11 RX ADMIN — SENNOSIDES 2 TABLET: 8.6 TABLET, FILM COATED ORAL at 21:56

## 2019-08-11 RX ADMIN — HYDROCODONE BITARTRATE AND ACETAMINOPHEN 1 TABLET: 5; 325 TABLET ORAL at 17:47

## 2019-08-11 RX ADMIN — POTASSIUM CHLORIDE 20 MEQ: 1500 TABLET, EXTENDED RELEASE ORAL at 11:36

## 2019-08-11 RX ADMIN — HYDROCODONE BITARTRATE AND ACETAMINOPHEN 1 TABLET: 5; 325 TABLET ORAL at 02:05

## 2019-08-11 RX ADMIN — METOPROLOL TARTRATE 12.5 MG: 25 TABLET, FILM COATED ORAL at 09:45

## 2019-08-11 RX ADMIN — ENOXAPARIN SODIUM 40 MG: 40 INJECTION SUBCUTANEOUS at 21:55

## 2019-08-11 RX ADMIN — HYDROCODONE BITARTRATE AND ACETAMINOPHEN 1 TABLET: 5; 325 TABLET ORAL at 11:37

## 2019-08-11 RX ADMIN — INSULIN LISPRO 4 UNITS: 100 INJECTION, SOLUTION INTRAVENOUS; SUBCUTANEOUS at 11:49

## 2019-08-11 RX ADMIN — MUPIROCIN 1 APPLICATION: 20 OINTMENT TOPICAL at 09:46

## 2019-08-11 RX ADMIN — HYDROCODONE BITARTRATE AND ACETAMINOPHEN 1 TABLET: 5; 325 TABLET ORAL at 22:04

## 2019-08-11 RX ADMIN — BUMETANIDE 1 MG: 0.25 INJECTION INTRAMUSCULAR; INTRAVENOUS at 11:36

## 2019-08-11 RX ADMIN — ASPIRIN 81 MG: 81 TABLET, DELAYED RELEASE ORAL at 09:46

## 2019-08-11 RX ADMIN — INSULIN GLARGINE 15 UNITS: 100 INJECTION, SOLUTION SUBCUTANEOUS at 11:49

## 2019-08-11 RX ADMIN — ATORVASTATIN CALCIUM 40 MG: 40 TABLET, FILM COATED ORAL at 21:55

## 2019-08-11 RX ADMIN — POLYETHYLENE GLYCOL 3350 17 G: 17 POWDER, FOR SOLUTION ORAL at 21:56

## 2019-08-11 RX ADMIN — METOPROLOL TARTRATE 12.5 MG: 25 TABLET, FILM COATED ORAL at 21:55

## 2019-08-11 RX ADMIN — DOCUSATE SODIUM 100 MG: 100 CAPSULE, LIQUID FILLED ORAL at 21:56

## 2019-08-11 RX ADMIN — DOCUSATE SODIUM 100 MG: 100 CAPSULE, LIQUID FILLED ORAL at 09:46

## 2019-08-11 RX ADMIN — PANTOPRAZOLE SODIUM 40 MG: 40 TABLET, DELAYED RELEASE ORAL at 05:47

## 2019-08-11 RX ADMIN — INSULIN LISPRO 6 UNITS: 100 INJECTION, SOLUTION INTRAVENOUS; SUBCUTANEOUS at 21:56

## 2019-08-11 RX ADMIN — AMIODARONE HYDROCHLORIDE 400 MG: 200 TABLET ORAL at 17:47

## 2019-08-11 RX ADMIN — INSULIN LISPRO 2 UNITS: 100 INJECTION, SOLUTION INTRAVENOUS; SUBCUTANEOUS at 17:47

## 2019-08-11 NOTE — PLAN OF CARE
Problem: Patient Care Overview  Goal: Plan of Care Review  Outcome: Ongoing (interventions implemented as appropriate)   08/11/19 0701   Coping/Psychosocial   Plan of Care Reviewed With patient   Plan of Care Review   Progress improving   OTHER   Outcome Summary Patient has been ambulating better with assistance and walker. Oxygen has been weaned down to 1L, not complaining of shortness of breath

## 2019-08-11 NOTE — PROGRESS NOTES
S/P POD# 3 CABG--Lyubov  EF 60% (cath)    Subjective:    No c/o's today reports he slept better    No events overnight  Wt up 4 kgs from preop        Intake/Output Summary (Last 24 hours) at 8/11/2019 1011  Last data filed at 8/11/2019 0800  Gross per 24 hour   Intake 1470 ml   Output --   Net 1470 ml     Temp:  [97.9 °F (36.6 °C)-100.7 °F (38.2 °C)] 98.5 °F (36.9 °C)  Heart Rate:  [66-86] 74  Resp:  [16-22] 16  BP: (104-144)/(39-64) 108/56      Results from last 7 days   Lab Units 08/11/19  0428 08/10/19  0353  08/08/19  1635 08/08/19  1528  08/06/19  0828   WBC 10*3/mm3 12.70* 12.10*   < > 11.90* 10.60  --  6.70   HEMOGLOBIN g/dL 9.1* 8.9*   < > 10.3* 8.7*  8.7*  --  12.6*   HEMOGLOBIN, POC   --   --    < >  --   --    < >  --    HEMATOCRIT % 27.3* 27.0*   < > 30.4* 24.8*  24.8*  --  37.5   HEMATOCRIT POC   --   --    < >  --   --    < >  --    PLATELETS 10*3/mm3 141 138*   < > 139* 124*  124*  --  227   INR   --   --   --  1.18* 1.37*  --  1.03    < > = values in this interval not displayed.     Results from last 7 days   Lab Units 08/11/19  0428  08/09/19  0344   CREATININE mg/dL 1.20   < > 1.20   POTASSIUM mmol/L 4.3   < > 3.9   SODIUM mmol/L 129*   < > 139   MAGNESIUM mg/dL  --   --  2.6*   PHOSPHORUS mg/dL  --   --  4.2    < > = values in this interval not displayed.       Physical Exam:  Neuro intact, nad, up in chair, no family present  Tele:  SR 60s  Diminished bases, 98% 1L  Sternotomy/SVHS bilat healing well  Benign abd, no BM  No edema    Assessment/Plan:  Principal Problem:    Coronary artery disease of native artery of native heart with stable angina pectoris (CMS/HCC)  Active Problems:    CAD (coronary artery disease)    MV CAD, Ef 60%--s/p POD#4 CABG--on asa/bb/statin, amio for afib ppx  HTN--stable  HLD--statin  DM II--preop a1c 7.4 , SSI + basal  PVD--stable  CKD, stage 2--stable    Routine care--d/c wires  Diurese, start basal insulin  Increase bowel regimen  Anticipate home at discharge in one  to 2 days    Darling Tanner, APRN  8/11/2019  10:11 AM

## 2019-08-11 NOTE — PROGRESS NOTES
Referring Provider: Cardiac surgeon    Reason for follow-up: Coronary artery bypass surgery     Patient Care Team:  Tangela Jc NP as PCP - General  Tangela Jc NP as PCP - Claims Attributed  Tangela Jc NP as PCP - Family Medicine    Subjective .  No chest pain or shortness of breath      Objective  Lying in bed comfortably     Review of Systems   Constitution: Negative for fever and malaise/fatigue.   HENT: Negative for ear pain and nosebleeds.    Eyes: Negative for blurred vision and double vision.   Cardiovascular: Negative for chest pain, dyspnea on exertion and palpitations.   Respiratory: Negative for cough and shortness of breath.    Skin: Negative for rash.   Musculoskeletal: Negative for joint pain.   Gastrointestinal: Negative for abdominal pain, nausea and vomiting.   Neurological: Negative for focal weakness and headaches.   Psychiatric/Behavioral: Negative for depression. The patient is not nervous/anxious.    All other systems reviewed and are negative.      Patient has no known allergies.    Scheduled Meds:    amiodarone 400 mg Oral BID With Meals   aspirin 81 mg Oral Daily   atorvastatin 40 mg Oral Nightly   bumetanide 1 mg Intravenous Once   docusate sodium 100 mg Oral BID   enoxaparin 40 mg Subcutaneous Daily   insulin glargine 15 Units Subcutaneous QAM   insulin lispro 0-9 Units Subcutaneous 4x Daily With Meals & Nightly   lactulose 30 g Oral Daily   metoprolol tartrate 12.5 mg Oral Q12H   mupirocin 1 application Each Nare BID   pantoprazole 40 mg Oral Q AM   polyethylene glycol 17 g Oral BID   potassium chloride 20 mEq Oral Daily   senna 2 tablet Oral Nightly     Continuous Infusions:    insulin 0-50 Units/hr Last Rate: Stopped (08/11/19 0630)   lactated ringers 9 mL/hr    sodium chloride 9 mL/hr    sodium chloride 30 mL/hr    sodium chloride 30 mL/hr Last Rate: 30 mL/hr (08/08/19 1846)     PRN Meds:.bisacodyl  •  dextrose  •  dextrose  •  glucagon (human recombinant)  •   "HYDROcodone-acetaminophen  •  insulin  •  lactated ringers  •  ondansetron  •  potassium chloride **OR** potassium chloride  •  potassium chloride **OR** potassium chloride  •  sodium chloride  •  sodium chloride        VITAL SIGNS  Vitals:    08/11/19 0340 08/11/19 0520 08/11/19 0550 08/11/19 0700   BP: 119/51 132/56 122/63 108/56   BP Location:  Right arm  Right arm   Patient Position:  Sitting  Sitting   Pulse: 73 75 74    Resp:  16  16   Temp:  99 °F (37.2 °C)  98.5 °F (36.9 °C)   TempSrc:  Oral  Oral   SpO2: 97% 96% 93% 97%   Weight:       Height:           Flowsheet Rows      First Filed Value   Admission Height  170.2 cm (67\") Documented at 08/08/2019 0746   Admission Weight  95.8 kg (211 lb 3.2 oz) Documented at 08/08/2019 0746           TELEMETRY: Sinus rhythm    Physical Exam:  Physical Exam   Constitutional: He appears well-developed and well-nourished.   HENT:   Head: Normocephalic and atraumatic.   Eyes: Conjunctivae and EOM are normal. Pupils are equal, round, and reactive to light. No scleral icterus.   Neck: Normal range of motion. Neck supple. No JVD present. Carotid bruit is not present.   Cardiovascular: Normal rate, regular rhythm, S1 normal, S2 normal, normal heart sounds and intact distal pulses. PMI is not displaced.   Pulmonary/Chest: Effort normal and breath sounds normal. He has no wheezes. He has no rales.   Abdominal: Soft. Bowel sounds are normal.   Musculoskeletal: Normal range of motion.   Neurological: He is alert. He has normal strength.   No focal deficits   Skin: Skin is warm and dry. No rash noted.   Psychiatric: He has a normal mood and affect.        Results Review:   I reviewed the patient's new clinical results.  Lab Results (last 24 hours)     Procedure Component Value Units Date/Time    POC Glucose Once [[id number]]  (Abnormal) Collected:  08/11/19 0756    Specimen:  Blood Updated:  08/11/19 0757     Glucose 166 mg/dL      Comment: Serial Number: [id number]:  [id number]       POC Glucose Once [[id number]]  (Abnormal) Collected:  08/11/19 0552    Specimen:  Blood Updated:  08/11/19 0553     Glucose 141 mg/dL      Comment: Serial Number: [id number]:  [id number]       Basic Metabolic Panel [[id number]]  (Abnormal) Collected:  08/11/19 0428    Specimen:  Blood Updated:  08/11/19 0533     Glucose 142 mg/dL      BUN 23 mg/dL      Creatinine 1.20 mg/dL      Sodium 129 mmol/L      Potassium 4.3 mmol/L      Chloride 100 mmol/L      CO2 21.0 mmol/L      Calcium 7.8 mg/dL      eGFR Non African Amer 60 mL/min/1.73      BUN/Creatinine Ratio 19.2     Anion Gap 12.3 mmol/L     CBC (No Diff) [[id number]]  (Abnormal) Collected:  08/11/19 0428    Specimen:  Blood Updated:  08/11/19 0459     WBC 12.70 10*3/mm3      RBC 3.08 10*6/mm3      Hemoglobin 9.1 g/dL      Hematocrit 27.3 %      MCV 88.8 fL      MCH 29.5 pg      MCHC 33.2 g/dL      RDW 13.1 %      RDW-SD 41.1 fl      MPV 8.7 fL      Platelets 141 10*3/mm3     POC Glucose Once [[id number]]  (Abnormal) Collected:  08/11/19 0210    Specimen:  Blood Updated:  08/11/19 0211     Glucose 147 mg/dL      Comment: Serial Number: [id number]:  [id number]       POC Glucose Once [[id number]]  (Abnormal) Collected:  08/11/19 0055    Specimen:  Blood Updated:  08/11/19 0104     Glucose 137 mg/dL      Comment: Serial Number: [id number]:  [id number]       POC Glucose Once [[id number]]  (Abnormal) Collected:  08/10/19 2241    Specimen:  Blood Updated:  08/10/19 2243     Glucose 182 mg/dL      Comment: Serial Number: [id number]:  [id number]       POC Glucose Once [[id number]]  (Abnormal) Collected:  08/10/19 2119    Specimen:  Blood Updated:  08/10/19 2120     Glucose 223 mg/dL      Comment: Serial Number: [id number]:  [id number]       POC Glucose Once [[id number]]  (Abnormal) Collected:  08/10/19 1937    Specimen:  Blood Updated:  08/10/19 1938     Glucose 236 mg/dL      Comment: Serial Number: [id number]:  [id number]       POC Glucose  Once [[id number]]  (Abnormal) Collected:  08/10/19 1517    Specimen:  Blood Updated:  08/10/19 1518     Glucose 238 mg/dL      Comment: Serial Number: [id number]:  [id number]       POC Glucose Once [[id number]]  (Abnormal) Collected:  08/10/19 1107    Specimen:  Blood Updated:  08/10/19 1108     Glucose 133 mg/dL      Comment: Serial Number: [id number]:  [id number]             Imaging Results (last 24 hours)     Procedure Component Value Units Date/Time    XR Chest 1 View [[id number]] Collected:  08/10/19 1057     Updated:  08/10/19 1100    Narrative:       DATE OF EXAM:  8/10/2019 10:34 AM     PROCEDURE:  XR CHEST 1 VW-     INDICATIONS:  Status post cardiac surgery, coronary artery disease, shortness of  breath.      COMPARISON:  08/09/2019.     TECHNIQUE:   Single radiographic view of the chest was obtained.     FINDINGS:  The lung volumes are diminished. There is patchy bilateral basilar  consolidation representing atelectasis. The heart is enlarged. The  pulmonary vascular markings are normal. There is a stable right internal  jugular sheath in place.  There are chronic age-related changes  involving the bony thorax and thoracic aorta.       Impression:       Low lung volumes with bilateral basilar subsegmental atelectasis similar  to the previous study.     Electronically Signed By-Loyd Schuster On:8/10/2019 10:58 AM  This report was finalized on [id number] by  Loyd Schuster, .          EKG      I personally viewed and interpreted the patient's EKG/Telemetry data:    ECHOCARDIOGRAM:    STRESS MYOVIEW:    CARDIAC CATHETERIZATION:    OTHER:         Assessment/Plan     1.  Coronary artery disease status post coronary bypass surgery  2.  Hypertension  3.  Hyper lipedema  4.  Diabetes    Patient underwent coronary bypass surgery x4 vessels with LIMA to LAD and saphenous graft to the marginal branch to the diagonal branch and to the RCA.    Patient is started on oral medicines and tolerating well.  Rhythm and  hemodynamics stable  Continue aggressive supportive care  Additional recommendations per Dr. João Durham, APRN  08/11/19  10:35 AM

## 2019-08-12 LAB
ANION GAP SERPL CALCULATED.3IONS-SCNC: 14.8 MMOL/L (ref 5–15)
BUN BLD-MCNC: 24 MG/DL (ref 8–20)
BUN/CREAT SERPL: 21.8 (ref 6.2–20.3)
CALCIUM SPEC-SCNC: 8.1 MG/DL (ref 8.9–10.3)
CHLORIDE SERPL-SCNC: 97 MMOL/L (ref 101–111)
CO2 SERPL-SCNC: 21 MMOL/L (ref 22–32)
CREAT BLD-MCNC: 1.1 MG/DL (ref 0.7–1.2)
DEPRECATED RDW RBC AUTO: 41.1 FL (ref 37–54)
ERYTHROCYTE [DISTWIDTH] IN BLOOD BY AUTOMATED COUNT: 13.1 % (ref 12.3–15.4)
GFR SERPL CREATININE-BSD FRML MDRD: 67 ML/MIN/1.73
GLUCOSE BLD-MCNC: 196 MG/DL (ref 65–99)
GLUCOSE BLDC GLUCOMTR-MCNC: 194 MG/DL (ref 70–105)
GLUCOSE BLDC GLUCOMTR-MCNC: 239 MG/DL (ref 70–105)
GLUCOSE BLDC GLUCOMTR-MCNC: 289 MG/DL (ref 70–105)
GLUCOSE BLDC GLUCOMTR-MCNC: 349 MG/DL (ref 70–105)
HCT VFR BLD AUTO: 26.9 % (ref 37.5–51)
HGB BLD-MCNC: 9.2 G/DL (ref 13–17.7)
MCH RBC QN AUTO: 30.5 PG (ref 26.6–33)
MCHC RBC AUTO-ENTMCNC: 34.2 G/DL (ref 31.5–35.7)
MCV RBC AUTO: 89 FL (ref 79–97)
PLATELET # BLD AUTO: 181 10*3/MM3 (ref 140–450)
PMV BLD AUTO: 8.9 FL (ref 6–12)
POTASSIUM BLD-SCNC: 4.8 MMOL/L (ref 3.6–5.1)
RBC # BLD AUTO: 3.02 10*6/MM3 (ref 4.14–5.8)
SODIUM BLD-SCNC: 128 MMOL/L (ref 136–144)
WBC NRBC COR # BLD: 10.1 10*3/MM3 (ref 3.4–10.8)

## 2019-08-12 PROCEDURE — 97530 THERAPEUTIC ACTIVITIES: CPT

## 2019-08-12 PROCEDURE — 63710000001 INSULIN GLARGINE PER 5 UNITS: Performed by: NURSE PRACTITIONER

## 2019-08-12 PROCEDURE — 97110 THERAPEUTIC EXERCISES: CPT

## 2019-08-12 PROCEDURE — 99233 SBSQ HOSP IP/OBS HIGH 50: CPT | Performed by: INTERNAL MEDICINE

## 2019-08-12 PROCEDURE — 63710000001 INSULIN LISPRO (HUMAN) PER 5 UNITS: Performed by: NURSE PRACTITIONER

## 2019-08-12 PROCEDURE — 94762 N-INVAS EAR/PLS OXIMTRY CONT: CPT

## 2019-08-12 PROCEDURE — 85027 COMPLETE CBC AUTOMATED: CPT | Performed by: NURSE PRACTITIONER

## 2019-08-12 PROCEDURE — 82962 GLUCOSE BLOOD TEST: CPT

## 2019-08-12 PROCEDURE — 97116 GAIT TRAINING THERAPY: CPT

## 2019-08-12 PROCEDURE — 80048 BASIC METABOLIC PNL TOTAL CA: CPT | Performed by: NURSE PRACTITIONER

## 2019-08-12 PROCEDURE — 25010000002 ENOXAPARIN PER 10 MG: Performed by: INTERNAL MEDICINE

## 2019-08-12 RX ORDER — BUMETANIDE 0.25 MG/ML
1 INJECTION INTRAMUSCULAR; INTRAVENOUS ONCE
Status: COMPLETED | OUTPATIENT
Start: 2019-08-12 | End: 2019-08-12

## 2019-08-12 RX ORDER — INSULIN GLARGINE 100 [IU]/ML
7 INJECTION, SOLUTION SUBCUTANEOUS ONCE
Status: COMPLETED | OUTPATIENT
Start: 2019-08-12 | End: 2019-08-12

## 2019-08-12 RX ORDER — INSULIN GLARGINE 100 [IU]/ML
22 INJECTION, SOLUTION SUBCUTANEOUS EVERY MORNING
Status: DISCONTINUED | OUTPATIENT
Start: 2019-08-13 | End: 2019-08-13 | Stop reason: HOSPADM

## 2019-08-12 RX ORDER — POTASSIUM CHLORIDE 20 MEQ/1
20 TABLET, EXTENDED RELEASE ORAL ONCE
Status: COMPLETED | OUTPATIENT
Start: 2019-08-12 | End: 2019-08-12

## 2019-08-12 RX ADMIN — DOCUSATE SODIUM 100 MG: 100 CAPSULE, LIQUID FILLED ORAL at 20:40

## 2019-08-12 RX ADMIN — BUMETANIDE 1 MG: 0.25 INJECTION INTRAMUSCULAR; INTRAVENOUS at 09:24

## 2019-08-12 RX ADMIN — DOCUSATE SODIUM 100 MG: 100 CAPSULE, LIQUID FILLED ORAL at 09:25

## 2019-08-12 RX ADMIN — PANTOPRAZOLE SODIUM 40 MG: 40 TABLET, DELAYED RELEASE ORAL at 06:23

## 2019-08-12 RX ADMIN — SENNOSIDES 2 TABLET: 8.6 TABLET, FILM COATED ORAL at 20:40

## 2019-08-12 RX ADMIN — HYDROCODONE BITARTRATE AND ACETAMINOPHEN 1 TABLET: 5; 325 TABLET ORAL at 20:40

## 2019-08-12 RX ADMIN — ATORVASTATIN CALCIUM 40 MG: 40 TABLET, FILM COATED ORAL at 20:40

## 2019-08-12 RX ADMIN — INSULIN LISPRO 7 UNITS: 100 INJECTION, SOLUTION INTRAVENOUS; SUBCUTANEOUS at 11:51

## 2019-08-12 RX ADMIN — AMIODARONE HYDROCHLORIDE 400 MG: 200 TABLET ORAL at 17:40

## 2019-08-12 RX ADMIN — POLYETHYLENE GLYCOL 3350 17 G: 17 POWDER, FOR SOLUTION ORAL at 20:39

## 2019-08-12 RX ADMIN — METOPROLOL TARTRATE 25 MG: 25 TABLET, FILM COATED ORAL at 09:25

## 2019-08-12 RX ADMIN — MUPIROCIN 1 APPLICATION: 20 OINTMENT TOPICAL at 09:25

## 2019-08-12 RX ADMIN — ASPIRIN 81 MG: 81 TABLET, DELAYED RELEASE ORAL at 09:25

## 2019-08-12 RX ADMIN — ENOXAPARIN SODIUM 40 MG: 40 INJECTION SUBCUTANEOUS at 17:40

## 2019-08-12 RX ADMIN — POTASSIUM CHLORIDE 20 MEQ: 20 TABLET, EXTENDED RELEASE ORAL at 09:25

## 2019-08-12 RX ADMIN — INSULIN GLARGINE 7 UNITS: 100 INJECTION, SOLUTION SUBCUTANEOUS at 11:51

## 2019-08-12 RX ADMIN — INSULIN LISPRO 2 UNITS: 100 INJECTION, SOLUTION INTRAVENOUS; SUBCUTANEOUS at 09:36

## 2019-08-12 RX ADMIN — INSULIN LISPRO 12 UNITS: 100 INJECTION, SOLUTION INTRAVENOUS; SUBCUTANEOUS at 17:40

## 2019-08-12 RX ADMIN — MUPIROCIN 1 APPLICATION: 20 OINTMENT TOPICAL at 20:41

## 2019-08-12 RX ADMIN — AMIODARONE HYDROCHLORIDE 400 MG: 200 TABLET ORAL at 09:36

## 2019-08-12 RX ADMIN — POLYETHYLENE GLYCOL 3350 17 G: 17 POWDER, FOR SOLUTION ORAL at 09:24

## 2019-08-12 RX ADMIN — INSULIN LISPRO 8 UNITS: 100 INJECTION, SOLUTION INTRAVENOUS; SUBCUTANEOUS at 20:40

## 2019-08-12 RX ADMIN — METOPROLOL TARTRATE 25 MG: 25 TABLET, FILM COATED ORAL at 20:40

## 2019-08-12 RX ADMIN — INSULIN GLARGINE 15 UNITS: 100 INJECTION, SOLUTION SUBCUTANEOUS at 06:23

## 2019-08-12 NOTE — THERAPY TREATMENT NOTE
Acute Care - Occupational Therapy Treatment Note  [facility]     Patient Name: hSaka Trinidad  : 1950  MRN: [id number]  Today's Date: 2019             Admit Date: 2019       ICD-10-CM ICD-9-CM   1. Coronary artery disease of native artery of native heart with stable angina pectoris (CMS/HCC) I25.118 414.01     413.9     Patient Active Problem List   Diagnosis   • Angina at rest (CMS/[facility])   • Abnormal nuclear stress test   • Coronary artery disease of native artery of native heart with stable angina pectoris (CMS/[facility])   • CAD (coronary artery disease)     Past Medical History:   Diagnosis Date   • Coronary artery disease    • Diabetes mellitus, type II (CMS/[facility])    • Elevated cholesterol    • H/O cataract     cataracts    • Hyperlipidemia    • Hypertension    • Peripheral edema    • Peripheral edema    • Sleep apnea     oral appliance     Past Surgical History:   Procedure Laterality Date   • CARDIAC CATHETERIZATION N/A 2019    Procedure: Left Heart Cath with angiogram;  Surgeon: Bautista Lopez MD;  Location: [facility] INVASIVE LOCATION;  Service: Cardiovascular   • CARDIAC CATHETERIZATION N/A 2019    Procedure: Coronary angiography;  Surgeon: Bautista Lopez MD;  Location: [facility] CATH INVASIVE LOCATION;  Service: Cardiovascular   • CARDIAC CATHETERIZATION N/A 2019    Procedure: Left ventriculography;  Surgeon: Bautista Lopez MD;  Location: [facility] CATH INVASIVE LOCATION;  Service: Cardiovascular   • COLONOSCOPY     • HERNIA REPAIR     • OTHER SURGICAL HISTORY  2015    2d echo-abstracted cent.        Therapy Treatment    Rehabilitation Treatment Summary     Row Name 19 1012             Treatment Time/Intention    Discipline  occupational therapist  -SR      Document Type  therapy note (daily note)  -SR      Recorded by [SR] María Mabry OT 19 1243      Row Name 19 1012             Cognitive Assessment/Intervention- PT/OT    Orientation Status  (Cognition)  oriented x 3  -SR      Recorded by [SR] María Mabry, OT 08/12/19 1243      Row Name 08/12/19 1012             Functional Mobility    Functional Mobility- Ind. Level  supervision required  -SR      Functional Mobility- Device  rolling walker  -SR      Recorded by [SR] María Mabry, OT 08/12/19 1257      Row Name 08/12/19 1012             Transfer Assessment/Treatment    Transfer Assessment/Treatment  sit-stand transfer  -SR      Comment (Transfers)  Pt required assist to stand from chair initially, though improved.  -SR      Recorded by [SR] María Mabry, OT 08/12/19 1257      Row Name 08/12/19 1012             Sit-Stand Transfer    Sit-Stand [place] (Transfers)  minimum assist (75% patient effort);contact guard  -SR      Recorded by [SR] María Mabry, OT 08/12/19 1257      Row Name 08/12/19 1012             Motor Skills Assessment/Interventions    Additional Documentation  Therapeutic Exercise Interventions (Group);Therapeutic Exercise (Group)  -SR      Recorded by [SR] María Mabry OT 08/12/19 1257      Row Name 08/12/19 1012             Therapeutic Exercise    Comment (Therapeutic Exercise)  Pt completed cardiac exercises with therapist with min cues.  He also completed 2 sets of 5 sit to stands from chair for LE strengthening.    -SR      Recorded by [SR] María Mabry OT 08/12/19 1257      Row Name 08/12/19 1012             Positioning and Restraints    Pre-Treatment Position  sitting in chair/recliner  -SR      Post Treatment Position  chair  -SR      In Chair  with family/caregiver;call light within reach;encouraged to call for assist  -SR      Recorded by [SR] María Mabry OT 08/12/19 1257      Row Name 08/12/19 1012             Pain Scale: Numbers Pre/Post-Treatment    Pain Scale: Numbers, Pretreatment  0/10 - no pain  -SR      Recorded by [SR] María Mabry OT 08/12/19 1257      Row Name                Wound  08/08/19 1256 Other (See comments) torso Incision    Wound - Properties Group Date first assessed: 08/08/19 [AT] Time first assessed: 1256 [AT] Side: Other (See comments) [AT] Location: torso [AT] Primary Wound Type: Incision [AT] Recorded by:  [AT] Kvng Merino RN 08/08/19 1256      User Key  (r) = Recorded By, (t) = Taken By, (c) = Cosigned By    Initials Name Effective Dates Discipline    SR María Mabry OT 03/01/19 -  OT    AT Kvng Merino RN 03/01/19 -  Nurse        Wound midline sternal Incision (Active)   Dressing Appearance open to air 8/12/2019  8:00 AM   Closure Approximated 8/12/2019  8:00 AM   Drainage Amount none 8/12/2019  8:00 AM       Wound Right anterior;lower;proximal leg Incision (Active)   Dressing Appearance open to air 8/12/2019  8:00 AM   Closure Approximated 8/12/2019  8:00 AM   Drainage Amount none 8/12/2019  8:00 AM       Wound 08/08/19 1256 Other (See comments) torso Incision (Active)   Dressing Appearance intact;dry 8/12/2019  8:00 AM   Closure Approximated 8/12/2019  8:00 AM   Drainage Amount none 8/12/2019  8:00 AM       Wound Left anterior;lower;proximal leg Incision (Active)   Dressing Appearance open to air 8/12/2019  8:00 AM   Closure Approximated;Liquid skin adhesive 8/12/2019  8:00 AM   Drainage Amount none 8/12/2019  8:00 AM       Occupational Therapy Education     Title: PT OT SLP Therapies (In Progress)     Topic: Occupational Therapy (In Progress)     Point: Home exercise program (Done)     Description: Instruct learner(s) on appropriate technique for monitoring, assisting and/or progressing therapeutic exercises/activities.    Learning Progress Summary           Patient Acceptance, E,TB, VU by SR at 8/12/2019 12:57 PM                   Point: Precautions (Done)     Description: Instruct learner(s) on prescribed precautions during self-care and functional transfers.    Learning Progress Summary           Patient Acceptance, E,TB, VU by SR at 8/12/2019 12:57 PM     Acceptance, E,TB, VU,NR by  at 8/9/2019  4:08 PM                               User Key     Initials Effective Dates Name Provider Type Discipline     03/01/19 -  María Mabry OT Occupational Therapist OT                OT Recommendation and Plan  Outcome Summary/Treatment Plan (OT)  Anticipated Discharge Disposition (OT): home with home health  Planned Therapy Interventions (OT Eval): activity tolerance training, transfer/mobility retraining, strengthening exercise, patient/caregiver education/training, occupation/activity based interventions, functional balance retraining, cognitive/visual perception retraining  Therapy Frequency (OT Eval): 5 times/wk  Plan of Care Review  Plan of Care Reviewed With: patient  Plan of Care Reviewed With: patient  Outcome Summary: Pt continues to require some assist for transfers and mobility this date.  He has slighlty decresaed activity tolerence.  He may benefit from IP rehab at discharge due to limited assist at home.        Time Calculation:   Time Calculation- OT     Row Name 08/12/19 1259             Time Calculation- OT    OT Start Time  1012  -SR      OT Stop Time  1031  -      OT Time Calculation (min)  19 min  -SR      OT Received On  08/12/19  -      OT - Next Appointment  08/13/19  -      OT Goal Re-Cert Due Date  08/26/19  -        User Key  (r) = Recorded By, (t) = Taken By, (c) = Cosigned By    Initials Name Provider Type    SR María Mabry OT Occupational Therapist        Therapy Charges for Today     Code Description Service Date Service Provider Modifiers Qty    43564157871 HC OT THERAPEUTIC ACT EA 15 MIN 8/12/2019 María Mabry OT GO 1    07552354373 HC OT THER PROC EA 15 MIN 8/12/2019 María Mabry OT GO 1               María Mabry OT  8/12/2019

## 2019-08-12 NOTE — PLAN OF CARE
Problem: Patient Care Overview  Goal: Plan of Care Review  Outcome: Ongoing (interventions implemented as appropriate)   08/12/19 5710   Coping/Psychosocial   Plan of Care Reviewed With patient   OTHER   Outcome Summary Pt continues to require some assist for transfers and mobility this date. He has slighlty decresaed activity tolerence. He may benefit from IP rehab at discharge due to limited assist at home.

## 2019-08-12 NOTE — PROGRESS NOTES
Referring Provider: Cardiac surgeon    Reason for follow-up: Coronary artery bypass surgery     Patient Care Team:  Tangela Jc NP as PCP - General  Tangela Jc NP as PCP - Claims Attributed  Tangela Jc NP as PCP - Family Medicine    Subjective .  No chest pain or shortness of breath      Objective  Lying in bed comfortably     Review of Systems   Constitution: Negative for fever and malaise/fatigue.   HENT: Negative for ear pain and nosebleeds.    Eyes: Negative for blurred vision and double vision.   Cardiovascular: Negative for chest pain, dyspnea on exertion and palpitations.   Respiratory: Negative for cough and shortness of breath.    Skin: Negative for rash.   Musculoskeletal: Negative for joint pain.   Gastrointestinal: Negative for abdominal pain, nausea and vomiting.   Neurological: Negative for focal weakness and headaches.   Psychiatric/Behavioral: Negative for depression. The patient is not nervous/anxious.    All other systems reviewed and are negative.      Patient has no known allergies.    Scheduled Meds:    amiodarone 400 mg Oral BID With Meals   aspirin 81 mg Oral Daily   atorvastatin 40 mg Oral Nightly   docusate sodium 100 mg Oral BID   enoxaparin 40 mg Subcutaneous Q24H   [START ON 8/13/2019] insulin glargine 25 Units Subcutaneous QAM   insulin lispro 0-9 Units Subcutaneous 4x Daily With Meals & Nightly   metoprolol tartrate 25 mg Oral Q12H   mupirocin 1 application Each Nare BID   pantoprazole 40 mg Oral Q AM   polyethylene glycol 17 g Oral BID   senna 2 tablet Oral Nightly     Continuous Infusions:    lactated ringers 9 mL/hr    sodium chloride 9 mL/hr    sodium chloride 30 mL/hr    sodium chloride 30 mL/hr Last Rate: 30 mL/hr (08/08/19 9567)     PRN Meds:.bisacodyl  •  dextrose  •  dextrose  •  glucagon (human recombinant)  •  HYDROcodone-acetaminophen  •  lactated ringers  •  ondansetron  •  potassium chloride **OR** potassium chloride  •  potassium chloride **OR** potassium  "chloride  •  sodium chloride  •  sodium chloride        VITAL SIGNS  Vitals:    08/12/19 0600 08/12/19 0700 08/12/19 0924 08/12/19 0936   BP:  142/51 156/78 160/64   BP Location:  Left arm     Patient Position:  Sitting     Pulse:   79 78   Resp:  19     Temp:  98.8 °F (37.1 °C)     TempSrc:  Oral     SpO2:       Weight: 100 kg (221 lb 5.5 oz)      Height:           Flowsheet Rows      First Filed Value   Admission Height  170.2 cm (67\") Documented at 08/08/2019 0746   Admission Weight  95.8 kg (211 lb 3.2 oz) Documented at 08/08/2019 0746           TELEMETRY: Sinus rhythm    Physical Exam:  Physical Exam   Constitutional: He appears well-developed and well-nourished.   HENT:   Head: Normocephalic and atraumatic.   Eyes: Conjunctivae and EOM are normal. Pupils are equal, round, and reactive to light. No scleral icterus.   Neck: Normal range of motion. Neck supple. No JVD present. Carotid bruit is not present.   Cardiovascular: Normal rate, regular rhythm, S1 normal, S2 normal, normal heart sounds and intact distal pulses. PMI is not displaced.   Pulmonary/Chest: Effort normal and breath sounds normal. He has no wheezes. He has no rales.   Abdominal: Soft. Bowel sounds are normal.   Musculoskeletal: Normal range of motion.   Neurological: He is alert. He has normal strength.   No focal deficits   Skin: Skin is warm and dry. No rash noted.   Psychiatric: He has a normal mood and affect.        Results Review:   I reviewed the patient's new clinical results.  Lab Results (last 24 hours)     Procedure Component Value Units Date/Time    POC Glucose Once [[id number]]  (Abnormal) Collected:  08/12/19 0753    Specimen:  Blood Updated:  08/12/19 0754     Glucose 194 mg/dL      Comment: Serial Number: [id number]:  [id number]       Basic Metabolic Panel [[id number]]  (Abnormal) Collected:  08/12/19 0346    Specimen:  Blood Updated:  08/12/19 0459     Glucose 196 mg/dL      BUN 24 mg/dL      Creatinine 1.10 mg/dL      Sodium " 128 mmol/L      Potassium 4.8 mmol/L      Chloride 97 mmol/L      CO2 21.0 mmol/L      Calcium 8.1 mg/dL      eGFR Non African Amer 67 mL/min/1.73      BUN/Creatinine Ratio 21.8     Anion Gap 14.8 mmol/L     CBC (No Diff) [[id number]]  (Abnormal) Collected:  08/12/19 0346    Specimen:  Blood Updated:  08/12/19 0431     WBC 10.10 10*3/mm3      RBC 3.02 10*6/mm3      Hemoglobin 9.2 g/dL      Hematocrit 26.9 %      MCV 89.0 fL      MCH 30.5 pg      MCHC 34.2 g/dL      RDW 13.1 %      RDW-SD 41.1 fl      MPV 8.9 fL      Platelets 181 10*3/mm3     POC Glucose Once [[id number]]  (Abnormal) Collected:  08/11/19 2150    Specimen:  Blood Updated:  08/11/19 2152     Glucose 264 mg/dL      Comment: Serial Number: [id number]:  [id number]       POC Glucose Once [[id number]]  (Abnormal) Collected:  08/11/19 1655    Specimen:  Blood Updated:  08/11/19 1657     Glucose 190 mg/dL      Comment: Serial Number: [id number]:  [id number]       POC Glucose Once [[id number]]  (Abnormal) Collected:  08/11/19 1135    Specimen:  Blood Updated:  08/11/19 1140     Glucose 231 mg/dL      Comment: Serial Number: [id number]:  [id number]             Imaging Results (last 24 hours)     ** No results found for the last 24 hours. **          EKG      I personally viewed and interpreted the patient's EKG/Telemetry data:    ECHOCARDIOGRAM:    STRESS MYOVIEW:    CARDIAC CATHETERIZATION:    OTHER:         Assessment/Plan     1.  Coronary artery disease status post coronary bypass surgery  2.  Hypertension  3.  Hyper lipedema  4.  Diabetes    Patient underwent coronary bypass surgery x4 vessels with LIMA to LAD and saphenous graft to the marginal branch to the diagonal branch and to the RCA.  Patient is extubated and chest tubes are removed  Patient is started on oral medicines and tolerating well.  Patient is ambulating.  Blood pressure and heart rate are stable  Sugar levels and lipid levels are followed by the primary care doctor  Patient  will go to the rehab soon    Bautista Lopez MD  08/12/19  10:22 AM

## 2019-08-12 NOTE — PLAN OF CARE
Problem: Patient Care Overview  Goal: Plan of Care Review  Outcome: Ongoing (interventions implemented as appropriate)   08/12/19 5026   Coping/Psychosocial   Plan of Care Reviewed With patient   Plan of Care Review   Progress improving   OTHER   Outcome Summary Pt able to complete two bouts of ambulation using RW while maintaining O2 >90%. Pt contnues to require Corey for safety with transfers. Pt also completes seated BLE exercises for strengthening. Pt has concerns with his wife's ability to assist at home and at this time pt is requiring physical assist with mobility. Due to concerns and pt's current level of mobility, PT is now recommending IP rehab to address deficits.

## 2019-08-12 NOTE — PROGRESS NOTES
Continued Stay Note  [facility]     Patient Name: Shaka Trinidad  MRN: [id number]  Today's Date: 8/12/2019    Admit Date: 8/8/2019    Discharge Plan     Row Name 08/12/19 9397       Plan    Plan  Accepted to [facility]. [facility] approved if needed. No precert required. 2nd choice [place].    Row Name 08/12/19 1438       Plan    Plan  [facility] if bed availiable. Second choice is [facility].     Plan Comments  Spoke with patient at bedside. states he takes care of his wife and he needs to go to rehab. wants [facility] as frist choice and [facility] second choice. Anticipate dc 8/13.         Jaz Burris, MSW, LCSW

## 2019-08-12 NOTE — PROGRESS NOTES
S/P POD# 4 CABG--Lyubov  EF 60% (cath)    Subjective:    reports he thinks he should go to rehab    No events overnight   Was placed back on oxygen last evening  Na down to 128  Wt up 4 kgs from preop        Intake/Output Summary (Last 24 hours) at 8/12/2019 0837  Last data filed at 8/12/2019 0600  Gross per 24 hour   Intake 1800 ml   Output 2100 ml   Net -300 ml     Temp:  [98.5 °F (36.9 °C)-99.5 °F (37.5 °C)] 98.5 °F (36.9 °C)  Heart Rate:  [63-77] 67  Resp:  [18-20] 20  BP: (120-139)/(51-66) 133/51      Results from last 7 days   Lab Units 08/12/19  0346 08/11/19  0428  08/08/19  1635 08/08/19  1528  08/06/19  0828   WBC 10*3/mm3 10.10 12.70*   < > 11.90* 10.60  --  6.70   HEMOGLOBIN g/dL 9.2* 9.1*   < > 10.3* 8.7*  8.7*  --  12.6*   HEMOGLOBIN, POC   --   --    < >  --   --    < >  --    HEMATOCRIT % 26.9* 27.3*   < > 30.4* 24.8*  24.8*  --  37.5   HEMATOCRIT POC   --   --    < >  --   --    < >  --    PLATELETS 10*3/mm3 181 141   < > 139* 124*  124*  --  227   INR   --   --   --  1.18* 1.37*  --  1.03    < > = values in this interval not displayed.     Results from last 7 days   Lab Units 08/12/19  0346  08/09/19  0344   CREATININE mg/dL 1.10   < > 1.20   POTASSIUM mmol/L 4.8   < > 3.9   SODIUM mmol/L 128*   < > 139   MAGNESIUM mg/dL  --   --  2.6*   PHOSPHORUS mg/dL  --   --  4.2    < > = values in this interval not displayed.       Physical Exam:  Neuro intact, nad, up in chair, no family present  Tele:  SR 70s  Diminished bases, 98% 1L  Sternotomy/SVHS bilat healing well  Benign abd, + BM  No edema    Assessment/Plan:  Principal Problem:    Coronary artery disease of native artery of native heart with stable angina pectoris (CMS/HCC)  Active Problems:    CAD (coronary artery disease)    MV CAD, Ef 60%--s/p POD#5 CABG--on asa/bb/statin, amio for afib ppx  HTN--stable  HLD--statin  DM II--preop a1c 7.4 , SSI + basal  PVD--stable  CKD, stage 2--stable  Postop hyponatremia likely r/t excess volume  status--diurese    Routine care  Diurese  Increase basal insulin and metoprolol  Nocturnal oximetry  Anticipate rehab at discharge now    Darling Hardy-Elba, ERICA  8/12/2019  8:37 AM

## 2019-08-12 NOTE — PROGRESS NOTES
Continued Stay Note  JESUS Welch     Patient Name: Shaka Trinidad  MRN: [id number]  Today's Date: 8/12/2019    Admit Date: 8/8/2019    Discharge Plan     Row Name 08/12/19 1438       Plan    Plan  [facility] if bed availiable. Second choice is [facility].     Plan Comments  Spoke with patient at bedside. states he takes care of his wife and he needs to go to rehab. wants [facility] as frist choice and [facility] as second choice. Anticipate dc 8/13.               Karla Uribe RN

## 2019-08-12 NOTE — CONSULTS
Discussed cardiac rehab, benefits, weight limit, pain control. Patient is interested in cardiac rehab. Spouse has participated before. Okay to call following discharge. ELSA Hobson

## 2019-08-12 NOTE — PLAN OF CARE
Problem: Patient Care Overview  Goal: Plan of Care Review  Outcome: Ongoing (interventions implemented as appropriate)   08/12/19 0435   Coping/Psychosocial   Plan of Care Reviewed With patient   Plan of Care Review   Progress improving   OTHER   Outcome Summary Patient stated that he may like to go to rehab at discharge, he is concerned about getting enough care at home, patient ambulated several times through the night and did well       Problem: Skin Injury Risk (Adult)  Goal: Identify Related Risk Factors and Signs and Symptoms  Outcome: Ongoing (interventions implemented as appropriate)   08/12/19 0435   Skin Injury Risk (Adult)   Related Risk Factors (Skin Injury Risk) advanced age;critical care admission;mobility impaired     Goal: Skin Health and Integrity  Outcome: Ongoing (interventions implemented as appropriate)   08/12/19 0435   Skin Injury Risk (Adult)   Skin Health and Integrity making progress toward outcome       Problem: Fall Risk (Adult)  Goal: Absence of Fall  Outcome: Ongoing (interventions implemented as appropriate)   08/12/19 0435   Fall Risk (Adult)   Absence of Fall achieves outcome       Problem: Cardiac Surgery (Adult)  Goal: Signs and Symptoms of Listed Potential Problems Will be Absent, Minimized or Managed (Cardiac Surgery)  Outcome: Ongoing (interventions implemented as appropriate)   08/12/19 0435   Goal/Outcome Evaluation   Problems Assessed (Cardiac Surgery) all   Problems Present (Cardiac Surgery) bowel motility decreased;pain

## 2019-08-13 VITALS
HEIGHT: 67 IN | SYSTOLIC BLOOD PRESSURE: 155 MMHG | WEIGHT: 214.95 LBS | BODY MASS INDEX: 33.74 KG/M2 | DIASTOLIC BLOOD PRESSURE: 54 MMHG | TEMPERATURE: 98.2 F | OXYGEN SATURATION: 94 % | HEART RATE: 76 BPM | RESPIRATION RATE: 20 BRPM

## 2019-08-13 LAB
ANION GAP SERPL CALCULATED.3IONS-SCNC: 13.2 MMOL/L (ref 5–15)
BUN BLD-MCNC: 21 MG/DL (ref 8–20)
BUN/CREAT SERPL: 16.2 (ref 6.2–20.3)
CALCIUM SPEC-SCNC: 8.3 MG/DL (ref 8.9–10.3)
CHLORIDE SERPL-SCNC: 99 MMOL/L (ref 101–111)
CO2 SERPL-SCNC: 24 MMOL/L (ref 22–32)
CREAT BLD-MCNC: 1.3 MG/DL (ref 0.7–1.2)
DEPRECATED RDW RBC AUTO: 40.3 FL (ref 37–54)
ERYTHROCYTE [DISTWIDTH] IN BLOOD BY AUTOMATED COUNT: 12.9 % (ref 12.3–15.4)
GFR SERPL CREATININE-BSD FRML MDRD: 55 ML/MIN/1.73
GLUCOSE BLD-MCNC: 185 MG/DL (ref 65–99)
GLUCOSE BLDC GLUCOMTR-MCNC: 203 MG/DL (ref 70–105)
GLUCOSE BLDC GLUCOMTR-MCNC: 274 MG/DL (ref 70–105)
HCT VFR BLD AUTO: 27.4 % (ref 37.5–51)
HGB BLD-MCNC: 9.4 G/DL (ref 13–17.7)
MCH RBC QN AUTO: 30.3 PG (ref 26.6–33)
MCHC RBC AUTO-ENTMCNC: 34.3 G/DL (ref 31.5–35.7)
MCV RBC AUTO: 88.3 FL (ref 79–97)
PLATELET # BLD AUTO: 199 10*3/MM3 (ref 140–450)
PMV BLD AUTO: 8.1 FL (ref 6–12)
POTASSIUM BLD-SCNC: 4.2 MMOL/L (ref 3.6–5.1)
RBC # BLD AUTO: 3.11 10*6/MM3 (ref 4.14–5.8)
SODIUM BLD-SCNC: 132 MMOL/L (ref 136–144)
WBC NRBC COR # BLD: 9 10*3/MM3 (ref 3.4–10.8)

## 2019-08-13 PROCEDURE — 97530 THERAPEUTIC ACTIVITIES: CPT

## 2019-08-13 PROCEDURE — 63710000001 INSULIN LISPRO (HUMAN) PER 5 UNITS: Performed by: NURSE PRACTITIONER

## 2019-08-13 PROCEDURE — 63710000001 INSULIN GLARGINE PER 5 UNITS: Performed by: NURSE PRACTITIONER

## 2019-08-13 PROCEDURE — 80048 BASIC METABOLIC PNL TOTAL CA: CPT | Performed by: NURSE PRACTITIONER

## 2019-08-13 PROCEDURE — 82962 GLUCOSE BLOOD TEST: CPT

## 2019-08-13 PROCEDURE — 99232 SBSQ HOSP IP/OBS MODERATE 35: CPT | Performed by: INTERNAL MEDICINE

## 2019-08-13 PROCEDURE — 97535 SELF CARE MNGMENT TRAINING: CPT

## 2019-08-13 PROCEDURE — 85027 COMPLETE CBC AUTOMATED: CPT | Performed by: NURSE PRACTITIONER

## 2019-08-13 RX ORDER — SENNA PLUS 8.6 MG/1
2 TABLET ORAL NIGHTLY
Start: 2019-08-13 | End: 2019-10-10

## 2019-08-13 RX ORDER — ATORVASTATIN CALCIUM 10 MG/1
40 TABLET, FILM COATED ORAL NIGHTLY
Qty: 30 TABLET | Refills: 3 | Status: SHIPPED | OUTPATIENT
Start: 2019-08-13 | End: 2019-08-29 | Stop reason: SDUPTHER

## 2019-08-13 RX ORDER — POLYETHYLENE GLYCOL 3350 17 G/17G
17 POWDER, FOR SOLUTION ORAL 2 TIMES DAILY
Start: 2019-08-13 | End: 2020-07-29

## 2019-08-13 RX ORDER — HYDROCODONE BITARTRATE AND ACETAMINOPHEN 5; 325 MG/1; MG/1
1 TABLET ORAL EVERY 4 HOURS PRN
Qty: 15 TABLET | Refills: 0 | Status: SHIPPED | OUTPATIENT
Start: 2019-08-13 | End: 2019-08-18

## 2019-08-13 RX ORDER — PSEUDOEPHEDRINE HCL 30 MG
100 TABLET ORAL 2 TIMES DAILY
Start: 2019-08-13 | End: 2019-10-10

## 2019-08-13 RX ADMIN — INSULIN GLARGINE 22 UNITS: 100 INJECTION, SOLUTION SUBCUTANEOUS at 06:24

## 2019-08-13 RX ADMIN — INSULIN LISPRO 12 UNITS: 100 INJECTION, SOLUTION INTRAVENOUS; SUBCUTANEOUS at 12:46

## 2019-08-13 RX ADMIN — METOPROLOL TARTRATE 25 MG: 25 TABLET, FILM COATED ORAL at 08:06

## 2019-08-13 RX ADMIN — MUPIROCIN 1 APPLICATION: 20 OINTMENT TOPICAL at 08:07

## 2019-08-13 RX ADMIN — POLYETHYLENE GLYCOL 3350 17 G: 17 POWDER, FOR SOLUTION ORAL at 08:06

## 2019-08-13 RX ADMIN — DOCUSATE SODIUM 100 MG: 100 CAPSULE, LIQUID FILLED ORAL at 08:06

## 2019-08-13 RX ADMIN — INSULIN LISPRO 8 UNITS: 100 INJECTION, SOLUTION INTRAVENOUS; SUBCUTANEOUS at 08:07

## 2019-08-13 RX ADMIN — PANTOPRAZOLE SODIUM 40 MG: 40 TABLET, DELAYED RELEASE ORAL at 06:24

## 2019-08-13 RX ADMIN — ASPIRIN 81 MG: 81 TABLET, DELAYED RELEASE ORAL at 08:06

## 2019-08-13 RX ADMIN — AMIODARONE HYDROCHLORIDE 400 MG: 200 TABLET ORAL at 08:06

## 2019-08-13 NOTE — PLAN OF CARE
Problem: Patient Care Overview  Goal: Plan of Care Review  Outcome: Outcome(s) achieved Date Met: 08/13/19 08/13/19 1321   OTHER   Outcome Summary Pt continues to improve with transfers and standing activity. He was able to complete >30 mins standing activity with self care. He will require IP rehab at discharge to increase independence before returning home.

## 2019-08-13 NOTE — PROGRESS NOTES
Referring Provider: Cardiac surgeon    Reason for follow-up: Coronary artery bypass surgery     Patient Care Team:  Tangela Jc NP as PCP - General  Tangela Jc NP as PCP - Claims Attributed  Tangela Jc NP as PCP - Family Medicine    Subjective .  No chest pain or shortness of breath      Objective  Lying in bed comfortably     Review of Systems   Constitution: Negative for fever and malaise/fatigue.   HENT: Negative for ear pain and nosebleeds.    Eyes: Negative for blurred vision and double vision.   Cardiovascular: Negative for chest pain, dyspnea on exertion and palpitations.   Respiratory: Negative for cough and shortness of breath.    Skin: Negative for rash.   Musculoskeletal: Negative for joint pain.   Gastrointestinal: Negative for abdominal pain, nausea and vomiting.   Neurological: Negative for focal weakness and headaches.   Psychiatric/Behavioral: Negative for depression. The patient is not nervous/anxious.    All other systems reviewed and are negative.      Patient has no known allergies.    Scheduled Meds:    amiodarone 400 mg Oral BID With Meals   aspirin 81 mg Oral Daily   atorvastatin 40 mg Oral Nightly   docusate sodium 100 mg Oral BID   enoxaparin 40 mg Subcutaneous Q24H   insulin glargine 22 Units Subcutaneous QAM   insulin lispro 0-24 Units Subcutaneous 4x Daily With Meals & Nightly   metoprolol tartrate 25 mg Oral Q12H   pantoprazole 40 mg Oral Q AM   polyethylene glycol 17 g Oral BID   senna 2 tablet Oral Nightly     Continuous Infusions:    lactated ringers 9 mL/hr    sodium chloride 9 mL/hr    sodium chloride 30 mL/hr    sodium chloride 30 mL/hr Last Rate: 30 mL/hr (08/08/19 6314)     PRN Meds:.bisacodyl  •  dextrose  •  dextrose  •  glucagon (human recombinant)  •  HYDROcodone-acetaminophen  •  lactated ringers  •  ondansetron  •  potassium chloride **OR** potassium chloride  •  potassium chloride **OR** potassium chloride  •  sodium chloride  •  sodium chloride        VITAL  "SIGNS  Vitals:    08/13/19 0300 08/13/19 0400 08/13/19 0500 08/13/19 0724   BP: (!) 136/30   150/64   BP Location:    Left arm   Patient Position:    Lying   Pulse: 64 68 69 73   Resp: 15   20   Temp: 98.3 °F (36.8 °C)   98.2 °F (36.8 °C)   TempSrc: Oral Oral  Oral   SpO2: 95% 95% 99% 96%   Weight:   97.5 kg (214 lb 15.2 oz)    Height:           Flowsheet Rows      First Filed Value   Admission Height  170.2 cm (67\") Documented at 08/08/2019 0746   Admission Weight  95.8 kg (211 lb 3.2 oz) Documented at 08/08/2019 0746           TELEMETRY: Sinus rhythm    Physical Exam:  Physical Exam   Constitutional: He appears well-developed and well-nourished.   HENT:   Head: Normocephalic and atraumatic.   Eyes: Conjunctivae and EOM are normal. Pupils are equal, round, and reactive to light. No scleral icterus.   Neck: Normal range of motion. Neck supple. No JVD present. Carotid bruit is not present.   Cardiovascular: Normal rate, regular rhythm, S1 normal, S2 normal, normal heart sounds and intact distal pulses. PMI is not displaced.   Pulmonary/Chest: Effort normal and breath sounds normal. He has no wheezes. He has no rales.   Abdominal: Soft. Bowel sounds are normal.   Musculoskeletal: Normal range of motion.   Neurological: He is alert. He has normal strength.   No focal deficits   Skin: Skin is warm and dry. No rash noted.   Psychiatric: He has a normal mood and affect.        Results Review:   I reviewed the patient's new clinical results.  Lab Results (last 24 hours)     Procedure Component Value Units Date/Time    POC Glucose Once [[id number]]  (Abnormal) Collected:  08/13/19 0755    Specimen:  Blood Updated:  08/13/19 0757     Glucose 203 mg/dL      Comment: Serial Number: [id number]:  [id number]       Basic Metabolic Panel [[id number]]  (Abnormal) Collected:  08/13/19 0346    Specimen:  Blood Updated:  08/13/19 0448     Glucose 185 mg/dL      BUN 21 mg/dL      Creatinine 1.30 mg/dL      Sodium 132 mmol/L      " Potassium 4.2 mmol/L      Chloride 99 mmol/L      CO2 24.0 mmol/L      Calcium 8.3 mg/dL      eGFR Non African Amer 55 mL/min/1.73      BUN/Creatinine Ratio 16.2     Anion Gap 13.2 mmol/L     CBC (No Diff) [[id number]]  (Abnormal) Collected:  08/13/19 0346    Specimen:  Blood Updated:  08/13/19 0428     WBC 9.00 10*3/mm3      RBC 3.11 10*6/mm3      Hemoglobin 9.4 g/dL      Hematocrit 27.4 %      MCV 88.3 fL      MCH 30.3 pg      MCHC 34.3 g/dL      RDW 12.9 %      RDW-SD 40.3 fl      MPV 8.1 fL      Platelets 199 10*3/mm3     POC Glucose Once [[id number]]  (Abnormal) Collected:  08/12/19 2027    Specimen:  Blood Updated:  08/12/19 2028     Glucose 239 mg/dL      Comment: Serial Number: [id number]:  [id number]       POC Glucose Once [[id number]]  (Abnormal) Collected:  08/12/19 1726    Specimen:  Blood Updated:  08/12/19 1727     Glucose 289 mg/dL      Comment: Serial Number: [id number]:  [id number]       POC Glucose Once [[id number]]  (Abnormal) Collected:  08/12/19 1144    Specimen:  Blood Updated:  08/12/19 1158     Glucose 349 mg/dL      Comment: Serial Number: [id number]:  [id number]             Imaging Results (last 24 hours)     ** No results found for the last 24 hours. **          EKG      I personally viewed and interpreted the patient's EKG/Telemetry data:    ECHOCARDIOGRAM:    STRESS MYOVIEW:    CARDIAC CATHETERIZATION:    OTHER:         Assessment/Plan     1.  Coronary artery disease status post coronary bypass surgery  2.  Hypertension  3.  Hyper lipedema  4.  Diabetes    Patient underwent coronary bypass surgery x4 vessels with LIMA to LAD and saphenous graft to the marginal branch to the diagonal branch and to the RCA.  Patient is extubated and chest tubes are removed  Patient is started on oral medicines and tolerating well.  Patient is ambulating.  Blood pressure and heart rate are stable  Sugar levels and lipid levels are followed by the primary care doctor  Patient will go to the rehab  casey Lopez MD  08/13/19  10:22 AM

## 2019-08-13 NOTE — THERAPY TREATMENT NOTE
Acute Care - Occupational Therapy Treatment Note  [facility]     Patient Name: Shaka Trinidad  : 1950  MRN: [id number]  Today's Date: 2019             Admit Date: 2019       ICD-10-CM ICD-9-CM   1. S/P CABG (coronary artery bypass graft) Z95.1 V45.81   2. Coronary artery disease of native artery of native heart with stable angina pectoris (CMS/[facility]) I25.118 414.01     413.9     Patient Active Problem List   Diagnosis   • Angina at rest (CMS/[facility])   • Abnormal nuclear stress test   • Coronary artery disease of native artery of native heart with stable angina pectoris (CMS/[facility])   • CAD (coronary artery disease)     Past Medical History:   Diagnosis Date   • Coronary artery disease    • Diabetes mellitus, type II (CMS/[facility])    • Elevated cholesterol    • H/O cataract     cataracts    • Hyperlipidemia    • Hypertension    • Peripheral edema    • Peripheral edema    • Sleep apnea     oral appliance     Past Surgical History:   Procedure Laterality Date   • CARDIAC CATHETERIZATION N/A 2019    Procedure: Left Heart Cath with angiogram;  Surgeon: Bautista Lopez MD;  Location: [facility] CATH INVASIVE LOCATION;  Service: Cardiovascular   • CARDIAC CATHETERIZATION N/A 2019    Procedure: Coronary angiography;  Surgeon: Bautista Lopez MD;  Location: [facility] INVASIVE LOCATION;  Service: Cardiovascular   • CARDIAC CATHETERIZATION N/A 2019    Procedure: Left ventriculography;  Surgeon: Bautista Lopez MD;  Location: [facility] CATH INVASIVE LOCATION;  Service: Cardiovascular   • COLONOSCOPY     • HERNIA REPAIR     • OTHER SURGICAL HISTORY  2015    2d echo-abstracted cent.        Therapy Treatment    Rehabilitation Treatment Summary     Row Name 19 0851             Treatment Time/Intention    Discipline  occupational therapist  -SR      Recorded by [SR] María Mabry OT 19 0858      Row Name 19 0851             Functional Mobility    Functional Mobility- Ind. Level   supervision required  -SR      Functional Mobility- Device  rolling walker  -SR      Recorded by [SR] María Mabry, OT 08/13/19 1321      Row Name 08/13/19 0851             Sit-Stand Transfer    Sit-Stand [place] (Transfers)  contact guard  -SR      Recorded by [SR] María Mabry, OT 08/13/19 1321      Row Name 08/13/19 0851             Stand-Sit Transfer    Stand-Sit [place] (Transfers)  contact guard  -SR      Recorded by [SR] María Mabry, OT 08/13/19 1321      Row Name 08/13/19 0851             ADL Assessment/Intervention    BADL Assessment/Intervention  grooming  -SR      Recorded by [SR] María Mabry, OT 08/13/19 1321      Row Name 08/13/19 0851             Grooming Assessment/Training    [place] Level (Grooming)  supervision;hair care, combing/brushing;oral care regimen;shave face;wash face, hands  -SR      Comment (Grooming)  Pt stood for >30 mins for grooming activity  -SR      Recorded by [SR] María Mabry, OT 08/13/19 1321      Row Name 08/13/19 0851             Therapeutic Exercise    Comment (Therapeutic Exercise)  Pt completed 1 set of 10 cardiac exercises  -SR      Recorded by [SR] María Mabry, OT 08/13/19 0858      Row Name 08/13/19 0851             Positioning and Restraints    Pre-Treatment Position  sitting in chair/recliner  -SR      Post Treatment Position  chair  -SR      Recorded by [SR] María Mabry, OT 08/13/19 0858      Row Name                Wound 08/08/19 1256 Other (See comments) torso Incision    Wound - Properties Group Date first assessed: 08/08/19 [AT] Time first assessed: 1256 [AT] Side: Other (See comments) [AT] Location: torso [AT] Primary Wound Type: Incision [AT] Recorded by:  [AT] Kvng Merino RN 08/08/19 1256      User Key  (r) = Recorded By, (t) = Taken By, (c) = Cosigned By    Initials Name Effective Dates Discipline    SR María Mabry OT 03/01/19 -  OT    AT Kvng Merino RN  03/01/19 -  Nurse        Wound midline sternal Incision (Active)   Dressing Appearance open to air 8/13/2019 12:16 PM   Closure Approximated 8/13/2019 12:16 PM   Base clean;dry 8/13/2019 12:16 PM   Drainage Amount none 8/13/2019 12:16 PM       Wound Right anterior;lower;proximal leg Incision (Active)   Dressing Appearance open to air 8/13/2019 12:16 PM   Closure Approximated 8/13/2019 12:16 PM   Drainage Amount none 8/13/2019 12:16 PM       Wound 08/08/19 1256 Other (See comments) torso Incision (Active)   Dressing Appearance dry;intact 8/13/2019 12:16 PM   Closure Approximated 8/13/2019 12:16 PM   Base dry;clean 8/13/2019 12:16 PM   Periwound dry 8/13/2019 12:16 PM   Drainage Amount none 8/13/2019 12:16 PM       Wound Left anterior;lower;proximal leg Incision (Active)   Dressing Appearance open to air 8/13/2019 12:16 PM   Closure Approximated 8/13/2019 12:16 PM   Base clean;dry 8/13/2019 12:16 PM   Drainage Amount none 8/13/2019 12:16 PM       Occupational Therapy Education     Title: PT OT SLP Therapies (Resolved)     Topic: Occupational Therapy (Resolved)     Point: Home exercise program (Resolved)     Description: Instruct learner(s) on appropriate technique for monitoring, assisting and/or progressing therapeutic exercises/activities.    Learning Progress Summary           Patient Acceptance, E,TB, VU by SR at 8/12/2019 12:57 PM                   Point: Precautions (Resolved)     Description: Instruct learner(s) on prescribed precautions during self-care and functional transfers.    Learning Progress Summary           Patient Acceptance, E,TB, VU by SR at 8/12/2019 12:57 PM    Acceptance, E,TB, VU,NR by SR at 8/9/2019  4:08 PM                               User Key     Initials Effective Dates Name Provider Type Discipline     03/01/19 -  María Mabry OT Occupational Therapist OT                OT Recommendation and Plan  Outcome Summary/Treatment Plan (OT)  Anticipated Discharge Disposition (OT):  inpatient rehabilitation facility  Planned Therapy Interventions (OT Eval): activity tolerance training, transfer/mobility retraining, strengthening exercise, patient/caregiver education/training, occupation/activity based interventions, functional balance retraining, cognitive/visual perception retraining  Therapy Frequency (OT Eval): 5 times/wk  Plan of Care Review  Plan of Care Reviewed With: patient  Plan of Care Reviewed With: patient  Outcome Summary: Pt continues to improve with transfers and standing activity.  He was able to complete >30 mins standing activity with self care.  He will require IP rehab at discharge to increase independence before returning home.        Time Calculation:   Time Calculation- OT     Row Name 08/13/19 1323             Time Calculation- OT    OT Start Time  0847  -SR      OT Stop Time  0923  -SR      OT Time Calculation (min)  36 min  -SR      OT Received On  08/13/19  -SR      OT - Next Appointment  08/14/19  -        User Key  (r) = Recorded By, (t) = Taken By, (c) = Cosigned By    Initials Name Provider Type    SR María Mabry, OT Occupational Therapist        Therapy Charges for Today     Code Description Service Date Service Provider Modifiers Qty    34664542156 HC OT THERAPEUTIC ACT EA 15 MIN 8/12/2019 María Mabry, OT GO 1    91474586976 HC OT THER PROC EA 15 MIN 8/12/2019 María Mabry OT GO 1    04038240956 HC OT THERAPEUTIC ACT EA 15 MIN 8/13/2019 María Mabry, OT GO 2    29367238468 HC OT SELF CARE/MGMT/TRAIN EA 15 MIN 8/13/2019 María Mabry OT GO 2               María Mabry OT  8/13/2019

## 2019-08-13 NOTE — PROGRESS NOTES
Case Management Discharge Note    Final Note: [facility] subacute              Final Discharge Disposition Code: 03 - skilled nursing facility (SNF)

## 2019-08-13 NOTE — DISCHARGE SUMMARY
Date of Admission: 8/8/2019  Date of Discharge: 8/13/2019    Discharge Diagnosis:     MV CAD, Ef 60%--s/p POD#5 CAB  HTN--stable  HLD--statin  DM II--preop a1c 7.4  PVD--stable  CKD, stage 2--stable  Postop hyponatremia likely r/t excess volume status--resolved      Presenting Problem/History of Present Illness  Coronary artery disease of native artery of native heart with stable angina pectoris (CMS/[facility]) [I25.118]  CAD (coronary artery disease) [I25.10]      MV CAD, Ef 60%  HTN--stable  HLD--statin  DM II--preop a1c 7.4   PVD--stable  CKD, stage 2--stable      Hospital Course  Patient is a 69 y.o. male presented from home the morning of surgery.  On 8/8/2019, he underwent CABG per Dr. Mcarthur.  Please see op note for full details.  His postop course was uneventful.  He did receive IV diuresis for volume excess and hyponatremia.  At discharge, his creatinine was 1.3 up from 1.1.  Dr. Mcarthur was aware and wanted BMP on Friday.  He was discharged on asa/bb/statin.  Diuretics were held for now.  He was also discharged on home Tresiba and sliding scale insulin.  Metformin and glyburide were held until taking more oral intake.  He was ready for SIR on POD#5. His surgical wounds were healing well.      PE:  Neuro intact, nad, up in chair, no family present  Tele:  SR 70s  Diminished bases, on room air  Sternotomy/SVHS bilat healing well  Benign abd, + BM  No edema        Procedures Performed  Procedure(s):8/9/2019 per Dr. Mcarthur  CORONARY ARTERY BYPASS GRAFTING   1. CABG x 4 with a LIMA sequential to the mid LAD and first diagonal and reverse individual saphenous vein graft to the distal RCA and to the obtuse marginal 1  2. EVH of the both legs        Consults:   Consults     Date and Time Order Name Status Description    8/8/2019 1630 Inpatient Cardiology Consult      7/19/2019 1540 Inpatient Cardiothoracic Surgery Consult Completed           Pertinent Test Results:    Lab Results   Component Value Date    WBC 9.00  08/13/2019    HGB 9.4 (L) 08/13/2019    HCT 27.4 (L) 08/13/2019    MCV 88.3 08/13/2019     08/13/2019      Lab Results   Component Value Date    GLUCOSE 185 (H) 08/13/2019    CALCIUM 8.3 (L) 08/13/2019     (L) 08/13/2019    K 4.2 08/13/2019    CO2 24.0 08/13/2019    CL 99 (L) 08/13/2019    BUN 21 (H) 08/13/2019    CREATININE 1.30 (H) 08/13/2019    EGFRIFNONA 55 (L) 08/13/2019    BCR 16.2 08/13/2019    ANIONGAP 13.2 08/13/2019     Lab Results   Component Value Date    INR 1.18 (H) 08/08/2019    PROTIME 11.9 (H) 08/08/2019         Condition on Discharge: Stable for discharge to [facility].    Vital Signs  Temp:  [98.2 °F (36.8 °C)-99 °F (37.2 °C)] 98.2 °F (36.8 °C)  Heart Rate:  [64-85] 73  Resp:  [15-22] 20  BP: (127-159)/(30-64) 150/64      Discharge Disposition  Rehab Facility or Unit (DC - External)    Discharge Medications     Discharge Medications      New Medications      Instructions Start Date   docusate sodium 100 MG capsule   100 mg, Oral, 2 Times Daily      HYDROcodone-acetaminophen 5-325 MG per tablet  Commonly known as:  NORCO   1 tablet, Oral, Every 4 Hours PRN      insulin lispro 100 UNIT/ML injection  Commonly known as:  humaLOG   0-24 Units, Subcutaneous, 4 Times Daily With Meals & Nightly      metoprolol tartrate 25 MG tablet  Commonly known as:  LOPRESSOR   25 mg, Oral, Every 12 Hours Scheduled      polyethylene glycol packet  Commonly known as:  MIRALAX   17 g, Oral, 2 Times Daily      senna 8.6 MG tablet  Commonly known as:  SENOKOT   2 tablets, Oral, Nightly         Changes to Medications      Instructions Start Date   atorvastatin 10 MG tablet  Commonly known as:  LIPITOR  What changed:    · how much to take  · when to take this   40 mg, Oral, Nightly         Continue These Medications      Instructions Start Date   amLODIPine 5 MG tablet  Commonly known as:  NORVASC   5 mg, Oral, Daily      ascorbic acid 1000 MG tablet  Commonly known as:  VITAMIN C   1,000 mg, Oral, Daily      aspirin  81 MG chewable tablet   81 mg, Oral, Nightly      CALCIUM 500 PO   600 mg, Oral, Daily, With vitamin d 1000iu      Cinnamon 500 MG tablet   1,000 mg, Oral, Nightly      IRON COMPLEX PO   1 tablet, Oral, 2 Times Daily      TRESIBA 100 UNIT/ML solution  Generic drug:  Insulin Degludec   32 Units, Subcutaneous, Daily         Stop These Medications    furosemide 20 MG tablet  Commonly known as:  LASIX     glipiZIDE 10 MG tablet  Commonly known as:  GLUCOTROL     hydrALAZINE 25 MG tablet  Commonly known as:  APRESOLINE     lisinopril 40 MG tablet  Commonly known as:  PRINIVIL,ZESTRIL     metFORMIN 1000 MG tablet  Commonly known as:  GLUCOPHAGE     potassium chloride 10 MEQ CR tablet  Commonly known as:  K-DUR,KLOR-CON            Discharge Diet:   Healthy Heart Diet    Activity at Discharge:   As tolerated, no lifting > 10 pounds x 6 weeks    Follow-up Appointments  Future Appointments   Date Time Provider Department Center   9/16/2019 11:30 AM Darling Tannre APRN [facility] None   9/17/2019 10:30 AM Tangela Jc NP [facility] None   2/6/2020  3:00 PM Bautista Lopez MD [facility]     Additional Instructions for the Follow-ups that You Need to Schedule     Discharge Follow-up with PCP   As directed       Currently Documented PCP:    Tangela Jc NP    PCP Phone Number:    [phone]     Follow Up Details:  in one month         Discharge Follow-up with Specialty: Cardiology; 2 Weeks   As directed      Specialty:  Cardiology    Follow Up:  2 Weeks         Discharge Follow-up with Specified Provider: Cardiac Surgery; 6 Weeks   As directed      To:  Cardiac Surgery    Follow Up:  6 Weeks         Referral to Cardiac Rehab   As directed      Call MD With Problems / Concerns   Sep 13, 2019      Instructions: Call for temp >101 or any surgical wound drainage    Order Comments:  Instructions: Call for temp >101 or any surgical wound drainage            · Dr. Mcarthur wants BMP on Friday,  8/16/2019    Test Results Pending at Discharge:  none    STS information:  Pt discharged on asa/bb/statin.         Darling Tanner, ERICA  08/13/19  12:51 PM

## 2019-08-16 ENCOUNTER — LAB REQUISITION (OUTPATIENT)
Dept: LAB | Facility: HOSPITAL | Age: 69
End: 2019-08-16

## 2019-08-16 DIAGNOSIS — I25.810 ATHEROSCLEROSIS OF CORONARY ARTERY BYPASS GRAFT WITHOUT ANGINA PECTORIS: ICD-10-CM

## 2019-08-16 LAB
ANION GAP SERPL CALCULATED.3IONS-SCNC: 13.5 MMOL/L (ref 5–15)
BUN BLD-MCNC: 14 MG/DL (ref 8–20)
BUN/CREAT SERPL: 11.7 (ref 6.2–20.3)
CALCIUM SPEC-SCNC: 8.8 MG/DL (ref 8.9–10.3)
CHLORIDE SERPL-SCNC: 98 MMOL/L (ref 101–111)
CO2 SERPL-SCNC: 25 MMOL/L (ref 22–32)
CREAT BLD-MCNC: 1.2 MG/DL (ref 0.7–1.2)
GFR SERPL CREATININE-BSD FRML MDRD: 60 ML/MIN/1.73
GLUCOSE BLD-MCNC: 135 MG/DL (ref 65–99)
POTASSIUM BLD-SCNC: 4.5 MMOL/L (ref 3.6–5.1)
SODIUM BLD-SCNC: 132 MMOL/L (ref 136–144)

## 2019-08-16 PROCEDURE — 80048 BASIC METABOLIC PNL TOTAL CA: CPT

## 2019-08-19 ENCOUNTER — LAB REQUISITION (OUTPATIENT)
Dept: LAB | Facility: HOSPITAL | Age: 69
End: 2019-08-19

## 2019-08-19 ENCOUNTER — TELEPHONE (OUTPATIENT)
Dept: CARDIOLOGY | Facility: CLINIC | Age: 69
End: 2019-08-19

## 2019-08-19 ENCOUNTER — HOSPITAL ENCOUNTER (OUTPATIENT)
Dept: GENERAL RADIOLOGY | Facility: HOSPITAL | Age: 69
Discharge: HOME | End: 2019-08-19

## 2019-08-19 DIAGNOSIS — J18.9 PNA (PNEUMONIA): ICD-10-CM

## 2019-08-19 DIAGNOSIS — I25.810 ATHEROSCLEROSIS OF CORONARY ARTERY BYPASS GRAFT WITHOUT ANGINA PECTORIS: ICD-10-CM

## 2019-08-19 LAB — BNP SERPL-MCNC: 180 PG/ML

## 2019-08-19 PROCEDURE — 83880 ASSAY OF NATRIURETIC PEPTIDE: CPT

## 2019-08-19 PROCEDURE — 71046 X-RAY EXAM CHEST 2 VIEWS: CPT

## 2019-08-25 ENCOUNTER — APPOINTMENT (OUTPATIENT)
Dept: GENERAL RADIOLOGY | Facility: HOSPITAL | Age: 69
End: 2019-08-25

## 2019-08-25 ENCOUNTER — HOSPITAL ENCOUNTER (EMERGENCY)
Dept: CARDIOLOGY | Facility: HOSPITAL | Age: 69
Discharge: HOME | End: 2019-08-25

## 2019-08-25 ENCOUNTER — HOSPITAL ENCOUNTER (EMERGENCY)
Facility: HOSPITAL | Age: 69
Discharge: HOME | End: 2019-08-25
Admitting: EMERGENCY MEDICINE

## 2019-08-25 VITALS
DIASTOLIC BLOOD PRESSURE: 56 MMHG | RESPIRATION RATE: 16 BRPM | TEMPERATURE: 98.5 F | HEART RATE: 78 BPM | OXYGEN SATURATION: 95 % | BODY MASS INDEX: 32.53 KG/M2 | WEIGHT: 207.23 LBS | SYSTOLIC BLOOD PRESSURE: 108 MMHG | HEIGHT: 67 IN

## 2019-08-25 DIAGNOSIS — M25.461 EFFUSION OF RIGHT KNEE: ICD-10-CM

## 2019-08-25 DIAGNOSIS — M17.11 ARTHRITIS OF RIGHT KNEE: ICD-10-CM

## 2019-08-25 DIAGNOSIS — M25.561 ACUTE PAIN OF RIGHT KNEE: Primary | ICD-10-CM

## 2019-08-25 LAB
APPEARANCE FLD: ABNORMAL
BH CV LOWER VASCULAR LEFT COMMON FEMORAL AUGMENT: NORMAL
BH CV LOWER VASCULAR LEFT COMMON FEMORAL COMPETENT: NORMAL
BH CV LOWER VASCULAR LEFT COMMON FEMORAL COMPRESS: NORMAL
BH CV LOWER VASCULAR LEFT COMMON FEMORAL PHASIC: NORMAL
BH CV LOWER VASCULAR LEFT COMMON FEMORAL SPONT: NORMAL
BH CV LOWER VASCULAR RIGHT COMMON FEMORAL AUGMENT: NORMAL
BH CV LOWER VASCULAR RIGHT COMMON FEMORAL COMPETENT: NORMAL
BH CV LOWER VASCULAR RIGHT COMMON FEMORAL COMPRESS: NORMAL
BH CV LOWER VASCULAR RIGHT COMMON FEMORAL PHASIC: NORMAL
BH CV LOWER VASCULAR RIGHT COMMON FEMORAL SPONT: NORMAL
BH CV LOWER VASCULAR RIGHT DISTAL FEMORAL COMPRESS: NORMAL
BH CV LOWER VASCULAR RIGHT GASTRONEMIUS COMPRESS: NORMAL
BH CV LOWER VASCULAR RIGHT GREATER SAPH AK COMPRESS: NORMAL
BH CV LOWER VASCULAR RIGHT GREATER SAPH BK COMPRESS: NORMAL
BH CV LOWER VASCULAR RIGHT LESSER SAPH COMPRESS: NORMAL
BH CV LOWER VASCULAR RIGHT MID FEMORAL AUGMENT: NORMAL
BH CV LOWER VASCULAR RIGHT MID FEMORAL COMPETENT: NORMAL
BH CV LOWER VASCULAR RIGHT MID FEMORAL COMPRESS: NORMAL
BH CV LOWER VASCULAR RIGHT MID FEMORAL PHASIC: NORMAL
BH CV LOWER VASCULAR RIGHT MID FEMORAL SPONT: NORMAL
BH CV LOWER VASCULAR RIGHT PERONEAL COMPRESS: NORMAL
BH CV LOWER VASCULAR RIGHT POPLITEAL AUGMENT: NORMAL
BH CV LOWER VASCULAR RIGHT POPLITEAL COMPETENT: NORMAL
BH CV LOWER VASCULAR RIGHT POPLITEAL COMPRESS: NORMAL
BH CV LOWER VASCULAR RIGHT POPLITEAL PHASIC: NORMAL
BH CV LOWER VASCULAR RIGHT POPLITEAL SPONT: NORMAL
BH CV LOWER VASCULAR RIGHT POSTERIOR TIBIAL COMPRESS: NORMAL
BH CV LOWER VASCULAR RIGHT PROXIMAL FEMORAL COMPRESS: NORMAL
BH CV LOWER VASCULAR RIGHT SAPHENOFEMORAL JUNCTION COMPRESS: NORMAL
COLOR FLD: YELLOW
CRYSTALS FLD MICRO: NORMAL
LYMPHOCYTES NFR FLD MANUAL: 3 %
METHOD: ABNORMAL
NEUTROPHILS NFR FLD MANUAL: 97 %
NUC CELL # FLD: 1455 /MM3

## 2019-08-25 PROCEDURE — 73564 X-RAY EXAM KNEE 4 OR MORE: CPT

## 2019-08-25 PROCEDURE — 87205 SMEAR GRAM STAIN: CPT | Performed by: NURSE PRACTITIONER

## 2019-08-25 PROCEDURE — 87070 CULTURE OTHR SPECIMN AEROBIC: CPT | Performed by: NURSE PRACTITIONER

## 2019-08-25 PROCEDURE — 89051 BODY FLUID CELL COUNT: CPT | Performed by: NURSE PRACTITIONER

## 2019-08-25 PROCEDURE — 89060 EXAM SYNOVIAL FLUID CRYSTALS: CPT | Performed by: NURSE PRACTITIONER

## 2019-08-25 PROCEDURE — 93971 EXTREMITY STUDY: CPT

## 2019-08-25 PROCEDURE — 87015 SPECIMEN INFECT AGNT CONCNTJ: CPT | Performed by: NURSE PRACTITIONER

## 2019-08-25 PROCEDURE — 99284 EMERGENCY DEPT VISIT MOD MDM: CPT

## 2019-08-29 ENCOUNTER — OFFICE VISIT (OUTPATIENT)
Dept: FAMILY MEDICINE CLINIC | Facility: CLINIC | Age: 69
End: 2019-08-29

## 2019-08-29 VITALS
HEIGHT: 67 IN | SYSTOLIC BLOOD PRESSURE: 125 MMHG | BODY MASS INDEX: 32.02 KG/M2 | OXYGEN SATURATION: 97 % | DIASTOLIC BLOOD PRESSURE: 72 MMHG | HEART RATE: 85 BPM | WEIGHT: 204 LBS

## 2019-08-29 DIAGNOSIS — I10 ESSENTIAL HYPERTENSION: Primary | ICD-10-CM

## 2019-08-29 DIAGNOSIS — D50.9 IRON DEFICIENCY ANEMIA, UNSPECIFIED IRON DEFICIENCY ANEMIA TYPE: ICD-10-CM

## 2019-08-29 DIAGNOSIS — E78.2 MIXED HYPERLIPIDEMIA: ICD-10-CM

## 2019-08-29 PROBLEM — Z00.00 ENCOUNTER FOR GENERAL ADULT MEDICAL EXAMINATION WITHOUT ABNORMAL FINDINGS: Status: ACTIVE | Noted: 2017-03-24

## 2019-08-29 PROBLEM — E11.9 DIABETES MELLITUS, TYPE II: Status: ACTIVE | Noted: 2017-03-24

## 2019-08-29 PROBLEM — E11.65 TYPE 2 DIABETES MELLITUS WITH HYPERGLYCEMIA (HCC): Status: ACTIVE | Noted: 2017-03-24

## 2019-08-29 PROBLEM — E78.5 HYPERLIPIDEMIA: Status: ACTIVE | Noted: 2019-08-29

## 2019-08-29 LAB
ALBUMIN SERPL-MCNC: 3.5 G/DL (ref 3.5–4.8)
ALBUMIN/GLOB SERPL: 1.1 G/DL (ref 1–1.7)
ALP SERPL-CCNC: 80 U/L (ref 32–91)
ALT SERPL W P-5'-P-CCNC: 14 U/L (ref 17–63)
ANION GAP SERPL CALCULATED.3IONS-SCNC: 15.2 MMOL/L (ref 5–15)
AST SERPL-CCNC: 18 U/L (ref 15–41)
BASOPHILS # BLD AUTO: 0.1 10*3/MM3 (ref 0–0.2)
BASOPHILS NFR BLD AUTO: 1.3 % (ref 0–1.5)
BILIRUB SERPL-MCNC: 0.6 MG/DL (ref 0.3–1.2)
BUN BLD-MCNC: 22 MG/DL (ref 8–20)
BUN/CREAT SERPL: 20 (ref 6.2–20.3)
CALCIUM SPEC-SCNC: 8.9 MG/DL (ref 8.9–10.3)
CHLORIDE SERPL-SCNC: 97 MMOL/L (ref 101–111)
CO2 SERPL-SCNC: 22 MMOL/L (ref 22–32)
CREAT BLD-MCNC: 1.1 MG/DL (ref 0.7–1.2)
DEPRECATED RDW RBC AUTO: 42 FL (ref 37–54)
EOSINOPHIL # BLD AUTO: 0.4 10*3/MM3 (ref 0–0.4)
EOSINOPHIL NFR BLD AUTO: 4.5 % (ref 0.3–6.2)
ERYTHROCYTE [DISTWIDTH] IN BLOOD BY AUTOMATED COUNT: 13.8 % (ref 12.3–15.4)
GFR SERPL CREATININE-BSD FRML MDRD: 67 ML/MIN/1.73
GLOBULIN UR ELPH-MCNC: 3.2 GM/DL (ref 2.5–3.8)
GLUCOSE BLD-MCNC: 123 MG/DL (ref 65–99)
HCT VFR BLD AUTO: 31.4 % (ref 37.5–51)
HGB BLD-MCNC: 11.1 G/DL (ref 13–17.7)
LYMPHOCYTES # BLD AUTO: 1.8 10*3/MM3 (ref 0.7–3.1)
LYMPHOCYTES NFR BLD AUTO: 20.5 % (ref 19.6–45.3)
MCH RBC QN AUTO: 30.3 PG (ref 26.6–33)
MCHC RBC AUTO-ENTMCNC: 35.3 G/DL (ref 31.5–35.7)
MCV RBC AUTO: 86 FL (ref 79–97)
MONOCYTES # BLD AUTO: 0.7 10*3/MM3 (ref 0.1–0.9)
MONOCYTES NFR BLD AUTO: 8.6 % (ref 5–12)
NEUTROPHILS # BLD AUTO: 5.6 10*3/MM3 (ref 1.7–7)
NEUTROPHILS NFR BLD AUTO: 65.1 % (ref 42.7–76)
NRBC BLD AUTO-RTO: 0.4 /100 WBC (ref 0–0.2)
PLATELET # BLD AUTO: 484 10*3/MM3 (ref 140–450)
PMV BLD AUTO: 8.3 FL (ref 6–12)
POTASSIUM BLD-SCNC: 4.2 MMOL/L (ref 3.6–5.1)
PROT SERPL-MCNC: 6.7 G/DL (ref 6.1–7.9)
RBC # BLD AUTO: 3.66 10*6/MM3 (ref 4.14–5.8)
SODIUM BLD-SCNC: 130 MMOL/L (ref 136–144)
WBC NRBC COR # BLD: 8.6 10*3/MM3 (ref 3.4–10.8)

## 2019-08-29 PROCEDURE — 36415 COLL VENOUS BLD VENIPUNCTURE: CPT | Performed by: NURSE PRACTITIONER

## 2019-08-29 PROCEDURE — 85025 COMPLETE CBC W/AUTO DIFF WBC: CPT | Performed by: NURSE PRACTITIONER

## 2019-08-29 PROCEDURE — 99213 OFFICE O/P EST LOW 20 MIN: CPT | Performed by: NURSE PRACTITIONER

## 2019-08-29 PROCEDURE — 85007 BL SMEAR W/DIFF WBC COUNT: CPT | Performed by: NURSE PRACTITIONER

## 2019-08-29 PROCEDURE — 80053 COMPREHEN METABOLIC PANEL: CPT | Performed by: NURSE PRACTITIONER

## 2019-08-29 RX ORDER — CETIRIZINE HYDROCHLORIDE 10 MG/1
10 TABLET ORAL DAILY
Refills: 0 | COMMUNITY
Start: 2019-08-20 | End: 2021-01-05

## 2019-08-29 RX ORDER — ATORVASTATIN CALCIUM 40 MG/1
40 TABLET, FILM COATED ORAL NIGHTLY
Qty: 90 TABLET | Refills: 0 | Status: SHIPPED | OUTPATIENT
Start: 2019-08-29 | End: 2019-11-18 | Stop reason: SDUPTHER

## 2019-08-29 RX ORDER — HYDROCODONE BITARTRATE AND ACETAMINOPHEN 5; 325 MG/1; MG/1
TABLET ORAL
Refills: 0 | COMMUNITY
Start: 2019-08-20 | End: 2020-02-18

## 2019-08-29 NOTE — PROGRESS NOTES
Subjective   Shaka Trinidad is a 69 y.o. male.     Patient presents today for hospital follow up. He is s/p CABG.  BP is stable and he is doing well without concerns.  He does have some swelling in right leg which is being followed by his surgeon.         The following portions of the patient's history were reviewed and updated as appropriate: allergies, current medications, past family history, past medical history, past social history, past surgical history and problem list.    Review of Systems   Constitutional: Negative for activity change, chills, diaphoresis, fatigue and fever.   HENT: Negative for congestion, ear pain, facial swelling, mouth sores, rhinorrhea, sinus pressure and sore throat.    Eyes: Negative for blurred vision, double vision, photophobia, pain and visual disturbance.   Respiratory: Negative for cough, choking, chest tightness, shortness of breath, wheezing and stridor.    Cardiovascular: Positive for leg swelling. Negative for chest pain and palpitations.   Gastrointestinal: Negative for abdominal distention, abdominal pain, anal bleeding, blood in stool, constipation, diarrhea, nausea, rectal pain, vomiting, GERD and indigestion.   Endocrine: Negative for polydipsia, polyphagia and polyuria.   Genitourinary: Negative for difficulty urinating, frequency, hematuria, urgency and urinary incontinence.   Musculoskeletal: Negative for arthralgias, back pain and neck pain.   Skin: Negative for rash and skin lesions.   Neurological: Negative for dizziness, tremors, weakness, light-headedness and headache.   Psychiatric/Behavioral: Negative for agitation, behavioral problems, depressed mood and stress. The patient is not nervous/anxious.        Objective   Physical Exam   Constitutional: He is oriented to person, place, and time. He appears well-developed and well-nourished. No distress.   HENT:   Head: Normocephalic and atraumatic.   Right Ear: External ear normal.   Left Ear: External ear  normal.   Nose: Nose normal.   Mouth/Throat: Oropharynx is clear and moist. No oropharyngeal exudate.   Eyes: Conjunctivae and EOM are normal. Pupils are equal, round, and reactive to light. Right eye exhibits no discharge. Left eye exhibits no discharge. No scleral icterus.   Neck: Normal range of motion. Neck supple. No JVD present. Carotid bruit is not present. No tracheal deviation present. No thyromegaly present.   Cardiovascular: Normal rate, regular rhythm, normal heart sounds and intact distal pulses. Exam reveals no gallop and no friction rub.   No murmur heard.  Pulmonary/Chest: Breath sounds normal. No stridor. No respiratory distress. He has no wheezes. He has no rales. He exhibits no tenderness.   Abdominal: Soft. Bowel sounds are normal. He exhibits no distension. There is no tenderness.   Musculoskeletal: Normal range of motion. He exhibits edema. He exhibits no tenderness or deformity.   Right leg   Lymphadenopathy:     He has no cervical adenopathy.   Neurological: He is alert and oriented to person, place, and time. No sensory deficit. He exhibits normal muscle tone. Coordination normal.   Skin: Skin is warm and dry. Capillary refill takes less than 2 seconds. No rash noted. He is not diaphoretic.   Psychiatric: He has a normal mood and affect. His behavior is normal. Judgment and thought content normal.         Assessment/Plan   Shaka was seen today for follow-up.    Diagnoses and all orders for this visit:    Essential hypertension  -     Comprehensive Metabolic Panel, Stable    Iron deficiency anemia, unspecified iron deficiency anemia type  -     CBC & Differential  -     CBC Auto Differential  -     Scan Slide; Future  -     Scan Slide  - Noted in hospital. Repeat CBC    Mixed hyperlipidemia  -     atorvastatin (LIPITOR) 40 MG tablet; Take 1 tablet by mouth Every Night.  Increased to 40 mg.

## 2019-08-30 LAB
BACTERIA FLD CULT: NORMAL
GRAM STN SPEC: NORMAL
GRAM STN SPEC: NORMAL

## 2019-09-04 ENCOUNTER — TELEPHONE (OUTPATIENT)
Dept: CARDIAC REHAB | Facility: HOSPITAL | Age: 69
End: 2019-09-04

## 2019-09-04 NOTE — TELEPHONE ENCOUNTER
Called pt to follow up and see if interested in Cardiac Rehab program. Said he is interested, so informed him we will need to verify insurance coverage and get order from Dr. Lopez, then call him back to schedule.

## 2019-09-05 ENCOUNTER — OFFICE VISIT (OUTPATIENT)
Dept: CARDIOLOGY | Facility: CLINIC | Age: 69
End: 2019-09-05

## 2019-09-05 VITALS
SYSTOLIC BLOOD PRESSURE: 132 MMHG | OXYGEN SATURATION: 99 % | DIASTOLIC BLOOD PRESSURE: 73 MMHG | HEIGHT: 67 IN | WEIGHT: 205.5 LBS | HEART RATE: 86 BPM | BODY MASS INDEX: 32.25 KG/M2

## 2019-09-05 DIAGNOSIS — I25.708 CORONARY ARTERY DISEASE OF BYPASS GRAFT OF NATIVE HEART WITH STABLE ANGINA PECTORIS (HCC): Primary | ICD-10-CM

## 2019-09-05 DIAGNOSIS — E78.00 PURE HYPERCHOLESTEROLEMIA: ICD-10-CM

## 2019-09-05 DIAGNOSIS — I10 ESSENTIAL HYPERTENSION: ICD-10-CM

## 2019-09-05 PROCEDURE — 99213 OFFICE O/P EST LOW 20 MIN: CPT | Performed by: INTERNAL MEDICINE

## 2019-09-05 RX ORDER — HYDRALAZINE HYDROCHLORIDE 25 MG/1
25 TABLET, FILM COATED ORAL 3 TIMES DAILY
COMMUNITY
End: 2019-12-18 | Stop reason: SDUPTHER

## 2019-09-05 RX ORDER — POTASSIUM CHLORIDE 750 MG/1
10 CAPSULE, EXTENDED RELEASE ORAL 2 TIMES DAILY
COMMUNITY
End: 2019-09-25 | Stop reason: SDUPTHER

## 2019-09-05 RX ORDER — LISINOPRIL 40 MG/1
40 TABLET ORAL DAILY
COMMUNITY
End: 2019-12-03 | Stop reason: SDUPTHER

## 2019-09-05 RX ORDER — FUROSEMIDE 20 MG/1
20 TABLET ORAL DAILY
COMMUNITY
End: 2019-10-16 | Stop reason: SDUPTHER

## 2019-09-05 RX ORDER — GLIPIZIDE 5 MG/1
5 TABLET ORAL
COMMUNITY
End: 2019-10-21 | Stop reason: SDUPTHER

## 2019-09-05 NOTE — PROGRESS NOTES
Subjective:     Encounter Date:09/05/2019      Patient ID: Shaka Trinidad is a 69 y.o. male.    Chief Complaint:  History of Present Illness 69-year-old white male with history of coronary artery disease status post coronary bypass surgery hypertension hyperlipidemia presents to my office for follow-up.  Is currently stable without any symptoms of chest pain or shortness of breath at rest on exertion.  No complaints of any PND orthopnea.  No palpitations dizziness syncope or swelling of the feet.  He recently had cardiac ulceration which showed severe three-vessel coronary disease.  He had coronary bypass surgery with a LIMA to LAD and saphenous graft to the diagonal branch distal RCA and marginal branch.  Patient has normal LV function.    The following portions of the patient's history were reviewed and updated as appropriate: allergies, current medications, past family history, past medical history, past social history, past surgical history and problem list.  Past Medical History:   Diagnosis Date   • Coronary artery disease    • Diabetes mellitus, type II (CMS/[facility])    • Elevated cholesterol    • H/O cataract     cataracts    • Hyperlipidemia    • Hypertension    • Peripheral edema    • Peripheral edema    • Sleep apnea     oral appliance     Past Surgical History:   Procedure Laterality Date   • CARDIAC CATHETERIZATION N/A 7/19/2019    Procedure: Left Heart Cath with angiogram;  Surgeon: Bautista Lopez MD;  Location: [facility] CATH INVASIVE LOCATION;  Service: Cardiovascular   • CARDIAC CATHETERIZATION N/A 7/19/2019    Procedure: Coronary angiography;  Surgeon: Bautista Lopez MD;  Location: [facility] CATH INVASIVE LOCATION;  Service: Cardiovascular   • CARDIAC CATHETERIZATION N/A 7/19/2019    Procedure: Left ventriculography;  Surgeon: Bautista Lopez MD;  Location: [facility] CATH INVASIVE LOCATION;  Service: Cardiovascular   • COLONOSCOPY     • CORONARY ARTERY BYPASS GRAFT N/A 8/8/2019    Procedure: CORONARY  "ARTERY BYPASS GRAFTING;  Surgeon: Chet Mcarthur MD;  Location: [facility];  Service: Cardiothoracic   • HERNIA REPAIR     • OTHER SURGICAL HISTORY  12/2015    2d echo-abstracted cent.      /73 (BP Location: Left arm, Patient Position: Sitting)   Pulse 86   Ht 170.2 cm (67\")   Wt 93.2 kg (205 lb 8 oz)   SpO2 99%   BMI 32.19 kg/m²   Family History   Problem Relation Age of Onset   • Heart failure Mother    • Hypertension Brother    • Cancer Brother        Current Outpatient Medications:   •  amLODIPine (NORVASC) 5 MG tablet, Take 1 tablet by mouth Daily., Disp: 90 tablet, Rfl: 0  •  ascorbic acid (VITAMIN C) 1000 MG tablet, Take 1,000 mg by mouth Daily., Disp: , Rfl:   •  aspirin 81 MG chewable tablet, Chew 81 mg Every Night., Disp: , Rfl:   •  atorvastatin (LIPITOR) 40 MG tablet, Take 1 tablet by mouth Every Night., Disp: 90 tablet, Rfl: 0  •  Calcium-Magnesium-Vitamin D (CALCIUM 500 PO), Take 600 mg by mouth Daily. With vitamin d 1000iu , Disp: , Rfl:   •  cetirizine (zyrTEC) 10 MG tablet, Take 10 mg by mouth Daily., Disp: , Rfl: 0  •  Cinnamon 500 MG tablet, Take 1,000 mg by mouth Every Night., Disp: , Rfl:   •  docusate sodium 100 MG capsule, Take 100 mg by mouth 2 (Two) Times a Day., Disp: , Rfl:   •  furosemide (LASIX) 20 MG tablet, Take 20 mg by mouth 2 (Two) Times a Day., Disp: , Rfl:   •  glipiZIDE (GLUCOTROL) 5 MG tablet, Take 5 mg by mouth 2 (Two) Times a Day Before Meals., Disp: , Rfl:   •  hydrALAZINE (APRESOLINE) 25 MG tablet, Take 25 mg by mouth 3 (Three) Times a Day., Disp: , Rfl:   •  HYDROcodone-acetaminophen (NORCO) 5-325 MG per tablet, TAKE 1 TABLET BY MOUTH EVERY 4 HOURS AS NEEDED FOR SEVERE PAIN., Disp: , Rfl: 0  •  Insulin Degludec (TRESIBA) 100 UNIT/ML solution, Inject 32 Units under the skin into the appropriate area as directed Daily., Disp: , Rfl:   •  Iron Combinations (IRON COMPLEX PO), Take 1 tablet by mouth 2 (Two) Times a Day., Disp: , Rfl:   •  lisinopril " (PRINIVIL,ZESTRIL) 40 MG tablet, Take 40 mg by mouth Daily., Disp: , Rfl:   •  metFORMIN (GLUCOPHAGE) 1000 MG tablet, Take 1,000 mg by mouth 2 (Two) Times a Day With Meals., Disp: , Rfl:   •  metoprolol tartrate (LOPRESSOR) 25 MG tablet, Take 1 tablet by mouth Every 12 (Twelve) Hours., Disp: 60 tablet, Rfl: 3  •  polyethylene glycol (MIRALAX) packet, Take 17 g by mouth 2 (Two) Times a Day., Disp: , Rfl:   •  potassium chloride (MICRO-K) 10 MEQ CR capsule, Take 10 mEq by mouth 2 (Two) Times a Day., Disp: , Rfl:   •  senna (SENOKOT) 8.6 MG tablet, Take 2 tablets by mouth Every Night., Disp: , Rfl:   •  insulin lispro (humaLOG) 100 UNIT/ML injection, Inject 0-24 Units under the skin into the appropriate area as directed 4 (Four) Times a Day With Meals & at Bedtime., Disp: , Rfl: 12  No Known Allergies  Social History     Socioeconomic History   • Marital status:      Spouse name: Not on file   • Number of children: Not on file   • Years of education: Not on file   • Highest education level: Not on file   Tobacco Use   • Smoking status: Never Smoker   • Smokeless tobacco: Never Used   Substance and Sexual Activity   • Alcohol use: No     Frequency: Never   • Drug use: No   • Sexual activity: Defer     Review of Systems   Constitution: Negative for fever and malaise/fatigue.   Cardiovascular: Negative for chest pain, dyspnea on exertion, leg swelling and palpitations.   Respiratory: Negative for cough and shortness of breath.    Skin: Negative for rash.   Gastrointestinal: Negative for abdominal pain, nausea and vomiting.   Neurological: Positive for numbness. Negative for focal weakness and headaches.   All other systems reviewed and are negative.             Objective:     Physical Exam   Constitutional: He appears well-developed and well-nourished.   HENT:   Head: Normocephalic and atraumatic.   Eyes: Conjunctivae are normal. No scleral icterus.   Neck: Normal range of motion. Neck supple. No JVD present.  Carotid bruit is not present.   Cardiovascular: Normal rate, regular rhythm, S1 normal, S2 normal, normal heart sounds and intact distal pulses. PMI is not displaced.   Pulmonary/Chest: Effort normal and breath sounds normal. He has no wheezes. He has no rales.   Chest wall is healing well except for one small area with slight discharge   Abdominal: Soft. Bowel sounds are normal.   Neurological: He is alert. He has normal strength.   Skin: Skin is warm and dry. No rash noted.     Procedures    Lab Review:       Assessment:          Diagnosis Plan   1. Coronary artery disease of bypass graft of native heart with stable angina pectoris (CMS/HCC)     2. Essential hypertension     3. Pure hypercholesterolemia            Plan:       Patient had coronary bypass surgery x4 vessels with a LIMA to LAD and saphenous graft to the marginal branch diagonal branch and RCA.  Patient has normal function per  Patient blood pressure heart are stable   continue current medicines and follow in 6 months

## 2019-09-09 ENCOUNTER — OFFICE VISIT (OUTPATIENT)
Dept: CARDIAC SURGERY | Facility: CLINIC | Age: 69
End: 2019-09-09

## 2019-09-09 VITALS
HEART RATE: 83 BPM | TEMPERATURE: 98.5 F | DIASTOLIC BLOOD PRESSURE: 70 MMHG | BODY MASS INDEX: 32.36 KG/M2 | HEIGHT: 67 IN | OXYGEN SATURATION: 98 % | WEIGHT: 206.2 LBS | SYSTOLIC BLOOD PRESSURE: 122 MMHG

## 2019-09-09 DIAGNOSIS — Z09 FOLLOW UP: Primary | ICD-10-CM

## 2019-09-09 PROCEDURE — 99024 POSTOP FOLLOW-UP VISIT: CPT | Performed by: NURSE PRACTITIONER

## 2019-09-10 ENCOUNTER — TELEPHONE (OUTPATIENT)
Dept: CARDIAC REHAB | Facility: HOSPITAL | Age: 69
End: 2019-09-10

## 2019-09-10 ENCOUNTER — TRANSCRIBE ORDERS (OUTPATIENT)
Dept: CARDIAC REHAB | Facility: HOSPITAL | Age: 69
End: 2019-09-10

## 2019-09-10 DIAGNOSIS — Z95.1 S/P CABG (CORONARY ARTERY BYPASS GRAFT): Primary | ICD-10-CM

## 2019-09-11 PROBLEM — Z95.1 S/P CABG (CORONARY ARTERY BYPASS GRAFT): Status: ACTIVE | Noted: 2019-09-11

## 2019-09-11 NOTE — PROGRESS NOTES
"CARDIOVASCULAR SURGERY FOLLOW-UP PROGRESS NOTE  Chief Complaint: Post-op Follow Up        HPI:   Dear Tangela Christensen APRN and colleagues:    It was nice to see Shaka Trinidad in follow up today after cardiac surgery.  As you know, he is a 69 y.o. male with CAD, DM II, HTN, HLD who underwent CABG at [facility] by Dr. Mcarthur on 8/8/19. He did well postoperatively and continues to do well. He comes in today complaining of nothing.  His activity level has been good. He states he has been recovering well and feels much better than he did prior to surgery. He has no complaints.     Physical Exam:         /70   Pulse 83   Temp 98.5 °F (36.9 °C)   Ht 170.2 cm (67\")   Wt 93.5 kg (206 lb 3.2 oz)   SpO2 98%   BMI 32.30 kg/m²   Heart:  regular rate and rhythm, S1, S2 normal, no murmur, click, rub or gallop  Lungs:  clear to auscultation bilaterally  Extremities:  no edema  Incision(s):  mid chest healing well, no significant drainage, no dehiscence, no significant erythema, left leg healing well, no significant drainage, no dehiscence, no significant erythema    Assessment/Plan:     S/P CABG. Overall, he is doing well.    No significant post-op complications    OK to drive if not taking narcotic pain medicine  OK to begin cardiac rehab    No restrictions of activity.      Thank you for allowing me to participate in the care of your patient.    Regards,  ERICA MONTERROSO      "

## 2019-09-12 ENCOUNTER — CLINICAL SUPPORT (OUTPATIENT)
Dept: CARDIAC SURGERY | Facility: CLINIC | Age: 69
End: 2019-09-12

## 2019-09-12 DIAGNOSIS — Z48.812 ENCOUNTER FOR SURGICAL AFTERCARE FOLLOWING SURGERY OF CIRCULATORY SYSTEM: Primary | ICD-10-CM

## 2019-09-12 DIAGNOSIS — I12.9 HYPERTENSIVE CHRONIC KIDNEY DISEASE WITH STAGE 1 THROUGH STAGE 4 CHRONIC KIDNEY DISEASE, OR UNSPECIFIED CHRONIC KIDNEY DISEASE: ICD-10-CM

## 2019-09-12 DIAGNOSIS — I25.10 ATHEROSCLEROSIS OF NATIVE CORONARY ARTERY WITHOUT ANGINA PECTORIS, UNSPECIFIED WHETHER NATIVE OR TRANSPLANTED HEART: ICD-10-CM

## 2019-09-12 DIAGNOSIS — E11.22 TYPE 2 DIABETES MELLITUS WITH DIABETIC CHRONIC KIDNEY DISEASE, UNSPECIFIED CKD STAGE, UNSPECIFIED WHETHER LONG TERM INSULIN USE (HCC): ICD-10-CM

## 2019-09-12 PROCEDURE — G0180 MD CERTIFICATION HHA PATIENT: HCPCS | Performed by: THORACIC SURGERY (CARDIOTHORACIC VASCULAR SURGERY)

## 2019-09-13 ENCOUNTER — TELEPHONE (OUTPATIENT)
Dept: CARDIAC SURGERY | Facility: CLINIC | Age: 69
End: 2019-09-13

## 2019-09-23 ENCOUNTER — OFFICE VISIT (OUTPATIENT)
Dept: CARDIAC REHAB | Facility: HOSPITAL | Age: 69
End: 2019-09-23

## 2019-09-23 DIAGNOSIS — Z95.1 S/P CABG (CORONARY ARTERY BYPASS GRAFT): Primary | ICD-10-CM

## 2019-09-23 PROCEDURE — 93798 PHYS/QHP OP CAR RHAB W/ECG: CPT

## 2019-09-25 ENCOUNTER — TREATMENT (OUTPATIENT)
Dept: CARDIAC REHAB | Facility: HOSPITAL | Age: 69
End: 2019-09-25

## 2019-09-25 DIAGNOSIS — Z95.1 S/P CABG (CORONARY ARTERY BYPASS GRAFT): Primary | ICD-10-CM

## 2019-09-25 PROCEDURE — 93798 PHYS/QHP OP CAR RHAB W/ECG: CPT

## 2019-09-25 RX ORDER — POTASSIUM CHLORIDE 750 MG/1
10 CAPSULE, EXTENDED RELEASE ORAL DAILY
Qty: 90 CAPSULE | Refills: 1 | Status: SHIPPED | OUTPATIENT
Start: 2019-09-25 | End: 2020-03-18 | Stop reason: SDUPTHER

## 2019-09-27 ENCOUNTER — TREATMENT (OUTPATIENT)
Dept: CARDIAC REHAB | Facility: HOSPITAL | Age: 69
End: 2019-09-27

## 2019-09-27 ENCOUNTER — APPOINTMENT (OUTPATIENT)
Dept: CARDIAC REHAB | Facility: HOSPITAL | Age: 69
End: 2019-09-27

## 2019-09-27 DIAGNOSIS — Z95.1 S/P CABG (CORONARY ARTERY BYPASS GRAFT): Primary | ICD-10-CM

## 2019-09-27 PROCEDURE — 93798 PHYS/QHP OP CAR RHAB W/ECG: CPT

## 2019-09-30 ENCOUNTER — APPOINTMENT (OUTPATIENT)
Dept: CARDIAC REHAB | Facility: HOSPITAL | Age: 69
End: 2019-09-30

## 2019-10-01 ENCOUNTER — TREATMENT (OUTPATIENT)
Dept: CARDIAC REHAB | Facility: HOSPITAL | Age: 69
End: 2019-10-01

## 2019-10-01 DIAGNOSIS — Z95.1 S/P CABG (CORONARY ARTERY BYPASS GRAFT): Primary | ICD-10-CM

## 2019-10-01 PROCEDURE — 93798 PHYS/QHP OP CAR RHAB W/ECG: CPT

## 2019-10-02 ENCOUNTER — TREATMENT (OUTPATIENT)
Dept: CARDIAC REHAB | Facility: HOSPITAL | Age: 69
End: 2019-10-02

## 2019-10-02 DIAGNOSIS — Z95.1 S/P CABG (CORONARY ARTERY BYPASS GRAFT): Primary | ICD-10-CM

## 2019-10-02 PROCEDURE — 93798 PHYS/QHP OP CAR RHAB W/ECG: CPT

## 2019-10-04 ENCOUNTER — TREATMENT (OUTPATIENT)
Dept: CARDIAC REHAB | Facility: HOSPITAL | Age: 69
End: 2019-10-04

## 2019-10-04 DIAGNOSIS — Z95.1 S/P CABG (CORONARY ARTERY BYPASS GRAFT): Primary | ICD-10-CM

## 2019-10-04 PROCEDURE — 93798 PHYS/QHP OP CAR RHAB W/ECG: CPT

## 2019-10-07 ENCOUNTER — TREATMENT (OUTPATIENT)
Dept: CARDIAC REHAB | Facility: HOSPITAL | Age: 69
End: 2019-10-07

## 2019-10-07 DIAGNOSIS — Z95.1 S/P CABG (CORONARY ARTERY BYPASS GRAFT): Primary | ICD-10-CM

## 2019-10-07 PROCEDURE — 93798 PHYS/QHP OP CAR RHAB W/ECG: CPT

## 2019-10-08 ENCOUNTER — TREATMENT (OUTPATIENT)
Dept: CARDIAC REHAB | Facility: HOSPITAL | Age: 69
End: 2019-10-08

## 2019-10-08 DIAGNOSIS — Z95.1 S/P CABG (CORONARY ARTERY BYPASS GRAFT): Primary | ICD-10-CM

## 2019-10-08 PROCEDURE — 93798 PHYS/QHP OP CAR RHAB W/ECG: CPT

## 2019-10-08 RX ORDER — INSULIN DEGLUDEC 100 U/ML
32 INJECTION, SOLUTION SUBCUTANEOUS DAILY
Qty: 18 ML | Refills: 1 | Status: SHIPPED | OUTPATIENT
Start: 2019-10-08 | End: 2019-12-23 | Stop reason: SDUPTHER

## 2019-10-09 ENCOUNTER — APPOINTMENT (OUTPATIENT)
Dept: CARDIAC REHAB | Facility: HOSPITAL | Age: 69
End: 2019-10-09

## 2019-10-10 ENCOUNTER — OFFICE VISIT (OUTPATIENT)
Dept: FAMILY MEDICINE CLINIC | Facility: CLINIC | Age: 69
End: 2019-10-10

## 2019-10-10 VITALS
BODY MASS INDEX: 32.18 KG/M2 | HEART RATE: 89 BPM | SYSTOLIC BLOOD PRESSURE: 129 MMHG | DIASTOLIC BLOOD PRESSURE: 82 MMHG | WEIGHT: 205 LBS | OXYGEN SATURATION: 98 % | HEIGHT: 67 IN

## 2019-10-10 DIAGNOSIS — Z79.4 TYPE 2 DIABETES MELLITUS WITH HYPERGLYCEMIA, WITH LONG-TERM CURRENT USE OF INSULIN (HCC): Primary | ICD-10-CM

## 2019-10-10 DIAGNOSIS — E11.65 TYPE 2 DIABETES MELLITUS WITH HYPERGLYCEMIA, WITH LONG-TERM CURRENT USE OF INSULIN (HCC): Primary | ICD-10-CM

## 2019-10-10 PROCEDURE — 99213 OFFICE O/P EST LOW 20 MIN: CPT | Performed by: NURSE PRACTITIONER

## 2019-10-11 ENCOUNTER — TREATMENT (OUTPATIENT)
Dept: CARDIAC REHAB | Facility: HOSPITAL | Age: 69
End: 2019-10-11

## 2019-10-11 DIAGNOSIS — Z95.1 S/P CABG (CORONARY ARTERY BYPASS GRAFT): Primary | ICD-10-CM

## 2019-10-11 PROCEDURE — 93798 PHYS/QHP OP CAR RHAB W/ECG: CPT

## 2019-10-14 ENCOUNTER — APPOINTMENT (OUTPATIENT)
Dept: CARDIAC REHAB | Facility: HOSPITAL | Age: 69
End: 2019-10-14

## 2019-10-15 ENCOUNTER — APPOINTMENT (OUTPATIENT)
Dept: CARDIAC REHAB | Facility: HOSPITAL | Age: 69
End: 2019-10-15

## 2019-10-15 NOTE — PROGRESS NOTES
Subjective   Shaka Trinidad is a 69 y.o. male.     Patient reports he is having some issues with hypoglycemia at night. His last A1C was slightly elevated.      Diabetes   He presents for his follow-up diabetic visit. He has type 2 diabetes mellitus. No MedicAlert identification noted. His disease course has been stable. Pertinent negatives for hypoglycemia include no confusion, dizziness, headaches, hunger, mood changes, nervousness/anxiousness, pallor, seizures, sleepiness, speech difficulty, sweats or tremors. Pertinent negatives for diabetes include no blurred vision, no chest pain, no fatigue, no foot paresthesias, no foot ulcerations, no polydipsia, no polyphagia, no polyuria, no visual change, no weakness and no weight loss. Hypoglycemia complications include nocturnal hypoglycemia. Pertinent negatives for hypoglycemia complications include no blackouts, no hospitalization, no required assistance and no required glucagon injection. Symptoms are stable. Current diabetic treatment includes insulin injections and oral agent (dual therapy).        The following portions of the patient's history were reviewed and updated as appropriate: allergies, current medications, past family history, past medical history, past social history, past surgical history and problem list.    Review of Systems   Constitutional: Negative for fatigue and unexpected weight loss.   Eyes: Negative for blurred vision.   Cardiovascular: Negative for chest pain.   Endocrine: Negative for polydipsia, polyphagia and polyuria.   Skin: Negative for pallor.   Neurological: Negative for dizziness, tremors, seizures, speech difficulty, weakness and confusion.   Psychiatric/Behavioral: The patient is not nervous/anxious.        Objective   Physical Exam   Constitutional: He appears well-developed and well-nourished. No distress.   Cardiovascular: Normal rate, regular rhythm, normal heart sounds and intact distal pulses. Exam reveals no gallop  and no friction rub.   No murmur heard.  Pulmonary/Chest: Effort normal and breath sounds normal. No stridor. No respiratory distress. He has no wheezes. He has no rales. He exhibits no tenderness.   Skin: He is not diaphoretic.   Psychiatric: He has a normal mood and affect. His behavior is normal. Judgment and thought content normal.         Assessment/Plan   Shaka was seen today for diabetes.    Diagnoses and all orders for this visit:    Type 2 diabetes mellitus with hyperglycemia, with long-term current use of insulin (CMS/[facility])   -  We will stop night dose of glipizide and continue to monitor. If blood sugars continue to be low then consider stopping glipizide. Call in 2 weeks with log.

## 2019-10-16 ENCOUNTER — APPOINTMENT (OUTPATIENT)
Dept: CARDIAC REHAB | Facility: HOSPITAL | Age: 69
End: 2019-10-16

## 2019-10-16 RX ORDER — FUROSEMIDE 20 MG/1
20 TABLET ORAL DAILY
Qty: 90 TABLET | Refills: 0 | Status: SHIPPED | OUTPATIENT
Start: 2019-10-16 | End: 2020-01-10 | Stop reason: SDUPTHER

## 2019-10-18 ENCOUNTER — APPOINTMENT (OUTPATIENT)
Dept: CARDIAC REHAB | Facility: HOSPITAL | Age: 69
End: 2019-10-18

## 2019-10-21 ENCOUNTER — APPOINTMENT (OUTPATIENT)
Dept: CARDIAC REHAB | Facility: HOSPITAL | Age: 69
End: 2019-10-21

## 2019-10-21 RX ORDER — GLIPIZIDE 5 MG/1
TABLET ORAL
Qty: 270 TABLET | Refills: 0 | Status: SHIPPED | OUTPATIENT
Start: 2019-10-21 | End: 2020-01-14 | Stop reason: SDUPTHER

## 2019-10-21 RX ORDER — AMLODIPINE BESYLATE 5 MG/1
5 TABLET ORAL DAILY
Qty: 90 TABLET | Refills: 0 | Status: SHIPPED | OUTPATIENT
Start: 2019-10-21 | End: 2020-01-13 | Stop reason: SDUPTHER

## 2019-10-23 ENCOUNTER — TREATMENT (OUTPATIENT)
Dept: CARDIAC REHAB | Facility: HOSPITAL | Age: 69
End: 2019-10-23

## 2019-10-23 DIAGNOSIS — Z95.1 S/P CABG (CORONARY ARTERY BYPASS GRAFT): Primary | ICD-10-CM

## 2019-10-23 PROCEDURE — 93798 PHYS/QHP OP CAR RHAB W/ECG: CPT

## 2019-10-25 ENCOUNTER — APPOINTMENT (OUTPATIENT)
Dept: CARDIAC REHAB | Facility: HOSPITAL | Age: 69
End: 2019-10-25

## 2019-10-28 ENCOUNTER — TREATMENT (OUTPATIENT)
Dept: CARDIAC REHAB | Facility: HOSPITAL | Age: 69
End: 2019-10-28

## 2019-10-28 DIAGNOSIS — Z95.1 S/P CABG (CORONARY ARTERY BYPASS GRAFT): Primary | ICD-10-CM

## 2019-10-28 PROCEDURE — 93798 PHYS/QHP OP CAR RHAB W/ECG: CPT

## 2019-10-30 ENCOUNTER — TREATMENT (OUTPATIENT)
Dept: CARDIAC REHAB | Facility: HOSPITAL | Age: 69
End: 2019-10-30

## 2019-10-30 DIAGNOSIS — Z95.1 S/P CABG (CORONARY ARTERY BYPASS GRAFT): Primary | ICD-10-CM

## 2019-10-30 PROCEDURE — 93798 PHYS/QHP OP CAR RHAB W/ECG: CPT

## 2019-11-01 ENCOUNTER — APPOINTMENT (OUTPATIENT)
Dept: CARDIAC REHAB | Facility: HOSPITAL | Age: 69
End: 2019-11-01

## 2019-11-04 ENCOUNTER — TREATMENT (OUTPATIENT)
Dept: CARDIAC REHAB | Facility: HOSPITAL | Age: 69
End: 2019-11-04

## 2019-11-04 DIAGNOSIS — Z95.1 S/P CABG (CORONARY ARTERY BYPASS GRAFT): Primary | ICD-10-CM

## 2019-11-04 PROCEDURE — 93798 PHYS/QHP OP CAR RHAB W/ECG: CPT

## 2019-11-06 ENCOUNTER — TREATMENT (OUTPATIENT)
Dept: CARDIAC REHAB | Facility: HOSPITAL | Age: 69
End: 2019-11-06

## 2019-11-06 DIAGNOSIS — Z95.1 S/P CABG (CORONARY ARTERY BYPASS GRAFT): Primary | ICD-10-CM

## 2019-11-06 PROCEDURE — 93798 PHYS/QHP OP CAR RHAB W/ECG: CPT

## 2019-11-08 ENCOUNTER — TREATMENT (OUTPATIENT)
Dept: CARDIAC REHAB | Facility: HOSPITAL | Age: 69
End: 2019-11-08

## 2019-11-08 DIAGNOSIS — Z95.1 S/P CABG (CORONARY ARTERY BYPASS GRAFT): Primary | ICD-10-CM

## 2019-11-08 PROCEDURE — 93798 PHYS/QHP OP CAR RHAB W/ECG: CPT

## 2019-11-08 RX ORDER — FLURBIPROFEN SODIUM 0.3 MG/ML
SOLUTION/ DROPS OPHTHALMIC
Qty: 100 EACH | Refills: 2 | Status: SHIPPED | OUTPATIENT
Start: 2019-11-08 | End: 2020-03-14

## 2019-11-11 ENCOUNTER — TREATMENT (OUTPATIENT)
Dept: CARDIAC REHAB | Facility: HOSPITAL | Age: 69
End: 2019-11-11

## 2019-11-11 DIAGNOSIS — Z95.1 S/P CABG (CORONARY ARTERY BYPASS GRAFT): Primary | ICD-10-CM

## 2019-11-11 PROCEDURE — 93798 PHYS/QHP OP CAR RHAB W/ECG: CPT

## 2019-11-13 ENCOUNTER — TREATMENT (OUTPATIENT)
Dept: CARDIAC REHAB | Facility: HOSPITAL | Age: 69
End: 2019-11-13

## 2019-11-13 DIAGNOSIS — Z95.1 S/P CABG (CORONARY ARTERY BYPASS GRAFT): Primary | ICD-10-CM

## 2019-11-13 PROCEDURE — 93798 PHYS/QHP OP CAR RHAB W/ECG: CPT

## 2019-11-15 ENCOUNTER — TREATMENT (OUTPATIENT)
Dept: CARDIAC REHAB | Facility: HOSPITAL | Age: 69
End: 2019-11-15

## 2019-11-15 DIAGNOSIS — Z95.1 S/P CABG (CORONARY ARTERY BYPASS GRAFT): Primary | ICD-10-CM

## 2019-11-15 PROCEDURE — 93798 PHYS/QHP OP CAR RHAB W/ECG: CPT

## 2019-11-18 ENCOUNTER — TREATMENT (OUTPATIENT)
Dept: CARDIAC REHAB | Facility: HOSPITAL | Age: 69
End: 2019-11-18

## 2019-11-18 DIAGNOSIS — Z95.1 S/P CABG (CORONARY ARTERY BYPASS GRAFT): Primary | ICD-10-CM

## 2019-11-18 PROCEDURE — 93798 PHYS/QHP OP CAR RHAB W/ECG: CPT

## 2019-11-18 RX ORDER — ATORVASTATIN CALCIUM 40 MG/1
40 TABLET, FILM COATED ORAL NIGHTLY
Qty: 90 TABLET | Refills: 0 | Status: SHIPPED | OUTPATIENT
Start: 2019-11-18 | End: 2020-02-14

## 2019-11-19 ENCOUNTER — TREATMENT (OUTPATIENT)
Dept: CARDIAC REHAB | Facility: HOSPITAL | Age: 69
End: 2019-11-19

## 2019-11-19 DIAGNOSIS — Z95.1 S/P CABG (CORONARY ARTERY BYPASS GRAFT): Primary | ICD-10-CM

## 2019-11-19 PROCEDURE — 93798 PHYS/QHP OP CAR RHAB W/ECG: CPT

## 2019-11-20 ENCOUNTER — APPOINTMENT (OUTPATIENT)
Dept: CARDIAC REHAB | Facility: HOSPITAL | Age: 69
End: 2019-11-20

## 2019-11-22 ENCOUNTER — TREATMENT (OUTPATIENT)
Dept: CARDIAC REHAB | Facility: HOSPITAL | Age: 69
End: 2019-11-22

## 2019-11-22 DIAGNOSIS — Z95.1 S/P CABG (CORONARY ARTERY BYPASS GRAFT): Primary | ICD-10-CM

## 2019-11-22 PROCEDURE — 93798 PHYS/QHP OP CAR RHAB W/ECG: CPT

## 2019-11-25 ENCOUNTER — TREATMENT (OUTPATIENT)
Dept: CARDIAC REHAB | Facility: HOSPITAL | Age: 69
End: 2019-11-25

## 2019-11-25 DIAGNOSIS — Z95.1 S/P CABG (CORONARY ARTERY BYPASS GRAFT): Primary | ICD-10-CM

## 2019-11-25 PROCEDURE — 93798 PHYS/QHP OP CAR RHAB W/ECG: CPT

## 2019-11-26 ENCOUNTER — TREATMENT (OUTPATIENT)
Dept: CARDIAC REHAB | Facility: HOSPITAL | Age: 69
End: 2019-11-26

## 2019-11-26 DIAGNOSIS — Z95.1 S/P CABG (CORONARY ARTERY BYPASS GRAFT): Primary | ICD-10-CM

## 2019-11-26 PROCEDURE — 93798 PHYS/QHP OP CAR RHAB W/ECG: CPT

## 2019-11-27 ENCOUNTER — APPOINTMENT (OUTPATIENT)
Dept: CARDIAC REHAB | Facility: HOSPITAL | Age: 69
End: 2019-11-27

## 2019-11-29 ENCOUNTER — TREATMENT (OUTPATIENT)
Dept: CARDIAC REHAB | Facility: HOSPITAL | Age: 69
End: 2019-11-29

## 2019-11-29 DIAGNOSIS — Z95.1 S/P CABG (CORONARY ARTERY BYPASS GRAFT): Primary | ICD-10-CM

## 2019-11-29 PROCEDURE — 93798 PHYS/QHP OP CAR RHAB W/ECG: CPT

## 2019-12-02 ENCOUNTER — TREATMENT (OUTPATIENT)
Dept: CARDIAC REHAB | Facility: HOSPITAL | Age: 69
End: 2019-12-02

## 2019-12-02 DIAGNOSIS — Z95.1 S/P CABG (CORONARY ARTERY BYPASS GRAFT): Primary | ICD-10-CM

## 2019-12-02 PROCEDURE — 93798 PHYS/QHP OP CAR RHAB W/ECG: CPT

## 2019-12-03 RX ORDER — LISINOPRIL 40 MG/1
40 TABLET ORAL DAILY
Qty: 90 TABLET | Refills: 0 | Status: SHIPPED | OUTPATIENT
Start: 2019-12-03 | End: 2020-02-25

## 2019-12-04 ENCOUNTER — TREATMENT (OUTPATIENT)
Dept: CARDIAC REHAB | Facility: HOSPITAL | Age: 69
End: 2019-12-04

## 2019-12-04 DIAGNOSIS — Z95.1 S/P CABG (CORONARY ARTERY BYPASS GRAFT): Primary | ICD-10-CM

## 2019-12-04 PROCEDURE — 93798 PHYS/QHP OP CAR RHAB W/ECG: CPT

## 2019-12-06 ENCOUNTER — TREATMENT (OUTPATIENT)
Dept: CARDIAC REHAB | Facility: HOSPITAL | Age: 69
End: 2019-12-06

## 2019-12-06 DIAGNOSIS — Z95.1 S/P CABG (CORONARY ARTERY BYPASS GRAFT): Primary | ICD-10-CM

## 2019-12-06 PROCEDURE — 93798 PHYS/QHP OP CAR RHAB W/ECG: CPT

## 2019-12-09 ENCOUNTER — TREATMENT (OUTPATIENT)
Dept: CARDIAC REHAB | Facility: HOSPITAL | Age: 69
End: 2019-12-09

## 2019-12-09 DIAGNOSIS — Z95.1 S/P CABG (CORONARY ARTERY BYPASS GRAFT): Primary | ICD-10-CM

## 2019-12-09 PROCEDURE — 93798 PHYS/QHP OP CAR RHAB W/ECG: CPT

## 2019-12-13 ENCOUNTER — TREATMENT (OUTPATIENT)
Dept: CARDIAC REHAB | Facility: HOSPITAL | Age: 69
End: 2019-12-13

## 2019-12-13 DIAGNOSIS — Z95.1 S/P CABG (CORONARY ARTERY BYPASS GRAFT): Primary | ICD-10-CM

## 2019-12-13 PROCEDURE — 93798 PHYS/QHP OP CAR RHAB W/ECG: CPT

## 2019-12-16 ENCOUNTER — TREATMENT (OUTPATIENT)
Dept: CARDIAC REHAB | Facility: HOSPITAL | Age: 69
End: 2019-12-16

## 2019-12-16 DIAGNOSIS — Z95.1 S/P CABG (CORONARY ARTERY BYPASS GRAFT): Primary | ICD-10-CM

## 2019-12-16 PROCEDURE — 93798 PHYS/QHP OP CAR RHAB W/ECG: CPT

## 2019-12-18 ENCOUNTER — TREATMENT (OUTPATIENT)
Dept: CARDIAC REHAB | Facility: HOSPITAL | Age: 69
End: 2019-12-18

## 2019-12-18 DIAGNOSIS — Z95.1 S/P CABG (CORONARY ARTERY BYPASS GRAFT): Primary | ICD-10-CM

## 2019-12-18 PROCEDURE — 93798 PHYS/QHP OP CAR RHAB W/ECG: CPT

## 2019-12-18 RX ORDER — HYDRALAZINE HYDROCHLORIDE 25 MG/1
25 TABLET, FILM COATED ORAL 3 TIMES DAILY
Qty: 180 TABLET | Refills: 1 | Status: SHIPPED | OUTPATIENT
Start: 2019-12-18 | End: 2020-04-10

## 2019-12-20 ENCOUNTER — TREATMENT (OUTPATIENT)
Dept: CARDIAC REHAB | Facility: HOSPITAL | Age: 69
End: 2019-12-20

## 2019-12-20 DIAGNOSIS — Z95.1 S/P CABG (CORONARY ARTERY BYPASS GRAFT): Primary | ICD-10-CM

## 2019-12-20 PROCEDURE — 93798 PHYS/QHP OP CAR RHAB W/ECG: CPT

## 2019-12-23 ENCOUNTER — TREATMENT (OUTPATIENT)
Dept: CARDIAC REHAB | Facility: HOSPITAL | Age: 69
End: 2019-12-23

## 2019-12-23 DIAGNOSIS — Z95.1 S/P CABG (CORONARY ARTERY BYPASS GRAFT): Primary | ICD-10-CM

## 2019-12-23 PROCEDURE — 93798 PHYS/QHP OP CAR RHAB W/ECG: CPT

## 2019-12-23 RX ORDER — INSULIN DEGLUDEC 100 U/ML
32 INJECTION, SOLUTION SUBCUTANEOUS DAILY
Qty: 18 ML | Refills: 1 | Status: SHIPPED | OUTPATIENT
Start: 2019-12-23 | End: 2020-04-10

## 2019-12-26 ENCOUNTER — TREATMENT (OUTPATIENT)
Dept: CARDIAC REHAB | Facility: HOSPITAL | Age: 69
End: 2019-12-26

## 2019-12-26 DIAGNOSIS — Z95.1 S/P CABG (CORONARY ARTERY BYPASS GRAFT): Primary | ICD-10-CM

## 2019-12-26 PROCEDURE — 93798 PHYS/QHP OP CAR RHAB W/ECG: CPT

## 2019-12-30 ENCOUNTER — TREATMENT (OUTPATIENT)
Dept: CARDIAC REHAB | Facility: HOSPITAL | Age: 69
End: 2019-12-30

## 2019-12-30 DIAGNOSIS — Z95.1 S/P CABG (CORONARY ARTERY BYPASS GRAFT): Primary | ICD-10-CM

## 2019-12-30 PROCEDURE — 93798 PHYS/QHP OP CAR RHAB W/ECG: CPT

## 2020-01-10 RX ORDER — FUROSEMIDE 20 MG/1
20 TABLET ORAL DAILY
Qty: 90 TABLET | Refills: 0 | Status: SHIPPED | OUTPATIENT
Start: 2020-01-10 | End: 2020-04-06

## 2020-01-13 RX ORDER — AMLODIPINE BESYLATE 5 MG/1
5 TABLET ORAL DAILY
Qty: 90 TABLET | Refills: 0 | Status: SHIPPED | OUTPATIENT
Start: 2020-01-13 | End: 2020-04-21

## 2020-01-14 RX ORDER — GLIPIZIDE 5 MG/1
TABLET ORAL
Qty: 270 TABLET | Refills: 0 | Status: SHIPPED | OUTPATIENT
Start: 2020-01-14 | End: 2020-03-14

## 2020-02-14 RX ORDER — ATORVASTATIN CALCIUM 40 MG/1
TABLET, FILM COATED ORAL
Qty: 90 TABLET | Refills: 0 | Status: SHIPPED | OUTPATIENT
Start: 2020-02-14 | End: 2020-06-10

## 2020-02-18 ENCOUNTER — OFFICE VISIT (OUTPATIENT)
Dept: FAMILY MEDICINE CLINIC | Facility: CLINIC | Age: 70
End: 2020-02-18

## 2020-02-18 ENCOUNTER — TELEPHONE (OUTPATIENT)
Dept: FAMILY MEDICINE CLINIC | Facility: CLINIC | Age: 70
End: 2020-02-18

## 2020-02-18 VITALS
HEART RATE: 84 BPM | DIASTOLIC BLOOD PRESSURE: 81 MMHG | BODY MASS INDEX: 33.9 KG/M2 | SYSTOLIC BLOOD PRESSURE: 136 MMHG | WEIGHT: 216 LBS | OXYGEN SATURATION: 97 % | HEIGHT: 67 IN

## 2020-02-18 DIAGNOSIS — R25.1 TREMOR: ICD-10-CM

## 2020-02-18 DIAGNOSIS — E78.2 MIXED HYPERLIPIDEMIA: Primary | ICD-10-CM

## 2020-02-18 DIAGNOSIS — Z79.4 TYPE 2 DIABETES MELLITUS WITH HYPERGLYCEMIA, WITH LONG-TERM CURRENT USE OF INSULIN (HCC): ICD-10-CM

## 2020-02-18 DIAGNOSIS — I10 ESSENTIAL HYPERTENSION: ICD-10-CM

## 2020-02-18 DIAGNOSIS — E11.65 TYPE 2 DIABETES MELLITUS WITH HYPERGLYCEMIA, WITH LONG-TERM CURRENT USE OF INSULIN (HCC): ICD-10-CM

## 2020-02-18 LAB — HBA1C MFR BLD: 7.8 % (ref 3.5–5.6)

## 2020-02-18 PROCEDURE — 36415 COLL VENOUS BLD VENIPUNCTURE: CPT | Performed by: NURSE PRACTITIONER

## 2020-02-18 PROCEDURE — 99214 OFFICE O/P EST MOD 30 MIN: CPT | Performed by: NURSE PRACTITIONER

## 2020-02-18 PROCEDURE — 83036 HEMOGLOBIN GLYCOSYLATED A1C: CPT | Performed by: NURSE PRACTITIONER

## 2020-02-18 PROCEDURE — 80053 COMPREHEN METABOLIC PANEL: CPT | Performed by: NURSE PRACTITIONER

## 2020-02-18 PROCEDURE — 80061 LIPID PANEL: CPT | Performed by: NURSE PRACTITIONER

## 2020-02-18 PROCEDURE — 85027 COMPLETE CBC AUTOMATED: CPT | Performed by: NURSE PRACTITIONER

## 2020-02-18 PROCEDURE — 84443 ASSAY THYROID STIM HORMONE: CPT | Performed by: NURSE PRACTITIONER

## 2020-02-18 NOTE — PROGRESS NOTES
"  Shaka Trinidad is a 69 y.o. male.     Chief Complaint   Patient presents with   • Diabetes     4 month check up       Diabetes   He presents for his follow-up diabetic visit. He has type 2 diabetes mellitus. Hypoglycemia symptoms include tremors. Associated symptoms include fatigue. Pertinent negatives for diabetes include no blurred vision, no chest pain, no foot paresthesias, no foot ulcerations, no polydipsia, no polyphagia, no polyuria, no visual change, no weakness and no weight loss. There are no hypoglycemic complications. His breakfast blood glucose range is generally 180-200 mg/dl. His overall blood glucose range is 180-200 mg/dl.        Subjective     Visit Vitals  /81 (BP Location: Left arm, Patient Position: Sitting, Cuff Size: Adult)   Pulse 84   Ht 170.2 cm (67.01\")   Wt 98 kg (216 lb)   SpO2 97%   BMI 33.82 kg/m²       The following portions of the patient's history were reviewed and updated as appropriate: allergies, current medications, past family history, past medical history, past social history, past surgical history and problem list.    Review of Systems   Constitutional: Positive for fatigue. Negative for unexpected weight loss.   HENT: Negative.    Eyes: Negative for blurred vision.   Respiratory: Negative.  Negative for shortness of breath.    Cardiovascular: Negative for chest pain, palpitations and leg swelling.   Gastrointestinal: Negative.    Endocrine: Negative for polydipsia, polyphagia and polyuria.   Genitourinary: Negative.    Musculoskeletal: Negative.    Neurological: Positive for tremors. Negative for weakness.       Objective     Physical Exam   Constitutional: He is oriented to person, place, and time. He appears well-developed and well-nourished. No distress.   HENT:   Head: Normocephalic.   Eyes: Pupils are equal, round, and reactive to light. Conjunctivae are normal.   Neck: Normal range of motion. Neck supple. No JVD present. No thyromegaly present. "   Cardiovascular: Normal rate, regular rhythm and normal heart sounds.   No murmur heard.  Pulmonary/Chest: Effort normal and breath sounds normal. No respiratory distress.   Abdominal: Soft. Bowel sounds are normal. He exhibits no distension. There is no tenderness.   Musculoskeletal: Normal range of motion. He exhibits no edema or tenderness.   Neurological: He is alert and oriented to person, place, and time. No sensory deficit.   Skin: Skin is warm and dry. No rash noted. He is not diaphoretic. No erythema.   Psychiatric: He has a normal mood and affect. His behavior is normal. Judgment and thought content normal.   Nursing note and vitals reviewed.        Assessment/Plan   Shaka was seen today for diabetes.    Diagnoses and all orders for this visit:    Mixed hyperlipidemia  -     Lipid Panel    Essential hypertension  Comments:  taking hydral BID as previously ordered, bp stable, continue amlod, and lisinopril  Orders:  -     TSH  -     Comprehensive Metabolic Panel    Type 2 diabetes mellitus with hyperglycemia, with long-term current use of insulin (CMS/[facility])  Comments:  continue met, tresiba 28 u, glip. will review labs and notify pt results.   Orders:  -     CBC (No Diff)  -     Hemoglobin A1c    Tremor  Comments:  check labs, consider sinemet if s/s worsen.         rf meds for 6mo supply once labs reviewed  Plan to repeat labs 1 week prior to 6mo f/u           Glucose   Date Value Ref Range Status   08/29/2019 123 (H) 65 - 99 mg/dL Final     BUN   Date Value Ref Range Status   08/29/2019 22 (H) 8 - 20 mg/dL Final     Creatinine   Date Value Ref Range Status   08/29/2019 1.10 0.70 - 1.20 mg/dL Final     Sodium   Date Value Ref Range Status   08/29/2019 130 (L) 136 - 144 mmol/L Final     Potassium   Date Value Ref Range Status   08/29/2019 4.2 3.6 - 5.1 mmol/L Final     Chloride   Date Value Ref Range Status   08/29/2019 97 (L) 101 - 111 mmol/L Final     CO2   Date Value Ref Range Status   08/29/2019 22.0  22.0 - 32.0 mmol/L Final     Calcium   Date Value Ref Range Status   08/29/2019 8.9 8.9 - 10.3 mg/dL Final     Total Protein   Date Value Ref Range Status   08/29/2019 6.7 6.1 - 7.9 g/dL Final     Albumin   Date Value Ref Range Status   08/29/2019 3.50 3.50 - 4.80 g/dL Final     ALT (SGPT)   Date Value Ref Range Status   08/29/2019 14 (L) 17 - 63 U/L Final     AST (SGOT)   Date Value Ref Range Status   08/29/2019 18 15 - 41 U/L Final     Alkaline Phosphatase   Date Value Ref Range Status   08/29/2019 80 32 - 91 U/L Final     Total Bilirubin   Date Value Ref Range Status   08/29/2019 0.6 0.3 - 1.2 mg/dL Final     eGFR Non  Amer   Date Value Ref Range Status   08/29/2019 67 >60 mL/min/1.73 Final     A/G Ratio   Date Value Ref Range Status   03/21/2019 1.3 1.0 - 1.7 Final     BUN/Creatinine Ratio   Date Value Ref Range Status   08/29/2019 20.0 6.2 - 20.3 Final     Anion Gap   Date Value Ref Range Status   08/29/2019 15.2 (H) 5.0 - 15.0 mmol/L Final

## 2020-02-18 NOTE — TELEPHONE ENCOUNTER
Patient forgot to ask you to enter referral for refresher courses about diabetes at the [facility].

## 2020-02-19 DIAGNOSIS — E11.65 TYPE 2 DIABETES MELLITUS WITH HYPERGLYCEMIA, WITH LONG-TERM CURRENT USE OF INSULIN (HCC): Primary | ICD-10-CM

## 2020-02-19 DIAGNOSIS — Z79.4 TYPE 2 DIABETES MELLITUS WITH HYPERGLYCEMIA, WITH LONG-TERM CURRENT USE OF INSULIN (HCC): Primary | ICD-10-CM

## 2020-02-19 LAB
ALBUMIN SERPL-MCNC: 4.4 G/DL (ref 3.5–5.2)
ALBUMIN/GLOB SERPL: 1.4 G/DL
ALP SERPL-CCNC: 67 U/L (ref 39–117)
ALT SERPL W P-5'-P-CCNC: 19 U/L (ref 1–41)
ANION GAP SERPL CALCULATED.3IONS-SCNC: 18.4 MMOL/L (ref 5–15)
AST SERPL-CCNC: 25 U/L (ref 1–40)
BILIRUB SERPL-MCNC: 0.4 MG/DL (ref 0.2–1.2)
BUN BLD-MCNC: 24 MG/DL (ref 8–23)
BUN/CREAT SERPL: 19.4 (ref 7–25)
CALCIUM SPEC-SCNC: 10.1 MG/DL (ref 8.6–10.5)
CHLORIDE SERPL-SCNC: 99 MMOL/L (ref 98–107)
CHOLEST SERPL-MCNC: 94 MG/DL (ref 0–200)
CO2 SERPL-SCNC: 20.6 MMOL/L (ref 22–29)
CREAT BLD-MCNC: 1.24 MG/DL (ref 0.76–1.27)
DEPRECATED RDW RBC AUTO: 41.9 FL (ref 37–54)
ERYTHROCYTE [DISTWIDTH] IN BLOOD BY AUTOMATED COUNT: 13 % (ref 12.3–15.4)
GFR SERPL CREATININE-BSD FRML MDRD: 58 ML/MIN/1.73
GLOBULIN UR ELPH-MCNC: 3.1 GM/DL
GLUCOSE BLD-MCNC: 75 MG/DL (ref 65–99)
HCT VFR BLD AUTO: 38.8 % (ref 37.5–51)
HDLC SERPL-MCNC: 33 MG/DL (ref 40–60)
HGB BLD-MCNC: 12.7 G/DL (ref 13–17.7)
LDLC SERPL CALC-MCNC: 32 MG/DL (ref 0–100)
LDLC/HDLC SERPL: 0.96 {RATIO}
MCH RBC QN AUTO: 28.8 PG (ref 26.6–33)
MCHC RBC AUTO-ENTMCNC: 32.7 G/DL (ref 31.5–35.7)
MCV RBC AUTO: 88 FL (ref 79–97)
PLATELET # BLD AUTO: 268 10*3/MM3 (ref 140–450)
PMV BLD AUTO: 11.2 FL (ref 6–12)
POTASSIUM BLD-SCNC: 4.5 MMOL/L (ref 3.5–5.2)
PROT SERPL-MCNC: 7.5 G/DL (ref 6–8.5)
RBC # BLD AUTO: 4.41 10*6/MM3 (ref 4.14–5.8)
SODIUM BLD-SCNC: 138 MMOL/L (ref 136–145)
TRIGL SERPL-MCNC: 147 MG/DL (ref 0–150)
TSH SERPL DL<=0.05 MIU/L-ACNC: 3.75 UIU/ML (ref 0.27–4.2)
VLDLC SERPL-MCNC: 29.4 MG/DL (ref 5–40)
WBC NRBC COR # BLD: 9.66 10*3/MM3 (ref 3.4–10.8)

## 2020-02-25 RX ORDER — LISINOPRIL 40 MG/1
TABLET ORAL
Qty: 90 TABLET | Refills: 0 | Status: SHIPPED | OUTPATIENT
Start: 2020-02-25 | End: 2020-06-15

## 2020-03-12 ENCOUNTER — TELEPHONE (OUTPATIENT)
Dept: FAMILY MEDICINE CLINIC | Facility: CLINIC | Age: 70
End: 2020-03-12

## 2020-03-12 RX ORDER — ONDANSETRON 4 MG/1
4 TABLET, FILM COATED ORAL EVERY 8 HOURS PRN
Qty: 20 TABLET | Refills: 0 | Status: SHIPPED | OUTPATIENT
Start: 2020-03-12 | End: 2020-03-18

## 2020-03-12 NOTE — TELEPHONE ENCOUNTER
Pt's wife called in and said patient had vomiting and diarrhea  His stomach is hurting and he wasn't asking to com in but she thought maybe there is something you can call in for him. ? Tess mcqueen

## 2020-03-12 NOTE — TELEPHONE ENCOUNTER
I sent him some Zofran for the nausea, he should try a very bland diet,  bananas, rice, applesauce and toast, advance diet as tolerated once nausea and vomiting/diarrhea resolves, and to be sure to drink plenty of fluids.

## 2020-03-14 ENCOUNTER — APPOINTMENT (OUTPATIENT)
Dept: CT IMAGING | Facility: HOSPITAL | Age: 70
End: 2020-03-14

## 2020-03-14 ENCOUNTER — HOSPITAL ENCOUNTER (INPATIENT)
Facility: HOSPITAL | Age: 70
LOS: 3 days | Discharge: HOME | End: 2020-03-17
Attending: EMERGENCY MEDICINE | Admitting: INTERNAL MEDICINE

## 2020-03-14 DIAGNOSIS — R93.5 ABNORMAL CT OF THE ABDOMEN: ICD-10-CM

## 2020-03-14 DIAGNOSIS — K85.90 ACUTE PANCREATITIS, UNSPECIFIED COMPLICATION STATUS, UNSPECIFIED PANCREATITIS TYPE: Primary | ICD-10-CM

## 2020-03-14 LAB
ALBUMIN SERPL-MCNC: 3.9 G/DL (ref 3.5–5.2)
ALBUMIN/GLOB SERPL: 1.3 G/DL
ALP SERPL-CCNC: 62 U/L (ref 39–117)
ALT SERPL W P-5'-P-CCNC: 15 U/L (ref 1–41)
ANION GAP SERPL CALCULATED.3IONS-SCNC: 12 MMOL/L (ref 5–15)
ANION GAP SERPL CALCULATED.3IONS-SCNC: 13 MMOL/L (ref 5–15)
ARTERIAL PATENCY WRIST A: POSITIVE
AST SERPL-CCNC: 21 U/L (ref 1–40)
ATMOSPHERIC PRESS: ABNORMAL MM[HG]
BASE EXCESS BLDA CALC-SCNC: 3 MMOL/L (ref 0–3)
BASOPHILS # BLD AUTO: 0.1 10*3/MM3 (ref 0–0.2)
BASOPHILS NFR BLD AUTO: 0.5 % (ref 0–1.5)
BDY SITE: ABNORMAL
BILIRUB SERPL-MCNC: 1.5 MG/DL (ref 0.2–1.2)
BILIRUB UR QL STRIP: NEGATIVE
BUN BLD-MCNC: 16 MG/DL (ref 8–23)
BUN BLD-MCNC: 16 MG/DL (ref 8–23)
BUN/CREAT SERPL: 12.5 (ref 7–25)
BUN/CREAT SERPL: 14.2 (ref 7–25)
CALCIUM SPEC-SCNC: 8.5 MG/DL (ref 8.6–10.5)
CALCIUM SPEC-SCNC: 8.7 MG/DL (ref 8.6–10.5)
CHLORIDE SERPL-SCNC: 93 MMOL/L (ref 98–107)
CHLORIDE SERPL-SCNC: 95 MMOL/L (ref 98–107)
CLARITY UR: CLEAR
CO2 BLDA-SCNC: 29.2 MMOL/L (ref 22–29)
CO2 SERPL-SCNC: 29 MMOL/L (ref 22–29)
CO2 SERPL-SCNC: 30 MMOL/L (ref 22–29)
COLOR UR: YELLOW
CREAT BLD-MCNC: 1.13 MG/DL (ref 0.76–1.27)
CREAT BLD-MCNC: 1.28 MG/DL (ref 0.76–1.27)
D-LACTATE SERPL-SCNC: 2 MMOL/L (ref 0.5–2)
DEPRECATED RDW RBC AUTO: 41.6 FL (ref 37–54)
EOSINOPHIL # BLD AUTO: 0 10*3/MM3 (ref 0–0.4)
EOSINOPHIL NFR BLD AUTO: 0.1 % (ref 0.3–6.2)
ERYTHROCYTE [DISTWIDTH] IN BLOOD BY AUTOMATED COUNT: 13.5 % (ref 12.3–15.4)
FLUAV SUBTYP SPEC NAA+PROBE: NOT DETECTED
FLUBV RNA ISLT QL NAA+PROBE: NOT DETECTED
GFR SERPL CREATININE-BSD FRML MDRD: 56 ML/MIN/1.73
GFR SERPL CREATININE-BSD FRML MDRD: 65 ML/MIN/1.73
GLOBULIN UR ELPH-MCNC: 3.1 GM/DL
GLUCOSE BLD-MCNC: 186 MG/DL (ref 65–99)
GLUCOSE BLD-MCNC: 97 MG/DL (ref 65–99)
GLUCOSE BLDC GLUCOMTR-MCNC: 32 MG/DL (ref 70–105)
GLUCOSE BLDC GLUCOMTR-MCNC: 81 MG/DL (ref 70–105)
GLUCOSE UR STRIP-MCNC: NEGATIVE MG/DL
HCO3 BLDA-SCNC: 27.9 MMOL/L (ref 21–28)
HCT VFR BLD AUTO: 35.6 % (ref 37.5–51)
HEMODILUTION: NO
HGB BLD-MCNC: 12 G/DL (ref 13–17.7)
HGB UR QL STRIP.AUTO: NEGATIVE
HOLD SPECIMEN: NORMAL
HOROWITZ INDEX BLD+IHG-RTO: 32 %
KETONES UR QL STRIP: NEGATIVE
LEUKOCYTE ESTERASE UR QL STRIP.AUTO: NEGATIVE
LIPASE SERPL-CCNC: 39 U/L (ref 13–60)
LYMPHOCYTES # BLD AUTO: 1.6 10*3/MM3 (ref 0.7–3.1)
LYMPHOCYTES NFR BLD AUTO: 10.4 % (ref 19.6–45.3)
MCH RBC QN AUTO: 29.4 PG (ref 26.6–33)
MCHC RBC AUTO-ENTMCNC: 33.7 G/DL (ref 31.5–35.7)
MCV RBC AUTO: 87.2 FL (ref 79–97)
MODALITY: ABNORMAL
MONOCYTES # BLD AUTO: 1.5 10*3/MM3 (ref 0.1–0.9)
MONOCYTES NFR BLD AUTO: 9.5 % (ref 5–12)
NEUTROPHILS # BLD AUTO: 12.1 10*3/MM3 (ref 1.7–7)
NEUTROPHILS NFR BLD AUTO: 79.5 % (ref 42.7–76)
NITRITE UR QL STRIP: NEGATIVE
NRBC BLD AUTO-RTO: 0.1 /100 WBC (ref 0–0.2)
PCO2 BLDA: 42.9 MM HG (ref 35–48)
PH BLDA: 7.42 PH UNITS (ref 7.35–7.45)
PH UR STRIP.AUTO: 6 [PH] (ref 5–8)
PLATELET # BLD AUTO: 237 10*3/MM3 (ref 140–450)
PMV BLD AUTO: 8 FL (ref 6–12)
PO2 BLDA: 77.4 MM HG (ref 83–108)
POTASSIUM BLD-SCNC: 4.2 MMOL/L (ref 3.5–5.2)
POTASSIUM BLD-SCNC: 4.3 MMOL/L (ref 3.5–5.2)
PROT SERPL-MCNC: 7 G/DL (ref 6–8.5)
PROT UR QL STRIP: NEGATIVE
RBC # BLD AUTO: 4.08 10*6/MM3 (ref 4.14–5.8)
RESPIRATORY RATE: 20
SAO2 % BLDCOA: 95.5 % (ref 94–98)
SODIUM BLD-SCNC: 135 MMOL/L (ref 136–145)
SODIUM BLD-SCNC: 137 MMOL/L (ref 136–145)
SP GR UR STRIP: 1.01 (ref 1–1.03)
UROBILINOGEN UR QL STRIP: NORMAL
WBC NRBC COR # BLD: 15.2 10*3/MM3 (ref 3.4–10.8)
WHOLE BLOOD HOLD SPECIMEN: NORMAL
WHOLE BLOOD HOLD SPECIMEN: NORMAL

## 2020-03-14 PROCEDURE — 82962 GLUCOSE BLOOD TEST: CPT

## 2020-03-14 PROCEDURE — 83605 ASSAY OF LACTIC ACID: CPT

## 2020-03-14 PROCEDURE — 82803 BLOOD GASES ANY COMBINATION: CPT

## 2020-03-14 PROCEDURE — 25010000002 ONDANSETRON PER 1 MG: Performed by: EMERGENCY MEDICINE

## 2020-03-14 PROCEDURE — 86038 ANTINUCLEAR ANTIBODIES: CPT | Performed by: NURSE PRACTITIONER

## 2020-03-14 PROCEDURE — 36600 WITHDRAWAL OF ARTERIAL BLOOD: CPT

## 2020-03-14 PROCEDURE — 83690 ASSAY OF LIPASE: CPT | Performed by: EMERGENCY MEDICINE

## 2020-03-14 PROCEDURE — 80053 COMPREHEN METABOLIC PANEL: CPT | Performed by: EMERGENCY MEDICINE

## 2020-03-14 PROCEDURE — 25010000002 KETOROLAC TROMETHAMINE PER 15 MG: Performed by: NURSE PRACTITIONER

## 2020-03-14 PROCEDURE — 25010000002 MORPHINE PER 10 MG: Performed by: EMERGENCY MEDICINE

## 2020-03-14 PROCEDURE — 99223 1ST HOSP IP/OBS HIGH 75: CPT | Performed by: HOSPITALIST

## 2020-03-14 PROCEDURE — 85025 COMPLETE CBC W/AUTO DIFF WBC: CPT | Performed by: EMERGENCY MEDICINE

## 2020-03-14 PROCEDURE — 87502 INFLUENZA DNA AMP PROBE: CPT | Performed by: EMERGENCY MEDICINE

## 2020-03-14 PROCEDURE — 0 IOPAMIDOL PER 1 ML: Performed by: EMERGENCY MEDICINE

## 2020-03-14 PROCEDURE — 82378 CARCINOEMBRYONIC ANTIGEN: CPT | Performed by: NURSE PRACTITIONER

## 2020-03-14 PROCEDURE — 81003 URINALYSIS AUTO W/O SCOPE: CPT | Performed by: EMERGENCY MEDICINE

## 2020-03-14 PROCEDURE — 25010000002 CEFEPIME PER 500 MG: Performed by: EMERGENCY MEDICINE

## 2020-03-14 PROCEDURE — 74177 CT ABD & PELVIS W/CONTRAST: CPT

## 2020-03-14 PROCEDURE — 99284 EMERGENCY DEPT VISIT MOD MDM: CPT

## 2020-03-14 RX ORDER — DEXTROSE MONOHYDRATE 25 G/50ML
50 INJECTION, SOLUTION INTRAVENOUS ONCE
Status: COMPLETED | OUTPATIENT
Start: 2020-03-15 | End: 2020-03-15

## 2020-03-14 RX ORDER — PANTOPRAZOLE SODIUM 40 MG/10ML
40 INJECTION, POWDER, LYOPHILIZED, FOR SOLUTION INTRAVENOUS
Status: DISCONTINUED | OUTPATIENT
Start: 2020-03-14 | End: 2020-03-15

## 2020-03-14 RX ORDER — HYDRALAZINE HYDROCHLORIDE 25 MG/1
25 TABLET, FILM COATED ORAL 3 TIMES DAILY
Status: DISCONTINUED | OUTPATIENT
Start: 2020-03-14 | End: 2020-03-17 | Stop reason: HOSPADM

## 2020-03-14 RX ORDER — SODIUM CHLORIDE 0.9 % (FLUSH) 0.9 %
10 SYRINGE (ML) INJECTION AS NEEDED
Status: DISCONTINUED | OUTPATIENT
Start: 2020-03-14 | End: 2020-03-17 | Stop reason: HOSPADM

## 2020-03-14 RX ORDER — LISINOPRIL 20 MG/1
40 TABLET ORAL DAILY
Status: DISCONTINUED | OUTPATIENT
Start: 2020-03-15 | End: 2020-03-17 | Stop reason: HOSPADM

## 2020-03-14 RX ORDER — GLIPIZIDE 5 MG/1
5 TABLET ORAL EVERY EVENING
COMMUNITY
End: 2020-07-29

## 2020-03-14 RX ORDER — IRON,CARBONYL/ASCORBIC ACID 100-250 MG
1 TABLET ORAL 2 TIMES DAILY
COMMUNITY
End: 2021-01-05

## 2020-03-14 RX ORDER — DEXTROSE AND SODIUM CHLORIDE 5; .45 G/100ML; G/100ML
75 INJECTION, SOLUTION INTRAVENOUS CONTINUOUS
Status: DISCONTINUED | OUTPATIENT
Start: 2020-03-15 | End: 2020-03-17 | Stop reason: HOSPADM

## 2020-03-14 RX ORDER — ACETAMINOPHEN 500 MG
1 TABLET ORAL DAILY
COMMUNITY
End: 2020-07-29

## 2020-03-14 RX ORDER — ASCORBIC ACID 500 MG
1000 TABLET ORAL EVERY EVENING
COMMUNITY

## 2020-03-14 RX ORDER — GLIPIZIDE 5 MG/1
10 TABLET ORAL EVERY MORNING
COMMUNITY
End: 2020-04-07

## 2020-03-14 RX ORDER — SODIUM CHLORIDE 0.9 % (FLUSH) 0.9 %
10 SYRINGE (ML) INJECTION EVERY 12 HOURS SCHEDULED
Status: DISCONTINUED | OUTPATIENT
Start: 2020-03-14 | End: 2020-03-17 | Stop reason: HOSPADM

## 2020-03-14 RX ORDER — SODIUM CHLORIDE 9 MG/ML
500 INJECTION, SOLUTION INTRAVENOUS CONTINUOUS
Status: DISCONTINUED | OUTPATIENT
Start: 2020-03-14 | End: 2020-03-14

## 2020-03-14 RX ORDER — MORPHINE SULFATE 4 MG/ML
4 INJECTION, SOLUTION INTRAMUSCULAR; INTRAVENOUS ONCE
Status: COMPLETED | OUTPATIENT
Start: 2020-03-14 | End: 2020-03-14

## 2020-03-14 RX ORDER — ONDANSETRON 2 MG/ML
4 INJECTION INTRAMUSCULAR; INTRAVENOUS ONCE
Status: COMPLETED | OUTPATIENT
Start: 2020-03-14 | End: 2020-03-14

## 2020-03-14 RX ORDER — AMLODIPINE BESYLATE 5 MG/1
5 TABLET ORAL DAILY
Status: DISCONTINUED | OUTPATIENT
Start: 2020-03-15 | End: 2020-03-17 | Stop reason: HOSPADM

## 2020-03-14 RX ORDER — SODIUM CHLORIDE, SODIUM LACTATE, POTASSIUM CHLORIDE, CALCIUM CHLORIDE 600; 310; 30; 20 MG/100ML; MG/100ML; MG/100ML; MG/100ML
150 INJECTION, SOLUTION INTRAVENOUS CONTINUOUS
Status: DISCONTINUED | OUTPATIENT
Start: 2020-03-14 | End: 2020-03-17 | Stop reason: HOSPADM

## 2020-03-14 RX ORDER — KETOROLAC TROMETHAMINE 15 MG/ML
15 INJECTION, SOLUTION INTRAMUSCULAR; INTRAVENOUS ONCE
Status: COMPLETED | OUTPATIENT
Start: 2020-03-14 | End: 2020-03-14

## 2020-03-14 RX ORDER — ATORVASTATIN CALCIUM 40 MG/1
40 TABLET, FILM COATED ORAL NIGHTLY
Status: DISCONTINUED | OUTPATIENT
Start: 2020-03-14 | End: 2020-03-17 | Stop reason: HOSPADM

## 2020-03-14 RX ORDER — MORPHINE SULFATE 4 MG/ML
2 INJECTION, SOLUTION INTRAMUSCULAR; INTRAVENOUS EVERY 4 HOURS PRN
Status: DISCONTINUED | OUTPATIENT
Start: 2020-03-14 | End: 2020-03-17 | Stop reason: HOSPADM

## 2020-03-14 RX ORDER — INSULIN GLARGINE 100 [IU]/ML
15 INJECTION, SOLUTION SUBCUTANEOUS EVERY MORNING
Status: DISCONTINUED | OUTPATIENT
Start: 2020-03-15 | End: 2020-03-17 | Stop reason: HOSPADM

## 2020-03-14 RX ADMIN — MORPHINE SULFATE 4 MG: 4 INJECTION INTRAVENOUS at 15:26

## 2020-03-14 RX ADMIN — ONDANSETRON 4 MG: 2 INJECTION INTRAMUSCULAR; INTRAVENOUS at 15:25

## 2020-03-14 RX ADMIN — PANTOPRAZOLE SODIUM 40 MG: 40 INJECTION, POWDER, FOR SOLUTION INTRAVENOUS at 19:44

## 2020-03-14 RX ADMIN — CEFEPIME HYDROCHLORIDE 2 G: 2 INJECTION, POWDER, FOR SOLUTION INTRAVENOUS at 16:56

## 2020-03-14 RX ADMIN — KETOROLAC TROMETHAMINE 15 MG: 15 INJECTION, SOLUTION INTRAMUSCULAR; INTRAVENOUS at 22:05

## 2020-03-14 RX ADMIN — IOPAMIDOL 100 ML: 755 INJECTION, SOLUTION INTRAVENOUS at 16:04

## 2020-03-14 RX ADMIN — SODIUM CHLORIDE, SODIUM LACTATE, POTASSIUM CHLORIDE, AND CALCIUM CHLORIDE 150 ML/HR: 600; 310; 30; 20 INJECTION, SOLUTION INTRAVENOUS at 21:02

## 2020-03-14 RX ADMIN — SODIUM CHLORIDE 500 ML/HR: 0.9 INJECTION, SOLUTION INTRAVENOUS at 15:26

## 2020-03-14 RX ADMIN — Medication 10 ML: at 20:53

## 2020-03-14 NOTE — ED PROVIDER NOTES
Subjective   Chief complaint: Patient presents with abdominal pain its mid to upper abdomen.  This been constant since Wednesday afternoon.  Waxes and wanes.    Context: He had symptoms of food poisoning he thought from some lunch meat on Wednesday morning.  He had multiple bouts of vomiting and diarrhea that lasted about 3-1/2 4 hours.  He has not had any since.  However since then he has had progressive abdominal pain.  Has not had a bowel movement since.  Nausea and pain.    Duration: Ongoing for 4 days.    Timin days.  Does increase and decrease in severity.    Severity: 10 out of 10.    Associated Symptoms:        PCP:  LMP:          Review of Systems   Constitutional: Negative.    HENT: Negative.    Eyes: Negative.    Respiratory: Negative.    Cardiovascular: Negative.    Gastrointestinal: Positive for abdominal pain, constipation, diarrhea, nausea and vomiting.   Genitourinary: Negative.    Musculoskeletal: Negative.    Skin: Negative.    Neurological: Negative.    Psychiatric/Behavioral: Negative.    All other systems reviewed and are negative.      Past Medical History:   Diagnosis Date   • Coronary artery disease    • Diabetes mellitus, type II (CMS/HCC)    • Elevated cholesterol    • H/O cataract     cataracts    • Hyperlipidemia    • Hypertension    • Peripheral edema    • Peripheral edema    • Sleep apnea     oral appliance       No Known Allergies    Past Surgical History:   Procedure Laterality Date   • CARDIAC CATHETERIZATION N/A 2019    Procedure: Left Heart Cath with angiogram;  Surgeon: Bautista Lopez MD;  Location: [facility] INVASIVE LOCATION;  Service: Cardiovascular   • CARDIAC CATHETERIZATION N/A 2019    Procedure: Coronary angiography;  Surgeon: Bautista Lopez MD;  Location: [facility] INVASIVE LOCATION;  Service: Cardiovascular   • CARDIAC CATHETERIZATION N/A 2019    Procedure: Left ventriculography;  Surgeon: Bautista Lopez MD;  Location: [facility] INVASIVE LOCATION;   Service: Cardiovascular   • COLONOSCOPY     • CORONARY ARTERY BYPASS GRAFT N/A 8/8/2019    Procedure: CORONARY ARTERY BYPASS GRAFTING;  Surgeon: Chet Mcarthur MD;  Location[facility];  Service: Cardiothoracic   • HERNIA REPAIR     • OTHER SURGICAL HISTORY  12/2015    2d echo-abstracted cent.        Family History   Problem Relation Age of Onset   • Heart failure Mother    • Hypertension Brother    • Cancer Brother        Social History     Socioeconomic History   • Marital status:      Spouse name: Not on file   • Number of children: Not on file   • Years of education: Not on file   • Highest education level: Not on file   Tobacco Use   • Smoking status: Never Smoker   • Smokeless tobacco: Never Used   Substance and Sexual Activity   • Alcohol use: No     Frequency: Never   • Drug use: No   • Sexual activity: Defer           Objective   Physical Exam   Constitutional: He is oriented to person, place, and time. He appears well-developed and well-nourished.   HENT:   Head: Normocephalic.   Eyes: Pupils are equal, round, and reactive to light.   Cardiovascular: Normal rate and regular rhythm.   Pulmonary/Chest: Effort normal and breath sounds normal.   Abdominal: Bowel sounds are decreased. There is generalized tenderness.   Neurological: He is alert and oriented to person, place, and time.   Skin: Skin is warm and dry.   Psychiatric: He has a normal mood and affect. His behavior is normal.       Procedures           ED Course      Results for orders placed or performed during the hospital encounter of 03/14/20   Influenza Antigen, Rapid - Swab, Nasopharynx   Result Value Ref Range    Influenza A PCR Not Detected Not Detected    Influenza B PCR Not Detected Not Detected   Comprehensive Metabolic Panel   Result Value Ref Range    Glucose 186 (H) 65 - 99 mg/dL    BUN 16 8 - 23 mg/dL    Creatinine 1.28 (H) 0.76 - 1.27 mg/dL    Sodium 135 (L) 136 - 145 mmol/L    Potassium 4.3 3.5 - 5.2 mmol/L    Chloride 93  (L) 98 - 107 mmol/L    CO2 30.0 (H) 22.0 - 29.0 mmol/L    Calcium 8.5 (L) 8.6 - 10.5 mg/dL    Total Protein 7.0 6.0 - 8.5 g/dL    Albumin 3.90 3.50 - 5.20 g/dL    ALT (SGPT) 15 1 - 41 U/L    AST (SGOT) 21 1 - 40 U/L    Alkaline Phosphatase 62 39 - 117 U/L    Total Bilirubin 1.5 (H) 0.2 - 1.2 mg/dL    eGFR Non African Amer 56 (L) >60 mL/min/1.73    Globulin 3.1 gm/dL    A/G Ratio 1.3 g/dL    BUN/Creatinine Ratio 12.5 7.0 - 25.0    Anion Gap 12.0 5.0 - 15.0 mmol/L   Lipase   Result Value Ref Range    Lipase 39 13 - 60 U/L   Urinalysis With Microscopic If Indicated (No Culture) - Urine, Clean Catch   Result Value Ref Range    Color, UA Yellow Yellow, Straw    Appearance, UA Clear Clear    pH, UA 6.0 5.0 - 8.0    Specific Gravity, UA 1.013 1.005 - 1.030    Glucose, UA Negative Negative    Ketones, UA Negative Negative    Bilirubin, UA Negative Negative    Blood, UA Negative Negative    Protein, UA Negative Negative    Leuk Esterase, UA Negative Negative    Nitrite, UA Negative Negative    Urobilinogen, UA 1.0 E.U./dL 0.2 - 1.0 E.U./dL   CBC Auto Differential   Result Value Ref Range    WBC 15.20 (H) 3.40 - 10.80 10*3/mm3    RBC 4.08 (L) 4.14 - 5.80 10*6/mm3    Hemoglobin 12.0 (L) 13.0 - 17.7 g/dL    Hematocrit 35.6 (L) 37.5 - 51.0 %    MCV 87.2 79.0 - 97.0 fL    MCH 29.4 26.6 - 33.0 pg    MCHC 33.7 31.5 - 35.7 g/dL    RDW 13.5 12.3 - 15.4 %    RDW-SD 41.6 37.0 - 54.0 fl    MPV 8.0 6.0 - 12.0 fL    Platelets 237 140 - 450 10*3/mm3    Neutrophil % 79.5 (H) 42.7 - 76.0 %    Lymphocyte % 10.4 (L) 19.6 - 45.3 %    Monocyte % 9.5 5.0 - 12.0 %    Eosinophil % 0.1 (L) 0.3 - 6.2 %    Basophil % 0.5 0.0 - 1.5 %    Neutrophils, Absolute 12.10 (H) 1.70 - 7.00 10*3/mm3    Lymphocytes, Absolute 1.60 0.70 - 3.10 10*3/mm3    Monocytes, Absolute 1.50 (H) 0.10 - 0.90 10*3/mm3    Eosinophils, Absolute 0.00 0.00 - 0.40 10*3/mm3    Basophils, Absolute 0.10 0.00 - 0.20 10*3/mm3    nRBC 0.1 0.0 - 0.2 /100 WBC   POC Lactate   Result Value Ref  Range    Lactate 2.0 0.5 - 2.0 mmol/L   Light Blue Top   Result Value Ref Range    Extra Tube hold for add-on    Lavender Top   Result Value Ref Range    Extra Tube hold for add-on    Gold Top - SST   Result Value Ref Range    Extra Tube Hold for add-ons.                                           MDM  Number of Diagnoses or Management Options  Acute pancreatitis, unspecified complication status, unspecified pancreatitis type:   Diagnosis management comments: With inflammatory stranding around the pancreas versus occult gastric ulcer I feel that the patient with his elevated white count and tenderness needs further admission and and work-up in the hospital.  I spoke with the hospitalist who is okay with this finding.  Patient was put on antibiotics and given fluid hydration here in the emergency department.       Amount and/or Complexity of Data Reviewed  Clinical lab tests: reviewed  Tests in the radiology section of CPT®: reviewed  Tests in the medicine section of CPT®: reviewed  Discuss the patient with other providers: yes    Patient Progress  Patient progress: stable      Final diagnoses:   None       pancreatitis     Mikel Hill,   03/14/20 [id number]

## 2020-03-14 NOTE — H&P
[facility] Medicine Services      Patient Name: Shaka Trinidad  : 1950  MRN: [id number]  Primary Care Physician: Karly Marmolejo APRN  Date of admission: 3/14/2020    Patient Care Team:  Karly Marmolejo APRN as PCP - General (Nurse Practitioner)  Tangela Hoover APRN as PCP - Family Medicine  Tangela Hoover APRN as PCP - Claims Attributed          Subjective   History Present Illness     Chief Complaint:   Chief Complaint   Patient presents with   • Abdominal Pain     HPI.    Mr. Trinidad is a 69 y.o.  presents to [facility] complaint of upper abdominal pain for last few days associated with feeling nausea, not improving at all. Patient underwent ct scan abdomen revealing mild stranding around the pancreas area. Pt pancreatitis enzymes were noted within normal limit, following this asked to admit the patient for further care. Patient denies for any chest pain, no nausea or vomiting.          History of Present Illness    Review of Systems   Gastrointestinal: Positive for abdominal pain and nausea.   All other systems reviewed and are negative.          Personal History     Past Medical History:   Past Medical History:   Diagnosis Date   • Coronary artery disease    • Diabetes mellitus, type II (CMS/HCC)    • Elevated cholesterol    • H/O cataract     cataracts    • Hyperlipidemia    • Hypertension    • Peripheral edema    • Peripheral edema    • Sleep apnea     oral appliance       Surgical History:      Past Surgical History:   Procedure Laterality Date   • CARDIAC CATHETERIZATION N/A 2019    Procedure: Left Heart Cath with angiogram;  Surgeon: Bautista Lopez MD;  Location: [facility] CATH INVASIVE LOCATION;  Service: Cardiovascular   • CARDIAC CATHETERIZATION N/A 2019    Procedure: Coronary angiography;  Surgeon: Bautista Lopez MD;  Location: [facility] CATH INVASIVE LOCATION;  Service: Cardiovascular   • CARDIAC CATHETERIZATION N/A 2019    Procedure: Left  ventriculography;  Surgeon: Bautista Lopez MD;  Location: [facility] CATH INVASIVE LOCATION;  Service: Cardiovascular   • COLONOSCOPY     • CORONARY ARTERY BYPASS GRAFT N/A 8/8/2019    Procedure: CORONARY ARTERY BYPASS GRAFTING;  Surgeon: Chet Mcarthur MD;  Location: [facility] CVOR;  Service: Cardiothoracic   • HERNIA REPAIR     • OTHER SURGICAL HISTORY  12/2015    2d echo-abstracted cent.            Family History: family history includes Cancer in his brother; Heart failure in his mother; Hypertension in his brother. Otherwise pertinent FHx was reviewed and unremarkable.     Social History:  reports that he has never smoked. He has never used smokeless tobacco. He reports that he does not drink alcohol or use drugs.      Medications:  Prior to Admission medications    Medication Sig Start Date End Date Taking? Authorizing Provider   amLODIPine (NORVASC) 5 MG tablet Take 1 tablet by mouth Daily. 1/13/20  Yes Karly Marmolejo APRN   aspirin 81 MG chewable tablet Chew 81 mg Every Night.   Yes Jono Sifuentes MD   atorvastatin (LIPITOR) 40 MG tablet TAKE 1 TABLET BY MOUTH EVERY DAY AT NIGHT 2/14/20  Yes Karly Marmolejo APRN   Calcium Carbonate-Vit D-Min (CALCIUM 1200) 6920-6348 MG-UNIT chewable tablet Chew 1 tablet Daily.   Yes ProviderJono MD   cetirizine (zyrTEC) 10 MG tablet Take 10 mg by mouth Daily. 8/20/19  Yes Jono Sifuentes MD   Cinnamon 500 MG tablet Take 1,000 mg by mouth Every Evening.   Yes Jono Sifuentes MD   furosemide (LASIX) 20 MG tablet Take 1 tablet by mouth Daily. 1/10/20  Yes Karly Marmolejo APRN   glipizide (GLUCOTROL) 5 MG tablet Take 10 mg by mouth Every Morning.   Yes Jono Sifuentes MD   glipizide (GLUCOTROL) 5 MG tablet Take 5 mg by mouth Every Evening.   Yes Jono Sifuentes MD   hydrALAZINE (APRESOLINE) 25 MG tablet Take 1 tablet by mouth 3 (Three) Times a Day.  Patient taking differently: Take 25 mg by mouth 2 (Two) Times a Day. 12/18/19  Yes Jaleel  ERICA Varma   Insulin Degludec (TRESIBA) 100 UNIT/ML solution injection Inject 32 Units under the skin into the appropriate area as directed Daily.  Patient taking differently: Inject 35 Units under the skin into the appropriate area as directed Daily. 12/23/19  Yes Karly Marmolejo APRN   Iron-Vitamin C 100-250 MG tablet Take 1 tablet by mouth 2 (Two) Times a Day.   Yes Jono Sifuentes MD   lisinopril (PRINIVIL,ZESTRIL) 40 MG tablet TAKE 1 TABLET BY MOUTH EVERY DAY 2/25/20  Yes Karly Marmolejo APRN   metFORMIN (GLUCOPHAGE) 1000 MG tablet Take 1 tablet by mouth 2 (Two) Times a Day With Meals. 9/9/19  Yes Tangela Hoover APRN   metoprolol tartrate (LOPRESSOR) 25 MG tablet Take 1 tablet by mouth Every 12 (Twelve) Hours. 1/3/20  Yes Karly Marmolejo APRN   ondansetron (ZOFRAN) 4 MG tablet Take 1 tablet by mouth Every 8 (Eight) Hours As Needed for Nausea or Vomiting. 3/12/20  Yes Karly Marmolejo APRN   polyethylene glycol (MIRALAX) packet Take 17 g by mouth 2 (Two) Times a Day.  Patient taking differently: Take 17 g by mouth 2 (Two) Times a Day As Needed (for constipation). 8/13/19  Yes Darling Tanner APRN   potassium chloride (MICRO-K) 10 MEQ CR capsule Take 1 capsule by mouth Daily. 9/25/19  Yes Tangela Hoover APRN   vitamin C (ASCORBIC ACID) 500 MG tablet Take 1,000 mg by mouth Every Evening.   Yes Jono Sifuentes MD   Calcium-Magnesium-Vitamin D (CALCIUM 500 PO) Take 600 mg by mouth Daily.  3/14/20 Yes Jono Sifuentes MD   esomeprazole (nexIUM) 20 MG capsule Take 20 mg by mouth Every Morning Before Breakfast.  3/14/20 Yes Jono Sifuentes MD   Iron Combinations (IRON COMPLEX PO) Take 1 tablet by mouth 2 (Two) Times a Day.  3/14/20 Yes Jono Sifuentes MD B-D UF III MINI PEN NEEDLES 31G X 5 MM misc USE ONCE DAILY 11/8/19 3/14/20  Karly Marmolejo APRN   glipizide (GLUCOTROL) 5 MG tablet TAKE 2 TABLETS BY MOUTH EVERY MORNING AND TAKE 1 TABLET BY MOUTH EVERY EVENING 1/14/20 3/14/20   Karly Marmolejo APRN   insulin lispro (humaLOG) 100 UNIT/ML injection Inject 0-24 Units under the skin into the appropriate area as directed 4 (Four) Times a Day With Meals & at Bedtime. 8/13/19 3/14/20  Hayley-Darling Arreola APRN   ONE TOUCH ULTRA TEST test strip CHECK READINGS DAILY ** [id number] 2/1/20 3/14/20  Provider, Jono, MD       Allergies:  No Known Allergies    Objective   Objective     Vital Signs  Temp:  [99.3 °F (37.4 °C)] 99.3 °F (37.4 °C)  Heart Rate:  [94] 94  Resp:  [18] 18  BP: (147)/(77) 147/77  SpO2:  [94 %] 94 %  on   ;      Body mass index is 33.49 kg/m².    Physical Exam   Constitutional: He is oriented to person, place, and time. He appears well-developed and well-nourished. No distress.   HENT:   Head: Normocephalic and atraumatic.   Right Ear: External ear normal.   Left Ear: External ear normal.   Nose: Nose normal.   Mouth/Throat: Oropharynx is clear and moist. No oropharyngeal exudate.   Eyes: Pupils are equal, round, and reactive to light. Conjunctivae and EOM are normal. Right eye exhibits no discharge. Left eye exhibits no discharge. No scleral icterus.   Neck: Normal range of motion. No JVD present. No tracheal deviation present. No thyromegaly present.   Cardiovascular: Normal rate, regular rhythm, normal heart sounds and intact distal pulses. Exam reveals no gallop and no friction rub.   No murmur heard.  Pulmonary/Chest: Effort normal and breath sounds normal. No stridor. No respiratory distress. He has no wheezes. He has no rales. He exhibits no tenderness.   Abdominal: Soft. Bowel sounds are normal. He exhibits no distension and no mass. There is no tenderness. There is no rebound and no guarding. No hernia.   Musculoskeletal: Normal range of motion. He exhibits no edema, tenderness or deformity.   Lymphadenopathy:     He has no cervical adenopathy.   Neurological: He is alert and oriented to person, place, and time. No cranial nerve deficit or sensory deficit. He  exhibits normal muscle tone. Coordination normal.   Skin: Skin is warm and dry. No rash noted. He is not diaphoretic. No erythema.   Psychiatric: He has a normal mood and affect. His behavior is normal.   Nursing note and vitals reviewed.      Results Review:  I have personally reviewed most recent lab results and agree with findings, most notably: .    Results from last 7 days   Lab Units 03/14/20  1504   WBC 10*3/mm3 15.20*   HEMOGLOBIN g/dL 12.0*   HEMATOCRIT % 35.6*   PLATELETS 10*3/mm3 237     Results from last 7 days   Lab Units 03/14/20  1522 03/14/20  1504   SODIUM mmol/L  --  135*   POTASSIUM mmol/L  --  4.3   CHLORIDE mmol/L  --  93*   CO2 mmol/L  --  30.0*   BUN mg/dL  --  16   CREATININE mg/dL  --  1.28*   GLUCOSE mg/dL  --  186*   CALCIUM mg/dL  --  8.5*   ALT (SGPT) U/L  --  15   AST (SGOT) U/L  --  21   LACTATE mmol/L 2.0  --      Estimated Creatinine Clearance: 61.3 mL/min (A) (by C-G formula based on SCr of 1.28 mg/dL (H)).  Brief Urine Lab Results  (Last result in the past 365 days)      Color   Clarity   Blood   Leuk Est   Nitrite   Protein   CREAT   Urine HCG        03/14/20 1543 Yellow Clear Negative Negative Negative Negative               Microbiology Results (last 10 days)     Procedure Component Value - Date/Time    Influenza Antigen, Rapid - Swab, Nasopharynx [[id number]]  (Normal) Collected:  03/14/20 1504    Lab Status:  Final result Specimen:  Swab from Nasopharynx Updated:  03/14/20 1524     Influenza A PCR Not Detected     Influenza B PCR Not Detected          ECG/EMG Results (most recent)     None          Results for orders placed during the hospital encounter of 08/25/19   Duplex Venous Lower Extremity - Right CAR    Narrative · Normal right lower extremity venous duplex scan.               Ct Abdomen Pelvis With Contrast    Result Date: 3/14/2020   1. Mild inflammatory process in the fatty soft tissues surrounding the body and neck and head of the pancreas and lying between the  pancreas and the gastric antrum and pyloric channel and first part of the duodenum and this is probably caused by mild pancreatitis and less likely gastric ulcer. I see no fluid collection or gas collection in the soft tissues in this area. Correlation with pancreas enzymes and clinical correlation would BE helpful to further evaluate this finding.  Electronically Signed By-DR. Noe Khan MD On:3/14/2020 4:49 PM This report was finalized on [id number] by DR. Noe Khan MD.        Estimated Creatinine Clearance: 61.3 mL/min (A) (by C-G formula based on SCr of 1.28 mg/dL (H)).    Assessment/Plan   Assessment/Plan       Active Hospital Problems    Diagnosis  POA   • Acute pancreatitis [K85.90]  Yes      Resolved Hospital Problems   No resolved problems to display.                 VTE Prophylaxis -   Mechanical Order History:      Ordered        Signed and Held  Place Sequential Compression Device  Once         Signed and Held  Maintain Sequential Compression Device  Continuous                 Pharmalogical Order History:     None          CODE STATUS:    Code Status and Medical Interventions:   Ordered at: 03/14/20 1847     Level Of Support Discussed With:    Patient     Code Status:    CPR     Medical Interventions (Level of Support Prior to Arrest):    Full         I discussed the patient's findings and my recommendations with patient and family.    Impression    Upper abdominal /Epigastric pain concerning for peptic ulcer disease ...... Iv protonix, GI consult, avoid NSAID.     DM type 2, hold oral hypoglycemia, lantus sc, SS, monitor accu check ac plus HS.    Hypertension ..... Continue hydralazine, lisinopril and metoprolol, monitor vitals closely.    Dyslipidemia .... Continue statin, monitor LFTs as needed.     Dvt prophylaxis with SCDs.            Electronically signed by Harsh Menendez MD, 03/14/20, 6:42 PM.  [facility] Hospitalist Team

## 2020-03-15 ENCOUNTER — ANESTHESIA EVENT (OUTPATIENT)
Dept: GASTROENTEROLOGY | Facility: HOSPITAL | Age: 70
End: 2020-03-15

## 2020-03-15 ENCOUNTER — APPOINTMENT (OUTPATIENT)
Dept: ULTRASOUND IMAGING | Facility: HOSPITAL | Age: 70
End: 2020-03-15

## 2020-03-15 ENCOUNTER — INPATIENT HOSPITAL (AMBULATORY)
Dept: URBAN - METROPOLITAN AREA HOSPITAL 84 | Facility: HOSPITAL | Age: 70
End: 2020-03-15
Payer: COMMERCIAL

## 2020-03-15 ENCOUNTER — ANESTHESIA (OUTPATIENT)
Dept: GASTROENTEROLOGY | Facility: HOSPITAL | Age: 70
End: 2020-03-15

## 2020-03-15 DIAGNOSIS — R93.2 ABNORMAL FINDINGS ON DIAGNOSTIC IMAGING OF LIVER AND BILIARY: ICD-10-CM

## 2020-03-15 DIAGNOSIS — I25.10 ATHEROSCLEROTIC HEART DISEASE OF NATIVE CORONARY ARTERY WITH: ICD-10-CM

## 2020-03-15 DIAGNOSIS — I10 ESSENTIAL (PRIMARY) HYPERTENSION: ICD-10-CM

## 2020-03-15 DIAGNOSIS — R19.7 DIARRHEA, UNSPECIFIED: ICD-10-CM

## 2020-03-15 DIAGNOSIS — E11.9 TYPE 2 DIABETES MELLITUS WITHOUT COMPLICATIONS: ICD-10-CM

## 2020-03-15 DIAGNOSIS — R10.13 EPIGASTRIC PAIN: ICD-10-CM

## 2020-03-15 DIAGNOSIS — R11.2 NAUSEA WITH VOMITING, UNSPECIFIED: ICD-10-CM

## 2020-03-15 PROBLEM — R93.5 ABNORMAL CT OF THE ABDOMEN: Status: ACTIVE | Noted: 2020-03-14

## 2020-03-15 LAB
ALBUMIN SERPL-MCNC: 3.1 G/DL (ref 3.5–5.2)
ALBUMIN/GLOB SERPL: 1 G/DL
ALP SERPL-CCNC: 56 U/L (ref 39–117)
ALT SERPL W P-5'-P-CCNC: 12 U/L (ref 1–41)
ANION GAP SERPL CALCULATED.3IONS-SCNC: 12 MMOL/L (ref 5–15)
AST SERPL-CCNC: 16 U/L (ref 1–40)
BASOPHILS # BLD AUTO: 0 10*3/MM3 (ref 0–0.2)
BASOPHILS NFR BLD AUTO: 0.4 % (ref 0–1.5)
BILIRUB SERPL-MCNC: 1 MG/DL (ref 0.2–1.2)
BUN BLD-MCNC: 16 MG/DL (ref 8–23)
BUN/CREAT SERPL: 14 (ref 7–25)
CALCIUM SPEC-SCNC: 8 MG/DL (ref 8.6–10.5)
CEA SERPL-MCNC: 1.99 NG/ML
CHLORIDE SERPL-SCNC: 96 MMOL/L (ref 98–107)
CO2 SERPL-SCNC: 26 MMOL/L (ref 22–29)
CREAT BLD-MCNC: 1.14 MG/DL (ref 0.76–1.27)
DEPRECATED RDW RBC AUTO: 40.3 FL (ref 37–54)
EOSINOPHIL # BLD AUTO: 0.1 10*3/MM3 (ref 0–0.4)
EOSINOPHIL NFR BLD AUTO: 0.6 % (ref 0.3–6.2)
ERYTHROCYTE [DISTWIDTH] IN BLOOD BY AUTOMATED COUNT: 13.1 % (ref 12.3–15.4)
GFR SERPL CREATININE-BSD FRML MDRD: 64 ML/MIN/1.73
GLOBULIN UR ELPH-MCNC: 3.1 GM/DL
GLUCOSE BLD-MCNC: 165 MG/DL (ref 65–99)
GLUCOSE BLDC GLUCOMTR-MCNC: 142 MG/DL (ref 70–105)
GLUCOSE BLDC GLUCOMTR-MCNC: 157 MG/DL (ref 70–105)
GLUCOSE BLDC GLUCOMTR-MCNC: 162 MG/DL (ref 70–105)
GLUCOSE BLDC GLUCOMTR-MCNC: 170 MG/DL (ref 70–105)
GLUCOSE BLDC GLUCOMTR-MCNC: 190 MG/DL (ref 70–105)
HCT VFR BLD AUTO: 31.7 % (ref 37.5–51)
HGB BLD-MCNC: 10.8 G/DL (ref 13–17.7)
LYMPHOCYTES # BLD AUTO: 1.5 10*3/MM3 (ref 0.7–3.1)
LYMPHOCYTES NFR BLD AUTO: 12.5 % (ref 19.6–45.3)
MCH RBC QN AUTO: 29.8 PG (ref 26.6–33)
MCHC RBC AUTO-ENTMCNC: 34.2 G/DL (ref 31.5–35.7)
MCV RBC AUTO: 87.3 FL (ref 79–97)
MONOCYTES # BLD AUTO: 1.1 10*3/MM3 (ref 0.1–0.9)
MONOCYTES NFR BLD AUTO: 9.1 % (ref 5–12)
NEUTROPHILS # BLD AUTO: 9.1 10*3/MM3 (ref 1.7–7)
NEUTROPHILS NFR BLD AUTO: 77.4 % (ref 42.7–76)
NRBC BLD AUTO-RTO: 0 /100 WBC (ref 0–0.2)
PLATELET # BLD AUTO: 184 10*3/MM3 (ref 140–450)
PMV BLD AUTO: 8.6 FL (ref 6–12)
POTASSIUM BLD-SCNC: 3.7 MMOL/L (ref 3.5–5.2)
PROT SERPL-MCNC: 6.2 G/DL (ref 6–8.5)
RBC # BLD AUTO: 3.63 10*6/MM3 (ref 4.14–5.8)
SODIUM BLD-SCNC: 134 MMOL/L (ref 136–145)
WBC NRBC COR # BLD: 11.8 10*3/MM3 (ref 3.4–10.8)

## 2020-03-15 PROCEDURE — 0DJ08ZZ INSPECTION OF UPPER INTESTINAL TRACT, VIA NATURAL OR ARTIFICIAL OPENING ENDOSCOPIC: ICD-10-PCS | Performed by: INTERNAL MEDICINE

## 2020-03-15 PROCEDURE — 99222 1ST HOSP IP/OBS MODERATE 55: CPT | Mod: 25 | Performed by: NURSE PRACTITIONER

## 2020-03-15 PROCEDURE — 80053 COMPREHEN METABOLIC PANEL: CPT | Performed by: HOSPITALIST

## 2020-03-15 PROCEDURE — 25010000002 MORPHINE PER 10 MG: Performed by: NURSE PRACTITIONER

## 2020-03-15 PROCEDURE — 99232 SBSQ HOSP IP/OBS MODERATE 35: CPT | Performed by: HOSPITALIST

## 2020-03-15 PROCEDURE — 85025 COMPLETE CBC W/AUTO DIFF WBC: CPT | Performed by: HOSPITALIST

## 2020-03-15 PROCEDURE — 25010000002 PROPOFOL 10 MG/ML EMULSION: Performed by: ANESTHESIOLOGY

## 2020-03-15 PROCEDURE — 43235 EGD DIAGNOSTIC BRUSH WASH: CPT | Performed by: INTERNAL MEDICINE

## 2020-03-15 PROCEDURE — 25010000002 MORPHINE PER 10 MG: Performed by: INTERNAL MEDICINE

## 2020-03-15 PROCEDURE — 82962 GLUCOSE BLOOD TEST: CPT

## 2020-03-15 PROCEDURE — 63710000001 INSULIN GLARGINE PER 5 UNITS: Performed by: HOSPITALIST

## 2020-03-15 PROCEDURE — 76705 ECHO EXAM OF ABDOMEN: CPT

## 2020-03-15 RX ORDER — ONDANSETRON 2 MG/ML
4 INJECTION INTRAMUSCULAR; INTRAVENOUS EVERY 6 HOURS PRN
Status: DISCONTINUED | OUTPATIENT
Start: 2020-03-15 | End: 2020-03-17 | Stop reason: HOSPADM

## 2020-03-15 RX ORDER — SODIUM CHLORIDE 9 MG/ML
INJECTION, SOLUTION INTRAVENOUS CONTINUOUS PRN
Status: DISCONTINUED | OUTPATIENT
Start: 2020-03-15 | End: 2020-03-15 | Stop reason: SURG

## 2020-03-15 RX ORDER — ONDANSETRON 4 MG/1
4 TABLET, FILM COATED ORAL EVERY 6 HOURS PRN
Status: DISCONTINUED | OUTPATIENT
Start: 2020-03-15 | End: 2020-03-17 | Stop reason: HOSPADM

## 2020-03-15 RX ORDER — PROPOFOL 10 MG/ML
VIAL (ML) INTRAVENOUS AS NEEDED
Status: DISCONTINUED | OUTPATIENT
Start: 2020-03-15 | End: 2020-03-15 | Stop reason: SURG

## 2020-03-15 RX ORDER — LIDOCAINE HYDROCHLORIDE 10 MG/ML
INJECTION, SOLUTION EPIDURAL; INFILTRATION; INTRACAUDAL; PERINEURAL AS NEEDED
Status: DISCONTINUED | OUTPATIENT
Start: 2020-03-15 | End: 2020-03-15 | Stop reason: SURG

## 2020-03-15 RX ADMIN — LIDOCAINE HYDROCHLORIDE 50 MG: 10 INJECTION, SOLUTION EPIDURAL; INFILTRATION; INTRACAUDAL; PERINEURAL at 13:19

## 2020-03-15 RX ADMIN — Medication 10 ML: at 22:52

## 2020-03-15 RX ADMIN — MORPHINE SULFATE 2 MG: 4 INJECTION INTRAVENOUS at 20:12

## 2020-03-15 RX ADMIN — DEXTROSE AND SODIUM CHLORIDE 75 ML/HR: 5; 450 INJECTION, SOLUTION INTRAVENOUS at 00:02

## 2020-03-15 RX ADMIN — MORPHINE SULFATE 2 MG: 4 INJECTION INTRAVENOUS at 06:07

## 2020-03-15 RX ADMIN — DEXTROSE 50 % IN WATER (D50W) INTRAVENOUS SYRINGE 50 ML: at 00:02

## 2020-03-15 RX ADMIN — HYDRALAZINE HYDROCHLORIDE 25 MG: 25 TABLET, FILM COATED ORAL at 16:20

## 2020-03-15 RX ADMIN — AMLODIPINE BESYLATE 5 MG: 5 TABLET ORAL at 08:03

## 2020-03-15 RX ADMIN — PANTOPRAZOLE SODIUM 40 MG: 40 INJECTION, POWDER, FOR SOLUTION INTRAVENOUS at 08:03

## 2020-03-15 RX ADMIN — SODIUM CHLORIDE: 0.9 INJECTION, SOLUTION INTRAVENOUS at 13:12

## 2020-03-15 RX ADMIN — METOPROLOL TARTRATE 25 MG: 25 TABLET, FILM COATED ORAL at 08:03

## 2020-03-15 RX ADMIN — MORPHINE SULFATE 2 MG: 4 INJECTION INTRAVENOUS at 11:30

## 2020-03-15 RX ADMIN — PROPOFOL 80 MG: 10 INJECTION, EMULSION INTRAVENOUS at 13:19

## 2020-03-15 RX ADMIN — Medication 10 ML: at 08:04

## 2020-03-15 RX ADMIN — LISINOPRIL 40 MG: 20 TABLET ORAL at 08:03

## 2020-03-15 RX ADMIN — HYDRALAZINE HYDROCHLORIDE 25 MG: 25 TABLET, FILM COATED ORAL at 22:52

## 2020-03-15 RX ADMIN — METOPROLOL TARTRATE 25 MG: 25 TABLET, FILM COATED ORAL at 22:52

## 2020-03-15 RX ADMIN — INSULIN GLARGINE 15 UNITS: 100 INJECTION, SOLUTION SUBCUTANEOUS at 06:07

## 2020-03-15 RX ADMIN — HYDRALAZINE HYDROCHLORIDE 25 MG: 25 TABLET, FILM COATED ORAL at 08:03

## 2020-03-15 RX ADMIN — ATORVASTATIN CALCIUM 40 MG: 40 TABLET, FILM COATED ORAL at 22:52

## 2020-03-15 NOTE — PLAN OF CARE
Problem: Patient Care Overview  Goal: Plan of Care Review  Outcome: Ongoing (interventions implemented as appropriate)  Flowsheets (Taken 3/15/2020 7473)  Progress: no change  Plan of Care Reviewed With: patient  Outcome Summary: Patient came in through ER c/o abd pain. CT revealed mild pancreatitis. Lipase within normal range. Patient made NPO. BS did drop to 32 overnight however he was treated, and recovered well. Patient now has D5 0.45% NS running. GI to see patient this AM. Will continue to monitor.

## 2020-03-15 NOTE — PROGRESS NOTES
"      [facility] Medicine Services Daily Progress Note      Hospitalist Team  LOS 1 days      Patient Care Team:  Karly Marmolejo APRN as PCP - General (Nurse Practitioner)  Tangela Hoover APRN as PCP - Family Medicine  Tangela Hoover APRN as PCP - Claims Attributed    Patient Location: 345/1      Subjective   Subjective   Still complaining of some upper abdominal discomfort, denies for any nausea or vomiting, no chest pain.   Chief Complaint / Subjective  Chief Complaint   Patient presents with   • Abdominal Pain         Brief Synopsis of Hospital Course/HPI    Mr. Trinidad is a 69 y.o.  presents to [facility] complaint of upper abdominal pain for last few days associated with feeling nausea, not improving at all. Patient underwent ct scan abdomen revealing mild stranding around the pancreas area. Pt pancreatitis enzymes were noted within normal limit, following this asked to admit the patient for further care. Patient denies for any chest pain, no nausea or vomiting.           Date::          ROS      Objective   Objective      Vital Signs  Temp:  [98.8 °F (37.1 °C)-99.3 °F (37.4 °C)] 98.8 °F (37.1 °C)  Heart Rate:  [90-98] 91  Resp:  [18-19] 18  BP: (131-151)/(72-81) 145/76  Oxygen Therapy  SpO2: 93 %  Device (Oxygen Therapy): room air  Device (Oxygen Therapy): room air  Flow (L/min): 2  Flowsheet Rows      First Filed Value   Admission Height  170.2 cm (67\") Documented at 03/14/2020 1433   Admission Weight  97 kg (213 lb 13.5 oz) Documented at 03/14/2020 1433        Intake & Output (last 3 days)       03/12 0701 - 03/13 0700 03/13 0701 - 03/14 0700 03/14 0701 - 03/15 0700 03/15 0701 - 03/16 0700            Urine Unmeasured Occurrence    1 x        Lines, Drains & Airways    Active LDAs     Name:   Placement date:   Placement time:   Site:   Days:    Peripheral IV 03/14/20 1526 Left Antecubital   03/14/20    1526    Antecubital   less than 1                  Physical Exam:    Physical " Exam   Constitutional: He is oriented to person, place, and time. He appears well-developed and well-nourished. No distress.   HENT:   Head: Normocephalic and atraumatic.   Right Ear: External ear normal.   Left Ear: External ear normal.   Nose: Nose normal.   Mouth/Throat: Oropharynx is clear and moist. No oropharyngeal exudate.   Eyes: Pupils are equal, round, and reactive to light. Conjunctivae and EOM are normal. Right eye exhibits no discharge. Left eye exhibits no discharge. No scleral icterus.   Neck: Normal range of motion. No JVD present. No tracheal deviation present. No thyromegaly present.   Cardiovascular: Normal rate, regular rhythm, normal heart sounds and intact distal pulses. Exam reveals no gallop and no friction rub.   No murmur heard.  Pulmonary/Chest: Effort normal and breath sounds normal. No stridor. No respiratory distress. He has no wheezes. He has no rales. He exhibits no tenderness.   Abdominal: Soft. Bowel sounds are normal. He exhibits no distension and no mass. There is no tenderness. There is no rebound and no guarding. No hernia.   Musculoskeletal: Normal range of motion. He exhibits no edema, tenderness or deformity.   Lymphadenopathy:     He has no cervical adenopathy.   Neurological: He is alert and oriented to person, place, and time. No cranial nerve deficit or sensory deficit. He exhibits normal muscle tone. Coordination normal.   Skin: Skin is warm and dry. No rash noted. He is not diaphoretic. No erythema.   Psychiatric: He has a normal mood and affect. His behavior is normal.   Nursing note and vitals reviewed.            Procedures:              Results Review:     I reviewed the patient's new clinical results.      Lab Results (last 24 hours)     Procedure Component Value Units Date/Time    POC Glucose Once [[id number]]  (Abnormal) Collected:  03/15/20 0427    Specimen:  Blood Updated:  03/15/20 0432     Glucose 162 mg/dL      Comment: Serial Number: [id number]:   [id number]       Comprehensive Metabolic Panel [[id number]]  (Abnormal) Collected:  03/15/20 0252    Specimen:  Blood Updated:  03/15/20 0412     Glucose 165 mg/dL      BUN 16 mg/dL      Creatinine 1.14 mg/dL      Sodium 134 mmol/L      Potassium 3.7 mmol/L      Chloride 96 mmol/L      CO2 26.0 mmol/L      Calcium 8.0 mg/dL      Total Protein 6.2 g/dL      Albumin 3.10 g/dL      ALT (SGPT) 12 U/L      AST (SGOT) 16 U/L      Alkaline Phosphatase 56 U/L      Total Bilirubin 1.0 mg/dL      eGFR Non African Amer 64 mL/min/1.73      Globulin 3.1 gm/dL      A/G Ratio 1.0 g/dL      BUN/Creatinine Ratio 14.0     Anion Gap 12.0 mmol/L     Narrative:       GFR Normal >60  Chronic Kidney Disease <60  Kidney Failure <15      CBC Auto Differential [[id number]]  (Abnormal) Collected:  03/15/20 0252    Specimen:  Blood Updated:  03/15/20 0352     WBC 11.80 10*3/mm3      RBC 3.63 10*6/mm3      Hemoglobin 10.8 g/dL      Hematocrit 31.7 %      MCV 87.3 fL      MCH 29.8 pg      MCHC 34.2 g/dL      RDW 13.1 %      RDW-SD 40.3 fl      MPV 8.6 fL      Platelets 184 10*3/mm3      Neutrophil % 77.4 %      Lymphocyte % 12.5 %      Monocyte % 9.1 %      Eosinophil % 0.6 %      Basophil % 0.4 %      Neutrophils, Absolute 9.10 10*3/mm3      Lymphocytes, Absolute 1.50 10*3/mm3      Monocytes, Absolute 1.10 10*3/mm3      Eosinophils, Absolute 0.10 10*3/mm3      Basophils, Absolute 0.00 10*3/mm3      nRBC 0.0 /100 WBC     POC Glucose Once [[id number]]  (Abnormal) Collected:  03/15/20 0232    Specimen:  Blood Updated:  03/15/20 0324     Glucose 142 mg/dL      Comment: Serial Number: [id number]:  [id number]       POC Glucose Once [[id number]]  (Abnormal) Collected:  03/15/20 0018    Specimen:  Blood Updated:  03/15/20 0023     Glucose 190 mg/dL      Comment: Serial Number: [id number]:  [id number]       POC Glucose Once [[id number]]  (Abnormal) Collected:  03/14/20 2348    Specimen:  Blood Updated:  03/14/20 2353     Glucose 32 mg/dL      Comment:  Serial Number: [id number]:  [id number]       Basic Metabolic Panel [[id number]]  (Abnormal) Collected:  03/14/20 2014    Specimen:  Blood Updated:  03/14/20 2048     Glucose 97 mg/dL      BUN 16 mg/dL      Creatinine 1.13 mg/dL      Sodium 137 mmol/L      Potassium 4.2 mmol/L      Chloride 95 mmol/L      CO2 29.0 mmol/L      Calcium 8.7 mg/dL      eGFR Non African Amer 65 mL/min/1.73      BUN/Creatinine Ratio 14.2     Anion Gap 13.0 mmol/L     Narrative:       GFR Normal >60  Chronic Kidney Disease <60  Kidney Failure <15      Blood Gas, Arterial [[id number]]  (Abnormal) Collected:  03/14/20 1957    Specimen:  Arterial Blood Updated:  03/14/20 2042     Site Left Radial     Rayo's Test Positive     pH, Arterial 7.420 pH units      pCO2, Arterial 42.9 mm Hg      pO2, Arterial 77.4 mm Hg      HCO3, Arterial 27.9 mmol/L      Base Excess, Arterial 3.0 mmol/L      Comment: Serial Number: [id number]:  [id number]        O2 Saturation, Arterial 95.5 %      CO2 Content 29.2 mmol/L      Barometric Pressure for Blood Gas --     Comment: N/A        Modality Room Air     FIO2 32 %      Hemodilution No     Respiratory Rate 20    POC Glucose Once [[id number]]  (Normal) Collected:  03/14/20 1925    Specimen:  Blood Updated:  03/14/20 1927     Glucose 81 mg/dL      Comment: Serial Number: [id number]:  [id number]       [place] Draw [[id number]] Collected:  03/14/20 1504    Specimen:  Blood Updated:  03/14/20 1615    Narrative:       The following orders were created for panel order [place] Draw.  Procedure                               Abnormality         Status                     ---------                               -----------         ------                     Light Blue Top[[id number]]                                   Final result               Green Top (Gel)[[id number]]                                                             Lavender Top[[id number]]                                     Final result               Gold  Top - SST[[id number]]                                   Final result                 Please view results for these tests on the individual orders.    Light Blue Top [[id number]] Collected:  03/14/20 1504    Specimen:  Blood Updated:  03/14/20 1615     Extra Tube hold for add-on     Comment: Auto resulted       Lavender Top [[id number]] Collected:  03/14/20 1504    Specimen:  Blood Updated:  03/14/20 1615     Extra Tube hold for add-on     Comment: Auto resulted       Gold Top - SST [[id number]] Collected:  03/14/20 1504    Specimen:  Blood Updated:  03/14/20 1615     Extra Tube Hold for add-ons.     Comment: Auto resulted.       Urinalysis With Microscopic If Indicated (No Culture) - Urine, Clean Catch [[id number]]  (Normal) Collected:  03/14/20 1543    Specimen:  Urine, Clean Catch Updated:  03/14/20 1602     Color, UA Yellow     Appearance, UA Clear     pH, UA 6.0     Specific Gravity, UA 1.013     Glucose, UA Negative     Ketones, UA Negative     Bilirubin, UA Negative     Blood, UA Negative     Protein, UA Negative     Leuk Esterase, UA Negative     Nitrite, UA Negative     Urobilinogen, UA 1.0 E.U./dL    Narrative:       Urine microscopic not indicated.    Comprehensive Metabolic Panel [[id number]]  (Abnormal) Collected:  03/14/20 1504    Specimen:  Blood Updated:  03/14/20 1530     Glucose 186 mg/dL      BUN 16 mg/dL      Creatinine 1.28 mg/dL      Sodium 135 mmol/L      Potassium 4.3 mmol/L      Chloride 93 mmol/L      CO2 30.0 mmol/L      Calcium 8.5 mg/dL      Total Protein 7.0 g/dL      Albumin 3.90 g/dL      ALT (SGPT) 15 U/L      AST (SGOT) 21 U/L      Alkaline Phosphatase 62 U/L      Total Bilirubin 1.5 mg/dL      eGFR Non African Amer 56 mL/min/1.73      Globulin 3.1 gm/dL      A/G Ratio 1.3 g/dL      BUN/Creatinine Ratio 12.5     Anion Gap 12.0 mmol/L     Narrative:       GFR Normal >60  Chronic Kidney Disease <60  Kidney Failure <15      Lipase [[id number]]  (Normal) Collected:  03/14/20 1504    Specimen:   Blood Updated:  03/14/20 1530     Lipase 39 U/L     Influenza Antigen, Rapid - Swab, Nasopharynx [[id number]]  (Normal) Collected:  03/14/20 1504    Specimen:  Swab from Nasopharynx Updated:  03/14/20 1524     Influenza A PCR Not Detected     Influenza B PCR Not Detected    POC Lactate [[id number]]  (Normal) Collected:  03/14/20 1522    Specimen:  Blood Updated:  03/14/20 1523     Lactate 2.0 mmol/L      Comment: Serial Number: [id number]:  [id number]       CBC & Differential [[id number]] Collected:  03/14/20 1504    Specimen:  Blood Updated:  03/14/20 1519    Narrative:       The following orders were created for panel order CBC & Differential.  Procedure                               Abnormality         Status                     ---------                               -----------         ------                     CBC Auto Differential[[id number]]        Abnormal            Final result                 Please view results for these tests on the individual orders.    CBC Auto Differential [[id number]]  (Abnormal) Collected:  03/14/20 1504    Specimen:  Blood Updated:  03/14/20 1519     WBC 15.20 10*3/mm3      RBC 4.08 10*6/mm3      Hemoglobin 12.0 g/dL      Hematocrit 35.6 %      MCV 87.2 fL      MCH 29.4 pg      MCHC 33.7 g/dL      RDW 13.5 %      RDW-SD 41.6 fl      MPV 8.0 fL      Platelets 237 10*3/mm3      Neutrophil % 79.5 %      Lymphocyte % 10.4 %      Monocyte % 9.5 %      Eosinophil % 0.1 %      Basophil % 0.5 %      Neutrophils, Absolute 12.10 10*3/mm3      Lymphocytes, Absolute 1.60 10*3/mm3      Monocytes, Absolute 1.50 10*3/mm3      Eosinophils, Absolute 0.00 10*3/mm3      Basophils, Absolute 0.10 10*3/mm3      nRBC 0.1 /100 WBC         No results found for: HGBA1C        Results from last 7 days   Lab Units 03/14/20  1957   PH, ARTERIAL pH units 7.420   PO2 ART mm Hg 77.4*   PCO2, ARTERIAL mm Hg 42.9   HCO3 ART mmol/L 27.9     Lab Results   Component Value Date    LIPASE 39 03/14/2020     Lab  Results   Component Value Date    CHOL 94 02/18/2020    TRIG 147 02/18/2020    HDL 33 (L) 02/18/2020    LDL 32 02/18/2020       No results found for: INTRAOP, PREDX, FINALDX, COMDX    Microbiology Results (last 10 days)     Procedure Component Value - Date/Time    Influenza Antigen, Rapid - Swab, Nasopharynx [[id number]]  (Normal) Collected:  03/14/20 1504    Lab Status:  Final result Specimen:  Swab from Nasopharynx Updated:  03/14/20 1524     Influenza A PCR Not Detected     Influenza B PCR Not Detected          ECG/EMG Results (most recent)     None          Results for orders placed during the hospital encounter of 08/25/19   Duplex Venous Lower Extremity - Right CAR    Narrative · Normal right lower extremity venous duplex scan.               Ct Abdomen Pelvis With Contrast    Result Date: 3/14/2020   1. Mild inflammatory process in the fatty soft tissues surrounding the body and neck and head of the pancreas and lying between the pancreas and the gastric antrum and pyloric channel and first part of the duodenum and this is probably caused by mild pancreatitis and less likely gastric ulcer. I see no fluid collection or gas collection in the soft tissues in this area. Correlation with pancreas enzymes and clinical correlation would BE helpful to further evaluate this finding.  Electronically Signed By-DR. Noe Khan MD On:3/14/2020 4:49 PM This report was finalized on [id number] by DR. Noe Khan MD.          Xrays, labs reviewed personally by physician.    Medication Review:   I have reviewed the patient's current medication list      Scheduled Meds    amLODIPine 5 mg Oral Daily   atorvastatin 40 mg Oral Nightly   hydrALAZINE 25 mg Oral TID   insulin glargine 15 Units Subcutaneous QAM   lisinopril 40 mg Oral Daily   metoprolol tartrate 25 mg Oral Q12H   pantoprazole 40 mg Intravenous BID AC   sodium chloride 10 mL Intravenous Q12H       Meds Infusions    dextrose 5 % and sodium chloride  0.45 % 75 mL/hr Last Rate: 75 mL/hr (03/15/20 0002)   lactated ringers 150 mL/hr Last Rate: 150 mL/hr (03/14/20 2102)       Meds PRN  influenza vaccine  •  Morphine  •  sodium chloride  •  sodium chloride  •  sodium chloride        Assessment/Plan   Assessment/Plan     Active Hospital Problems    Diagnosis  POA   • Acute pancreatitis [K85.90]  Yes      Resolved Hospital Problems   No resolved problems to display.       MEDICAL DECISION MAKING COMPLEXITY BY PROBLEM:     Upper abdominal /Epigastric pain not improved much concerning for peptic ulcer disease ...... Iv protonix, awaited GI input.      DM type 2, hold oral hypoglycemia, lantus sc, SS, monitor accu check ac plus HS.     Hypertension ..... Continue hydralazine, lisinopril and metoprolol, monitor vitals closely.     Dyslipidemia .... Continue statin, monitor LFTs as needed.      Dvt prophylaxis with SCDs.    VTE Prophylaxis -   Mechanical Order History:      Ordered        03/14/20 1938  Place Sequential Compression Device  Once         03/14/20 1938  Maintain Sequential Compression Device  Continuous                 Pharmalogical Order History:     None            Code Status -   Code Status and Medical Interventions:   Ordered at: 03/14/20 1841     Level Of Support Discussed With:    Patient     Code Status:    CPR     Medical Interventions (Level of Support Prior to Arrest):    Full       Discharge Planning          Destination      Coordination has not been started for this encounter.      Durable Medical Equipment      Coordination has not been started for this encounter.      Dialysis/Infusion      Coordination has not been started for this encounter.      Home Medical Care      Coordination has not been started for this encounter.      Therapy      Coordination has not been started for this encounter.      Community Resources      Coordination has not been started for this encounter.            Electronically signed by Harsh Menendez MD,  03/15/20, 10:58.  [facility] Hospitalist Team

## 2020-03-15 NOTE — NURSING NOTE
Patient was made NPO per Dr. Logan for pancreatitis. Patient is a diabetic so we made him a q6hr blood sugar. BS came back at 32 this AM. Called and got verbal orders per NP.

## 2020-03-15 NOTE — PLAN OF CARE
Problem: Patient Care Overview  Goal: Plan of Care Review  Outcome: Ongoing (interventions implemented as appropriate)  Flowsheets (Taken 3/15/2020 0791)  Plan of Care Reviewed With: patient  Outcome Summary: Patient still having some pain in the abdomen. EGD perfomed today and was negative. Gallbladder ultrasound ordered. No other complaints voiced. will continue to monitor

## 2020-03-15 NOTE — OP NOTE
ESOPHAGOGASTRODUODENOSCOPY Procedure Report    Patient Name:  Shaka Trinidad  YOB: 1950    Date of Surgery:  3/15/2020     Pre-Op Diagnosis:  Abnormal CT  Epigastric abdominal pain    Post-Op Diagnosis:  Normal EGD    Procedure/CPT® Codes:  EGD     Staff:  Surgeon(s):  Jaspal Logan MD         Anesthesia: Monitored Anesthesia Care    Implants:    Nothing was implanted during the procedure    Specimen:        See Below    Complications:  None    Description of Procedure:  Informed consent was obtained for the procedure, including sedation.  Risks of perforation, hemorrhage, adverse drug reaction and aspiration were discussed.  The patient was brought into the endoscopy suite. Continuous cardiopulmonary monitoring was performed. The patient was placed in the left lateral decubitus position.  The bite block was inserted into the patient's mouth. After adequate sedation was attained, the Olympus gastroscope was inserted into the patient's mouth and advanced to the second portion of the duodenum without difficulty.  Circumferential examination was performed. A retroflex exam was performed in the patient's stomach.  On completion of the exam, the bowel was decompressed, the scope was removed from the patient, the patient tolerated the procedure well, there were no immediate post-operative complications.     Examination of the esophagus: Normal mucosa throughout the esophagus without stricture, Mackay's esophagus, or hiatal hernia  Examination of the stomach: Normal mucosa in the pylorus, antrum, angularis, body, cardia and fundus.  Examination of the duodenum: Normal duodenal bulb and second portion of duodenum.  Ampulla also appears unremarkable      Impression:  69-year-old male with epigastric abdominal pain and abnormal CT showing thickening of the distal stomach and duodenum with peripancreatic inflammation suspicious for pancreatitis with a normal lipase.  EGD is negative for peptic  ulcer disease    Recommendations:  Clear liquids  Aggressive hydration  Pain management  Check AMOS, IgG4, CEA, CA-19-9  Would consider outpatient endoscopic ultrasound in 1 month to rule out pancreatic cancer as a cause of his otherwise idiopathic pancreatitis  We will follow    We appreciate the referral    Jaspal Logan MD     Date: 3/15/2020  Time: 13:28    Much of the above report is an electronic transcription/translation of the spoken language to printed text using Dragon Software. As such, the subtleties and finesse of the spoken language may permit erroneous, or at times, nonsensical words or phrases to be inadvertently transcribed; thus changes may be made at a later date to rectify these errors.

## 2020-03-15 NOTE — ANESTHESIA PREPROCEDURE EVALUATION
Anesthesia Evaluation     Patient summary reviewed and Nursing notes reviewed   no history of anesthetic complications:  NPO Solid Status: > 8 hours  NPO Liquid Status: > 8 hours           Airway   Dental      Pulmonary    (+) sleep apnea,   Cardiovascular     ECG reviewed  PT is on anticoagulation therapy    (+) hypertension, CAD, CABG, angina, hyperlipidemia,       Neuro/Psych  GI/Hepatic/Renal/Endo    (+) obesity,   diabetes mellitus,     Musculoskeletal     Abdominal    Substance History      OB/GYN          Other   blood dyscrasia anemia,     ROS/Med Hx Other: Allergies, pancreatitis, edema    Echo  Left ventricular systolic function is normal.  Ejection Fraction is 60-65%  There is trace mitral regurgitation.                Anesthesia Plan    ASA 4     MAC   (Patient identified; pre-operative vital signs, all relevant labs/studies, complete medical/surgical/anesthetic history, full medication list, full allergy list, and NPO status obtained/reviewed; physical assessment performed; anesthetic options, side effects, potential complications, risks, and benefits discussed; questions answered; written anesthesia consent obtained; patient cleared for procedure; anesthesia machine and equipment checked and functioning)    Anesthetic plan, all risks, benefits, and alternatives have been provided, discussed and informed consent has been obtained with: patient.

## 2020-03-15 NOTE — CONSULTS
GI CONSULT  NOTE:    Referring Provider:  Dr Menendez    Chief complaint:   Acute pancreatitis versus ulcer, abnormal CT    Subjective    Abdominal pain, nausea, vomiting, diarrhea    History of present illness:   Patient is a 69-year-old male with a history of diabetes, coronary artery disease/CABG 2019, hypertension, hyperlipidemia who presented to the ER on 3/14/2020 with a complaint of mid upper abdominal pain, nausea, vomiting, and diarrhea since 3/11/2020.  Patient initially thought he had food poisoning.  He woke up with symptoms on Wednesday, 3/11/2020.  Patient states the pain has been constant initially started in the epigastric area and now radiates down to around just below the umbilicus.  Patient has had no further nausea or vomiting since Wednesday.  Patient denies any hematemesis, coffee-ground emesis cysts, melena, hematochezia or bright red blood per rectum.  Patient states he has occasional heartburn and takes Nexium as needed.  Patient denies any NSAID intake except for 81 mg aspirin a day.  Denies any esophageal burning.  No history of previous pancreatitis.  CT of the abdomen and pelvis on 3/14/2020 showed mild pancreatitis versus ulcer.  Patient did have elevated liver enzymes upon admission but those have now decreased.  No tobacco or alcohol usage.  Patient is on several medications that could induce pancreatitis.      Endo History:  2014 colonoscopy (Dr. Ortega) mucosal transverse colon polyp.  Grade 2 internal and external hemorrhoids.  Recall 2024.    Past Medical History:  Past Medical History:   Diagnosis Date   • Coronary artery disease    • Diabetes mellitus, type II (CMS/HCC)    • Elevated cholesterol    • H/O cataract     cataracts    • Hyperlipidemia    • Hypertension    • Peripheral edema    • Peripheral edema    • Sleep apnea     oral appliance       Past Surgical History:  Past Surgical History:   Procedure Laterality Date   • CARDIAC CATHETERIZATION N/A 7/19/2019     Procedure: Left Heart Cath with angiogram;  Surgeon: Bautista Lopez MD;  Location: [facility] CATH INVASIVE LOCATION;  Service: Cardiovascular   • CARDIAC CATHETERIZATION N/A 7/19/2019    Procedure: Coronary angiography;  Surgeon: Bautista Lopez MD;  Location: [facility] CATH INVASIVE LOCATION;  Service: Cardiovascular   • CARDIAC CATHETERIZATION N/A 7/19/2019    Procedure: Left ventriculography;  Surgeon: Bautista Lopez MD;  Location: [facility] CATH INVASIVE LOCATION;  Service: Cardiovascular   • COLONOSCOPY     • CORONARY ARTERY BYPASS GRAFT N/A 8/8/2019    Procedure: CORONARY ARTERY BYPASS GRAFTING;  Surgeon: Chet Mcarthur MD;  Location: [facility] CVOR;  Service: Cardiothoracic   • HERNIA REPAIR     • OTHER SURGICAL HISTORY  12/2015    2d echo-abstracted cent.        Social History:  Social History     Tobacco Use   • Smoking status: Never Smoker   • Smokeless tobacco: Never Used   Substance Use Topics   • Alcohol use: No     Frequency: Never   • Drug use: No       Family History:  Family History   Problem Relation Age of Onset   • Heart failure Mother    • Hypertension Brother    • Cancer Brother        Medications:  Medications Prior to Admission   Medication Sig Dispense Refill Last Dose   • amLODIPine (NORVASC) 5 MG tablet Take 1 tablet by mouth Daily. 90 tablet 0 3/14/2020 at Unknown time   • aspirin 81 MG chewable tablet Chew 81 mg Every Night.   3/13/2020 at Unknown time   • atorvastatin (LIPITOR) 40 MG tablet TAKE 1 TABLET BY MOUTH EVERY DAY AT NIGHT 90 tablet 0 3/13/2020 at Unknown time   • Calcium Carbonate-Vit D-Min (CALCIUM 1200) 7777-7772 MG-UNIT chewable tablet Chew 1 tablet Daily.   3/14/2020 at Unknown time   • cetirizine (zyrTEC) 10 MG tablet Take 10 mg by mouth Daily.  0 3/13/2020 at Unknown time   • Cinnamon 500 MG tablet Take 1,000 mg by mouth Every Evening.   3/13/2020 at Unknown time   • furosemide (LASIX) 20 MG tablet Take 1 tablet by mouth Daily. 90 tablet 0 3/14/2020 at Unknown time   • glipizide  (GLUCOTROL) 5 MG tablet Take 10 mg by mouth Every Morning.   3/14/2020 at Unknown time   • glipizide (GLUCOTROL) 5 MG tablet Take 5 mg by mouth Every Evening.   3/13/2020 at Unknown time   • hydrALAZINE (APRESOLINE) 25 MG tablet Take 1 tablet by mouth 3 (Three) Times a Day. (Patient taking differently: Take 25 mg by mouth 2 (Two) Times a Day.) 180 tablet 1 3/14/2020 at Unknown time   • Insulin Degludec (TRESIBA) 100 UNIT/ML solution injection Inject 32 Units under the skin into the appropriate area as directed Daily. (Patient taking differently: Inject 35 Units under the skin into the appropriate area as directed Daily.) 18 mL 1 3/14/2020 at Unknown time   • Iron-Vitamin C 100-250 MG tablet Take 1 tablet by mouth 2 (Two) Times a Day.   3/14/2020 at Unknown time   • lisinopril (PRINIVIL,ZESTRIL) 40 MG tablet TAKE 1 TABLET BY MOUTH EVERY DAY 90 tablet 0 3/14/2020 at Unknown time   • metFORMIN (GLUCOPHAGE) 1000 MG tablet Take 1 tablet by mouth 2 (Two) Times a Day With Meals. 180 tablet 1 3/14/2020 at Unknown time   • metoprolol tartrate (LOPRESSOR) 25 MG tablet Take 1 tablet by mouth Every 12 (Twelve) Hours. 60 tablet 3 3/14/2020 at Unknown time   • ondansetron (ZOFRAN) 4 MG tablet Take 1 tablet by mouth Every 8 (Eight) Hours As Needed for Nausea or Vomiting. 20 tablet 0 3/14/2020 at Unknown time   • polyethylene glycol (MIRALAX) packet Take 17 g by mouth 2 (Two) Times a Day. (Patient taking differently: Take 17 g by mouth 2 (Two) Times a Day As Needed (for constipation).)   Past Week at Unknown time   • potassium chloride (MICRO-K) 10 MEQ CR capsule Take 1 capsule by mouth Daily. 90 capsule 1 3/14/2020 at Unknown time   • vitamin C (ASCORBIC ACID) 500 MG tablet Take 1,000 mg by mouth Every Evening.   3/13/2020 at Unknown time       Scheduled Meds:  amLODIPine 5 mg Oral Daily   atorvastatin 40 mg Oral Nightly   hydrALAZINE 25 mg Oral TID   insulin glargine 15 Units Subcutaneous QAM   lisinopril 40 mg Oral Daily    "  metoprolol tartrate 25 mg Oral Q12H   pantoprazole 40 mg Intravenous BID AC   sodium chloride 10 mL Intravenous Q12H     Continuous Infusions:  dextrose 5 % and sodium chloride 0.45 % 75 mL/hr Last Rate: 75 mL/hr (03/15/20 0002)   lactated ringers 150 mL/hr Last Rate: 150 mL/hr (03/14/20 2102)     PRN Meds:.influenza vaccine  •  Morphine  •  sodium chloride  •  sodium chloride  •  sodium chloride    ALLERGIES:  Patient has no known allergies.    ROS:  Review of Systems   Constitutional: Positive for fever. Negative for chills and unexpected weight change.   Gastrointestinal: Positive for abdominal pain, diarrhea, nausea and vomiting. Negative for abdominal distention, anal bleeding, blood in stool and constipation.       The following systems were reviewed and negative;  Constitution:  No fevers, chills, no unintentional weight loss  Skin: no rash, no jaundice  Eyes:  No blurry vision, no eye pain  HENT:  No change in hearing or smell  Resp:  No dyspnea or cough  CV:  No chest pain or palpitations  :  No dysuria, hematuria  Musculoskeletal:  No leg cramps or arthralgias  Neuro:  No tremor, no numbness  Psych:  No depression or confsuion    Objective  Patient is resting comfortably in bed.    Vital Signs:   Vitals:    03/14/20 1927 03/15/20 0432 03/15/20 0800 03/15/20 0818   BP: 151/81 131/74 145/76    BP Location: Right arm Right arm     Patient Position: Lying Lying     Pulse: 98 90 91    Resp: 19 18     Temp: 99.1 °F (37.3 °C) 98.8 °F (37.1 °C)     TempSrc: Oral Oral     SpO2: 98% 96%  93%   Weight: 95.9 kg (211 lb 6.7 oz)      Height: 172.7 cm (68\")          Physical Exam:   General Appearance:    Awake and alert, in no acute distress   Head:    Normocephalic, without obvious abnormality, atraumatic.  Lisp noted.   Eyes:            Conjunctivae normal, anicteric sclera, pupils equal   Ears:    Ears appear intact with no abnormalities noted   Throat:   No oral lesions, no thrush, oral mucosa moist   Neck:   " Supple, no JVD   Lungs:     Clear to auscultation bilaterally, respirations regular, even and unlabored    Heart:    Regular rhythm and normal rate, normal S1 and S2, no            Murmur appreciated   Chest Wall:    No abnormalities observed   Abdomen:     Normal bowel sounds, soft, epigastric to umbilical tender, no rebound or guarding, non-distended, no hepatosplenomegaly   Rectal:     Deferred   Extremities:   Moves all extremities, no edema, no cyanosis   Pulses:   Pulses palpable and equal bilaterally   Skin:   No rash, no jaundice, normal palpaion   Lymph nodes:   No cervical, supraclavicular or submandibular palpable adenopathy   Neurologic:   Cranial nerves 2 - 12 grossly intact, no asterixis       Results Review:   I reviewed the patient's labs and imaging.  Lab Results (last 24 hours)     Procedure Component Value Units Date/Time    POC Glucose Once [[id number]]  (Abnormal) Collected:  03/15/20 0427    Specimen:  Blood Updated:  03/15/20 0432     Glucose 162 mg/dL      Comment: Serial Number: [id number]:  [id number]       Comprehensive Metabolic Panel [[id number]]  (Abnormal) Collected:  03/15/20 0252    Specimen:  Blood Updated:  03/15/20 0412     Glucose 165 mg/dL      BUN 16 mg/dL      Creatinine 1.14 mg/dL      Sodium 134 mmol/L      Potassium 3.7 mmol/L      Chloride 96 mmol/L      CO2 26.0 mmol/L      Calcium 8.0 mg/dL      Total Protein 6.2 g/dL      Albumin 3.10 g/dL      ALT (SGPT) 12 U/L      AST (SGOT) 16 U/L      Alkaline Phosphatase 56 U/L      Total Bilirubin 1.0 mg/dL      eGFR Non African Amer 64 mL/min/1.73      Globulin 3.1 gm/dL      A/G Ratio 1.0 g/dL      BUN/Creatinine Ratio 14.0     Anion Gap 12.0 mmol/L     Narrative:       GFR Normal >60  Chronic Kidney Disease <60  Kidney Failure <15      CBC Auto Differential [[id number]]  (Abnormal) Collected:  03/15/20 0252    Specimen:  Blood Updated:  03/15/20 0352     WBC 11.80 10*3/mm3      RBC 3.63 10*6/mm3      Hemoglobin 10.8 g/dL       Hematocrit 31.7 %      MCV 87.3 fL      MCH 29.8 pg      MCHC 34.2 g/dL      RDW 13.1 %      RDW-SD 40.3 fl      MPV 8.6 fL      Platelets 184 10*3/mm3      Neutrophil % 77.4 %      Lymphocyte % 12.5 %      Monocyte % 9.1 %      Eosinophil % 0.6 %      Basophil % 0.4 %      Neutrophils, Absolute 9.10 10*3/mm3      Lymphocytes, Absolute 1.50 10*3/mm3      Monocytes, Absolute 1.10 10*3/mm3      Eosinophils, Absolute 0.10 10*3/mm3      Basophils, Absolute 0.00 10*3/mm3      nRBC 0.0 /100 WBC     POC Glucose Once [[id number]]  (Abnormal) Collected:  03/15/20 0232    Specimen:  Blood Updated:  03/15/20 0324     Glucose 142 mg/dL      Comment: Serial Number: [id number]:  [id number]       POC Glucose Once [[id number]]  (Abnormal) Collected:  03/15/20 0018    Specimen:  Blood Updated:  03/15/20 0023     Glucose 190 mg/dL      Comment: Serial Number: [id number]:  [id number]       POC Glucose Once [[id number]]  (Abnormal) Collected:  03/14/20 2348    Specimen:  Blood Updated:  03/14/20 2353     Glucose 32 mg/dL      Comment: Serial Number: [id number]:  [id number]       Basic Metabolic Panel [[id number]]  (Abnormal) Collected:  03/14/20 2014    Specimen:  Blood Updated:  03/14/20 2048     Glucose 97 mg/dL      BUN 16 mg/dL      Creatinine 1.13 mg/dL      Sodium 137 mmol/L      Potassium 4.2 mmol/L      Chloride 95 mmol/L      CO2 29.0 mmol/L      Calcium 8.7 mg/dL      eGFR Non African Amer 65 mL/min/1.73      BUN/Creatinine Ratio 14.2     Anion Gap 13.0 mmol/L     Narrative:       GFR Normal >60  Chronic Kidney Disease <60  Kidney Failure <15      Blood Gas, Arterial [[id number]]  (Abnormal) Collected:  03/14/20 1957    Specimen:  Arterial Blood Updated:  03/14/20 2042     Site Left Radial     Rayo's Test Positive     pH, Arterial 7.420 pH units      pCO2, Arterial 42.9 mm Hg      pO2, Arterial 77.4 mm Hg      HCO3, Arterial 27.9 mmol/L      Base Excess, Arterial 3.0 mmol/L      Comment: Serial Number:  [id number]:  [id number]        O2 Saturation, Arterial 95.5 %      CO2 Content 29.2 mmol/L      Barometric Pressure for Blood Gas --     Comment: N/A        Modality Room Air     FIO2 32 %      Hemodilution No     Respiratory Rate 20    POC Glucose Once [[id number]]  (Normal) Collected:  03/14/20 1925    Specimen:  Blood Updated:  03/14/20 1927     Glucose 81 mg/dL      Comment: Serial Number: [id number]:  [id number]       [place] Draw [[id number]] Collected:  03/14/20 1504    Specimen:  Blood Updated:  03/14/20 1615    Narrative:       The following orders were created for panel order [place] Draw.  Procedure                               Abnormality         Status                     ---------                               -----------         ------                     Light Blue Top[[id number]]                                   Final result               Green Top (Gel)[[id number]]                                                             Lavender Top[[id number]]                                     Final result               Gold Top - SST[[id number]]                                   Final result                 Please view results for these tests on the individual orders.    Light Blue Top [[id number]] Collected:  03/14/20 1504    Specimen:  Blood Updated:  03/14/20 1615     Extra Tube hold for add-on     Comment: Auto resulted       Lavender Top [[id number]] Collected:  03/14/20 1504    Specimen:  Blood Updated:  03/14/20 1615     Extra Tube hold for add-on     Comment: Auto resulted       Gold Top - SST [[id number]] Collected:  03/14/20 1504    Specimen:  Blood Updated:  03/14/20 1615     Extra Tube Hold for add-ons.     Comment: Auto resulted.       Urinalysis With Microscopic If Indicated (No Culture) - Urine, Clean Catch [[id number]]  (Normal) Collected:  03/14/20 1543    Specimen:  Urine, Clean Catch Updated:  03/14/20 1602     Color, UA Yellow     Appearance, UA Clear     pH, UA 6.0     Specific Gravity, UA  1.013     Glucose, UA Negative     Ketones, UA Negative     Bilirubin, UA Negative     Blood, UA Negative     Protein, UA Negative     Leuk Esterase, UA Negative     Nitrite, UA Negative     Urobilinogen, UA 1.0 E.U./dL    Narrative:       Urine microscopic not indicated.    Comprehensive Metabolic Panel [[id number]]  (Abnormal) Collected:  03/14/20 1504    Specimen:  Blood Updated:  03/14/20 1530     Glucose 186 mg/dL      BUN 16 mg/dL      Creatinine 1.28 mg/dL      Sodium 135 mmol/L      Potassium 4.3 mmol/L      Chloride 93 mmol/L      CO2 30.0 mmol/L      Calcium 8.5 mg/dL      Total Protein 7.0 g/dL      Albumin 3.90 g/dL      ALT (SGPT) 15 U/L      AST (SGOT) 21 U/L      Alkaline Phosphatase 62 U/L      Total Bilirubin 1.5 mg/dL      eGFR Non African Amer 56 mL/min/1.73      Globulin 3.1 gm/dL      A/G Ratio 1.3 g/dL      BUN/Creatinine Ratio 12.5     Anion Gap 12.0 mmol/L     Narrative:       GFR Normal >60  Chronic Kidney Disease <60  Kidney Failure <15      Lipase [[id number]]  (Normal) Collected:  03/14/20 1504    Specimen:  Blood Updated:  03/14/20 1530     Lipase 39 U/L     Influenza Antigen, Rapid - Swab, Nasopharynx [[id number]]  (Normal) Collected:  03/14/20 1504    Specimen:  Swab from Nasopharynx Updated:  03/14/20 1524     Influenza A PCR Not Detected     Influenza B PCR Not Detected    POC Lactate [[id number]]  (Normal) Collected:  03/14/20 1522    Specimen:  Blood Updated:  03/14/20 1523     Lactate 2.0 mmol/L      Comment: Serial Number: [id number]:  [id number]       CBC & Differential [[id number]] Collected:  03/14/20 1504    Specimen:  Blood Updated:  03/14/20 1519    Narrative:       The following orders were created for panel order CBC & Differential.  Procedure                               Abnormality         Status                     ---------                               -----------         ------                     CBC Auto Differential[[id number]]        Abnormal            Final  result                 Please view results for these tests on the individual orders.    CBC Auto Differential [[id number]]  (Abnormal) Collected:  03/14/20 1504    Specimen:  Blood Updated:  03/14/20 1519     WBC 15.20 10*3/mm3      RBC 4.08 10*6/mm3      Hemoglobin 12.0 g/dL      Hematocrit 35.6 %      MCV 87.2 fL      MCH 29.4 pg      MCHC 33.7 g/dL      RDW 13.5 %      RDW-SD 41.6 fl      MPV 8.0 fL      Platelets 237 10*3/mm3      Neutrophil % 79.5 %      Lymphocyte % 10.4 %      Monocyte % 9.5 %      Eosinophil % 0.1 %      Basophil % 0.5 %      Neutrophils, Absolute 12.10 10*3/mm3      Lymphocytes, Absolute 1.60 10*3/mm3      Monocytes, Absolute 1.50 10*3/mm3      Eosinophils, Absolute 0.00 10*3/mm3      Basophils, Absolute 0.10 10*3/mm3      nRBC 0.1 /100 WBC           Imaging Results (Last 24 Hours)     Procedure Component Value Units Date/Time    CT Abdomen Pelvis With Contrast [[id number]] Collected:  03/14/20 1640     Updated:  03/14/20 1653    Narrative:          DATE OF EXAM:  3/14/2020 3:55 PM     PROCEDURE:  CT ABDOMEN PELVIS W CONTRAST-     INDICATIONS:   Abdominal pain, unspecified the past one week with nausea and vomiting  and constipation. History of hernia repair and CABG surgery.     COMPARISON:   No Comparisons Available     TECHNIQUE:  Routine transaxial slices were obtained through the abdomen and pelvis  after the intravenous administration of 100 mL of Isovue 370.  Reconstructed coronal and sagittal images were also obtained. Automated  exposure control and iterative construction methods were used.     FINDINGS:  CT abdomen pelvis with contrast contrast reveals that the patient is  status post sternotomy the heart size is normal. No evidence pericardial  effusion pericardial thickening. There is a questionable mild thickening  of the wall the inferior thoracic esophagus just above the esophageal  gastric junction and this might be caused by contraction or anatomic  variation or small hiatal  hernia or possibly inflammation and less  likely mass. Endoscopy or barium esophagram would be helpful to further  evaluate this finding. Calcified atherosclerotic plaque can be seen in  the right and left coronary arteries. There is mild pleural thickening  surrounding the right and left lower lobe along. Diffuse fatty  infiltration liver parenchyma seen. No evidence of liver mass. The  gallbladder is normal. No evidence of dilatation of the bile ducts.  There is mild inflammation and increased density in the fatty soft  tissues surrounding the body and neck and head of the pancreas and this  might be caused by mild pancreatitis. No evidence of pancreatic mass or  pancreatic pseudocysts or dilatation of the main pancreatic duct. I see  no definite evidence of gastric ulcer or perforated gastric ulcer.  Clinical correlation would BE helpful. The spleen is normal. The right  and left adrenal glands are normal. The left kidney is normal. The right  kidney is normal. No evidence of adenopathy or ascites in the abdomen.  There is questionable mild thickening of the wall of the distal gastric  antrum and pyloric channel of the stomach and this might be caused by  contraction or inflammation or infection or peptic ulcer disease.     CT the pelvis reveals no evidence of mass or adenopathy or fluid in the  pelvis. The distal right and left ureters are normal. Right and left  inguinal regions are normal. There is a small tiny amount of fluid in  the right paracolic gutter. The appendix is normal.        Impression:          1. Mild inflammatory process in the fatty soft tissues surrounding the  body and neck and head of the pancreas and lying between the pancreas  and the gastric antrum and pyloric channel and first part of the  duodenum and this is probably caused by mild pancreatitis and less  likely gastric ulcer. I see no fluid collection or gas collection in the  soft tissues in this area. Correlation with pancreas  enzymes and  clinical correlation would BE helpful to further evaluate this finding.     Electronically Signed By-DR. Noe Khan MD On:3/14/2020 4:49  PM  This report was finalized on [id number] by DR. Noe Khan MD.             ASSESSMENT AND PLAN:  Mid abdominal pain consider related to ulcer, acute gastroenteritis, pancreatitis  Abnormal CT showing questionable mild pancreatitis versus ulcer  Elevated total bilirubin RESOLVED  Nausea, vomiting, diarrhea consider above RESOLVED  Coronary artery disease/CABG 2019  Diabetes   Hypertension  Sleep apnea    PLAN:  Due to abnormal CT and patient's symptomatology we will proceed with EGD today.  Continue PPI  N.p.o. until after procedure  Upon reviewing patient's medication list at home patient is on atorvastatin, lisinopril, and metformin which are all category 3 for potentially causing pancreatitis.  Lasix is class Ia.        I discussed the patients findings and my recommendations with the patient.  Farhana Fairbanks, APRN  03/15/20  11:42    Time:

## 2020-03-15 NOTE — ANESTHESIA POSTPROCEDURE EVALUATION
Patient: Shaka Trinidad    Procedure Summary     Date:  03/15/20 Room / Location:  [facility] ENDOSCOPY 1 / [facility] ENDOSCOPY    Anesthesia Start:  1312 Anesthesia Stop:  1330    Procedure:  ESOPHAGOGASTRODUODENOSCOPY (N/A ) Diagnosis:       Abnormal CT of the abdomen      (Abnormal CT of the abdomen [R93.5])    Surgeon:  Jaspal Logan MD Provider:  Kevin Nelson MD    Anesthesia Type:  MAC ASA Status:  4          Anesthesia Type: MAC    Vitals  No vitals data found for the desired time range.          Post Anesthesia Care and Evaluation    Patient location during evaluation: PACU  Patient participation: complete - patient participated  Level of consciousness: awake  Pain scale: See nurse's notes for pain score.  Pain management: adequate  Airway patency: patent  Anesthetic complications: No anesthetic complications  PONV Status: none  Cardiovascular status: acceptable  Respiratory status: acceptable  Hydration status: acceptable    Comments: Patient seen and examined postoperatively; vital signs stable; SpO2 greater than or equal to 90%; cardiopulmonary status stable; nausea/vomiting adequately controlled; pain adequately controlled; no apparent anesthesia complications; patient discharged from anesthesia care when discharge criteria were met

## 2020-03-16 ENCOUNTER — INPATIENT HOSPITAL (AMBULATORY)
Dept: URBAN - METROPOLITAN AREA HOSPITAL 84 | Facility: HOSPITAL | Age: 70
End: 2020-03-16
Payer: COMMERCIAL

## 2020-03-16 DIAGNOSIS — I25.10 ATHEROSCLEROTIC HEART DISEASE OF NATIVE CORONARY ARTERY WITH: ICD-10-CM

## 2020-03-16 DIAGNOSIS — I10 ESSENTIAL (PRIMARY) HYPERTENSION: ICD-10-CM

## 2020-03-16 DIAGNOSIS — R11.2 NAUSEA WITH VOMITING, UNSPECIFIED: ICD-10-CM

## 2020-03-16 DIAGNOSIS — R10.13 EPIGASTRIC PAIN: ICD-10-CM

## 2020-03-16 DIAGNOSIS — E11.9 TYPE 2 DIABETES MELLITUS WITHOUT COMPLICATIONS: ICD-10-CM

## 2020-03-16 DIAGNOSIS — R19.7 DIARRHEA, UNSPECIFIED: ICD-10-CM

## 2020-03-16 DIAGNOSIS — R93.2 ABNORMAL FINDINGS ON DIAGNOSTIC IMAGING OF LIVER AND BILIARY: ICD-10-CM

## 2020-03-16 LAB
ALBUMIN SERPL-MCNC: 3 G/DL (ref 3.5–5.2)
ALBUMIN/GLOB SERPL: 1 G/DL
ALP SERPL-CCNC: 61 U/L (ref 39–117)
ALT SERPL W P-5'-P-CCNC: 11 U/L (ref 1–41)
AMYLASE SERPL-CCNC: 29 U/L (ref 28–100)
ANA SER QL: NEGATIVE
ANION GAP SERPL CALCULATED.3IONS-SCNC: 13 MMOL/L (ref 5–15)
AST SERPL-CCNC: 17 U/L (ref 1–40)
BASOPHILS # BLD AUTO: 0 10*3/MM3 (ref 0–0.2)
BASOPHILS NFR BLD AUTO: 0.4 % (ref 0–1.5)
BILIRUB SERPL-MCNC: 0.9 MG/DL (ref 0.2–1.2)
BUN BLD-MCNC: 13 MG/DL (ref 8–23)
BUN/CREAT SERPL: 14.3 (ref 7–25)
CALCIUM SPEC-SCNC: 8.3 MG/DL (ref 8.6–10.5)
CANCER AG19-9 SERPL-ACNC: 4.8 U/ML
CHLORIDE SERPL-SCNC: 98 MMOL/L (ref 98–107)
CHOLEST SERPL-MCNC: 82 MG/DL (ref 0–200)
CO2 SERPL-SCNC: 25 MMOL/L (ref 22–29)
CREAT BLD-MCNC: 0.91 MG/DL (ref 0.76–1.27)
DEPRECATED RDW RBC AUTO: 41.1 FL (ref 37–54)
EOSINOPHIL # BLD AUTO: 0.1 10*3/MM3 (ref 0–0.4)
EOSINOPHIL NFR BLD AUTO: 0.8 % (ref 0.3–6.2)
ERYTHROCYTE [DISTWIDTH] IN BLOOD BY AUTOMATED COUNT: 13.4 % (ref 12.3–15.4)
GFR SERPL CREATININE-BSD FRML MDRD: 83 ML/MIN/1.73
GLOBULIN UR ELPH-MCNC: 3.1 GM/DL
GLUCOSE BLD-MCNC: 149 MG/DL (ref 65–99)
GLUCOSE BLDC GLUCOMTR-MCNC: 144 MG/DL (ref 70–105)
GLUCOSE BLDC GLUCOMTR-MCNC: 243 MG/DL (ref 70–105)
GLUCOSE BLDC GLUCOMTR-MCNC: 248 MG/DL (ref 70–105)
GLUCOSE BLDC GLUCOMTR-MCNC: 268 MG/DL (ref 70–105)
HCT VFR BLD AUTO: 30.2 % (ref 37.5–51)
HDLC SERPL-MCNC: 34 MG/DL (ref 40–60)
HGB BLD-MCNC: 10.8 G/DL (ref 13–17.7)
LDLC SERPL CALC-MCNC: 34 MG/DL (ref 0–100)
LDLC/HDLC SERPL: 0.99 {RATIO}
LIPASE SERPL-CCNC: 25 U/L (ref 13–60)
LYMPHOCYTES # BLD AUTO: 1.3 10*3/MM3 (ref 0.7–3.1)
LYMPHOCYTES NFR BLD AUTO: 12.5 % (ref 19.6–45.3)
MCH RBC QN AUTO: 31.3 PG (ref 26.6–33)
MCHC RBC AUTO-ENTMCNC: 35.9 G/DL (ref 31.5–35.7)
MCV RBC AUTO: 87.1 FL (ref 79–97)
MONOCYTES # BLD AUTO: 1.1 10*3/MM3 (ref 0.1–0.9)
MONOCYTES NFR BLD AUTO: 10.4 % (ref 5–12)
NEUTROPHILS # BLD AUTO: 7.8 10*3/MM3 (ref 1.7–7)
NEUTROPHILS NFR BLD AUTO: 75.9 % (ref 42.7–76)
NRBC BLD AUTO-RTO: 0 /100 WBC (ref 0–0.2)
PLATELET # BLD AUTO: 209 10*3/MM3 (ref 140–450)
PMV BLD AUTO: 8.3 FL (ref 6–12)
POTASSIUM BLD-SCNC: 3.8 MMOL/L (ref 3.5–5.2)
PROT SERPL-MCNC: 6.1 G/DL (ref 6–8.5)
RBC # BLD AUTO: 3.46 10*6/MM3 (ref 4.14–5.8)
SODIUM BLD-SCNC: 136 MMOL/L (ref 136–145)
TRIGL SERPL-MCNC: 72 MG/DL (ref 0–150)
VLDLC SERPL-MCNC: 14.4 MG/DL
WBC NRBC COR # BLD: 10.3 10*3/MM3 (ref 3.4–10.8)

## 2020-03-16 PROCEDURE — 82787 IGG 1 2 3 OR 4 EACH: CPT | Performed by: HOSPITALIST

## 2020-03-16 PROCEDURE — 99232 SBSQ HOSP IP/OBS MODERATE 35: CPT | Performed by: NURSE PRACTITIONER

## 2020-03-16 PROCEDURE — 82962 GLUCOSE BLOOD TEST: CPT

## 2020-03-16 PROCEDURE — 80061 LIPID PANEL: CPT | Performed by: NURSE PRACTITIONER

## 2020-03-16 PROCEDURE — 99238 HOSP IP/OBS DSCHRG MGMT 30/<: CPT | Performed by: INTERNAL MEDICINE

## 2020-03-16 PROCEDURE — 83690 ASSAY OF LIPASE: CPT | Performed by: NURSE PRACTITIONER

## 2020-03-16 PROCEDURE — 63710000001 ONDANSETRON PER 8 MG: Performed by: INTERNAL MEDICINE

## 2020-03-16 PROCEDURE — 85025 COMPLETE CBC W/AUTO DIFF WBC: CPT | Performed by: NURSE PRACTITIONER

## 2020-03-16 PROCEDURE — 80053 COMPREHEN METABOLIC PANEL: CPT | Performed by: NURSE PRACTITIONER

## 2020-03-16 PROCEDURE — 25010000002 MORPHINE PER 10 MG: Performed by: INTERNAL MEDICINE

## 2020-03-16 PROCEDURE — 86301 IMMUNOASSAY TUMOR CA 19-9: CPT | Performed by: HOSPITALIST

## 2020-03-16 PROCEDURE — 63710000001 INSULIN GLARGINE PER 5 UNITS: Performed by: INTERNAL MEDICINE

## 2020-03-16 PROCEDURE — 82150 ASSAY OF AMYLASE: CPT | Performed by: NURSE PRACTITIONER

## 2020-03-16 RX ADMIN — HYDRALAZINE HYDROCHLORIDE 25 MG: 25 TABLET, FILM COATED ORAL at 10:10

## 2020-03-16 RX ADMIN — INSULIN GLARGINE 15 UNITS: 100 INJECTION, SOLUTION SUBCUTANEOUS at 06:15

## 2020-03-16 RX ADMIN — AMLODIPINE BESYLATE 5 MG: 5 TABLET ORAL at 10:10

## 2020-03-16 RX ADMIN — ONDANSETRON HYDROCHLORIDE 4 MG: 4 TABLET, FILM COATED ORAL at 21:49

## 2020-03-16 RX ADMIN — HYDRALAZINE HYDROCHLORIDE 25 MG: 25 TABLET, FILM COATED ORAL at 19:41

## 2020-03-16 RX ADMIN — MORPHINE SULFATE 2 MG: 4 INJECTION INTRAVENOUS at 01:16

## 2020-03-16 RX ADMIN — ATORVASTATIN CALCIUM 40 MG: 40 TABLET, FILM COATED ORAL at 21:49

## 2020-03-16 RX ADMIN — METOPROLOL TARTRATE 25 MG: 25 TABLET, FILM COATED ORAL at 10:11

## 2020-03-16 RX ADMIN — Medication 10 ML: at 21:50

## 2020-03-16 RX ADMIN — SODIUM CHLORIDE, SODIUM LACTATE, POTASSIUM CHLORIDE, AND CALCIUM CHLORIDE 150 ML/HR: 600; 310; 30; 20 INJECTION, SOLUTION INTRAVENOUS at 21:52

## 2020-03-16 RX ADMIN — MORPHINE SULFATE 2 MG: 4 INJECTION INTRAVENOUS at 21:49

## 2020-03-16 RX ADMIN — LISINOPRIL 40 MG: 20 TABLET ORAL at 10:11

## 2020-03-16 RX ADMIN — METOPROLOL TARTRATE 25 MG: 25 TABLET, FILM COATED ORAL at 21:49

## 2020-03-16 NOTE — PROGRESS NOTES
LOS: 2 days   Patient Care Team:  Karly Marmolejo APRN as PCP - General (Nurse Practitioner)  Tangela Hoover APRN as PCP - Family Medicine  Tangela Hoover APRN as PCP - Claims Attributed      Subjective     Patient is doing better today. He denies nausea, vomiting, or abdominal pain other than some mild soreness. He is tolerating clear liquid diet. CEA 1.99, RUQ US shows some fatty liver. Otherwise, labs unremarkable. CA 19.9, AMOS, IgG4, and TG pending.    3/15/2020 EGD (Dr. Logan) - unremarkable      ROS:   Review of Systems   Cardiovascular: Negative for chest pain and palpitations.   Gastrointestinal: Negative for abdominal distention, abdominal pain, blood in stool, constipation, diarrhea, nausea and vomiting.        Mild abdominal soreness        Medication Review:     Current Facility-Administered Medications:   •  amLODIPine (NORVASC) tablet 5 mg, 5 mg, Oral, Daily, Jaspal Logan MD, 5 mg at 03/16/20 1010  •  atorvastatin (LIPITOR) tablet 40 mg, 40 mg, Oral, Nightly, Jaspal Logan MD, 40 mg at 03/15/20 2252  •  dextrose 5 % and sodium chloride 0.45 % infusion, 75 mL/hr, Intravenous, Continuous, Jaspal Logan MD, Stopped at 03/15/20 1230  •  hydrALAZINE (APRESOLINE) tablet 25 mg, 25 mg, Oral, TID, Jaspal Logan MD, 25 mg at 03/16/20 1010  •  influenza vac split quad (FLUZONE,FLUARIX,AFLURIA) injection 0.5 mL, 0.5 mL, Intramuscular, During Hospitalization, Jaspal Logan MD  •  insulin glargine (LANTUS) injection 15 Units, 15 Units, Subcutaneous, QAM, Jaspal Logan MD, 15 Units at 03/16/20 0615  •  lactated ringers infusion, 150 mL/hr, Intravenous, Continuous, Jaspal Logan MD, Last Rate: 150 mL/hr at 03/14/20 2102, 150 mL/hr at 03/14/20 2102  •  lisinopril (PRINIVIL,ZESTRIL) tablet 40 mg, 40 mg, Oral, Daily, Jaspal Logan MD, 40 mg at 03/16/20 1011  •  metoprolol tartrate (LOPRESSOR) tablet 25 mg, 25 mg,  Oral, Q12H, Jaspal Logan MD, 25 mg at 03/16/20 1011  •  Morphine sulfate (PF) injection 2 mg, 2 mg, Intravenous, Q4H PRN, Jaspal Logan MD, 2 mg at 03/16/20 0116  •  ondansetron (ZOFRAN) tablet 4 mg, 4 mg, Oral, Q6H PRN **OR** ondansetron (ZOFRAN) injection 4 mg, 4 mg, Intravenous, Q6H PRN, Jaspal Logan MD  •  sodium chloride 0.9 % flush 10 mL, 10 mL, Intravenous, PRN, Jaspal Logan MD  •  sodium chloride 0.9 % flush 10 mL, 10 mL, Intravenous, PRN, Jaspal Logan MD  •  sodium chloride 0.9 % flush 10 mL, 10 mL, Intravenous, Q12H, Jaspal Logan MD, 10 mL at 03/15/20 2252  •  sodium chloride 0.9 % flush 10 mL, 10 mL, Intravenous, PRN, Jaspal Logan MD      Objective     Vital Signs  Vitals:    03/1950 03/15/20 1954 03/16/20 0218 03/16/20 0900   BP: 142/71 171/86 125/65 152/80   BP Location: Right arm Right arm Right arm Right arm   Patient Position: Lying Lying Lying Lying   Pulse: 101 74 78 87   Resp: 16 16 16    Temp: 99 °F (37.2 °C) 97.5 °F (36.4 °C) 98.3 °F (36.8 °C) 98.6 °F (37 °C)   TempSrc: Oral Oral Oral Oral   SpO2: 90% 100% 92% 92%   Weight:       Height:           Physical Exam:     General Appearance:    Awake and alert, in no acute distress   Head:    Normocephalic, without obvious abnormality   Eyes:          Conjunctivae normal, anicteric sclera   Throat:   No oral lesions, no thrush, oral mucosa moist   Neck:   No adenopathy, supple, no JVD   Lungs:     Respirations regular, even and unlabored   Abdomen:     Soft, mild tenderness, non-distended, no rebound or guarding, no hepatosplenomegaly   Rectal:     Deferred   Extremities:   No edema, no cyanosis, moves equally bilaterally   Skin:   No bruising, no rash, no jaundice        Results Review:    Lab Results (last 24 hours)     Procedure Component Value Units Date/Time    POC Glucose Once [[id number]]  (Abnormal) Collected:  03/16/20 1150    Specimen:   Blood Updated:  03/16/20 1201     Glucose 268 mg/dL      Comment: Serial Number: [id number]:  [id number]       AMOS [[id number]]  (Normal) Collected:  03/14/20 1504    Specimen:  Blood Updated:  03/16/20 1121     Antinuclear Antibodies (AMOS) Negative    POC Glucose Once [[id number]]  (Abnormal) Collected:  03/16/20 0713    Specimen:  Blood Updated:  03/16/20 0715     Glucose 144 mg/dL      Comment: Serial Number: [id number]:  [id number]       Comprehensive Metabolic Panel [[id number]]  (Abnormal) Collected:  03/16/20 0342    Specimen:  Blood Updated:  03/16/20 0526     Glucose 149 mg/dL      BUN 13 mg/dL      Creatinine 0.91 mg/dL      Sodium 136 mmol/L      Potassium 3.8 mmol/L      Chloride 98 mmol/L      CO2 25.0 mmol/L      Calcium 8.3 mg/dL      Total Protein 6.1 g/dL      Albumin 3.00 g/dL      ALT (SGPT) 11 U/L      AST (SGOT) 17 U/L      Alkaline Phosphatase 61 U/L      Total Bilirubin 0.9 mg/dL      eGFR Non African Amer 83 mL/min/1.73      Globulin 3.1 gm/dL      A/G Ratio 1.0 g/dL      BUN/Creatinine Ratio 14.3     Anion Gap 13.0 mmol/L     Narrative:       GFR Normal >60  Chronic Kidney Disease <60  Kidney Failure <15      Lipid Panel [[id number]]  (Abnormal) Collected:  03/16/20 0342    Specimen:  Blood Updated:  03/16/20 0526     Total Cholesterol 82 mg/dL      Triglycerides 72 mg/dL      HDL Cholesterol 34 mg/dL      LDL Cholesterol  34 mg/dL      VLDL Cholesterol 14.4 mg/dL      LDL/HDL Ratio 0.99    Narrative:       Cholesterol Reference Ranges  (U.S. Department of Health and Human Services ATP III Classifications)    Desirable          <200 mg/dL  Borderline High    200-239 mg/dL  High Risk          >240 mg/dL      Triglyceride Reference Ranges  (U.S. Department of Health and Human Services ATP III Classifications)    Normal           <150 mg/dL  Borderline High  150-199 mg/dL  High             200-499 mg/dL  Very High        >500 mg/dL    HDL Reference Ranges  (U.S. Department of Health and  Human Services ATP III Classifcations)    Low     <40 mg/dl (major risk factor for CHD)  High    >60 mg/dl ('negative' risk factor for CHD)        LDL Reference Ranges  (U.S. Department of Health and Human Services ATP III Classifcations)    Optimal          <100 mg/dL  Near Optimal     100-129 mg/dL  Borderline High  130-159 mg/dL  High             160-189 mg/dL  Very High        >189 mg/dL    Lipase [[id number]]  (Normal) Collected:  03/16/20 0342    Specimen:  Blood Updated:  03/16/20 0526     Lipase 25 U/L     Amylase [[id number]]  (Normal) Collected:  03/16/20 0342    Specimen:  Blood Updated:  03/16/20 0526     Amylase 29 U/L     CBC & Differential [[id number]] Collected:  03/16/20 0342    Specimen:  Blood Updated:  03/16/20 0458    Narrative:       The following orders were created for panel order CBC & Differential.  Procedure                               Abnormality         Status                     ---------                               -----------         ------                     CBC Auto Differential[[id number]]        Abnormal            Final result                 Please view results for these tests on the individual orders.    CBC Auto Differential [[id number]]  (Abnormal) Collected:  03/16/20 0342    Specimen:  Blood Updated:  03/16/20 0458     WBC 10.30 10*3/mm3      RBC 3.46 10*6/mm3      Hemoglobin 10.8 g/dL      Hematocrit 30.2 %      MCV 87.1 fL      MCH 31.3 pg      MCHC 35.9 g/dL      RDW 13.4 %      RDW-SD 41.1 fl      MPV 8.3 fL      Platelets 209 10*3/mm3      Neutrophil % 75.9 %      Lymphocyte % 12.5 %      Monocyte % 10.4 %      Eosinophil % 0.8 %      Basophil % 0.4 %      Neutrophils, Absolute 7.80 10*3/mm3      Lymphocytes, Absolute 1.30 10*3/mm3      Monocytes, Absolute 1.10 10*3/mm3      Eosinophils, Absolute 0.10 10*3/mm3      Basophils, Absolute 0.00 10*3/mm3      nRBC 0.0 /100 WBC     Cancer Antigen 19-9 [[id number]] Collected:  03/16/20 0342    Specimen:  Blood Updated:   03/16/20 0453    IgG 4 [[id number]] Collected:  03/16/20 0342    Specimen:  Blood Updated:  03/16/20 0453    POC Glucose Once [[id number]]  (Abnormal) Collected:  03/15/20 1915    Specimen:  Blood Updated:  03/15/20 1922     Glucose 157 mg/dL      Comment: Serial Number: [id number]:  [id number]       CEA [[id number]] Collected:  03/14/20 1504    Specimen:  Blood Updated:  03/15/20 1812     CEA 1.99 ng/mL     Narrative:       CEA Reference Range:    Non Smokers:   Less than 3 ng/mL  Smokers:       Less than 5 ng/mL  Results may be falsely decreased if patient taking Biotin.      POC Glucose Once [[id number]]  (Abnormal) Collected:  03/15/20 1649    Specimen:  Blood Updated:  03/15/20 1650     Glucose 170 mg/dL      Comment: Serial Number: [id number]:  [id number]             Imaging Results (Last 24 Hours)     Procedure Component Value Units Date/Time    US Gallbladder [[id number]] Collected:  03/15/20 1858     Updated:  03/15/20 2100    Narrative:       Exam: Gallbladder ultrasound    INDICATION: Gallstones    FINDINGS:  Liver: Mildly echogenic.    Gallbladder and bile ducts: Normal.  There are no dilated biliary ducts.  Common bile duct measures 4 mm.    Right kidney: Not visualized.    Pancreas: Not visualized.      Impression:       1.  Normal gallbladder.  2.  Possible fatty replacement of the liver.    Electronically signed by:  Brayden Gonzalez M.D.    3/15/2020 6:59 PM              ASSESSMENT:  Mid abdominal pain - improved  Nausea, vomiting, diarrhea - resolved  CAD, s/p CABG 2019  DM  HTN  Sleep apnea    PLAN:  Suspect idiopathic acute pancreatitis  CA 19.9, AMOS, IgG4, TG pending  Symptoms improving  egd negative   Advance diet as tolerated to low fat diet. If continues to tolerate food, he may go home from GI standpoint and follow up with [facility] as outpatient.       Harper Griffith, APRN  03/16/20  12:12    Much of the above report is an electronic transcription/translation of the spoken language to  printed text using Dragon Software. As such, the subtleties and finesse of the spoken language may permit erroneous, or at times, nonsensical words or phrases to be inadvertently transcribed; thus changes may be made at a later date to rectify these errors.

## 2020-03-16 NOTE — DISCHARGE SUMMARY
[facility]   DISCHARGE SUMMARY    Patient Name: Shaka Trinidad  : 1950  MRN: [id number]    Date of Admission: 3/14/2020  Date of Discharge:  3/16/2020    Primary Care Physician: Karly Marmolejo APRN    Consults     Date and Time Order Name Status Description    3/14/2020 1842 Inpatient Gastroenterology Consult      3/14/2020 1704 Hospitalist (on-call MD unless specified) Completed           Hospital Course     Presenting Problem:   Acute pancreatitis, unspecified complication status, unspecified pancreatitis type [K85.90]    Active Hospital Problems:  No notes have been filed under this hospital service.  Service: Hospitalist      Resolved Hospital Problems:  No notes have been filed under this hospital service.  Service: Hospitalist            Day of Discharge     HPI and Clinical Course:Mr. Trinidad is a 69 y.o.  presents to [facility] complaint of upper abdominal pain for last few days associated with feeling nausea, not improving at all. Patient underwent ct scan abdomen revealing mild stranding around the pancreas area. Pt pancreatitis enzymes were noted within normal limit, following this asked to admit the patient for further care. Patient denies for any chest pain, no nausea or vomiting.      GI was consulted. EGD was done yesterday.  It was normal EGD.   The patient started clear liquid diet and he is tolerable. Gradually advancing now. He is cleared to go home by GI..  They will plan for endoscopic US to rule out possible pancreas cancer at the [facility].  Frequent small meals advised.       Vital Signs:   Temp:  [97.5 °F (36.4 °C)-99 °F (37.2 °C)] 98.6 °F (37 °C)  Heart Rate:  [] 87  Resp:  [16-24] 16  BP: (122-171)/(59-86) 152/80     Physical Exam:      Pertinent  and/or Most Recent Results     Results from last 7 days   Lab Units 20  0342 03/15/20  0252 20  1504   WBC 10*3/mm3 10.30 11.80*  --  15.20*   HEMOGLOBIN g/dL 10.8* 10.8*  --  12.0*    HEMATOCRIT % 30.2* 31.7*  --  35.6*   PLATELETS 10*3/mm3 209 184  --  237   SODIUM mmol/L 136 134* 137 135*   POTASSIUM mmol/L 3.8 3.7 4.2 4.3   CHLORIDE mmol/L 98 96* 95* 93*   CO2 mmol/L 25.0 26.0 29.0 30.0*   BUN mg/dL 13 16 16 16   CREATININE mg/dL 0.91 1.14 1.13 1.28*   GLUCOSE mg/dL 149* 165* 97 186*   CALCIUM mg/dL 8.3* 8.0* 8.7 8.5*     Results from last 7 days   Lab Units 03/16/20  0342 03/15/20  0252 03/14/20  1504   BILIRUBIN mg/dL 0.9 1.0 1.5*   ALK PHOS U/L 61 56 62   ALT (SGPT) U/L 11 12 15   AST (SGOT) U/L 17 16 21     Results from last 7 days   Lab Units 03/16/20  0342   CHOLESTEROL mg/dL 82   TRIGLYCERIDES mg/dL 72   HDL CHOL mg/dL 34*     Results from last 7 days   Lab Units 03/14/20  1522   LACTATE mmol/L 2.0       Brief Urine Lab Results  (Last result in the past 365 days)      Color   Clarity   Blood   Leuk Est   Nitrite   Protein   CREAT   Urine HCG        03/14/20 1543 Yellow Clear Negative Negative Negative Negative               Microbiology Results Abnormal     Procedure Component Value - Date/Time    Influenza Antigen, Rapid - Swab, Nasopharynx [[id number]]  (Normal) Collected:  03/14/20 1504    Lab Status:  Final result Specimen:  Swab from Nasopharynx Updated:  03/14/20 1524     Influenza A PCR Not Detected     Influenza B PCR Not Detected          Ct Abdomen Pelvis With Contrast    Result Date: 3/14/2020  Impression:  1. Mild inflammatory process in the fatty soft tissues surrounding the body and neck and head of the pancreas and lying between the pancreas and the gastric antrum and pyloric channel and first part of the duodenum and this is probably caused by mild pancreatitis and less likely gastric ulcer. I see no fluid collection or gas collection in the soft tissues in this area. Correlation with pancreas enzymes and clinical correlation would BE helpful to further evaluate this finding.  Electronically Signed By-DR. Noe Khan MD On:3/14/2020 4:49 PM This report was  finalized on [id number] by DR. Noe Khan MD.    Us Gallbladder    Result Date: 3/15/2020  Impression: 1.  Normal gallbladder. 2.  Possible fatty replacement of the liver. Electronically signed by:  Brayden Gonzalez M.D.  3/15/2020 6:59 PM      Results for orders placed during the hospital encounter of 08/25/19   Duplex Venous Lower Extremity - Right CAR    Narrative · Normal right lower extremity venous duplex scan.          Results for orders placed during the hospital encounter of 08/25/19   Duplex Venous Lower Extremity - Right CAR    Narrative · Normal right lower extremity venous duplex scan.                Order Current Status    Cancer Antigen 19-9 In process    IgG 4 In process        Discharge Details        Discharge Medications      Changes to Medications      Instructions Start Date   hydrALAZINE 25 MG tablet  Commonly known as:  APRESOLINE  What changed:  when to take this   25 mg, Oral, 3 Times Daily      Insulin Degludec 100 UNIT/ML solution injection  Commonly known as:  TRESIBA  What changed:  how much to take   32 Units, Subcutaneous, Daily      polyethylene glycol packet  Commonly known as:  MIRALAX  What changed:    · when to take this  · reasons to take this   17 g, Oral, 2 Times Daily         Continue These Medications      Instructions Start Date   amLODIPine 5 MG tablet  Commonly known as:  NORVASC   5 mg, Oral, Daily      aspirin 81 MG chewable tablet   81 mg, Oral, Nightly      atorvastatin 40 MG tablet  Commonly known as:  LIPITOR   TAKE 1 TABLET BY MOUTH EVERY DAY AT NIGHT      CALCIUM 1200 [id number] MG-UNIT chewable tablet   1 tablet, Oral, Daily      cetirizine 10 MG tablet  Commonly known as:  zyrTEC   10 mg, Oral, Daily      Cinnamon 500 MG tablet   1,000 mg, Oral, Every Evening      furosemide 20 MG tablet  Commonly known as:  LASIX   20 mg, Oral, Daily      glipizide 5 MG tablet  Commonly known as:  GLUCOTROL   10 mg, Oral, Every Morning      glipizide 5 MG  tablet  Commonly known as:  GLUCOTROL   5 mg, Oral, Every Evening      Iron-Vitamin C 100-250 MG tablet   1 tablet, Oral, 2 Times Daily      lisinopril 40 MG tablet  Commonly known as:  PRINIVIL,ZESTRIL   TAKE 1 TABLET BY MOUTH EVERY DAY      metFORMIN 1000 MG tablet  Commonly known as:  GLUCOPHAGE   1,000 mg, Oral, 2 Times Daily With Meals      metoprolol tartrate 25 MG tablet  Commonly known as:  LOPRESSOR   25 mg, Oral, Every 12 Hours Scheduled      ondansetron 4 MG tablet  Commonly known as:  ZOFRAN   4 mg, Oral, Every 8 Hours PRN      potassium chloride 10 MEQ CR capsule  Commonly known as:  MICRO-K   10 mEq, Oral, Daily      vitamin C 500 MG tablet  Commonly known as:  ASCORBIC ACID   1,000 mg, Oral, Every Evening             No Known Allergies      Discharge Disposition:  Home or Self Care    Diet:  Hospital:  Diet Order   Procedures   • Diet Gastrointestinal; Low Fat         Discharge Activity:   Activity Instructions     As tolerated                 CODE STATUS:    Code Status and Medical Interventions:   Ordered at: 03/14/20 1841     Level Of Support Discussed With:    Patient     Code Status:    CPR     Medical Interventions (Level of Support Prior to Arrest):    Full         Future Appointments   Date Time Provider Department Center   4/29/2020 11:40 AM Bautista Lopez MD [facility]   8/11/2020  9:15 AM NURSE/MA [facility] None   8/18/2020 10:00 AM Karly Marmolejo APRN [facility] None           Time spent on Discharge including face to face service:  30 minutes    Electronically signed by Carmelo Mitcehll MD, 03/16/20, 1:04 PM.

## 2020-03-16 NOTE — PLAN OF CARE
Problem: Patient Care Overview  Goal: Plan of Care Review  Outcome: Ongoing (interventions implemented as appropriate)  Flowsheets  Taken 3/16/2020 8332  Progress: no change  Outcome Summary: pt has no complaints during shift. pt is cooperative. pt awaiting d/c. cleared by GI. awaiting message back from  MD. will continue to monitor.  Taken 3/16/2020 0701  Plan of Care Reviewed With: patient

## 2020-03-16 NOTE — PLAN OF CARE
Problem: Patient Care Overview  Goal: Plan of Care Review  Outcome: Ongoing (interventions implemented as appropriate)  Flowsheets  Taken 3/15/2020 0331 by Eloisa Cade RN  Progress: no change  Taken 3/15/2020 2055 by Mary Ellen Stephenson RN  Plan of Care Reviewed With: patient  Taken 3/15/2020 1621 by Obed Xiong, RN  Outcome Summary: Patient still having some pain in the abdomen. EGD perfomed today and was negative. Gallbladder ultrasound ordered. No other complaints voiced. will continue to monitor  Goal: Individualization and Mutuality  Outcome: Ongoing (interventions implemented as appropriate)  Goal: Discharge Needs Assessment  Outcome: Ongoing (interventions implemented as appropriate)  Flowsheets  Taken 3/14/2020 1931 by Salvatore Anders RN  Equipment Currently Used at Home: none  Taken 3/14/2020 1935 by Salvatore Anders RN  Transportation Anticipated: family or friend will provide  Patient/Family Anticipated Services at Transition: none  Patient/Family Anticipates Transition to: home with family  Goal: Interprofessional Rounds/Family Conf  Outcome: Ongoing (interventions implemented as appropriate)     Problem: Pancreatitis, Acute/Chronic (Adult)  Goal: Signs and Symptoms of Listed Potential Problems Will be Absent, Minimized or Managed (Pancreatitis, Acute/Chronic)  Outcome: Ongoing (interventions implemented as appropriate)     Problem: Pain, Acute (Adult)  Goal: Identify Related Risk Factors and Signs and Symptoms  Outcome: Ongoing (interventions implemented as appropriate)  Goal: Acceptable Pain Control/Comfort Level  Outcome: Ongoing (interventions implemented as appropriate)  Flowsheets (Taken 3/15/2020 1621 by Obde Xiong, RN)  Acceptable Pain Control/Comfort Level: achieves outcome

## 2020-03-17 VITALS
OXYGEN SATURATION: 93 % | WEIGHT: 210.76 LBS | HEIGHT: 68 IN | RESPIRATION RATE: 16 BRPM | SYSTOLIC BLOOD PRESSURE: 137 MMHG | TEMPERATURE: 97.8 F | BODY MASS INDEX: 31.94 KG/M2 | DIASTOLIC BLOOD PRESSURE: 69 MMHG | HEART RATE: 84 BPM

## 2020-03-17 LAB
ANION GAP SERPL CALCULATED.3IONS-SCNC: 11 MMOL/L (ref 5–15)
BUN BLD-MCNC: 13 MG/DL (ref 8–23)
BUN/CREAT SERPL: 14.6 (ref 7–25)
CALCIUM SPEC-SCNC: 8.9 MG/DL (ref 8.6–10.5)
CHLORIDE SERPL-SCNC: 96 MMOL/L (ref 98–107)
CO2 SERPL-SCNC: 26 MMOL/L (ref 22–29)
CREAT BLD-MCNC: 0.89 MG/DL (ref 0.76–1.27)
GFR SERPL CREATININE-BSD FRML MDRD: 85 ML/MIN/1.73
GLUCOSE BLD-MCNC: 200 MG/DL (ref 65–99)
GLUCOSE BLDC GLUCOMTR-MCNC: 200 MG/DL (ref 70–105)
GLUCOSE BLDC GLUCOMTR-MCNC: 224 MG/DL (ref 70–105)
IGG4 SER-MCNC: 23 MG/DL (ref 2–96)
POTASSIUM BLD-SCNC: 3.7 MMOL/L (ref 3.5–5.2)
SODIUM BLD-SCNC: 133 MMOL/L (ref 136–145)

## 2020-03-17 PROCEDURE — 63710000001 INSULIN GLARGINE PER 5 UNITS: Performed by: INTERNAL MEDICINE

## 2020-03-17 PROCEDURE — 80048 BASIC METABOLIC PNL TOTAL CA: CPT | Performed by: INTERNAL MEDICINE

## 2020-03-17 PROCEDURE — 99231 SBSQ HOSP IP/OBS SF/LOW 25: CPT | Performed by: INTERNAL MEDICINE

## 2020-03-17 PROCEDURE — 82962 GLUCOSE BLOOD TEST: CPT

## 2020-03-17 RX ADMIN — Medication 10 ML: at 11:00

## 2020-03-17 RX ADMIN — LISINOPRIL 40 MG: 20 TABLET ORAL at 09:56

## 2020-03-17 RX ADMIN — SODIUM CHLORIDE, SODIUM LACTATE, POTASSIUM CHLORIDE, AND CALCIUM CHLORIDE 150 ML/HR: 600; 310; 30; 20 INJECTION, SOLUTION INTRAVENOUS at 04:27

## 2020-03-17 RX ADMIN — AMLODIPINE BESYLATE 5 MG: 5 TABLET ORAL at 09:56

## 2020-03-17 RX ADMIN — METOPROLOL TARTRATE 25 MG: 25 TABLET, FILM COATED ORAL at 09:56

## 2020-03-17 RX ADMIN — INSULIN GLARGINE 15 UNITS: 100 INJECTION, SOLUTION SUBCUTANEOUS at 09:56

## 2020-03-17 RX ADMIN — HYDRALAZINE HYDROCHLORIDE 25 MG: 25 TABLET, FILM COATED ORAL at 09:56

## 2020-03-17 NOTE — SIGNIFICANT NOTE
I spoke with the patient's nurse about the patient's current condition.  He was supposed to discharge tonight, but when he went to eat he experienced further nausea and abdominal pain.  A call was placed to Dr. Mitchell, the patient's physician, but he stated this was not his patient.  However, he placed discharge orders and a discharge summary today.  This will be deferred to day shift and we will keep the patient overnight and restart his IV fluids.

## 2020-03-17 NOTE — NURSING NOTE
Placed call to hospitialist group regarding the patient not feeling comfortable going home still being in pain. Patient is complaining of abdominal pain

## 2020-03-17 NOTE — NURSING NOTE
Placed call to Hospitalist group regarding patients condition. Dr Mitchell placed discharge orders but upon assessment the patient was having abdominal pain and nausea. The previous shift made several attempts to reach MD. PARISH Arrington returned call and said to keep patient overnight and to continue hydrating patient with fluids.

## 2020-03-17 NOTE — PLAN OF CARE
Problem: Patient Care Overview  Goal: Plan of Care Review  Outcome: Ongoing (interventions implemented as appropriate)  Flowsheets  Taken 3/16/2020 1759 by Sonia Elder, RN  Progress: no change  Taken 3/16/2020 1950 by Mary Ellen Stephenson RN  Plan of Care Reviewed With: patient  Note:   Patient had complaints of pain in abdomen and had episodes of nausea intermittent throughout the night  Goal: Individualization and Mutuality  Outcome: Ongoing (interventions implemented as appropriate)  Goal: Discharge Needs Assessment  Outcome: Ongoing (interventions implemented as appropriate)  Flowsheets (Taken 3/16/2020 1440 by Gera De La Paz, RN)  Equipment Needed After Discharge: none  Discharge Coordination/Progress: Return home with spouse - no needs.  Equipment Currently Used at Home: none  Anticipated Changes Related to Illness: none  Transportation Anticipated: family or friend will provide  Transportation Concerns: car, none  Concerns to be Addressed: no discharge needs identified  Readmission Within the Last 30 Days: no previous admission in last 30 days  Patient/Family Anticipated Services at Transition: none  Patient/Family Anticipates Transition to: home with family  Goal: Interprofessional Rounds/Family Conf  Outcome: Ongoing (interventions implemented as appropriate)     Problem: Pancreatitis, Acute/Chronic (Adult)  Goal: Signs and Symptoms of Listed Potential Problems Will be Absent, Minimized or Managed (Pancreatitis, Acute/Chronic)  Outcome: Ongoing (interventions implemented as appropriate)     Problem: Pain, Acute (Adult)  Goal: Identify Related Risk Factors and Signs and Symptoms  Outcome: Ongoing (interventions implemented as appropriate)  Goal: Acceptable Pain Control/Comfort Level  Outcome: Ongoing (interventions implemented as appropriate)  Flowsheets (Taken 3/17/2020 0311)  Acceptable Pain Control/Comfort Level: making progress toward outcome

## 2020-03-17 NOTE — DISCHARGE SUMMARY
[facility]   DISCHARGE SUMMARY    Patient Name: Shaka Trinidad  : 1950  MRN: [id number]    Date of Admission: 3/14/2020  Date of Discharge:  3/17/2020    Primary Care Physician: Karly Marmolejo APRN    Consults     Date and Time Order Name Status Description    3/14/2020 1704 Hospitalist (on-call MD unless specified) Completed           Hospital Course     Presenting Problem:   Acute pancreatitis, unspecified complication status, unspecified pancreatitis type [K85.90]    Active Hospital Problems:  No notes have been filed under this hospital service.  Service: Hospitalist      Resolved Hospital Problems:  No notes have been filed under this hospital service.  Service: Hospitalist            Day of Discharge     HPI and Clinical Course:Mr. Trinidad is a 69 y.o.  presents to [facility] complaint of upper abdominal pain for last few days associated with feeling nausea, not improving at all. Patient underwent ct scan abdomen revealing mild stranding around the pancreas area. Pt pancreatitis enzymes were noted within normal limit, following this asked to admit the patient for further care. Patient denies for any chest pain, no nausea or vomiting.      GI was consulted. EGD was done yesterday.  It was normal EGD.   The patient started clear liquid diet and he is tolerable. Gradually advancing now. He is cleared to go home by GI..  They will plan for endoscopic US to rule out possible pancreas cancer at the [facility].  Frequent small meals advised.       He was supposed to be discharged yesterday.  Delayed with some communication reasons.    Vital Signs:   Temp:  [97.8 °F (36.6 °C)-98.6 °F (37 °C)] 97.8 °F (36.6 °C)  Heart Rate:  [80-89] 84  Resp:  [16] 16  BP: (137-159)/(69-74) 137/69     Physical Exam:  Constitutional: He is oriented to person, place, and time. He appears well-developed and well-nourished. No distress.   HENT:   Head: Normocephalic and atraumatic.   Right Ear: External ear normal.    Left Ear: External ear normal.   Nose: Nose normal.   Mouth/Throat: Oropharynx is clear and moist. No oropharyngeal exudate.   Eyes: Pupils are equal, round, and reactive to light. Conjunctivae and EOM are normal. Right eye exhibits no discharge. Left eye exhibits no discharge. No scleral icterus.   Neck: Normal range of motion. No JVD present. No tracheal deviation present. No thyromegaly present.   Cardiovascular: Normal rate, regular rhythm, normal heart sounds and intact distal pulses. Exam reveals no gallop and no friction rub.   No murmur heard.  Pulmonary/Chest: Effort normal and breath sounds normal. No stridor. No respiratory distress. He has no wheezes. He has no rales. He exhibits no tenderness.   Abdominal: Soft. Bowel sounds are normal. He exhibits no distension and no mass. There is no tenderness. There is no rebound and no guarding. No hernia.   Musculoskeletal: Normal range of motion. He exhibits no edema, tenderness or deformity.   Lymphadenopathy:     He has no cervical adenopathy.   Neurological: He is alert and oriented to person, place, and time. No cranial nerve deficit or sensory deficit. He exhibits normal muscle tone. Coordination normal.   Skin: Skin is warm and dry. No rash noted. He is not diaphoretic. No erythema.   Psychiatric: He has a normal mood and affect. His behavior is normal.   Nursing note and vitals reviewed.    Pertinent  and/or Most Recent Results     Results from last 7 days   Lab Units 03/17/20  0427 03/16/20  0342 03/15/20  0252 03/14/20 2014 03/14/20  1504   WBC 10*3/mm3  --  10.30 11.80*  --  15.20*   HEMOGLOBIN g/dL  --  10.8* 10.8*  --  12.0*   HEMATOCRIT %  --  30.2* 31.7*  --  35.6*   PLATELETS 10*3/mm3  --  209 184  --  237   SODIUM mmol/L 133* 136 134* 137 135*   POTASSIUM mmol/L 3.7 3.8 3.7 4.2 4.3   CHLORIDE mmol/L 96* 98 96* 95* 93*   CO2 mmol/L 26.0 25.0 26.0 29.0 30.0*   BUN mg/dL 13 13 16 16 16   CREATININE mg/dL 0.89 0.91 1.14 1.13 1.28*   GLUCOSE mg/dL  200* 149* 165* 97 186*   CALCIUM mg/dL 8.9 8.3* 8.0* 8.7 8.5*     Results from last 7 days   Lab Units 03/16/20  0342 03/15/20  0252 03/14/20  1504   BILIRUBIN mg/dL 0.9 1.0 1.5*   ALK PHOS U/L 61 56 62   ALT (SGPT) U/L 11 12 15   AST (SGOT) U/L 17 16 21     Results from last 7 days   Lab Units 03/16/20  0342   CHOLESTEROL mg/dL 82   TRIGLYCERIDES mg/dL 72   HDL CHOL mg/dL 34*     Results from last 7 days   Lab Units 03/14/20  1522   LACTATE mmol/L 2.0       Brief Urine Lab Results  (Last result in the past 365 days)      Color   Clarity   Blood   Leuk Est   Nitrite   Protein   CREAT   Urine HCG        03/14/20 1543 Yellow Clear Negative Negative Negative Negative               Microbiology Results Abnormal     Procedure Component Value - Date/Time    Influenza Antigen, Rapid - Swab, Nasopharynx [[id number]]  (Normal) Collected:  03/14/20 1504    Lab Status:  Final result Specimen:  Swab from Nasopharynx Updated:  03/14/20 1524     Influenza A PCR Not Detected     Influenza B PCR Not Detected          Ct Abdomen Pelvis With Contrast    Result Date: 3/14/2020  Impression:  1. Mild inflammatory process in the fatty soft tissues surrounding the body and neck and head of the pancreas and lying between the pancreas and the gastric antrum and pyloric channel and first part of the duodenum and this is probably caused by mild pancreatitis and less likely gastric ulcer. I see no fluid collection or gas collection in the soft tissues in this area. Correlation with pancreas enzymes and clinical correlation would BE helpful to further evaluate this finding.  Electronically Signed By-DR. Noe Khan MD On:3/14/2020 4:49 PM This report was finalized on [id number] by DR. Noe Khan MD.    Us Gallbladder    Result Date: 3/15/2020  Impression: 1.  Normal gallbladder. 2.  Possible fatty replacement of the liver. Electronically signed by:  Brayden Gonzalez M.D.  3/15/2020 6:59 PM      Results for orders placed  during the hospital encounter of 08/25/19   Duplex Venous Lower Extremity - Right CAR    Narrative · Normal right lower extremity venous duplex scan.          Results for orders placed during the hospital encounter of 08/25/19   Duplex Venous Lower Extremity - Right CAR    Narrative · Normal right lower extremity venous duplex scan.                 Discharge Details        Discharge Medications      Changes to Medications      Instructions Start Date   hydrALAZINE 25 MG tablet  Commonly known as:  APRESOLINE  What changed:  when to take this   25 mg, Oral, 3 Times Daily      Insulin Degludec 100 UNIT/ML solution injection  Commonly known as:  Tresiba  What changed:  how much to take   32 Units, Subcutaneous, Daily      polyethylene glycol packet  Commonly known as:  MIRALAX  What changed:    · when to take this  · reasons to take this   17 g, Oral, 2 Times Daily         Continue These Medications      Instructions Start Date   amLODIPine 5 MG tablet  Commonly known as:  NORVASC   5 mg, Oral, Daily      aspirin 81 MG chewable tablet   81 mg, Oral, Nightly      atorvastatin 40 MG tablet  Commonly known as:  LIPITOR   TAKE 1 TABLET BY MOUTH EVERY DAY AT NIGHT      Calcium 1200 4904-9664 MG-UNIT chewable tablet   1 tablet, Oral, Daily      cetirizine 10 MG tablet  Commonly known as:  zyrTEC   10 mg, Oral, Daily      Cinnamon 500 MG tablet   1,000 mg, Oral, Every Evening      furosemide 20 MG tablet  Commonly known as:  LASIX   20 mg, Oral, Daily      glipizide 5 MG tablet  Commonly known as:  GLUCOTROL   10 mg, Oral, Every Morning      glipizide 5 MG tablet  Commonly known as:  GLUCOTROL   5 mg, Oral, Every Evening      Iron-Vitamin C 100-250 MG tablet   1 tablet, Oral, 2 Times Daily      lisinopril 40 MG tablet  Commonly known as:  PRINIVIL,ZESTRIL   TAKE 1 TABLET BY MOUTH EVERY DAY      metFORMIN 1000 MG tablet  Commonly known as:  GLUCOPHAGE   1,000 mg, Oral, 2 Times Daily With Meals      metoprolol tartrate 25 MG  tablet  Commonly known as:  LOPRESSOR   25 mg, Oral, Every 12 Hours Scheduled      ondansetron 4 MG tablet  Commonly known as:  Zofran   4 mg, Oral, Every 8 Hours PRN      potassium chloride 10 MEQ CR capsule  Commonly known as:  MICRO-K   10 mEq, Oral, Daily      vitamin C 500 MG tablet  Commonly known as:  ASCORBIC ACID   1,000 mg, Oral, Every Evening             No Known Allergies      Discharge Disposition:  Home or Self Care    Diet:  Hospital:  No active diet order        Discharge Activity:   Activity Instructions     As tolerated                 CODE STATUS:    Code Status and Medical Interventions:   Ordered at: 03/14/20 1841     Level Of Support Discussed With:    Patient     Code Status:    CPR     Medical Interventions (Level of Support Prior to Arrest):    Full         Future Appointments   Date Time Provider Department Center   4/29/2020 11:40 AM Bautista Lopez MD [facility] NA   8/11/2020  9:15 AM NURSE/MA [facility] None   8/18/2020 10:00 AM Karly Marmolejo APRN [facility] None           Time spent on Discharge including face to face service:  30 minutes    Electronically signed by Carmelo Mitchell MD, 03/16/20, 1:04 PM.

## 2020-03-18 ENCOUNTER — READMISSION MANAGEMENT (OUTPATIENT)
Dept: CALL CENTER | Facility: HOSPITAL | Age: 70
End: 2020-03-18

## 2020-03-18 DIAGNOSIS — D50.9 IRON DEFICIENCY ANEMIA, UNSPECIFIED IRON DEFICIENCY ANEMIA TYPE: Primary | ICD-10-CM

## 2020-03-18 DIAGNOSIS — K85.90 ACUTE PANCREATITIS, UNSPECIFIED COMPLICATION STATUS, UNSPECIFIED PANCREATITIS TYPE: Primary | ICD-10-CM

## 2020-03-18 RX ORDER — POTASSIUM CHLORIDE 750 MG/1
10 CAPSULE, EXTENDED RELEASE ORAL DAILY
Qty: 90 CAPSULE | Refills: 1 | Status: SHIPPED | OUTPATIENT
Start: 2020-03-18 | End: 2020-09-11

## 2020-03-18 RX ORDER — ONDANSETRON 4 MG/1
4 TABLET, FILM COATED ORAL EVERY 8 HOURS PRN
Qty: 30 TABLET | Refills: 0 | Status: SHIPPED | OUTPATIENT
Start: 2020-03-18 | End: 2020-07-29

## 2020-03-18 RX ORDER — HYDROCODONE BITARTRATE AND ACETAMINOPHEN 5; 325 MG/1; MG/1
1 TABLET ORAL EVERY 6 HOURS PRN
Qty: 40 TABLET | Refills: 0 | Status: SHIPPED | OUTPATIENT
Start: 2020-03-18 | End: 2021-01-05

## 2020-03-18 NOTE — OUTREACH NOTE
Prep Survey      Responses   [facility] patient discharged from?  [facility]   Is LACE score < 7 ?  No   Eligibility  Readm Mgmt   Discharge diagnosis  Acute pnacreatitis    Does the patient have one of the following disease processes/diagnoses(primary or secondary)?  Other   Does the patient have Home health ordered?  No   Is there a DME ordered?  No   Prep survey completed?  Yes          Myrna Cade RN

## 2020-03-19 ENCOUNTER — READMISSION MANAGEMENT (OUTPATIENT)
Dept: CALL CENTER | Facility: HOSPITAL | Age: 70
End: 2020-03-19

## 2020-03-19 NOTE — OUTREACH NOTE
Medical Week 1 Survey      Responses   [facility] patient discharged from?  [facility]   Does the patient have one of the following disease processes/diagnoses(primary or secondary)?  Other   Is there a successful TCM telephone encounter documented?  No   Week 1 attempt successful?  Yes   Call start time  1121   Call end time  1125   Meds reviewed with patient/caregiver?  Yes   Does the patient have all medications ordered at discharge?  N/A   Is the patient taking all medications as directed (includes completed medication regime)?  Yes   Does the patient have a primary care provider?   Yes   Does the patient have an appointment with their PCP within 7 days of discharge?  No   Comments regarding PCP  PATIENT'S PCP IS NOT TAKING APPOINTMENTS AT THIS TIME. PATIENT INSTRUCTED TO CALL OFFICE OR CALL HIS GI PHYSICIAN IF HE HAS ANY PROBLEMS.    Has the patient kept scheduled appointments due by today?  N/A   Has home health visited the patient within 72 hours of discharge?  N/A   Did the patient receive a copy of their discharge instructions?  Yes   Nursing interventions  Reviewed instructions with patient   What is the patient's perception of their health status since discharge?  Same   Is the patient/caregiver able to teach back signs and symptoms related to disease process for when to call PCP?  Yes   Is the patient/caregiver able to teach back signs and symptoms related to disease process for when to call 911?  Yes   Is the patient/caregiver able to teach back the hierarchy of who to call/visit for symptoms/problems? PCP, Specialist, Home health nurse, Urgent Care, ED, 911  Yes   Week 1 call completed?  Yes          Cynthia Alexandre LPN

## 2020-03-24 ENCOUNTER — READMISSION MANAGEMENT (OUTPATIENT)
Dept: CALL CENTER | Facility: HOSPITAL | Age: 70
End: 2020-03-24

## 2020-03-24 NOTE — OUTREACH NOTE
Medical Week 2 Survey      Responses   [facility] patient discharged from?  [facility]   Does the patient have one of the following disease processes/diagnoses(primary or secondary)?  Other   Week 2 attempt successful?  Yes   Call start time  1932   Discharge diagnosis  Acute pnacreatitis    Call end time  1938   Meds reviewed with patient/caregiver?  Yes   Does the patient have all medications ordered at discharge?  N/A   Is the patient taking all medications as directed (includes completed medication regime)?  Yes   Does the patient have a primary care provider?   Yes   Has the patient kept scheduled appointments due by today?  N/A   Comments  Waiting on appt due to Covid19   Has home health visited the patient within 72 hours of discharge?  N/A   Did the patient receive a copy of their discharge instructions?  Yes   Nursing interventions  Reviewed instructions with patient   What is the patient's perception of their health status since discharge?  Improving   Is the patient/caregiver able to teach back signs and symptoms related to disease process for when to call PCP?  Yes   Is the patient/caregiver able to teach back signs and symptoms related to disease process for when to call 911?  Yes   Is the patient/caregiver able to teach back the hierarchy of who to call/visit for symptoms/problems? PCP, Specialist, Home health nurse, Urgent Care, ED, 911  Yes   Additional teach back comments  States he is doing well and hasn't had to take any pain pills.  Had questions regarding low fat diet.  Advised to stay away from fried foods, butter, gonsalves,.  Eat lean meats and vegetables.  Wife is making pancakes in the morning but she will leave off the butter.   Week 2 Call Completed?  Yes   Graduated  Yes   Graduated/Revoked comments  Questions answered with no needs at this time          Natalie Penn LPN

## 2020-03-25 ENCOUNTER — EPISODE CHANGES (OUTPATIENT)
Dept: CASE MANAGEMENT | Facility: OTHER | Age: 70
End: 2020-03-25

## 2020-04-06 RX ORDER — FUROSEMIDE 20 MG/1
TABLET ORAL
Qty: 90 TABLET | Refills: 0 | Status: SHIPPED | OUTPATIENT
Start: 2020-04-06 | End: 2020-07-29

## 2020-04-07 RX ORDER — GLIPIZIDE 5 MG/1
TABLET ORAL
Qty: 270 TABLET | Refills: 0 | Status: SHIPPED | OUTPATIENT
Start: 2020-04-07 | End: 2020-07-02

## 2020-04-09 ENCOUNTER — EPISODE CHANGES (OUTPATIENT)
Dept: CASE MANAGEMENT | Facility: OTHER | Age: 70
End: 2020-04-09

## 2020-04-10 RX ORDER — HYDRALAZINE HYDROCHLORIDE 25 MG/1
TABLET, FILM COATED ORAL
Qty: 180 TABLET | Refills: 1 | Status: SHIPPED | OUTPATIENT
Start: 2020-04-10 | End: 2020-07-02

## 2020-04-13 ENCOUNTER — PATIENT OUTREACH (OUTPATIENT)
Dept: CASE MANAGEMENT | Facility: OTHER | Age: 70
End: 2020-04-13

## 2020-04-13 NOTE — OUTREACH NOTE
Care Coordination Assessment    Documented/Reviewed By:  Farhana Echeverria RN Date/time:  4/13/2020  1:36 PM   Assessment completed with:  patient  Enrolled in care management program:  Yes  Living arrangement:  spouse  Support system:  spouse  Type of residence:  private residence  Home care services:  No  Bed or wheelchair confined:  No  Inadequate nutrition:  No  Medication adherence problem:  No  Experiencing side effects from current medications:  No  History of fall(s) in last 6 months:  No  Difficulty keeping appointments:  No

## 2020-04-13 NOTE — OUTREACH NOTE
Care Plan Note      Responses   Lifestyle Goals  Eat a healthy diet, Fewer ER/urgent care visits, Fewer exacerbations, Increase physical activity, Medication management, Self monitor blood pressure, Self monitor blood sugar   Barriers  Side effects of medication, Disease education   Self Management  Dietary Changes - Eat More Fruits/Vegetables, Get flu/pneumonia shot, Home Glucose Monitoring, Increase Physical Activities, Medication Adherence   Specific Disease Process Teaching  Diabetes, Heart Disease, Heart Failure   Discharged From:  [facility] [place]   Discharged to:  home   Admit Date:  03/14/20   Discharge Date:  03/17/20   Discharge destination:  Home   Medication Adherence  Medication side effects   Readiness Scale  3   Confidence Scale  3        The main concerns and/or symptoms the patient would like to address are: Weakness, COVID 19    Education/instruction provided by Care Coordinator: Call to pt and explained role of ACM. Pt not very talkative but states he feels he is doing well. He is staying home due to COVID outbreak. Education provided regarding safety measures during quarantine such as social distancing if need to go out, frequent handwashing, keeping hands away from face and mouth. Staying away from people even within the home that are showing any symptoms such as cough, fever and body aches. Cleaning surfaces frequently with Lysol or chlorine wipes. Encouraged pt to sign up for my chart where e-visits can be used if needed. Pt verbalizes understanding.  Pt states he feels like he is not back to himself but is getting stronger. Encouraged to continue to increase activity as much as possible. Ambulate several times a day with in the home, with gradual increase to mailbox. He states he hopes to get out when its is warmer in his yard. Pt is agreeable to further calls.     Follow Up Outreach Due: 4/27/2020    Farhana Echeverria RN  Ambulatory     4/13/2020, 13:43

## 2020-04-16 ENCOUNTER — EPISODE CHANGES (OUTPATIENT)
Dept: CASE MANAGEMENT | Facility: OTHER | Age: 70
End: 2020-04-16

## 2020-04-21 RX ORDER — AMLODIPINE BESYLATE 5 MG/1
TABLET ORAL
Qty: 90 TABLET | Refills: 0 | Status: SHIPPED | OUTPATIENT
Start: 2020-04-21 | End: 2020-07-16

## 2020-04-29 ENCOUNTER — OFFICE VISIT (OUTPATIENT)
Dept: CARDIOLOGY | Facility: CLINIC | Age: 70
End: 2020-04-29

## 2020-04-29 VITALS
WEIGHT: 212 LBS | SYSTOLIC BLOOD PRESSURE: 124 MMHG | DIASTOLIC BLOOD PRESSURE: 70 MMHG | BODY MASS INDEX: 32.23 KG/M2 | HEART RATE: 88 BPM

## 2020-04-29 DIAGNOSIS — E78.2 MIXED HYPERLIPIDEMIA: ICD-10-CM

## 2020-04-29 DIAGNOSIS — I10 ESSENTIAL HYPERTENSION: ICD-10-CM

## 2020-04-29 DIAGNOSIS — I25.118 CORONARY ARTERY DISEASE OF NATIVE ARTERY OF NATIVE HEART WITH STABLE ANGINA PECTORIS (HCC): Primary | ICD-10-CM

## 2020-04-29 DIAGNOSIS — E11.9 TYPE 2 DIABETES MELLITUS WITHOUT COMPLICATION, WITHOUT LONG-TERM CURRENT USE OF INSULIN (HCC): ICD-10-CM

## 2020-04-29 DIAGNOSIS — Z95.1 S/P CABG (CORONARY ARTERY BYPASS GRAFT): ICD-10-CM

## 2020-04-29 PROCEDURE — 99442 PR PHYS/QHP TELEPHONE EVALUATION 11-20 MIN: CPT | Performed by: INTERNAL MEDICINE

## 2020-04-29 RX ORDER — FLURBIPROFEN SODIUM 0.3 MG/ML
SOLUTION/ DROPS OPHTHALMIC
COMMUNITY
Start: 2020-04-26 | End: 2020-10-23

## 2020-04-29 NOTE — PROGRESS NOTES
You have chosen to receive care through a telephone visit. Do you consent to use a telephone visit for your medical care today? Yes  This visit has been rescheduled as a phone visit to comply with patient safety concerns in accordance with [facility] recommendations. Total time of discussion was 15 minutes.      Subjective:     Encounter Date:04/29/2020      Patient ID: Shaka Trinidad is a 70 y.o. male.    Chief Complaint:  History of Present Illness 70-year-old white male with history of coronary artery disease status post carotid bypass surgery history of diabetes hypertension hyperlipidemia sleep apnea had a telephone visit today.  Patient is currently stable with absence of chest pain or shortness of breath at rest or exertion.  No complains any PND orthopnea.  No palpitation dizziness syncope or swelling of the feet.  Take his meds regularly.  He is also very active and exercise regularly.  He follows a good diet.  He does not smoke.  /70   Pulse 88   Wt 96.2 kg (212 lb)   BMI 32.23 kg/m²     The following portions of the patient's history were reviewed and updated as appropriate: allergies, current medications, past family history, past medical history, past social history, past surgical history and problem list.  Past Medical History:   Diagnosis Date   • Coronary artery disease    • Diabetes mellitus, type II (CMS/[facility])    • Elevated cholesterol    • H/O cataract     cataracts    • Hyperlipidemia    • Hypertension    • Peripheral edema    • Peripheral edema    • Sleep apnea     oral appliance     Past Surgical History:   Procedure Laterality Date   • CARDIAC CATHETERIZATION N/A 7/19/2019    Procedure: Left Heart Cath with angiogram;  Surgeon: Bautista Lopez MD;  Location: [facility] CATH INVASIVE LOCATION;  Service: Cardiovascular   • CARDIAC CATHETERIZATION N/A 7/19/2019    Procedure: Coronary angiography;  Surgeon: Bautista Lopez MD;  Location: [facility] CATH INVASIVE LOCATION;  Service: Cardiovascular   •  CARDIAC CATHETERIZATION N/A 7/19/2019    Procedure: Left ventriculography;  Surgeon: Bautista Lopez MD;  Location: [facility] CATH INVASIVE LOCATION;  Service: Cardiovascular   • COLONOSCOPY     • CORONARY ARTERY BYPASS GRAFT N/A 8/8/2019    Procedure: CORONARY ARTERY BYPASS GRAFTING;  Surgeon: Chet Mcarthur MD;  Location: [facility] CVOR;  Service: Cardiothoracic   • ENDOSCOPY N/A 3/15/2020    Procedure: ESOPHAGOGASTRODUODENOSCOPY;  Surgeon: Jaspal Logan MD;  Location: [facility] ENDOSCOPY;  Service: Gastroenterology;  Laterality: N/A;  EPIGASTRIC ABDOMINAL PAIN, ABNORMAL CT SCAN, PANCREATITIS  NORMAL EGD   • HERNIA REPAIR     • OTHER SURGICAL HISTORY  12/2015    2d echo-abstracted cent.      Social History     Socioeconomic History   • Marital status:      Spouse name: Not on file   • Number of children: Not on file   • Years of education: Not on file   • Highest education level: Not on file   Tobacco Use   • Smoking status: Never Smoker   • Smokeless tobacco: Never Used   Substance and Sexual Activity   • Alcohol use: No     Frequency: Never   • Drug use: No   • Sexual activity: Defer     Family History   Problem Relation Age of Onset   • Heart failure Mother    • Hypertension Brother    • Cancer Brother        Current Outpatient Medications:   •  amLODIPine (NORVASC) 5 MG tablet, TAKE 1 TABLET BY MOUTH EVERY DAY, Disp: 90 tablet, Rfl: 0  •  aspirin 81 MG chewable tablet, Chew 81 mg Every Night., Disp: , Rfl:   •  atorvastatin (LIPITOR) 40 MG tablet, TAKE 1 TABLET BY MOUTH EVERY DAY AT NIGHT, Disp: 90 tablet, Rfl: 0  •  Calcium Carbonate-Vit D-Min (CALCIUM 1200) 3250-7963 MG-UNIT chewable tablet, Chew 1 tablet Daily., Disp: , Rfl:   •  cetirizine (zyrTEC) 10 MG tablet, Take 10 mg by mouth Daily., Disp: , Rfl: 0  •  Cinnamon 500 MG tablet, Take 1,000 mg by mouth Every Evening., Disp: , Rfl:   •  furosemide (LASIX) 20 MG tablet, TAKE 1 TABLET BY MOUTH EVERY DAY, Disp: 90 tablet, Rfl: 0  •  glipizide  (GLUCOTROL) 5 MG tablet, Take 5 mg by mouth Every Evening., Disp: , Rfl:   •  glipizide (GLUCOTROL) 5 MG tablet, TAKE 2 TABLETS BY MOUTH EVERY MORNING AND TAKE 1 TABLET BY MOUTH EVERY EVENING, Disp: 270 tablet, Rfl: 0  •  hydrALAZINE (APRESOLINE) 25 MG tablet, TAKE 1 TABLET BY MOUTH THREE TIMES A DAY, Disp: 180 tablet, Rfl: 1  •  insulin degludec (Tresiba FlexTouch) 100 UNIT/ML solution pen-injector injection, Inject 35 Units under the skin into the appropriate area as directed Daily., Disp: 18 mL, Rfl: 1  •  Iron-Vitamin C 100-250 MG tablet, Take 1 tablet by mouth 2 (Two) Times a Day., Disp: , Rfl:   •  lisinopril (PRINIVIL,ZESTRIL) 40 MG tablet, TAKE 1 TABLET BY MOUTH EVERY DAY, Disp: 90 tablet, Rfl: 0  •  metFORMIN (GLUCOPHAGE) 1000 MG tablet, Take 1 tablet by mouth 2 (Two) Times a Day With Meals., Disp: 180 tablet, Rfl: 1  •  metoprolol tartrate (LOPRESSOR) 25 MG tablet, TAKE 1 TABLET BY MOUTH EVERY 12 HOURS, Disp: 180 tablet, Rfl: 1  •  potassium chloride (MICRO-K) 10 MEQ CR capsule, Take 1 capsule by mouth Daily., Disp: 90 capsule, Rfl: 1  •  B-D UF III MINI PEN NEEDLES 31G X 5 MM misc, , Disp: , Rfl:   •  HYDROcodone-acetaminophen (Norco) 5-325 MG per tablet, Take 1 tablet by mouth Every 6 (Six) Hours As Needed for Severe Pain ., Disp: 40 tablet, Rfl: 0  •  ondansetron (Zofran) 4 MG tablet, Take 1 tablet by mouth Every 8 (Eight) Hours As Needed for Nausea or Vomiting., Disp: 30 tablet, Rfl: 0  •  polyethylene glycol (MIRALAX) packet, Take 17 g by mouth 2 (Two) Times a Day. (Patient taking differently: Take 17 g by mouth 2 (Two) Times a Day As Needed (for constipation).), Disp: , Rfl:   •  vitamin C (ASCORBIC ACID) 500 MG tablet, Take 1,000 mg by mouth Every Evening., Disp: , Rfl:   No Known Allergies    Review of Systems   Constitution: Negative for fever and malaise/fatigue.   HENT: Negative for congestion and hearing loss.    Eyes: Negative for double vision and visual disturbance.   Cardiovascular:  Negative for chest pain, claudication, dyspnea on exertion, leg swelling and syncope.   Respiratory: Negative for cough and shortness of breath.    Endocrine: Negative for cold intolerance.   Skin: Negative for color change and rash.   Musculoskeletal: Negative for arthritis and joint pain.   Gastrointestinal: Negative for abdominal pain and heartburn.   Genitourinary: Negative for hematuria.   Neurological: Negative for excessive daytime sleepiness and dizziness.   Psychiatric/Behavioral: Negative for depression. The patient is not nervous/anxious.    All other systems reviewed and are negative.             Objective:     Physical Exam    Procedures    Lab Review:       Assessment:          Diagnosis Plan   1. Coronary artery disease of native artery of native heart with stable angina pectoris (CMS/[facility])     2. Mixed hyperlipidemia     3. Essential hypertension     4. Type 2 diabetes mellitus without complication, without long-term current use of insulin (CMS/[facility])     5. S/P CABG (coronary artery bypass graft)            Plan:       Patient has history of coronary disease status post carotid bypass surgery x3 vessels with a LIMA to LAD and saphenous graft to the marginal branch and to the RCA  Patient has normal LV systolic function  Patient has sleep apnea and uses CPAP machine  Patient sugar levels and lipid levels are followed by the primary care doctor  Patient has hypertension his blood pressure heart rate stable  Continue current medicines and follow in 3

## 2020-06-10 RX ORDER — ATORVASTATIN CALCIUM 40 MG/1
TABLET, FILM COATED ORAL
Qty: 90 TABLET | Refills: 0 | Status: SHIPPED | OUTPATIENT
Start: 2020-06-10 | End: 2020-08-18 | Stop reason: SDUPTHER

## 2020-06-15 RX ORDER — LISINOPRIL 40 MG/1
TABLET ORAL
Qty: 90 TABLET | Refills: 0 | Status: SHIPPED | OUTPATIENT
Start: 2020-06-15 | End: 2020-08-18 | Stop reason: SDUPTHER

## 2020-06-25 ENCOUNTER — OFFICE VISIT (OUTPATIENT)
Dept: FAMILY MEDICINE CLINIC | Facility: CLINIC | Age: 70
End: 2020-06-25

## 2020-06-25 VITALS
SYSTOLIC BLOOD PRESSURE: 138 MMHG | OXYGEN SATURATION: 97 % | HEIGHT: 68 IN | TEMPERATURE: 98 F | WEIGHT: 214.4 LBS | BODY MASS INDEX: 32.49 KG/M2 | HEART RATE: 70 BPM | DIASTOLIC BLOOD PRESSURE: 82 MMHG

## 2020-06-25 DIAGNOSIS — E11.65 TYPE 2 DIABETES MELLITUS WITH HYPERGLYCEMIA, WITH LONG-TERM CURRENT USE OF INSULIN (HCC): ICD-10-CM

## 2020-06-25 DIAGNOSIS — Z12.11 COLON CANCER SCREENING: ICD-10-CM

## 2020-06-25 DIAGNOSIS — Z00.00 ENCOUNTER FOR MEDICARE ANNUAL WELLNESS EXAM: Primary | ICD-10-CM

## 2020-06-25 DIAGNOSIS — Z79.4 TYPE 2 DIABETES MELLITUS WITH HYPERGLYCEMIA, WITH LONG-TERM CURRENT USE OF INSULIN (HCC): ICD-10-CM

## 2020-06-25 DIAGNOSIS — E66.9 OBESITY (BMI 30.0-34.9): ICD-10-CM

## 2020-06-25 PROCEDURE — 90471 IMMUNIZATION ADMIN: CPT | Performed by: NURSE PRACTITIONER

## 2020-06-25 PROCEDURE — G0402 INITIAL PREVENTIVE EXAM: HCPCS | Performed by: NURSE PRACTITIONER

## 2020-06-25 PROCEDURE — 90715 TDAP VACCINE 7 YRS/> IM: CPT | Performed by: NURSE PRACTITIONER

## 2020-06-25 NOTE — PROGRESS NOTES
The ABCs of the Annual Wellness Visit  Initial Medicare Wellness Visit    Chief Complaint   Patient presents with   • Medicare Wellness-Initial Visit       Subjective   History of Present Illness:  Shaka Trinidad is a 70 y.o. male who presents for an Initial Medicare Wellness Visit.    HEALTH RISK ASSESSMENT    Recent Hospitalizations:  Recently treated at the following:  [facility]  CABG 8/2019, 2/2020 pancreatitis    Current Medical Providers:  Patient Care Team:  Karly Marmolejo APRN as PCP - General (Nurse Practitioner)  Tangela Hoover APRN as PCP - Family Medicine  Jayda Lucas DPM as PCP - Claims Attributed  Bautista Lopez MD as Consulting Physician (Cardiology)    Smoking Status:  Social History     Tobacco Use   Smoking Status Never Smoker   Smokeless Tobacco Never Used       Alcohol Consumption:  Social History     Substance and Sexual Activity   Alcohol Use No   • Frequency: Never       Depression Screen:   PHQ-2/PHQ-9 Depression Screening 6/25/2020   Little interest or pleasure in doing things 0   Feeling down, depressed, or hopeless 0   Trouble falling or staying asleep, or sleeping too much 0   Feeling tired or having little energy 1   Poor appetite or overeating 2   Feeling bad about yourself - or that you are a failure or have let yourself or your family down 0   Trouble concentrating on things, such as reading the newspaper or watching television 0   Moving or speaking so slowly that other people could have noticed. Or the opposite - being so fidgety or restless that you have been moving around a lot more than usual 0   Thoughts that you would be better off dead, or of hurting yourself in some way 0   Total Score 3   If you checked off any problems, how difficult have these problems made it for you to do your work, take care of things at home, or get along with other people? Not difficult at all       Fall Risk Screen:  BONNIE Fall Risk Assessment was completed, and patient is at  LOW risk for falls.Assessment completed on:6/25/2020    Health Habits and Functional and Cognitive Screening:  Functional & Cognitive Status 6/25/2020   Do you have difficulty preparing food and eating? No   Do you have difficulty bathing yourself, getting dressed or grooming yourself? No   Do you have difficulty using the toilet? No   Do you have difficulty moving around from place to place? No   Do you have trouble with steps or getting out of a bed or a chair? No   Do you need help using the phone?  No   Are you deaf or do you have serious difficulty hearing?  No   Do you need help with transportation? No   Do you need help shopping? No   Do you need help preparing meals?  No   Do you need help with housework?  No   Do you need help with laundry? No   Do you need help taking your medications? No   Do you need help managing money? No   Do you ever drive or ride in a car without wearing a seat belt? No   Have you felt unusual stress, anger or loneliness in the last month? No   Who do you live with? Spouse   If you need help, do you have trouble finding someone available to you? No   Have you been bothered in the last four weeks by sexual problems? No   Do you have difficulty concentrating, remembering or making decisions? No         Does the patient have evidence of cognitive impairment? No    Asprin use counseling:Taking ASA appropriately as indicated    Age-appropriate Screening Schedule:  Refer to the list below for future screening recommendations based on patient's age, sex and/or medical conditions. Orders for these recommended tests are listed in the plan section. The patient has been provided with a written plan.    Health Maintenance   Topic Date Due   • TDAP/TD VACCINES (1 - Tdap) 04/16/1962   • ZOSTER VACCINE (1 of 2) 04/16/2001   • URINE MICROALBUMIN  03/24/2018   • DIABETIC FOOT EXAM  05/13/2019   • COLONOSCOPY  05/13/2019   • DIABETIC EYE EXAM  07/03/2020   • INFLUENZA VACCINE  08/01/2020   •  HEMOGLOBIN A1C  08/18/2020   • LIPID PANEL  03/16/2021          The following portions of the patient's history were reviewed and updated as appropriate: allergies, current medications, past family history, past medical history, past social history, past surgical history and problem list.    Outpatient Medications Prior to Visit   Medication Sig Dispense Refill   • amLODIPine (NORVASC) 5 MG tablet TAKE 1 TABLET BY MOUTH EVERY DAY 90 tablet 0   • aspirin 81 MG chewable tablet Chew 81 mg Every Night.     • atorvastatin (LIPITOR) 40 MG tablet TAKE 1 TABLET BY MOUTH EVERY DAY AT NIGHT 90 tablet 0   • B-D UF III MINI PEN NEEDLES 31G X 5 MM misc      • Calcium Carbonate-Vit D-Min (CALCIUM 1200) 9948-9685 MG-UNIT chewable tablet Chew 1 tablet Daily.     • cetirizine (zyrTEC) 10 MG tablet Take 10 mg by mouth Daily.  0   • Cinnamon 500 MG tablet Take 1,000 mg by mouth Every Evening.     • furosemide (LASIX) 20 MG tablet TAKE 1 TABLET BY MOUTH EVERY DAY 90 tablet 0   • glipizide (GLUCOTROL) 5 MG tablet Take 5 mg by mouth Every Evening.     • glipizide (GLUCOTROL) 5 MG tablet TAKE 2 TABLETS BY MOUTH EVERY MORNING AND TAKE 1 TABLET BY MOUTH EVERY EVENING 270 tablet 0   • hydrALAZINE (APRESOLINE) 25 MG tablet TAKE 1 TABLET BY MOUTH THREE TIMES A  tablet 1   • HYDROcodone-acetaminophen (Norco) 5-325 MG per tablet Take 1 tablet by mouth Every 6 (Six) Hours As Needed for Severe Pain . 40 tablet 0   • insulin degludec (Tresiba FlexTouch) 100 UNIT/ML solution pen-injector injection Inject 35 Units under the skin into the appropriate area as directed Daily. 18 mL 1   • Iron-Vitamin C 100-250 MG tablet Take 1 tablet by mouth 2 (Two) Times a Day.     • lisinopril (PRINIVIL,ZESTRIL) 40 MG tablet TAKE 1 TABLET BY MOUTH EVERY DAY 90 tablet 0   • metFORMIN (GLUCOPHAGE) 1000 MG tablet Take 1 tablet by mouth 2 (Two) Times a Day With Meals. 180 tablet 1   • metoprolol tartrate (LOPRESSOR) 25 MG tablet TAKE 1 TABLET BY MOUTH EVERY 12  HOURS 180 tablet 1   • ondansetron (Zofran) 4 MG tablet Take 1 tablet by mouth Every 8 (Eight) Hours As Needed for Nausea or Vomiting. 30 tablet 0   • ONE TOUCH ULTRA TEST test strip Test blood sugar no more that once daily.  ICD E11.9 200 each 12   • polyethylene glycol (MIRALAX) packet Take 17 g by mouth 2 (Two) Times a Day. (Patient taking differently: Take 17 g by mouth 2 (Two) Times a Day As Needed (for constipation).)     • potassium chloride (MICRO-K) 10 MEQ CR capsule Take 1 capsule by mouth Daily. 90 capsule 1   • vitamin C (ASCORBIC ACID) 500 MG tablet Take 1,000 mg by mouth Every Evening.       No facility-administered medications prior to visit.        Patient Active Problem List   Diagnosis   • Angina at rest (CMS/[facility])   • Abnormal nuclear stress test   • Coronary artery disease of native artery of native heart with stable angina pectoris (CMS/[facility])   • CAD (coronary artery disease)   • Diabetes mellitus, type II (CMS/[facility])   • Encounter for general adult medical examination without abnormal findings   • Hyperlipidemia   • Erectile dysfunction   • Hypertension   • Iron deficiency anemia   • Type 2 diabetes mellitus with hyperglycemia (CMS/[facility])   • S/P CABG (coronary artery bypass graft)   • Acute pancreatitis   • Abnormal CT of the abdomen       Advanced Care Planning:  ACP discussion was held with the patient during this visit. Patient does not have an advance directive, information provided.    Review of Systems   Constitutional: Positive for fatigue. Negative for activity change and appetite change.   HENT: Negative for congestion, dental problem, postnasal drip, rhinorrhea, sore throat and trouble swallowing.    Eyes: Negative.    Respiratory: Negative for cough, shortness of breath and wheezing.    Cardiovascular: Negative for chest pain, palpitations and leg swelling.   Gastrointestinal: Negative for abdominal distention, constipation, diarrhea, nausea and vomiting.   Endocrine: Negative.   "  Genitourinary: Negative for decreased urine volume, difficulty urinating, dysuria, frequency and urgency.   Musculoskeletal: Positive for arthralgias and back pain. Negative for gait problem, myalgias, neck pain and neck stiffness.   Skin: Negative.    Allergic/Immunologic: Negative for environmental allergies and food allergies.   Neurological: Negative for dizziness, tremors, weakness and numbness.   Psychiatric/Behavioral: Negative for decreased concentration, dysphoric mood and sleep disturbance. The patient is not nervous/anxious.        Compared to one year ago, the patient feels his physical health is worse.  Compared to one year ago, the patient feels his mental health is the same.    Reviewed chart for potential of high risk medication in the elderly: yes  Reviewed chart for potential of harmful drug interactions in the elderly:yes    Objective         Vitals:    06/25/20 0938   BP: 138/82   BP Location: Left arm   Patient Position: Sitting   Cuff Size: Adult   Pulse: 70   Temp: 98 °F (36.7 °C)   TempSrc: Skin   SpO2: 97%   Weight: 97.3 kg (214 lb 6.4 oz)   Height: 172.7 cm (67.99\")       Body mass index is 32.61 kg/m².  Discussed the patient's BMI with him. The BMI is above average; BMI management plan is completed.    Physical Exam   Constitutional: He is oriented to person, place, and time. He appears well-developed and well-nourished. No distress.   obese   HENT:   Head: Normocephalic and atraumatic.   Eyes: Pupils are equal, round, and reactive to light.   Neck: Normal range of motion. No thyromegaly present.   Cardiovascular: Normal rate, regular rhythm, normal heart sounds and intact distal pulses.   No murmur heard.  Pulmonary/Chest: Effort normal and breath sounds normal. No respiratory distress.   Abdominal: Soft. Bowel sounds are normal. He exhibits no distension. There is no tenderness.   Musculoskeletal: Normal range of motion.   Neurological: He is alert and oriented to person, place, and " time.   Skin: Skin is warm and dry. He is not diaphoretic. No erythema.   Psychiatric: He has a normal mood and affect. His behavior is normal. Judgment and thought content normal.   Nursing note and vitals reviewed.            Assessment/Plan   Medicare Risks and Personalized Health Plan  CMS Preventative Services Quick Reference  Advance Directive Discussion  Cardiovascular risk  Fall Risk  Immunizations Discussed/Encouraged (specific immunizations; Td and Shingrix )  Inactivity/Sedentary  Obesity/Overweight     The above risks/problems have been discussed with the patient.  Pertinent information has been shared with the patient in the After Visit Summary.  Follow up plans and orders are seen below in the Assessment/Plan Section.    Diagnoses and all orders for this visit:    1. Encounter for Medicare annual wellness exam (Primary)    2. Obesity (BMI 30.0-34.9)    3. Colon cancer screening  -     Cologuard - Stool, Per Rectum; Future    4. Type 2 diabetes mellitus with hyperglycemia, with long-term current use of insulin (CMS/[facility])  -     Comprehensive Metabolic Panel; Future  -     CBC (No Diff); Future  -     Hemoglobin A1c; Future  -     Lipid Panel; Future  -     TSH; Future    Other orders  -     Tdap Vaccine Greater Than or Equal To 6yo IM      Follow Up:  Return for fasting labs 1 week prior to next scheduled follow up.   Ordered orthotics to  prosthetics, order also completed per Dr. Lucas  Update Tdap today  Ordered Cologuard  Discussed weight loss, improving diet and exercise regimen  Recommend patient to discuss shingles vaccine with pharmacy    An After Visit Summary and PPPS were given to the patient.

## 2020-06-26 ENCOUNTER — RESULTS ENCOUNTER (OUTPATIENT)
Dept: FAMILY MEDICINE CLINIC | Facility: CLINIC | Age: 70
End: 2020-06-26

## 2020-06-26 DIAGNOSIS — Z12.11 COLON CANCER SCREENING: ICD-10-CM

## 2020-07-02 RX ORDER — GLIPIZIDE 5 MG/1
TABLET ORAL
Qty: 270 TABLET | Refills: 0 | Status: SHIPPED | OUTPATIENT
Start: 2020-07-02 | End: 2020-08-18 | Stop reason: SDUPTHER

## 2020-07-02 RX ORDER — HYDRALAZINE HYDROCHLORIDE 25 MG/1
TABLET, FILM COATED ORAL
Qty: 270 TABLET | Refills: 1 | Status: SHIPPED | OUTPATIENT
Start: 2020-07-02 | End: 2020-12-16

## 2020-07-15 RX ORDER — INSULIN DEGLUDEC INJECTION 100 U/ML
INJECTION, SOLUTION SUBCUTANEOUS
Qty: 18 PEN | Refills: 1 | Status: SHIPPED | OUTPATIENT
Start: 2020-07-15 | End: 2020-08-18

## 2020-07-16 RX ORDER — AMLODIPINE BESYLATE 5 MG/1
TABLET ORAL
Qty: 90 TABLET | Refills: 0 | Status: SHIPPED | OUTPATIENT
Start: 2020-07-16 | End: 2020-08-18 | Stop reason: SDUPTHER

## 2020-07-29 ENCOUNTER — OFFICE VISIT (OUTPATIENT)
Dept: CARDIOLOGY | Facility: CLINIC | Age: 70
End: 2020-07-29

## 2020-07-29 VITALS
OXYGEN SATURATION: 97 % | DIASTOLIC BLOOD PRESSURE: 73 MMHG | SYSTOLIC BLOOD PRESSURE: 123 MMHG | WEIGHT: 215 LBS | HEART RATE: 76 BPM | HEIGHT: 67 IN | BODY MASS INDEX: 33.74 KG/M2

## 2020-07-29 DIAGNOSIS — Z95.1 S/P CABG (CORONARY ARTERY BYPASS GRAFT): ICD-10-CM

## 2020-07-29 DIAGNOSIS — E11.9 TYPE 2 DIABETES MELLITUS WITHOUT COMPLICATION, WITHOUT LONG-TERM CURRENT USE OF INSULIN (HCC): ICD-10-CM

## 2020-07-29 DIAGNOSIS — E78.2 MIXED HYPERLIPIDEMIA: ICD-10-CM

## 2020-07-29 DIAGNOSIS — I25.118 CORONARY ARTERY DISEASE OF NATIVE ARTERY OF NATIVE HEART WITH STABLE ANGINA PECTORIS (HCC): Primary | ICD-10-CM

## 2020-07-29 DIAGNOSIS — I10 ESSENTIAL HYPERTENSION: ICD-10-CM

## 2020-07-29 PROCEDURE — 99214 OFFICE O/P EST MOD 30 MIN: CPT | Performed by: INTERNAL MEDICINE

## 2020-07-29 RX ORDER — FUROSEMIDE 20 MG/1
TABLET ORAL
Qty: 90 TABLET | Refills: 0 | Status: SHIPPED | OUTPATIENT
Start: 2020-07-29 | End: 2020-08-18 | Stop reason: SDUPTHER

## 2020-07-29 RX ORDER — DOCUSATE SODIUM 100 MG/1
100 CAPSULE, LIQUID FILLED ORAL 2 TIMES DAILY
COMMUNITY
End: 2021-01-05

## 2020-07-29 NOTE — PROGRESS NOTES
Subjective:     Encounter Date:07/29/2020      Patient ID: Shaka Trinidad is a 70 y.o. male.    Chief Complaint:  History of Present Illness 70-year-old white male with history of coronary status post current bypass surgery history of hypertension hyperlipidemia diabetes sleep apnea presents to my office for follow-up.  Patient is currently stable without incidence of chest pain or shortness of breath at rest on exertion.  No complains any PND orthopnea.  No palpitation dizziness syncope or swelling of the feet.  Is taking medicines regularly but he does not smoke.  He uses a CPAP machine all the time    The following portions of the patient's history were reviewed and updated as appropriate: allergies, current medications, past family history, past medical history, past social history, past surgical history and problem list.  Past Medical History:   Diagnosis Date   • Coronary artery disease    • Diabetes mellitus, type II (CMS/[facility])    • Elevated cholesterol    • H/O cataract     cataracts    • Hyperlipidemia    • Hypertension    • Peripheral edema    • Peripheral edema    • Sleep apnea     oral appliance     Past Surgical History:   Procedure Laterality Date   • CARDIAC CATHETERIZATION N/A 7/19/2019    Procedure: Left Heart Cath with angiogram;  Surgeon: Bautista Lopez MD;  Location: [facility] CATH INVASIVE LOCATION;  Service: Cardiovascular   • CARDIAC CATHETERIZATION N/A 7/19/2019    Procedure: Coronary angiography;  Surgeon: Bautista Lopez MD;  Location: [facility] CATH INVASIVE LOCATION;  Service: Cardiovascular   • CARDIAC CATHETERIZATION N/A 7/19/2019    Procedure: Left ventriculography;  Surgeon: Bautista Lopez MD;  Location: [facility] CATH INVASIVE LOCATION;  Service: Cardiovascular   • COLONOSCOPY     • CORONARY ARTERY BYPASS GRAFT N/A 8/8/2019    Procedure: CORONARY ARTERY BYPASS GRAFTING;  Surgeon: Chet Mcarthur MD;  Location: [facility] CVOR;  Service: Cardiothoracic   • ENDOSCOPY N/A 3/15/2020     "Procedure: ESOPHAGOGASTRODUODENOSCOPY;  Surgeon: Jaspal Logan MD;  Location: [facility] ENDOSCOPY;  Service: Gastroenterology;  Laterality: N/A;  EPIGASTRIC ABDOMINAL PAIN, ABNORMAL CT SCAN, PANCREATITIS  NORMAL EGD   • HERNIA REPAIR     • OTHER SURGICAL HISTORY  12/2015    2d echo-abstracted cent.      /73 (BP Location: Left arm, Patient Position: Sitting)   Pulse 76   Ht 170.2 cm (67\")   Wt 97.5 kg (215 lb)   SpO2 97%   BMI 33.67 kg/m²   Family History   Problem Relation Age of Onset   • Heart failure Mother    • Hypertension Brother    • Cancer Brother        Current Outpatient Medications:   •  amLODIPine (NORVASC) 5 MG tablet, TAKE 1 TABLET BY MOUTH EVERY DAY, Disp: 90 tablet, Rfl: 0  •  aspirin 81 MG chewable tablet, Chew 81 mg Every Night., Disp: , Rfl:   •  atorvastatin (LIPITOR) 40 MG tablet, TAKE 1 TABLET BY MOUTH EVERY DAY AT NIGHT, Disp: 90 tablet, Rfl: 0  •  B-D UF III MINI PEN NEEDLES 31G X 5 MM misc, , Disp: , Rfl:   •  cetirizine (zyrTEC) 10 MG tablet, Take 10 mg by mouth Daily., Disp: , Rfl: 0  •  Cinnamon 500 MG tablet, Take 1,000 mg by mouth Every Evening., Disp: , Rfl:   •  docusate sodium (COLACE) 100 MG capsule, Take 100 mg by mouth 2 (Two) Times a Day., Disp: , Rfl:   •  esomeprazole (nexIUM) 20 MG capsule, Take 20 mg by mouth Every Morning Before Breakfast., Disp: , Rfl:   •  furosemide (LASIX) 20 MG tablet, TAKE 1 TABLET BY MOUTH EVERY DAY, Disp: 90 tablet, Rfl: 0  •  glipizide (GLUCOTROL) 5 MG tablet, TAKE 2 TABLETS BY MOUTH EVERY MORNING AND TAKE 1 TABLET BY MOUTH EVERY EVENING, Disp: 270 tablet, Rfl: 0  •  hydrALAZINE (APRESOLINE) 25 MG tablet, TAKE 1 TABLET BY MOUTH THREE TIMES A DAY, Disp: 270 tablet, Rfl: 1  •  HYDROcodone-acetaminophen (Norco) 5-325 MG per tablet, Take 1 tablet by mouth Every 6 (Six) Hours As Needed for Severe Pain ., Disp: 40 tablet, Rfl: 0  •  Iron-Vitamin C 100-250 MG tablet, Take 1 tablet by mouth 2 (Two) Times a Day., Disp: , Rfl:   •  " lisinopril (PRINIVIL,ZESTRIL) 40 MG tablet, TAKE 1 TABLET BY MOUTH EVERY DAY, Disp: 90 tablet, Rfl: 0  •  metFORMIN (GLUCOPHAGE) 1000 MG tablet, Take 1 tablet by mouth 2 (Two) Times a Day With Meals., Disp: 180 tablet, Rfl: 1  •  metoprolol tartrate (LOPRESSOR) 25 MG tablet, TAKE 1 TABLET BY MOUTH EVERY 12 HOURS, Disp: 180 tablet, Rfl: 1  •  ONE TOUCH ULTRA TEST test strip, Test blood sugar no more that once daily.  ICD E11.9, Disp: 200 each, Rfl: 12  •  potassium chloride (MICRO-K) 10 MEQ CR capsule, Take 1 capsule by mouth Daily., Disp: 90 capsule, Rfl: 1  •  TRESIBA FLEXTOUCH 100 UNIT/ML solution pen-injector injection, INJECT 35 UNITS UNDER THE SKIN INTO THE APPROPRIATE AREA AS DIRECTED DAILY., Disp: 18 pen, Rfl: 1  •  vitamin C (ASCORBIC ACID) 500 MG tablet, Take 1,000 mg by mouth Every Evening., Disp: , Rfl:   No Known Allergies  Social History     Socioeconomic History   • Marital status:      Spouse name: Not on file   • Number of children: Not on file   • Years of education: Not on file   • Highest education level: Not on file   Tobacco Use   • Smoking status: Never Smoker   • Smokeless tobacco: Never Used   Substance and Sexual Activity   • Alcohol use: No     Frequency: Never   • Drug use: No   • Sexual activity: Defer     Review of Systems   Constitution: Negative for fever and malaise/fatigue.   HENT: Negative for ear pain and nosebleeds.    Eyes: Negative for blurred vision and double vision.   Cardiovascular: Negative for chest pain, dyspnea on exertion, leg swelling and palpitations.   Respiratory: Negative for cough and shortness of breath.    Skin: Negative for rash.   Musculoskeletal: Negative for joint pain.   Gastrointestinal: Negative for abdominal pain, nausea and vomiting.   Neurological: Negative for focal weakness and headaches.   Psychiatric/Behavioral: Negative for depression. The patient is not nervous/anxious.    All other systems reviewed and are negative.             Objective:      Physical Exam   Constitutional: He appears well-developed and well-nourished.   HENT:   Head: Normocephalic and atraumatic.   Eyes: Pupils are equal, round, and reactive to light. Conjunctivae and EOM are normal. No scleral icterus.   Neck: Normal range of motion. Neck supple. No JVD present. Carotid bruit is not present.   Cardiovascular: Normal rate, regular rhythm, S1 normal, S2 normal, normal heart sounds and intact distal pulses. PMI is not displaced.   Pulmonary/Chest: Effort normal and breath sounds normal. He has no wheezes. He has no rales.   Abdominal: Soft. Bowel sounds are normal.   Musculoskeletal: Normal range of motion.   Neurological: He is alert. He has normal strength.   No focal deficits   Skin: Skin is warm and dry. No rash noted.   Psychiatric: He has a normal mood and affect.     Procedures    Lab Review:       Assessment:          Diagnosis Plan   1. Coronary artery disease of native artery of native heart with stable angina pectoris (CMS/[facility])     2. Mixed hyperlipidemia     3. Essential hypertension     4. Type 2 diabetes mellitus without complication, without long-term current use of insulin (CMS/[facility])     5. S/P CABG (coronary artery bypass graft)            Plan:       Patient has history of coronary status post coronary artery bypass surgery ×3 vessels with a LIMA to LAD and saphenous graft to the marginal branch and RCA  Patient blood pressure and heart rate stable  Patient lipid levels are followed by the primary care doctor  Patient has diabetes and her sugars are not well controlled but is on oral medicines  Continue current medicines and follow in 6 months

## 2020-08-11 ENCOUNTER — CLINICAL SUPPORT (OUTPATIENT)
Dept: FAMILY MEDICINE CLINIC | Facility: CLINIC | Age: 70
End: 2020-08-11

## 2020-08-11 DIAGNOSIS — E11.65 TYPE 2 DIABETES MELLITUS WITH HYPERGLYCEMIA, WITH LONG-TERM CURRENT USE OF INSULIN (HCC): ICD-10-CM

## 2020-08-11 DIAGNOSIS — Z79.4 TYPE 2 DIABETES MELLITUS WITH HYPERGLYCEMIA, WITH LONG-TERM CURRENT USE OF INSULIN (HCC): ICD-10-CM

## 2020-08-11 PROCEDURE — 80061 LIPID PANEL: CPT | Performed by: NURSE PRACTITIONER

## 2020-08-11 PROCEDURE — 85027 COMPLETE CBC AUTOMATED: CPT | Performed by: NURSE PRACTITIONER

## 2020-08-11 PROCEDURE — 36415 COLL VENOUS BLD VENIPUNCTURE: CPT | Performed by: NURSE PRACTITIONER

## 2020-08-11 PROCEDURE — 80053 COMPREHEN METABOLIC PANEL: CPT | Performed by: NURSE PRACTITIONER

## 2020-08-11 PROCEDURE — 84443 ASSAY THYROID STIM HORMONE: CPT | Performed by: NURSE PRACTITIONER

## 2020-08-11 PROCEDURE — 83036 HEMOGLOBIN GLYCOSYLATED A1C: CPT | Performed by: NURSE PRACTITIONER

## 2020-08-12 LAB
ALBUMIN SERPL-MCNC: 4.2 G/DL (ref 3.5–5.2)
ALBUMIN/GLOB SERPL: 1.4 G/DL
ALP SERPL-CCNC: 63 U/L (ref 39–117)
ALT SERPL W P-5'-P-CCNC: 19 U/L (ref 1–41)
ANION GAP SERPL CALCULATED.3IONS-SCNC: 11.9 MMOL/L (ref 5–15)
AST SERPL-CCNC: 23 U/L (ref 1–40)
BILIRUB SERPL-MCNC: 0.5 MG/DL (ref 0–1.2)
BUN SERPL-MCNC: 25 MG/DL (ref 8–23)
BUN/CREAT SERPL: 18.8 (ref 7–25)
CALCIUM SPEC-SCNC: 9.8 MG/DL (ref 8.6–10.5)
CHLORIDE SERPL-SCNC: 100 MMOL/L (ref 98–107)
CHOLEST SERPL-MCNC: 79 MG/DL (ref 0–200)
CO2 SERPL-SCNC: 25.1 MMOL/L (ref 22–29)
CREAT SERPL-MCNC: 1.33 MG/DL (ref 0.76–1.27)
DEPRECATED RDW RBC AUTO: 42.5 FL (ref 37–54)
ERYTHROCYTE [DISTWIDTH] IN BLOOD BY AUTOMATED COUNT: 12.7 % (ref 12.3–15.4)
GFR SERPL CREATININE-BSD FRML MDRD: 53 ML/MIN/1.73
GLOBULIN UR ELPH-MCNC: 2.9 GM/DL
GLUCOSE SERPL-MCNC: 72 MG/DL (ref 65–99)
HBA1C MFR BLD: 8 % (ref 3.5–5.6)
HCT VFR BLD AUTO: 39.5 % (ref 37.5–51)
HDLC SERPL-MCNC: 30 MG/DL (ref 40–60)
HGB BLD-MCNC: 12.6 G/DL (ref 13–17.7)
LDLC SERPL CALC-MCNC: 28 MG/DL (ref 0–100)
LDLC/HDLC SERPL: 0.93 {RATIO}
MCH RBC QN AUTO: 29.2 PG (ref 26.6–33)
MCHC RBC AUTO-ENTMCNC: 31.9 G/DL (ref 31.5–35.7)
MCV RBC AUTO: 91.6 FL (ref 79–97)
PLATELET # BLD AUTO: 251 10*3/MM3 (ref 140–450)
PMV BLD AUTO: 11.3 FL (ref 6–12)
POTASSIUM SERPL-SCNC: 4.9 MMOL/L (ref 3.5–5.2)
PROT SERPL-MCNC: 7.1 G/DL (ref 6–8.5)
RBC # BLD AUTO: 4.31 10*6/MM3 (ref 4.14–5.8)
SODIUM SERPL-SCNC: 137 MMOL/L (ref 136–145)
TRIGL SERPL-MCNC: 105 MG/DL (ref 0–150)
TSH SERPL DL<=0.05 MIU/L-ACNC: 3.22 UIU/ML (ref 0.27–4.2)
VLDLC SERPL-MCNC: 21 MG/DL (ref 5–40)
WBC # BLD AUTO: 7.91 10*3/MM3 (ref 3.4–10.8)

## 2020-08-18 ENCOUNTER — OFFICE VISIT (OUTPATIENT)
Dept: FAMILY MEDICINE CLINIC | Facility: CLINIC | Age: 70
End: 2020-08-18

## 2020-08-18 VITALS
DIASTOLIC BLOOD PRESSURE: 77 MMHG | HEART RATE: 67 BPM | SYSTOLIC BLOOD PRESSURE: 134 MMHG | WEIGHT: 214 LBS | TEMPERATURE: 97.3 F | BODY MASS INDEX: 33.59 KG/M2 | OXYGEN SATURATION: 97 % | HEIGHT: 67 IN

## 2020-08-18 DIAGNOSIS — Z79.4 TYPE 2 DIABETES MELLITUS WITH HYPERGLYCEMIA, WITH LONG-TERM CURRENT USE OF INSULIN (HCC): Primary | ICD-10-CM

## 2020-08-18 DIAGNOSIS — E11.65 TYPE 2 DIABETES MELLITUS WITH HYPERGLYCEMIA, WITH LONG-TERM CURRENT USE OF INSULIN (HCC): Primary | ICD-10-CM

## 2020-08-18 DIAGNOSIS — K21.9 GASTROESOPHAGEAL REFLUX DISEASE WITHOUT ESOPHAGITIS: ICD-10-CM

## 2020-08-18 DIAGNOSIS — E78.2 MIXED HYPERLIPIDEMIA: ICD-10-CM

## 2020-08-18 DIAGNOSIS — I10 ESSENTIAL HYPERTENSION: ICD-10-CM

## 2020-08-18 DIAGNOSIS — N18.30 CKD (CHRONIC KIDNEY DISEASE) STAGE 3, GFR 30-59 ML/MIN (HCC): ICD-10-CM

## 2020-08-18 DIAGNOSIS — I25.118 CORONARY ARTERY DISEASE OF NATIVE ARTERY OF NATIVE HEART WITH STABLE ANGINA PECTORIS (HCC): ICD-10-CM

## 2020-08-18 DIAGNOSIS — D50.9 IRON DEFICIENCY ANEMIA, UNSPECIFIED IRON DEFICIENCY ANEMIA TYPE: ICD-10-CM

## 2020-08-18 DIAGNOSIS — M25.512 CHRONIC LEFT SHOULDER PAIN: ICD-10-CM

## 2020-08-18 DIAGNOSIS — G89.29 CHRONIC LEFT SHOULDER PAIN: ICD-10-CM

## 2020-08-18 PROCEDURE — 99214 OFFICE O/P EST MOD 30 MIN: CPT | Performed by: NURSE PRACTITIONER

## 2020-08-18 RX ORDER — FUROSEMIDE 20 MG/1
20 TABLET ORAL DAILY
Qty: 90 TABLET | Refills: 3 | Status: SHIPPED | OUTPATIENT
Start: 2020-08-18 | End: 2021-06-08 | Stop reason: SDUPTHER

## 2020-08-18 RX ORDER — ATORVASTATIN CALCIUM 40 MG/1
40 TABLET, FILM COATED ORAL
Qty: 90 TABLET | Refills: 3 | Status: SHIPPED | OUTPATIENT
Start: 2020-08-18 | End: 2021-08-26

## 2020-08-18 RX ORDER — LISINOPRIL 40 MG/1
40 TABLET ORAL DAILY
Qty: 90 TABLET | Refills: 3 | Status: SHIPPED | OUTPATIENT
Start: 2020-08-18 | End: 2021-06-08 | Stop reason: SDUPTHER

## 2020-08-18 RX ORDER — AMLODIPINE BESYLATE 5 MG/1
5 TABLET ORAL DAILY
Qty: 90 TABLET | Refills: 3 | Status: SHIPPED | OUTPATIENT
Start: 2020-08-18 | End: 2021-10-26

## 2020-08-18 RX ORDER — GLIPIZIDE 5 MG/1
TABLET ORAL
Qty: 270 TABLET | Refills: 1 | Status: SHIPPED | OUTPATIENT
Start: 2020-08-18 | End: 2021-01-05

## 2020-08-18 NOTE — PATIENT INSTRUCTIONS
Try tylenol 500mg 1 or 2 tabs twice daily as needed  Elevate legs as needed for swelling and keep sodium less than 2000mg/day                                                                                                              Shoulder Exercises  Ask your health care provider which exercises are safe for you. Do exercises exactly as told by your health care provider and adjust them as directed. It is normal to feel mild stretching, pulling, tightness, or discomfort as you do these exercises. Stop right away if you feel sudden pain or your pain gets worse. Do not begin these exercises until told by your health care provider.  Stretching exercises  External rotation and abduction  This exercise is sometimes called corner stretch. This exercise rotates your arm outward (external rotation) and moves your arm out from your body (abduction).  1.  a doorway with one of your feet slightly in front of the other. This is called a staggered stance. If you cannot reach your forearms to the door frame, stand facing a corner of a room.  2. Choose one of the following positions as told by your health care provider:  ? Place your hands and forearms on the door frame above your head.  ? Place your hands and forearms on the door frame at the height of your head.  ? Place your hands on the door frame at the height of your elbows.  3. Slowly move your weight onto your front foot until you feel a stretch across your chest and in the front of your shoulders. Keep your head and chest upright and keep your abdominal muscles tight.  4. Hold for __________ seconds.  5. To release the stretch, shift your weight to your back foot.  Repeat __________ times. Complete this exercise __________ times a day.  Extension, standing  1. Stand and hold a broomstick, a cane, or a similar object behind your back.  ? Your hands should be a little wider than shoulder width apart.  ? Your palms should face away from your back.  2. Keeping your  elbows straight and your shoulder muscles relaxed, move the stick away from your body until you feel a stretch in your shoulders (extension).  ? Avoid shrugging your shoulders while you move the stick. Keep your shoulder blades tucked down toward the middle of your back.  3. Hold for __________ seconds.  4. Slowly return to the starting position.  Repeat __________ times. Complete this exercise __________ times a day.  Range-of-motion exercises  Pendulum    1. Stand near a wall or a surface that you can hold onto for balance.  2. Bend at the waist and let your left / right arm hang straight down. Use your other arm to support you. Keep your back straight and do not lock your knees.  3. Relax your left / right arm and shoulder muscles, and move your hips and your trunk so your left / right arm swings freely. Your arm should swing because of the motion of your body, not because you are using your arm or shoulder muscles.  4. Keep moving your hips and trunk so your arm swings in the following directions, as told by your health care provider:  ? Side to side.  ? Forward and backward.  ? In clockwise and counterclockwise circles.  5. Continue each motion for __________ seconds, or for as long as told by your health care provider.  6. Slowly return to the starting position.  Repeat __________ times. Complete this exercise __________ times a day.  Shoulder flexion, standing    1. Stand and hold a broomstick, a cane, or a similar object. Place your hands a little more than shoulder width apart on the object. Your left / right hand should be palm up, and your other hand should be palm down.  2. Keep your elbow straight and your shoulder muscles relaxed. Push the stick up with your healthy arm to raise your left / right arm in front of your body, and then over your head until you feel a stretch in your shoulder (flexion).  ? Avoid shrugging your shoulder while you raise your arm. Keep your shoulder blade tucked down toward  the middle of your back.  3. Hold for __________ seconds.  4. Slowly return to the starting position.  Repeat __________ times. Complete this exercise __________ times a day.  Shoulder abduction, standing  1. Stand and hold a broomstick, a cane, or a similar object. Place your hands a little more than shoulder width apart on the object. Your left / right hand should be palm up, and your other hand should be palm down.  2. Keep your elbow straight and your shoulder muscles relaxed. Push the object across your body toward your left / right side. Raise your left / right arm to the side of your body (abduction) until you feel a stretch in your shoulder.  ? Do not raise your arm above shoulder height unless your health care provider tells you to do that.  ? If directed, raise your arm over your head.  ? Avoid shrugging your shoulder while you raise your arm. Keep your shoulder blade tucked down toward the middle of your back.  3. Hold for __________ seconds.  4. Slowly return to the starting position.  Repeat __________ times. Complete this exercise __________ times a day.  Internal rotation    1. Place your left / right hand behind your back, palm up.  2. Use your other hand to dangle an exercise band, a towel, or a similar object over your shoulder. Grasp the band with your left / right hand so you are holding on to both ends.  3. Gently pull up on the band until you feel a stretch in the front of your left / right shoulder. The movement of your arm toward the center of your body is called internal rotation.  ? Avoid shrugging your shoulder while you raise your arm. Keep your shoulder blade tucked down toward the middle of your back.  4. Hold for __________ seconds.  5. Release the stretch by letting go of the band and lowering your hands.  Repeat __________ times. Complete this exercise __________ times a day.  Strengthening exercises  External rotation    1. Sit in a stable chair without armrests.  2. Secure an  exercise band to a stable object at elbow height on your left / right side.  3. Place a soft object, such as a folded towel or a small pillow, between your left / right upper arm and your body to move your elbow about 4 inches (10 cm) away from your side.  4. Hold the end of the exercise band so it is tight and there is no slack.  5. Keeping your elbow pressed against the soft object, slowly move your forearm out, away from your abdomen (external rotation). Keep your body steady so only your forearm moves.  6. Hold for __________ seconds.  7. Slowly return to the starting position.  Repeat __________ times. Complete this exercise __________ times a day.  Shoulder abduction    1. Sit in a stable chair without armrests, or stand up.  2. Hold a __________ weight in your left / right hand, or hold an exercise band with both hands.  3. Start with your arms straight down and your left / right palm facing in, toward your body.  4. Slowly lift your left / right hand out to your side (abduction). Do not lift your hand above shoulder height unless your health care provider tells you that this is safe.  ? Keep your arms straight.  ? Avoid shrugging your shoulder while you do this movement. Keep your shoulder blade tucked down toward the middle of your back.  5. Hold for __________ seconds.  6. Slowly lower your arm, and return to the starting position.  Repeat __________ times. Complete this exercise __________ times a day.  Shoulder extension  1. Sit in a stable chair without armrests, or stand up.  2. Secure an exercise band to a stable object in front of you so it is at shoulder height.  3. Hold one end of the exercise band in each hand. Your palms should face each other.  4. Straighten your elbows and lift your hands up to shoulder height.  5. Step back, away from the secured end of the exercise band, until the band is tight and there is no slack.  6. Squeeze your shoulder blades together as you pull your hands down to the  sides of your thighs (extension). Stop when your hands are straight down by your sides. Do not let your hands go behind your body.  7. Hold for __________ seconds.  8. Slowly return to the starting position.  Repeat __________ times. Complete this exercise __________ times a day.  Shoulder row  1. Sit in a stable chair without armrests, or stand up.  2. Secure an exercise band to a stable object in front of you so it is at waist height.  3. Hold one end of the exercise band in each hand. Position your palms so that your thumbs are facing the ceiling (neutral position).  4. Bend each of your elbows to a 90-degree angle (right angle) and keep your upper arms at your sides.  5. Step back until the band is tight and there is no slack.  6. Slowly pull your elbows back behind you.  7. Hold for __________ seconds.  8. Slowly return to the starting position.  Repeat __________ times. Complete this exercise __________ times a day.  Shoulder press-ups    1. Sit in a stable chair that has armrests. Sit upright, with your feet flat on the floor.  2. Put your hands on the armrests so your elbows are bent and your fingers are pointing forward. Your hands should be about even with the sides of your body.  3. Push down on the armrests and use your arms to lift yourself off the chair. Straighten your elbows and lift yourself up as much as you comfortably can.  ? Move your shoulder blades down, and avoid letting your shoulders move up toward your ears.  ? Keep your feet on the ground. As you get stronger, your feet should support less of your body weight as you lift yourself up.  4. Hold for __________ seconds.  5. Slowly lower yourself back into the chair.  Repeat __________ times. Complete this exercise __________ times a day.  Wall push-ups    1. Stand so you are facing a stable wall. Your feet should be about one arm-length away from the wall.  2. Lean forward and place your palms on the wall at shoulder height.  3. Keep your  "feet flat on the floor as you bend your elbows and lean forward toward the wall.  4. Hold for __________ seconds.  5. Straighten your elbows to push yourself back to the starting position.  Repeat __________ times. Complete this exercise __________ times a day.  This information is not intended to replace advice given to you by your health care provider. Make sure you discuss any questions you have with your health care provider.  Document Released: 11/01/2006 Document Revised: 04/10/2020 Document Reviewed: 01/17/2020  [employer] Patient Education © 2020 [employer]  Osteoarthritis    Osteoarthritis is a type of arthritis that affects tissue that covers the ends of bones in joints (cartilage). Cartilage acts as a cushion between the bones and helps them move smoothly. Osteoarthritis results when cartilage in the joints gets worn down. Osteoarthritis is sometimes called \"wear and tear\" arthritis.  Osteoarthritis is the most common form of arthritis. It often occurs in older people. It is a condition that gets worse over time (a progressive condition). Joints that are most often affected by this condition are in:  · Fingers.  · Toes.  · Hips.  · Knees.  · Spine, including neck and lower back.  What are the causes?  This condition is caused by age-related wearing down of cartilage that covers the ends of bones.  What increases the risk?  The following factors may make you more likely to develop this condition:  · Older age.  · Being overweight or obese.  · Overuse of joints, such as in athletes.  · Past injury of a joint.  · Past surgery on a joint.  · Family history of osteoarthritis.  What are the signs or symptoms?  The main symptoms of this condition are pain, swelling, and stiffness in the joint. The joint may lose its shape over time. Small pieces of bone or cartilage may break off and float inside of the joint, which may cause more pain and damage to the joint. Small deposits of bone (osteophytes) may grow on " the edges of the joint. Other symptoms may include:  · A grating or scraping feeling inside the joint when you move it.  · Popping or creaking sounds when you move.  Symptoms may affect one or more joints. Osteoarthritis in a major joint, such as your knee or hip, can make it painful to walk or exercise. If you have osteoarthritis in your hands, you might not be able to  items, twist your hand, or control small movements of your hands and fingers (fine motor skills).  How is this diagnosed?  This condition may be diagnosed based on:  · Your medical history.  · A physical exam.  · Your symptoms.  · X-rays of the affected joint(s).  · Blood tests to rule out other types of arthritis.  How is this treated?  There is no cure for this condition, but treatment can help to control pain and improve joint function. Treatment plans may include:  · A prescribed exercise program that allows for rest and joint relief. You may work with a physical therapist.  · A weight control plan.  · Pain relief techniques, such as:  ? Applying heat and cold to the joint.  ? Electric pulses delivered to nerve endings under the skin (transcutaneous electrical nerve stimulation, or TENS).  ? Massage.  ? Certain nutritional supplements.  · NSAIDs or prescription medicines to help relieve pain.  · Medicine to help relieve pain and inflammation (corticosteroids). This can be given by mouth (orally) or as an injection.  · Assistive devices, such as a brace, wrap, splint, specialized glove, or cane.  · Surgery, such as:  ? An osteotomy. This is done to reposition the bones and relieve pain or to remove loose pieces of bone and cartilage.  ? Joint replacement surgery. You may need this surgery if you have very bad (advanced) osteoarthritis.  Follow these instructions at home:  Activity  · Rest your affected joints as directed by your health care provider.  · Do not drive or use heavy machinery while taking prescription pain medicine.  · Exercise  as directed. Your health care provider or physical therapist may recommend specific types of exercise, such as:  ? Strengthening exercises. These are done to strengthen the muscles that support joints that are affected by arthritis. They can be performed with weights or with exercise bands to add resistance.  ? Aerobic activities. These are exercises, such as brisk walking or water aerobics, that get your heart pumping.  ? Range-of-motion activities. These keep your joints easy to move.  ? Balance and agility exercises.  Managing pain, stiffness, and swelling         · If directed, apply heat to the affected area as often as told by your health care provider. Use the heat source that your health care provider recommends, such as a moist heat pack or a heating pad.  ? If you have a removable assistive device, remove it as told by your health care provider.  ? Place a towel between your skin and the heat source. If your health care provider tells you to keep the assistive device on while you apply heat, place a towel between the assistive device and the heat source.  ? Leave the heat on for 20-30 minutes.  ? Remove the heat if your skin turns bright red. This is especially important if you are unable to feel pain, heat, or cold. You may have a greater risk of getting burned.  · If directed, put ice on the affected joint:  ? If you have a removable assistive device, remove it as told by your health care provider.  ? Put ice in a plastic bag.  ? Place a towel between your skin and the bag. If your health care provider tells you to keep the assistive device on during icing, place a towel between the assistive device and the bag.  ? Leave the ice on for 20 minutes, 2-3 times a day.  General instructions  · Take over-the-counter and prescription medicines only as told by your health care provider.  · Maintain a healthy weight. Follow instructions from your health care provider for weight control. These may include dietary  restrictions.  · Do not use any products that contain nicotine or tobacco, such as cigarettes and e-cigarettes. These can delay bone healing. If you need help quitting, ask your health care provider.  · Use assistive devices as directed by your health care provider.  · Keep all follow-up visits as told by your health care provider. This is important.  Where to find more information  · [employer] [place] of Arth[employer] and Musculoskeletal and Skin Diseases: www.niams.nih.gov  · [employer] [place] [employer] Aging: www.paul.nih.gov  · [employer] Rheumatology: www.rheumatology.org  Contact a health care provider if:  · Your skin turns red.  · You develop a rash.  · You have pain that gets worse.  · You have a fever along with joint or muscle aches.  Get help right away if:  · You lose a lot of weight.  · You suddenly lose your appetite.  · You have night sweats.  Summary  · Osteoarthritis is a type of arthritis that affects tissue covering the ends of bones in joints (cartilage).  · This condition is caused by age-related wearing down of cartilage that covers the ends of bones.  · The main symptom of this condition is pain, swelling, and stiffness in the joint.  · There is no cure for this condition, but treatment can help to control pain and improve joint function.  This information is not intended to replace advice given to you by your health care provider. Make sure you discuss any questions you have with your health care provider.  Document Released: 12/18/2006 Document Revised: 11/30/2018 Document Reviewed: 08/21/2017  [employer] Patient Education © 2020 [employer]

## 2020-08-18 NOTE — PROGRESS NOTES
Chief Complaint   Patient presents with   • Diabetes   • Follow-up   • Results       HPI     Diabetes mellitus type II, feels stable on meds, denies any signs/symptoms of hyper/hypoglycemia, blurry vision, polydipsia, polyuria, nocturia, and unintentional weight loss, on tresiba 37 u daily, would like to review labs.     HTN, stable on meds and takes as directed, denies chest pain, headache, shortness of air, palpitations and swelling of extremities.     GERD, stable on medication, denies nausea, vomiting, constipation, abdominal pain and diarrhea.      The following portions of the patient's history were reviewed and updated as appropriate: allergies, current medications, past family history, past medical history, past social history, past surgical history and problem list.    Past Medical History:   Diagnosis Date   • Coronary artery disease    • Diabetes mellitus, type II (CMS/[facility])    • Elevated cholesterol    • H/O cataract    • Hyperlipidemia    • Hypertension    • Peripheral edema    • Peripheral edema    • Sleep apnea      Past Surgical History:   Procedure Laterality Date   • CARDIAC CATHETERIZATION N/A 7/19/2019    Procedure: Left Heart Cath with angiogram;  Surgeon: Bautista Lopez MD;  Location: [facility] CATH INVASIVE LOCATION;  Service: Cardiovascular   • CARDIAC CATHETERIZATION N/A 7/19/2019    Procedure: Coronary angiography;  Surgeon: Bautista Lopez MD;  Location: [facility] CATH INVASIVE LOCATION;  Service: Cardiovascular   • CARDIAC CATHETERIZATION N/A 7/19/2019    Procedure: Left ventriculography;  Surgeon: Bautista Lopez MD;  Location: [facility] CATH INVASIVE LOCATION;  Service: Cardiovascular   • COLONOSCOPY     • CORONARY ARTERY BYPASS GRAFT N/A 8/8/2019    Procedure: CORONARY ARTERY BYPASS GRAFTING;  Surgeon: Chet Mcarthur MD;  Location: [facility] CVOR;  Service: Cardiothoracic   • ENDOSCOPY N/A 3/15/2020    Procedure: ESOPHAGOGASTRODUODENOSCOPY;  Surgeon: Jaspal Lgoan MD;  Location:   [facility];  Service: Gastroenterology;  Laterality: N/A;  EPIGASTRIC ABDOMINAL PAIN, ABNORMAL CT SCAN, PANCREATITIS  NORMAL EGD   • HERNIA REPAIR     • OTHER SURGICAL HISTORY  12/2015    2d echo-abstracted cent.      Family History   Problem Relation Age of Onset   • Heart failure Mother    • Hypertension Brother    • Cancer Brother      Social History     Tobacco Use   • Smoking status: Never Smoker   • Smokeless tobacco: Never Used   Substance Use Topics   • Alcohol use: No     Frequency: Never         Current Outpatient Medications:   •  amLODIPine (NORVASC) 5 MG tablet, Take 1 tablet by mouth Daily., Disp: 90 tablet, Rfl: 3  •  aspirin 81 MG chewable tablet, Chew 81 mg Every Night., Disp: , Rfl:   •  atorvastatin (LIPITOR) 40 MG tablet, Take 1 tablet by mouth every night at bedtime., Disp: 90 tablet, Rfl: 3  •  B-D UF III MINI PEN NEEDLES 31G X 5 MM misc, , Disp: , Rfl:   •  cetirizine (zyrTEC) 10 MG tablet, Take 10 mg by mouth Daily., Disp: , Rfl: 0  •  Cinnamon 500 MG tablet, Take 1,000 mg by mouth Every Evening., Disp: , Rfl:   •  docusate sodium (COLACE) 100 MG capsule, Take 100 mg by mouth 2 (Two) Times a Day., Disp: , Rfl:   •  esomeprazole (nexIUM) 20 MG capsule, Take 20 mg by mouth Every Morning Before Breakfast., Disp: , Rfl:   •  furosemide (LASIX) 20 MG tablet, Take 1 tablet by mouth Daily., Disp: 90 tablet, Rfl: 3  •  glipizide (GLUCOTROL) 5 MG tablet, Take 2 tablets po in am, 1 po every evening, Disp: 270 tablet, Rfl: 1  •  hydrALAZINE (APRESOLINE) 25 MG tablet, TAKE 1 TABLET BY MOUTH THREE TIMES A DAY, Disp: 270 tablet, Rfl: 1  •  HYDROcodone-acetaminophen (Norco) 5-325 MG per tablet, Take 1 tablet by mouth Every 6 (Six) Hours As Needed for Severe Pain ., Disp: 40 tablet, Rfl: 0  •  insulin degludec (Tresiba FlexTouch) 100 UNIT/ML solution pen-injector injection, Inject 45 Units under the skin into the appropriate area as directed Daily., Disp: 18 pen, Rfl: 1  •  Iron-Vitamin C 100-250 MG  "tablet, Take 1 tablet by mouth 2 (Two) Times a Day., Disp: , Rfl:   •  lisinopril (PRINIVIL,ZESTRIL) 40 MG tablet, Take 1 tablet by mouth Daily., Disp: 90 tablet, Rfl: 3  •  metFORMIN (GLUCOPHAGE) 1000 MG tablet, Take 1 tablet by mouth 2 (Two) Times a Day With Meals., Disp: 180 tablet, Rfl: 1  •  metoprolol tartrate (LOPRESSOR) 25 MG tablet, TAKE 1 TABLET BY MOUTH EVERY 12 HOURS, Disp: 180 tablet, Rfl: 1  •  ONE TOUCH ULTRA TEST test strip, Test blood sugar no more that once daily.  ICD E11.9, Disp: 200 each, Rfl: 12  •  potassium chloride (MICRO-K) 10 MEQ CR capsule, Take 1 capsule by mouth Daily., Disp: 90 capsule, Rfl: 1  •  vitamin C (ASCORBIC ACID) 500 MG tablet, Take 1,000 mg by mouth Every Evening., Disp: , Rfl:       Review of Systems     Obtained as mentioned in HPI, otherwise negative.       Vitals:    08/18/20 0958   BP: 134/77   BP Location: Left arm   Patient Position: Sitting   Cuff Size: Adult   Pulse: 67   Temp: 97.3 °F (36.3 °C)   TempSrc: Skin   SpO2: 97%   Weight: 97.1 kg (214 lb)   Height: 170.2 cm (67.01\")     Body mass index is 33.51 kg/m².    Physical Exam   Constitutional: He is oriented to person, place, and time. He appears well-developed and well-nourished. No distress.   HENT:   Head: Normocephalic and atraumatic.   Eyes: Pupils are equal, round, and reactive to light.   Neck: Normal range of motion. No thyromegaly present.   Cardiovascular: Normal rate, regular rhythm, normal heart sounds and intact distal pulses.   Pulmonary/Chest: Effort normal and breath sounds normal. No respiratory distress.   Abdominal: Soft. Bowel sounds are normal. He exhibits no distension. There is no tenderness.   Musculoskeletal: Normal range of motion.   Neurological: He is alert and oriented to person, place, and time.   Skin: Skin is warm and dry. He is not diaphoretic. No erythema.   Psychiatric: He has a normal mood and affect. His behavior is normal. Judgment and thought content normal.   Nursing note " and vitals reviewed.      No visits with results within 7 Day(s) from this visit.   Latest known visit with results is:   Clinical Support on 08/11/2020   Component Date Value Ref Range Status   • Glucose 08/11/2020 72  65 - 99 mg/dL Final   • BUN 08/11/2020 25* 8 - 23 mg/dL Final   • Creatinine 08/11/2020 1.33* 0.76 - 1.27 mg/dL Final   • Sodium 08/11/2020 137  136 - 145 mmol/L Final   • Potassium 08/11/2020 4.9  3.5 - 5.2 mmol/L Final   • Chloride 08/11/2020 100  98 - 107 mmol/L Final   • CO2 08/11/2020 25.1  22.0 - 29.0 mmol/L Final   • Calcium 08/11/2020 9.8  8.6 - 10.5 mg/dL Final   • Total Protein 08/11/2020 7.1  6.0 - 8.5 g/dL Final   • Albumin 08/11/2020 4.20  3.50 - 5.20 g/dL Final   • ALT (SGPT) 08/11/2020 19  1 - 41 U/L Final   • AST (SGOT) 08/11/2020 23  1 - 40 U/L Final   • Alkaline Phosphatase 08/11/2020 63  39 - 117 U/L Final   • Total Bilirubin 08/11/2020 0.5  0.0 - 1.2 mg/dL Final   • eGFR Non African Amer 08/11/2020 53* >60 mL/min/1.73 Final   • Globulin 08/11/2020 2.9  gm/dL Final   • A/G Ratio 08/11/2020 1.4  g/dL Final   • BUN/Creatinine Ratio 08/11/2020 18.8  7.0 - 25.0 Final   • Anion Gap 08/11/2020 11.9  5.0 - 15.0 mmol/L Final   • WBC 08/11/2020 7.91  3.40 - 10.80 10*3/mm3 Final   • RBC 08/11/2020 4.31  4.14 - 5.80 10*6/mm3 Final   • Hemoglobin 08/11/2020 12.6* 13.0 - 17.7 g/dL Final   • Hematocrit 08/11/2020 39.5  37.5 - 51.0 % Final   • MCV 08/11/2020 91.6  79.0 - 97.0 fL Final   • MCH 08/11/2020 29.2  26.6 - 33.0 pg Final   • MCHC 08/11/2020 31.9  31.5 - 35.7 g/dL Final   • RDW 08/11/2020 12.7  12.3 - 15.4 % Final   • RDW-SD 08/11/2020 42.5  37.0 - 54.0 fl Final   • MPV 08/11/2020 11.3  6.0 - 12.0 fL Final   • Platelets 08/11/2020 251  140 - 450 10*3/mm3 Final   • Hemoglobin A1C 08/11/2020 8.0* 3.5 - 5.6 % Final   • Total Cholesterol 08/11/2020 79  0 - 200 mg/dL Final   • Triglycerides 08/11/2020 105  0 - 150 mg/dL Final   • HDL Cholesterol 08/11/2020 30* 40 - 60 mg/dL Final   • LDL  Cholesterol  08/11/2020 28  0 - 100 mg/dL Final   • VLDL Cholesterol 08/11/2020 21  5 - 40 mg/dL Final   • LDL/HDL Ratio 08/11/2020 0.93   Final   • TSH 08/11/2020 3.220  0.270 - 4.200 uIU/mL Final       Diagnoses and all orders for this visit:    1. Type 2 diabetes mellitus with hyperglycemia, with long-term current use of insulin (CMS/HCC) (Primary)  -     insulin degludec (Tresiba FlexTouch) 100 UNIT/ML solution pen-injector injection; Inject 45 Units under the skin into the appropriate area as directed Daily.  Dispense: 18 pen; Refill: 1  -     glipizide (GLUCOTROL) 5 MG tablet; Take 2 tablets po in am, 1 po every evening  Dispense: 270 tablet; Refill: 1    2. Coronary artery disease of native artery of native heart with stable angina pectoris (CMS/[facility])    3. Mixed hyperlipidemia  -     atorvastatin (LIPITOR) 40 MG tablet; Take 1 tablet by mouth every night at bedtime.  Dispense: 90 tablet; Refill: 3    4. Essential hypertension  -     amLODIPine (NORVASC) 5 MG tablet; Take 1 tablet by mouth Daily.  Dispense: 90 tablet; Refill: 3  -     furosemide (LASIX) 20 MG tablet; Take 1 tablet by mouth Daily.  Dispense: 90 tablet; Refill: 3  -     lisinopril (PRINIVIL,ZESTRIL) 40 MG tablet; Take 1 tablet by mouth Daily.  Dispense: 90 tablet; Refill: 3    5. Iron deficiency anemia, unspecified iron deficiency anemia type    6. CKD (chronic kidney disease) stage 3, GFR 30-59 ml/min (CMS/[facility])    7. Gastroesophageal reflux disease without esophagitis    8. Chronic left shoulder pain         bp stable, rf meds above.   hgba1c up to 8 from 7.8, increase tresiba to 45 u, continue met, glip, rf meds. Discussed weight loss, improving diet and exercise regimen and limiting carbs/sweets/sugars  Repeat diabetic labs again in 3mo prior to f/u appt  Last visit Ordered orthotics to  prosthetics, order also completed per Dr. Lucas, pt has not been able to get to [facility] yet.   Continue iron, hgb improved  Tdap utd last  vist  Cologuard negative 2020  Ok to use nexium prn  Recommend patient to discuss shingles vaccine with pharmacy  diabetic panel, PSA, TSH, hep c ab, urine micro prior to f/u appt in 3mo, rtc earlier prn

## 2020-09-02 PROCEDURE — U0003 INFECTIOUS AGENT DETECTION BY NUCLEIC ACID (DNA OR RNA); SEVERE ACUTE RESPIRATORY SYNDROME CORONAVIRUS 2 (SARS-COV-2) (CORONAVIRUS DISEASE [COVID-19]), AMPLIFIED PROBE TECHNIQUE, MAKING USE OF HIGH THROUGHPUT TECHNOLOGIES AS DESCRIBED BY CMS-2020-01-R: HCPCS | Performed by: FAMILY MEDICINE

## 2020-09-10 DIAGNOSIS — D50.9 IRON DEFICIENCY ANEMIA, UNSPECIFIED IRON DEFICIENCY ANEMIA TYPE: ICD-10-CM

## 2020-09-11 RX ORDER — POTASSIUM CHLORIDE 750 MG/1
CAPSULE, EXTENDED RELEASE ORAL
Qty: 90 CAPSULE | Refills: 1 | Status: SHIPPED | OUTPATIENT
Start: 2020-09-11 | End: 2021-03-04

## 2020-10-05 ENCOUNTER — OFFICE VISIT (OUTPATIENT)
Dept: FAMILY MEDICINE CLINIC | Facility: CLINIC | Age: 70
End: 2020-10-05

## 2020-10-05 VITALS
TEMPERATURE: 97.3 F | HEIGHT: 66 IN | DIASTOLIC BLOOD PRESSURE: 74 MMHG | WEIGHT: 215 LBS | HEART RATE: 72 BPM | OXYGEN SATURATION: 98 % | SYSTOLIC BLOOD PRESSURE: 129 MMHG | BODY MASS INDEX: 34.55 KG/M2

## 2020-10-05 DIAGNOSIS — M20.5X2 OVERLAPPING TOE, ACQUIRED, LEFT: ICD-10-CM

## 2020-10-05 DIAGNOSIS — E11.9 ENCOUNTER FOR DIABETIC FOOT EXAM (HCC): Primary | ICD-10-CM

## 2020-10-05 DIAGNOSIS — Z79.4 TYPE 2 DIABETES MELLITUS WITH HYPERGLYCEMIA, WITH LONG-TERM CURRENT USE OF INSULIN (HCC): ICD-10-CM

## 2020-10-05 DIAGNOSIS — E11.42 DIABETIC PERIPHERAL NEUROPATHY (HCC): ICD-10-CM

## 2020-10-05 DIAGNOSIS — E11.65 TYPE 2 DIABETES MELLITUS WITH HYPERGLYCEMIA, WITH LONG-TERM CURRENT USE OF INSULIN (HCC): ICD-10-CM

## 2020-10-05 PROCEDURE — 99213 OFFICE O/P EST LOW 20 MIN: CPT | Performed by: NURSE PRACTITIONER

## 2020-10-05 NOTE — PROGRESS NOTES
Chief Complaint   Patient presents with   • Diabetes     discuss diabetic shoes.  i called [facility] and they said they filled out a form, gave it to them, then it should've been faxed to [facility].  [facility]        HPI     Diabetes mellitus type II, feels stable on meds, last hgba1c elevated at 8.0, on metformin 1000 twice daily, Tresiba 45 units daily and glipizide 5 mg daily.  Patient is here for diabetic foot exam, has seen podiatry and is working with [facility] prosthetics and orthotics for diabetic shoes.  Denies any signs/symptoms of hyper/hypoglycemia, blurry vision, polydipsia, polyuria, nocturia, and unintentional weight loss.         The following portions of the patient's history were reviewed and updated as appropriate: allergies, current medications, past family history, past medical history, past social history, past surgical history and problem list.    Past Medical History:   Diagnosis Date   • Coronary artery disease    • Diabetes mellitus, type II (CMS/[facility])    • Elevated cholesterol    • H/O cataract    • Hyperlipidemia    • Hypertension    • Peripheral edema    • Peripheral edema    • Sleep apnea      Past Surgical History:   Procedure Laterality Date   • CARDIAC CATHETERIZATION N/A 7/19/2019    Procedure: Left Heart Cath with angiogram;  Surgeon: Bautista Lopez MD;  Location: [facility] CATH INVASIVE LOCATION;  Service: Cardiovascular   • CARDIAC CATHETERIZATION N/A 7/19/2019    Procedure: Coronary angiography;  Surgeon: Bautista Lopez MD;  Location: [facility] CATH INVASIVE LOCATION;  Service: Cardiovascular   • CARDIAC CATHETERIZATION N/A 7/19/2019    Procedure: Left ventriculography;  Surgeon: Bautista Lopez MD;  Location: [facility] CATH INVASIVE LOCATION;  Service: Cardiovascular   • COLONOSCOPY     • CORONARY ARTERY BYPASS GRAFT N/A 8/8/2019    Procedure: CORONARY ARTERY BYPASS GRAFTING;  Surgeon: Chet Mcarthur MD;  Location: [facility] CVOR;  Service:  Cardiothoracic   • ENDOSCOPY N/A 3/15/2020    Procedure: ESOPHAGOGASTRODUODENOSCOPY;  Surgeon: Jaspal Logan MD;  Location: [facility] ENDOSCOPY;  Service: Gastroenterology;  Laterality: N/A;  EPIGASTRIC ABDOMINAL PAIN, ABNORMAL CT SCAN, PANCREATITIS  NORMAL EGD   • HERNIA REPAIR     • OTHER SURGICAL HISTORY  12/2015    2d echo-abstracted cent.      Family History   Problem Relation Age of Onset   • Heart failure Mother    • Hypertension Brother    • Cancer Brother      Social History     Tobacco Use   • Smoking status: Never Smoker   • Smokeless tobacco: Never Used   Substance Use Topics   • Alcohol use: No     Frequency: Never         Current Outpatient Medications:   •  amLODIPine (NORVASC) 5 MG tablet, Take 1 tablet by mouth Daily., Disp: 90 tablet, Rfl: 3  •  aspirin 81 MG chewable tablet, Chew 81 mg Every Night., Disp: , Rfl:   •  atorvastatin (LIPITOR) 40 MG tablet, Take 1 tablet by mouth every night at bedtime., Disp: 90 tablet, Rfl: 3  •  B-D UF III MINI PEN NEEDLES 31G X 5 MM misc, , Disp: , Rfl:   •  cetirizine (zyrTEC) 10 MG tablet, Take 10 mg by mouth Daily., Disp: , Rfl: 0  •  Cinnamon 500 MG tablet, Take 1,000 mg by mouth Every Evening., Disp: , Rfl:   •  docusate sodium (COLACE) 100 MG capsule, Take 100 mg by mouth 2 (Two) Times a Day., Disp: , Rfl:   •  esomeprazole (nexIUM) 20 MG capsule, Take 20 mg by mouth Every Morning Before Breakfast., Disp: , Rfl:   •  furosemide (LASIX) 20 MG tablet, Take 1 tablet by mouth Daily., Disp: 90 tablet, Rfl: 3  •  glipizide (GLUCOTROL) 5 MG tablet, Take 2 tablets po in am, 1 po every evening, Disp: 270 tablet, Rfl: 1  •  hydrALAZINE (APRESOLINE) 25 MG tablet, TAKE 1 TABLET BY MOUTH THREE TIMES A DAY, Disp: 270 tablet, Rfl: 1  •  HYDROcodone-acetaminophen (Norco) 5-325 MG per tablet, Take 1 tablet by mouth Every 6 (Six) Hours As Needed for Severe Pain ., Disp: 40 tablet, Rfl: 0  •  insulin degludec (Tresiba FlexTouch) 100 UNIT/ML solution pen-injector  "injection, Inject 45 Units under the skin into the appropriate area as directed Daily., Disp: 18 pen, Rfl: 1  •  Iron-Vitamin C 100-250 MG tablet, Take 1 tablet by mouth 2 (Two) Times a Day., Disp: , Rfl:   •  lisinopril (PRINIVIL,ZESTRIL) 40 MG tablet, Take 1 tablet by mouth Daily., Disp: 90 tablet, Rfl: 3  •  metFORMIN (GLUCOPHAGE) 1000 MG tablet, Take 1 tablet by mouth 2 (Two) Times a Day With Meals., Disp: 180 tablet, Rfl: 1  •  metoprolol tartrate (LOPRESSOR) 25 MG tablet, TAKE 1 TABLET BY MOUTH EVERY 12 HOURS, Disp: 180 tablet, Rfl: 1  •  ONE TOUCH ULTRA TEST test strip, Test blood sugar no more that once daily.  ICD E11.9, Disp: 200 each, Rfl: 12  •  vitamin C (ASCORBIC ACID) 500 MG tablet, Take 1,000 mg by mouth Every Evening., Disp: , Rfl:   •  potassium chloride (MICRO-K) 10 MEQ CR capsule, TAKE 1 CAPSULE BY MOUTH EVERY DAY, Disp: 90 capsule, Rfl: 1      Review of Systems       Obtained as mentioned in HPI, otherwise negative.       Vitals:    10/05/20 1444   BP: 129/74   BP Location: Right arm   Patient Position: Sitting   Cuff Size: Adult   Pulse: 72   Temp: 97.3 °F (36.3 °C)   TempSrc: Skin   SpO2: 98%   Weight: 97.5 kg (215 lb)   Height: 167.6 cm (65.98\")     Body mass index is 34.72 kg/m².    Physical Exam  Vitals signs and nursing note reviewed.   Constitutional:       General: He is not in acute distress.     Appearance: He is well-developed. He is not diaphoretic.   HENT:      Head: Normocephalic and atraumatic.   Eyes:      Pupils: Pupils are equal, round, and reactive to light.   Neck:      Musculoskeletal: Normal range of motion.      Thyroid: No thyromegaly.   Cardiovascular:      Rate and Rhythm: Normal rate and regular rhythm.      Heart sounds: Normal heart sounds. No murmur.   Pulmonary:      Effort: Pulmonary effort is normal. No respiratory distress.      Breath sounds: Normal breath sounds.   Abdominal:      General: Bowel sounds are normal. There is no distension.      Palpations: " Abdomen is soft.      Tenderness: There is no abdominal tenderness.   Musculoskeletal: Normal range of motion.      Right foot: Normal range of motion. No deformity or bunion.      Left foot: Normal range of motion. Deformity ( L great toe drop noted and 2nd toe overlapping of the great toe) present. No bunion.   Feet:      Right foot:      Protective Sensation: 10 sites tested. 10 sites sensed.      Skin integrity: Callus and dry skin present. No ulcer, blister, skin breakdown, erythema, warmth or fissure.      Toenail Condition: Right toenails are abnormally thick.      Left foot:      Protective Sensation: 10 sites tested. 10 sites sensed.      Skin integrity: Callus and dry skin present. No ulcer, blister, skin breakdown, erythema, warmth or fissure.      Toenail Condition: Left toenails are abnormally thick.      Comments: Diabetic Foot Exam Performed and Monofilament Test Performed.    Skin:     General: Skin is warm and dry.      Findings: No erythema.   Neurological:      Mental Status: He is alert and oriented to person, place, and time.   Psychiatric:         Behavior: Behavior normal.         Thought Content: Thought content normal.         Judgment: Judgment normal.         No visits with results within 7 Day(s) from this visit.   Latest known visit with results is:   Admission on 09/02/2020, Discharged on 09/02/2020   Component Date Value Ref Range Status   • SARS-CoV-2, JAZMINE 09/02/2020 Not Detected  Not Detected Final    Comment: This nucleic acid amplification test was developed and its performance  characteristics determined by [employer]. Nucleic acid  amplification tests include PCR and TMA. This test has not been FDA  cleared or approved. This test has been authorized by FDA under an  Emergency Use Authorization (EUA). This test is only authorized for  the duration of time the declaration that circumstances exist  justifying the authorization of the emergency use of in vitro  diagnostic  tests for detection of SARS-CoV-2 virus and/or diagnosis  of COVID-19 infection under section 564(b)(1) of the Act, 21 U.S.C.  360bbb-3(b) (1), unless the authorization is terminated or revoked  sooner.  When diagnostic testing is negative, the possibility of a false  negative result should be considered in the context of a patient's  recent exposures and the presence of clinical signs and symptoms  consistent with COVID-19. An individual without symptoms of COVID-19  and who is not shedding SARS-CoV-2 virus would                            expect to have a  negative (not detected) result in this assay.       Problem List Items Addressed This Visit        Endocrine    Type 2 diabetes mellitus with hyperglycemia, with long-term current use of insulin (CMS/[facility])    Current Assessment & Plan     Diabetes is improving with treatment.   Continue current treatment regimen.  Dietary recommendations for ADA diet.  Discussed foot care.  Reminded to get yearly retinal exam.  Diabetes will be reassessed in December at previously scheduled appt.          Diabetic peripheral neuropathy (CMS/[facility])       Other    Encounter for diabetic foot exam (CMS/[facility]) - Primary    Current Assessment & Plan     diabetes is labile, with recent titration of insulin r/t hgba1c of 8.0. Pt has seen podiatry and diabetic foot exam completed today. Paperwork will be completed by Dr. Sutton and faxed to [facility] and orthotics for diabetic shoes           Other Visit Diagnoses     Overlapping toe, acquired, left        rec gel separation device otc if becomes problematic           Follow up at sched appt in December, earlier prn.

## 2020-10-06 PROBLEM — E11.42 DIABETIC PERIPHERAL NEUROPATHY (HCC): Status: ACTIVE | Noted: 2020-10-06

## 2020-10-06 PROBLEM — E11.9 ENCOUNTER FOR DIABETIC FOOT EXAM (HCC): Status: ACTIVE | Noted: 2017-03-24

## 2020-10-06 PROBLEM — Z79.4 TYPE 2 DIABETES MELLITUS WITH HYPERGLYCEMIA, WITH LONG-TERM CURRENT USE OF INSULIN (HCC): Status: ACTIVE | Noted: 2017-03-24

## 2020-10-06 PROBLEM — E11.65 TYPE 2 DIABETES MELLITUS WITH HYPERGLYCEMIA, WITH LONG-TERM CURRENT USE OF INSULIN: Status: ACTIVE | Noted: 2017-03-24

## 2020-10-06 NOTE — ASSESSMENT & PLAN NOTE
Diabetes is improving with treatment.   Continue current treatment regimen.  Dietary recommendations for ADA diet.  Discussed foot care.  Reminded to get yearly retinal exam.  Diabetes will be reassessed in December at previously scheduled appt.

## 2020-10-06 NOTE — ASSESSMENT & PLAN NOTE
diabetes is labile, with recent titration of insulin r/t hgba1c of 8.0. Pt has seen podiatry and diabetic foot exam completed today. Paperwork will be completed by Dr. Sutton and faxed to [facility] prosthetics and orthotics for diabetic shoes

## 2020-10-21 DIAGNOSIS — E11.65 TYPE 2 DIABETES MELLITUS WITH HYPERGLYCEMIA, WITH LONG-TERM CURRENT USE OF INSULIN (HCC): ICD-10-CM

## 2020-10-21 DIAGNOSIS — Z79.4 TYPE 2 DIABETES MELLITUS WITH HYPERGLYCEMIA, WITH LONG-TERM CURRENT USE OF INSULIN (HCC): ICD-10-CM

## 2020-10-21 NOTE — TELEPHONE ENCOUNTER
Iglesia was seen in the office today for a follow-up visit regarding his seizure disorder. He has had 2 seizures in the past. One in February 2018 and the second one was in May 2018.    The patient has been maintained on Keppra. He is doing well. The wife states that he may be a little bit more irritable but nothing to really worry about taking medications. Otherwise, denies any grogginess or drowsiness or GI symptoms or mood changes or suicide thoughts. He is presently maintained on 750 mg every morning and 1000 mg every night. This was increased based on his levels. Last time he was here he was on 500 mg p.o. twice a day. Patient denies any alteration of consciousness. Denies any abnormal motor activity. Denies any diplopia or dysarthria or dysphagia or nausea or vomiting or vertigo or any focal weakness or sensory complaints.    The patient's MRI showed that he has small vessel disease. Cerebral microvascular disease. The patient's vascular risk factors include hypertension and discontinue. He is ordered on blood pressure medication and a statin. He is on aspirin 81 mg p.o. daily. Tolerating medications very well.    The patient is active. Exercising. Watching what he eats.    The last time I saw the patient, he had some changes in the lower extremities suggestive of neuropathy. Denies any symptoms at this point in time. Denies any numbness or tingling or burning sensation. He might have a little bit of a numbness when he sits prolonged period of time on one leg 1 foot or one arm but otherwise no symptoms to suggest any neuropathic pain. When I talked to the patient, he tells me that his blood sugar had been admitted bit on the borderline elevated. However he has not been diagnosed with diabetes.    Past Medical History:   Diagnosis Date   • Arthritis    • Essential (primary) hypertension    • Other and unspecified hyperlipidemia    • Seizures (CMS/[facility])        ALLERGIES:  No Known Allergies    Current  LOV 10/5/20    Last refill 8/18/20 #18 pens with 1 refill    Medications    ACETAMINOPHEN (TYLENOL) 325 MG TABLET    Take 1,300 mg by mouth.    ASPIRIN 81 MG TABLET    Take 81 mg by mouth daily.    CYANOCOBALAMIN (VITAMIN B 12 PO)        FLUTICASONE FUROATE NA        LEVETIRACETAM (KEPPRA) 500 MG TABLET    Take 1 1/2 tablets (750 mg) in the AM and 2 tablets (1000 mg) at night.    LISINOPRIL (PRINIVIL,ZESTRIL) 40 MG TABLET    Take 1 tablet by mouth daily.    MULTIPLE VITAMINS-MINERALS (MULTIVITAMIN PO)        NAPROXEN (NAPROSYN) 500 MG TABLET        ROSUVASTATIN (CRESTOR) 20 MG TABLET    Take 20 mg by mouth daily.    VITAMIN D, CHOLECALCIFEROL, PO             Review of Systems:     As mentioned above.    General Exam:    Visit Vitals  /80 (BP Location: [facility] [place] Patient Position: Sitting, Cuff Size: Regular)   Pulse 60   Ht 6' (1.829 m)   Wt 76.7 kg   BMI 22.95 kg/m²         Neurology Exam:    Attention span and concentration: Normal.  The patient was awake, alert and oriented to person time and place.  Speech and language functions: Normal. Can repeat well. Can understand well.  Cranial nerves: Visual fields are full. Pupils are round equal and reactive to light. Extraocular movements were full. Face was symmetric and muscle and sensation. Tongue the midline.  Motor examination: Normal tone and bulk. No drift. Strength was 5 out of 5 bilaterally in the upper and lower extremities proximally and distally.  Sensory examination: Today, the patient has a markedly decreased vibratory sensation in the lower extremities distal to the knees bilaterally. They are markedly reduced in the knees. Absent in the toes bilaterally. He has decreased pinprick and temperature distal to the midshin on the right side. Its distal to the knee on the left side.  Deep tendon reflexes in the upper extremities: Normal in the biceps and triceps  and brachial radialis bilaterally.  Deep tendon reflexes: Symmetric in the knees. Slightly on the hypoactive side. Absent in the ankles bilaterally.  Plantar  reflexes: Flexor bilaterally  Coordination: Normal finger-nose-finger, heel-to-shin, and rapid alternating movement.  Gait: Normal.        Impression:    Seizure. Generalized tonoclonic seizure. Not well characterized. Doing well. On Keppra. Tolerating it very well.    Peripheral neuropathy. Etiology unclear. Idiopathic. He has had history of elevated blood sugar but I can't find the labs since all his data is at [facility] and we don't have any electronic medical record coordination to review. I think we need to check for treatable cause for her neuropathy. We'll observe generally. He is asymptomatic though. On on examination.    Cerebral microvascular disease. No symptoms to suggest any acute changes. No symptoms such as a stroke or TIA at this point in time. Has multiple vascular risk factors. Being treated for that.    Plan:    #1. Keppra 750 mg every morning. 1000 mg every night.  #2. We will check a Keppra level. Trough level.  #3. Continue blood pressure medication and statin.  #4. Continue aspirin 81 mg p.o. daily as an antiplatelet therapy.  #5. I discussed with the patient in length lifestyle medication such as.modification and exercises.    I'll see the patient in 4 months for a follow-up visit. Should any problems arise he'll call us earlier.      Plan:    Keppra 750 mg po am and 1000 mg po hs  TSH, Vit B12, ESR, SPEP, IEP, Lyme, Hg A1c   RTC 4 months

## 2020-10-23 RX ORDER — INSULIN DEGLUDEC INJECTION 100 U/ML
INJECTION, SOLUTION SUBCUTANEOUS
Qty: 15 PEN | Refills: 1 | Status: SHIPPED | OUTPATIENT
Start: 2020-10-23 | End: 2021-01-05

## 2020-10-23 RX ORDER — FLURBIPROFEN SODIUM 0.3 MG/ML
SOLUTION/ DROPS OPHTHALMIC
Qty: 100 EACH | Refills: 3 | Status: SHIPPED | OUTPATIENT
Start: 2020-10-23 | End: 2021-01-05

## 2020-12-01 ENCOUNTER — LAB (OUTPATIENT)
Dept: FAMILY MEDICINE CLINIC | Facility: CLINIC | Age: 70
End: 2020-12-01

## 2020-12-01 DIAGNOSIS — Z79.4 TYPE 2 DIABETES MELLITUS WITH HYPERGLYCEMIA, WITH LONG-TERM CURRENT USE OF INSULIN (HCC): ICD-10-CM

## 2020-12-01 DIAGNOSIS — Z12.5 SCREENING PSA (PROSTATE SPECIFIC ANTIGEN): ICD-10-CM

## 2020-12-01 DIAGNOSIS — E78.2 MIXED HYPERLIPIDEMIA: Primary | ICD-10-CM

## 2020-12-01 DIAGNOSIS — D50.9 IRON DEFICIENCY ANEMIA, UNSPECIFIED IRON DEFICIENCY ANEMIA TYPE: ICD-10-CM

## 2020-12-01 DIAGNOSIS — E11.65 TYPE 2 DIABETES MELLITUS WITH HYPERGLYCEMIA, WITH LONG-TERM CURRENT USE OF INSULIN (HCC): ICD-10-CM

## 2020-12-01 DIAGNOSIS — Z11.59 ENCOUNTER FOR HEPATITIS C SCREENING TEST FOR LOW RISK PATIENT: ICD-10-CM

## 2020-12-01 DIAGNOSIS — I10 ESSENTIAL HYPERTENSION: ICD-10-CM

## 2020-12-01 LAB — HBA1C MFR BLD: 6.9 % (ref 3.5–5.6)

## 2020-12-01 PROCEDURE — 80053 COMPREHEN METABOLIC PANEL: CPT | Performed by: NURSE PRACTITIONER

## 2020-12-01 PROCEDURE — 82570 ASSAY OF URINE CREATININE: CPT | Performed by: NURSE PRACTITIONER

## 2020-12-01 PROCEDURE — 85027 COMPLETE CBC AUTOMATED: CPT | Performed by: NURSE PRACTITIONER

## 2020-12-01 PROCEDURE — 36415 COLL VENOUS BLD VENIPUNCTURE: CPT | Performed by: NURSE PRACTITIONER

## 2020-12-01 PROCEDURE — 80061 LIPID PANEL: CPT | Performed by: NURSE PRACTITIONER

## 2020-12-01 PROCEDURE — G0103 PSA SCREENING: HCPCS | Performed by: NURSE PRACTITIONER

## 2020-12-01 PROCEDURE — 83036 HEMOGLOBIN GLYCOSYLATED A1C: CPT | Performed by: NURSE PRACTITIONER

## 2020-12-01 PROCEDURE — 84443 ASSAY THYROID STIM HORMONE: CPT | Performed by: NURSE PRACTITIONER

## 2020-12-01 PROCEDURE — 86803 HEPATITIS C AB TEST: CPT | Performed by: NURSE PRACTITIONER

## 2020-12-01 PROCEDURE — 82043 UR ALBUMIN QUANTITATIVE: CPT | Performed by: NURSE PRACTITIONER

## 2020-12-02 LAB
ALBUMIN SERPL-MCNC: 4.2 G/DL (ref 3.5–5.2)
ALBUMIN UR-MCNC: <1.2 MG/DL
ALBUMIN/GLOB SERPL: 1.5 G/DL
ALP SERPL-CCNC: 71 U/L (ref 39–117)
ALT SERPL W P-5'-P-CCNC: 20 U/L (ref 1–41)
ANION GAP SERPL CALCULATED.3IONS-SCNC: 13 MMOL/L (ref 5–15)
AST SERPL-CCNC: 25 U/L (ref 1–40)
BILIRUB SERPL-MCNC: 0.3 MG/DL (ref 0–1.2)
BUN SERPL-MCNC: 30 MG/DL (ref 8–23)
BUN/CREAT SERPL: 21.3 (ref 7–25)
CALCIUM SPEC-SCNC: 9.3 MG/DL (ref 8.6–10.5)
CHLORIDE SERPL-SCNC: 98 MMOL/L (ref 98–107)
CHOLEST SERPL-MCNC: 89 MG/DL (ref 0–200)
CO2 SERPL-SCNC: 25 MMOL/L (ref 22–29)
CREAT SERPL-MCNC: 1.41 MG/DL (ref 0.76–1.27)
CREAT UR-MCNC: 25.9 MG/DL
DEPRECATED RDW RBC AUTO: 39.8 FL (ref 37–54)
ERYTHROCYTE [DISTWIDTH] IN BLOOD BY AUTOMATED COUNT: 12.8 % (ref 12.3–15.4)
GFR SERPL CREATININE-BSD FRML MDRD: 50 ML/MIN/1.73
GLOBULIN UR ELPH-MCNC: 2.8 GM/DL
GLUCOSE SERPL-MCNC: 95 MG/DL (ref 65–99)
HCT VFR BLD AUTO: 36 % (ref 37.5–51)
HCV AB SER DONR QL: NORMAL
HDLC SERPL-MCNC: 29 MG/DL (ref 40–60)
HGB BLD-MCNC: 11.9 G/DL (ref 13–17.7)
LDLC SERPL CALC-MCNC: 26 MG/DL (ref 0–100)
LDLC/HDLC SERPL: 0.55 {RATIO}
MCH RBC QN AUTO: 28.7 PG (ref 26.6–33)
MCHC RBC AUTO-ENTMCNC: 33.1 G/DL (ref 31.5–35.7)
MCV RBC AUTO: 86.7 FL (ref 79–97)
MICROALBUMIN/CREAT UR: NORMAL MG/G{CREAT}
PLATELET # BLD AUTO: 259 10*3/MM3 (ref 140–450)
PMV BLD AUTO: 11.1 FL (ref 6–12)
POTASSIUM SERPL-SCNC: 4.8 MMOL/L (ref 3.5–5.2)
PROT SERPL-MCNC: 7 G/DL (ref 6–8.5)
PSA SERPL-MCNC: 0.74 NG/ML (ref 0–4)
RBC # BLD AUTO: 4.15 10*6/MM3 (ref 4.14–5.8)
SODIUM SERPL-SCNC: 136 MMOL/L (ref 136–145)
TRIGL SERPL-MCNC: 220 MG/DL (ref 0–150)
TSH SERPL DL<=0.05 MIU/L-ACNC: 4.95 UIU/ML (ref 0.27–4.2)
VLDLC SERPL-MCNC: 34 MG/DL (ref 5–40)
WBC # BLD AUTO: 9.32 10*3/MM3 (ref 3.4–10.8)

## 2020-12-08 ENCOUNTER — OFFICE VISIT (OUTPATIENT)
Dept: FAMILY MEDICINE CLINIC | Facility: CLINIC | Age: 70
End: 2020-12-08

## 2020-12-08 VITALS
SYSTOLIC BLOOD PRESSURE: 144 MMHG | WEIGHT: 215.2 LBS | HEART RATE: 68 BPM | DIASTOLIC BLOOD PRESSURE: 80 MMHG | TEMPERATURE: 97.5 F | HEIGHT: 66 IN | OXYGEN SATURATION: 98 % | BODY MASS INDEX: 34.58 KG/M2

## 2020-12-08 DIAGNOSIS — D50.9 IRON DEFICIENCY ANEMIA, UNSPECIFIED IRON DEFICIENCY ANEMIA TYPE: ICD-10-CM

## 2020-12-08 DIAGNOSIS — E78.2 MIXED HYPERLIPIDEMIA: ICD-10-CM

## 2020-12-08 DIAGNOSIS — Z79.4 TYPE 2 DIABETES MELLITUS WITH HYPERGLYCEMIA, WITH LONG-TERM CURRENT USE OF INSULIN (HCC): Primary | ICD-10-CM

## 2020-12-08 DIAGNOSIS — R79.89 ABNORMAL TSH: ICD-10-CM

## 2020-12-08 DIAGNOSIS — I10 ESSENTIAL HYPERTENSION: ICD-10-CM

## 2020-12-08 DIAGNOSIS — E11.65 TYPE 2 DIABETES MELLITUS WITH HYPERGLYCEMIA, WITH LONG-TERM CURRENT USE OF INSULIN (HCC): Primary | ICD-10-CM

## 2020-12-08 DIAGNOSIS — Z23 INFLUENZA VACCINE ADMINISTERED: ICD-10-CM

## 2020-12-08 DIAGNOSIS — N18.31 STAGE 3A CHRONIC KIDNEY DISEASE (HCC): ICD-10-CM

## 2020-12-08 PROCEDURE — 99214 OFFICE O/P EST MOD 30 MIN: CPT | Performed by: NURSE PRACTITIONER

## 2020-12-08 PROCEDURE — 90694 VACC AIIV4 NO PRSRV 0.5ML IM: CPT | Performed by: NURSE PRACTITIONER

## 2020-12-08 PROCEDURE — G0008 ADMIN INFLUENZA VIRUS VAC: HCPCS | Performed by: NURSE PRACTITIONER

## 2020-12-08 NOTE — PATIENT INSTRUCTIONS
Iron-Rich Diet    Iron is a mineral that helps your body to produce hemoglobin. Hemoglobin is a protein in red blood cells that carries oxygen to your body's tissues. Eating too little iron may cause you to feel weak and tired, and it can increase your risk of infection. Iron is naturally found in many foods, and many foods have iron added to them (iron-fortified foods).  You may need to follow an iron-rich diet if you do not have enough iron in your body due to certain medical conditions. The amount of iron that you need each day depends on your age, your sex, and any medical conditions you have. Follow instructions from your health care provider or a diet and nutrition specialist (dietitian) about how much iron you should eat each day.  What are tips for following this plan?  Reading food labels  · Check food labels to see how many milligrams (mg) of iron are in each serving.  Cooking  · Cook foods in pots and pans that are made from iron.  · Take these steps to make it easier for your body to absorb iron from certain foods:  ? Soak beans overnight before cooking.  ? Soak whole grains overnight and drain them before using.  ? Ferment flours before baking, such as by using yeast in bread dough.  Meal planning  · When you eat foods that contain iron, you should eat them with foods that are high in vitamin C. These include oranges, peppers, tomatoes, potatoes, and tom. Vitamin C helps your body to absorb iron.  General information  · Take iron supplements only as told by your health care provider. An overdose of iron can be life-threatening. If you were prescribed iron supplements, take them with orange juice or a vitamin C supplement.  · When you eat iron-fortified foods or take an iron supplement, you should also eat foods that naturally contain iron, such as meat, poultry, and fish. Eating naturally iron-rich foods helps your body to absorb the iron that is added to other foods or contained in a  supplement.  · Certain foods and drinks prevent your body from absorbing iron properly. Avoid eating these foods in the same meal as iron-rich foods or with iron supplements. These foods include:  ? Coffee, black tea, and red wine.  ? Milk, dairy products, and foods that are high in calcium.  ? Beans and soybeans.  ? Whole grains.  What foods should I eat?  Fruits  Prunes. Raisins.  Eat fruits high in vitamin C, such as oranges, grapefruits, and strawberries, alongside iron-rich foods.  Vegetables  Spinach (cooked). Green peas. Broccoli. Fermented vegetables.  Eat vegetables high in vitamin C, such as leafy greens, potatoes, bell peppers, and tomatoes, alongside iron-rich foods.  Grains  Iron-fortified breakfast cereal. Iron-fortified whole-wheat bread. Enriched rice. Sprouted grains.  Meats and other proteins  Beef liver. Oysters. Beef. Shrimp. Turkey. Chicken. Tuna. Sardines. Chickpeas. Nuts. Tofu. Pumpkin seeds.  Beverages  Tomato juice. Fresh orange juice. Prune juice. Hibiscus tea. Fortified instant breakfast shakes.  Sweets and desserts  Blackstrap molasses.  Seasonings and condiments  Tahini. Fermented soy sauce.  Other foods  Wheat germ.  The items listed above may not be a complete list of recommended foods and beverages. Contact a dietitian for more information.  What foods should I avoid?  Grains  Whole grains. Bran cereal. Bran flour. Oats.  Meats and other proteins  Soybeans. Products made from soy protein. Black beans. Lentils. Mung beans. Split peas.  Dairy  Milk. Cream. Cheese. Yogurt. Cottage cheese.  Beverages  Coffee. Black tea. Red wine.  Sweets and desserts  Cocoa. Chocolate. Ice cream.  Other foods  Basil. Oregano. Large amounts of parsley.  The items listed above may not be a complete list of foods and beverages to avoid. Contact a dietitian for more information.  Summary  · Iron is a mineral that helps your body to produce hemoglobin. Hemoglobin is a protein in red blood cells that carries  oxygen to your body's tissues.  · Iron is naturally found in many foods, and many foods have iron added to them (iron-fortified foods).  · When you eat foods that contain iron, you should eat them with foods that are high in vitamin C. Vitamin C helps your body to absorb iron.  · Certain foods and drinks prevent your body from absorbing iron properly, such as whole grains and dairy products. You should avoid eating these foods in the same meal as iron-rich foods or with iron supplements.  This information is not intended to replace advice given to you by your health care provider. Make sure you discuss any questions you have with your health care provider.  Document Revised: 11/30/2018 Document Reviewed: 11/13/2018  Elsevier Patient Education © 2020 [employer]

## 2020-12-08 NOTE — PROGRESS NOTES
Chief Complaint   Patient presents with   • Diabetes   • Hypertension   • Chronic Kidney Disease       HPI     Diabetes mellitus type II, feels stable on meds, last hgba1c elevated at 8.0, here to review labs hgba1c now 6.9, on metformin 1000 twice daily, Tresiba 45 units daily and glipizide 5 mg daily.  Denies any signs/symptoms of hyper/hypoglycemia, blurry vision, polydipsia, polyuria, nocturia, and unintentional weight loss.     Hyperlipidemia/CAD/hx of CABG, The patient denies muscle aches, constipation, diarrhea, GI upset, fatigue, chest pain/pressure, exercise intolerance, dyspnea, palpitations, syncope.      HTN, stable on meds and takes as directed, denies chest pain, headache, shortness of air, palpitations and does report intermittent swelling of extremities worse at the end of the day.     Here to review labs      The following portions of the patient's history were reviewed and updated as appropriate: allergies, current medications, past family history, past medical history, past social history, past surgical history and problem list.    Past Medical History:   Diagnosis Date   • Coronary artery disease    • Diabetes mellitus, type II (CMS/[facility])    • Elevated cholesterol    • H/O cataract    • Hyperlipidemia    • Hypertension    • Peripheral edema    • Peripheral edema    • Sleep apnea      Past Surgical History:   Procedure Laterality Date   • CARDIAC CATHETERIZATION N/A 7/19/2019    Procedure: Left Heart Cath with angiogram;  Surgeon: Bautista Lopez MD;  Location: [facility] CATH INVASIVE LOCATION;  Service: Cardiovascular   • CARDIAC CATHETERIZATION N/A 7/19/2019    Procedure: Coronary angiography;  Surgeon: Bautista Lopez MD;  Location: [facility] CATH INVASIVE LOCATION;  Service: Cardiovascular   • CARDIAC CATHETERIZATION N/A 7/19/2019    Procedure: Left ventriculography;  Surgeon: Bautista Lopez MD;  Location: [facility] CATH INVASIVE LOCATION;  Service: Cardiovascular   • COLONOSCOPY     • CORONARY ARTERY  BYPASS GRAFT N/A 8/8/2019    Procedure: CORONARY ARTERY BYPASS GRAFTING;  Surgeon: Chet Mcarthur MD;  Location: [facility] CVOR;  Service: Cardiothoracic   • ENDOSCOPY N/A 3/15/2020    Procedure: ESOPHAGOGASTRODUODENOSCOPY;  Surgeon: Jaspal Logan MD;  Location: [facility] ENDOSCOPY;  Service: Gastroenterology;  Laterality: N/A;  EPIGASTRIC ABDOMINAL PAIN, ABNORMAL CT SCAN, PANCREATITIS  NORMAL EGD   • HERNIA REPAIR     • OTHER SURGICAL HISTORY  12/2015    2d echo-abstracted cent.      Family History   Problem Relation Age of Onset   • Heart failure Mother    • Hypertension Brother    • Cancer Brother      Social History     Tobacco Use   • Smoking status: Never Smoker   • Smokeless tobacco: Never Used   Substance Use Topics   • Alcohol use: No     Frequency: Never         Current Outpatient Medications:   •  amLODIPine (NORVASC) 5 MG tablet, Take 1 tablet by mouth Daily., Disp: 90 tablet, Rfl: 3  •  aspirin 81 MG chewable tablet, Chew 81 mg Every Night., Disp: , Rfl:   •  atorvastatin (LIPITOR) 40 MG tablet, Take 1 tablet by mouth every night at bedtime., Disp: 90 tablet, Rfl: 3  •  B-D UF III MINI PEN NEEDLES 31G X 5 MM misc, USE ONCE DAILY AS DIRECTED, Disp: 100 each, Rfl: 3  •  cetirizine (zyrTEC) 10 MG tablet, Take 10 mg by mouth Daily., Disp: , Rfl: 0  •  Cinnamon 500 MG tablet, Take 1,000 mg by mouth Every Evening., Disp: , Rfl:   •  docusate sodium (COLACE) 100 MG capsule, Take 100 mg by mouth 2 (Two) Times a Day., Disp: , Rfl:   •  esomeprazole (nexIUM) 20 MG capsule, Take 20 mg by mouth Every Morning Before Breakfast., Disp: , Rfl:   •  furosemide (LASIX) 20 MG tablet, Take 1 tablet by mouth Daily., Disp: 90 tablet, Rfl: 3  •  glipizide (GLUCOTROL) 5 MG tablet, Take 2 tablets po in am, 1 po every evening, Disp: 270 tablet, Rfl: 1  •  hydrALAZINE (APRESOLINE) 25 MG tablet, TAKE 1 TABLET BY MOUTH THREE TIMES A DAY, Disp: 270 tablet, Rfl: 1  •  HYDROcodone-acetaminophen (Norco) 5-325 MG per tablet,  "Take 1 tablet by mouth Every 6 (Six) Hours As Needed for Severe Pain ., Disp: 40 tablet, Rfl: 0  •  Iron-Vitamin C 100-250 MG tablet, Take 1 tablet by mouth 2 (Two) Times a Day., Disp: , Rfl:   •  lisinopril (PRINIVIL,ZESTRIL) 40 MG tablet, Take 1 tablet by mouth Daily., Disp: 90 tablet, Rfl: 3  •  metFORMIN (GLUCOPHAGE) 1000 MG tablet, TAKE 1 TABLET BY MOUTH TWICE A DAY WITH MEALS, Disp: 180 tablet, Rfl: 1  •  metoprolol tartrate (LOPRESSOR) 25 MG tablet, TAKE 1 TABLET BY MOUTH EVERY 12 HOURS, Disp: 180 tablet, Rfl: 1  •  ONE TOUCH ULTRA TEST test strip, Test blood sugar no more that once daily.  ICD E11.9, Disp: 200 each, Rfl: 12  •  potassium chloride (MICRO-K) 10 MEQ CR capsule, TAKE 1 CAPSULE BY MOUTH EVERY DAY, Disp: 90 capsule, Rfl: 1  •  Tresiba FlexTouch 100 UNIT/ML solution pen-injector injection, INJECT 45 UNITS UNDER THE SKIN INTO THE APPROPRIATE AREA AS DIRECTED DAILY., Disp: 15 pen, Rfl: 1  •  vitamin C (ASCORBIC ACID) 500 MG tablet, Take 1,000 mg by mouth Every Evening., Disp: , Rfl:       Review of Systems       Obtained as mentioned in HPI, otherwise negative.       Vitals:    12/08/20 0948   BP: 144/80   BP Location: Left arm   Patient Position: Sitting   Cuff Size: Adult   Pulse: 68   Temp: 97.5 °F (36.4 °C)   TempSrc: Skin   SpO2: 98%   Weight: 97.6 kg (215 lb 3.2 oz)   Height: 167.6 cm (65.98\")     Body mass index is 34.75 kg/m².    Physical Exam  Vitals signs and nursing note reviewed.   Constitutional:       General: He is not in acute distress.     Appearance: He is well-developed. He is not diaphoretic.   HENT:      Head: Normocephalic and atraumatic.   Eyes:      Pupils: Pupils are equal, round, and reactive to light.   Neck:      Musculoskeletal: Normal range of motion.      Thyroid: No thyromegaly.   Cardiovascular:      Rate and Rhythm: Normal rate and regular rhythm.      Heart sounds: Normal heart sounds. No murmur.   Pulmonary:      Effort: Pulmonary effort is normal. No respiratory " distress.      Breath sounds: Normal breath sounds.   Abdominal:      General: Bowel sounds are normal. There is no distension.      Palpations: Abdomen is soft.      Tenderness: There is no abdominal tenderness.   Musculoskeletal: Normal range of motion.         General: Swelling (trace pitting BLE) present.   Skin:     General: Skin is warm and dry.      Findings: No erythema.   Neurological:      Mental Status: He is alert and oriented to person, place, and time.   Psychiatric:         Behavior: Behavior normal.         Thought Content: Thought content normal.         Judgment: Judgment normal.         No visits with results within 7 Day(s) from this visit.   Latest known visit with results is:   Lab on 12/01/2020   Component Date Value Ref Range Status   • Glucose 12/01/2020 95  65 - 99 mg/dL Final   • BUN 12/01/2020 30* 8 - 23 mg/dL Final   • Creatinine 12/01/2020 1.41* 0.76 - 1.27 mg/dL Final   • Sodium 12/01/2020 136  136 - 145 mmol/L Final   • Potassium 12/01/2020 4.8  3.5 - 5.2 mmol/L Final   • Chloride 12/01/2020 98  98 - 107 mmol/L Final   • CO2 12/01/2020 25.0  22.0 - 29.0 mmol/L Final   • Calcium 12/01/2020 9.3  8.6 - 10.5 mg/dL Final   • Total Protein 12/01/2020 7.0  6.0 - 8.5 g/dL Final   • Albumin 12/01/2020 4.20  3.50 - 5.20 g/dL Final   • ALT (SGPT) 12/01/2020 20  1 - 41 U/L Final   • AST (SGOT) 12/01/2020 25  1 - 40 U/L Final   • Alkaline Phosphatase 12/01/2020 71  39 - 117 U/L Final   • Total Bilirubin 12/01/2020 0.3  0.0 - 1.2 mg/dL Final   • eGFR Non African Amer 12/01/2020 50* >60 mL/min/1.73 Final   • Globulin 12/01/2020 2.8  gm/dL Final   • A/G Ratio 12/01/2020 1.5  g/dL Final   • BUN/Creatinine Ratio 12/01/2020 21.3  7.0 - 25.0 Final   • Anion Gap 12/01/2020 13.0  5.0 - 15.0 mmol/L Final   • WBC 12/01/2020 9.32  3.40 - 10.80 10*3/mm3 Final   • RBC 12/01/2020 4.15  4.14 - 5.80 10*6/mm3 Final   • Hemoglobin 12/01/2020 11.9* 13.0 - 17.7 g/dL Final   • Hematocrit 12/01/2020 36.0* 37.5 - 51.0 %  Final   • MCV 12/01/2020 86.7  79.0 - 97.0 fL Final   • MCH 12/01/2020 28.7  26.6 - 33.0 pg Final   • MCHC 12/01/2020 33.1  31.5 - 35.7 g/dL Final   • RDW 12/01/2020 12.8  12.3 - 15.4 % Final   • RDW-SD 12/01/2020 39.8  37.0 - 54.0 fl Final   • MPV 12/01/2020 11.1  6.0 - 12.0 fL Final   • Platelets 12/01/2020 259  140 - 450 10*3/mm3 Final   • Total Cholesterol 12/01/2020 89  0 - 200 mg/dL Final   • Triglycerides 12/01/2020 220* 0 - 150 mg/dL Final   • HDL Cholesterol 12/01/2020 29* 40 - 60 mg/dL Final   • LDL Cholesterol  12/01/2020 26  0 - 100 mg/dL Final   • VLDL Cholesterol 12/01/2020 34  5 - 40 mg/dL Final   • LDL/HDL Ratio 12/01/2020 0.55   Final   • Hemoglobin A1C 12/01/2020 6.9* 3.5 - 5.6 % Final   • PSA 12/01/2020 0.740  0.000 - 4.000 ng/mL Final   • TSH 12/01/2020 4.950* 0.270 - 4.200 uIU/mL Final   • Hepatitis C Ab 12/01/2020 Non-Reactive  Non-Reactive Final   • Microalbumin/Creatinine Ratio 12/01/2020    Final    Unable to calculate   • Creatinine, Urine 12/01/2020 25.9  mg/dL Final   • Microalbumin, Urine 12/01/2020 <1.2  mg/dL Final       Diagnoses and all orders for this visit:    1. Type 2 diabetes mellitus with hyperglycemia, with long-term current use of insulin (CMS/[facility]) (Primary)  Comments:  A1c improved at 6.9, continue Metformin, Tresiba and glipizide.  Continue diabetic diet, repeat labs in 6 months    2. Mixed hyperlipidemia  Comments:  Reviewed labs, all in good range except triglycerides 220, continue statin    3. Essential hypertension  Comments:  Stable    4. Iron deficiency anemia, unspecified iron deficiency anemia type  Comments:  Slight anemia, continue iron/vitamin C OTC, increase iron fortified foods in diet, given education    5. Stage 3a chronic kidney disease  Comments:  Stable, continue to monitor every 3 to 6 months    6. Influenza vaccine administered  -     Fluad Quad >65 years    7. Abnormal TSH  Comments:  Slightly hypoactive at 4.95, no treatment at this time, will repeat  TSH with next lab draw    Other orders  -     Cancel: Fluarix/Fluzone/Afluria Quad>6 Months       Return in about 6 months (around 6/8/2021) for labs prior to appt, f/u on DM, HTN, lipids.

## 2020-12-16 RX ORDER — HYDRALAZINE HYDROCHLORIDE 25 MG/1
TABLET, FILM COATED ORAL
Qty: 270 TABLET | Refills: 1 | Status: SHIPPED | OUTPATIENT
Start: 2020-12-16 | End: 2021-06-14

## 2021-01-05 ENCOUNTER — APPOINTMENT (OUTPATIENT)
Dept: MRI IMAGING | Facility: HOSPITAL | Age: 71
End: 2021-01-05

## 2021-01-05 ENCOUNTER — HOSPITAL ENCOUNTER (INPATIENT)
Facility: HOSPITAL | Age: 71
LOS: 2 days | Discharge: HOME | End: 2021-01-07
Attending: HOSPITALIST | Admitting: HOSPITALIST

## 2021-01-05 ENCOUNTER — APPOINTMENT (OUTPATIENT)
Dept: CT IMAGING | Facility: HOSPITAL | Age: 71
End: 2021-01-05

## 2021-01-05 ENCOUNTER — APPOINTMENT (OUTPATIENT)
Dept: ULTRASOUND IMAGING | Facility: HOSPITAL | Age: 71
End: 2021-01-05

## 2021-01-05 ENCOUNTER — INPATIENT HOSPITAL (AMBULATORY)
Dept: URBAN - METROPOLITAN AREA HOSPITAL 84 | Facility: HOSPITAL | Age: 71
End: 2021-01-05
Payer: COMMERCIAL

## 2021-01-05 DIAGNOSIS — K85.90 ACUTE PANCREATITIS WITHOUT INFECTION OR NECROSIS, UNSPECIFIED PANCREATITIS TYPE: ICD-10-CM

## 2021-01-05 DIAGNOSIS — R11.0 NAUSEA: ICD-10-CM

## 2021-01-05 DIAGNOSIS — R10.10 PAIN OF UPPER ABDOMEN: Primary | ICD-10-CM

## 2021-01-05 DIAGNOSIS — R74.8 ABNORMAL LEVELS OF OTHER SERUM ENZYMES: ICD-10-CM

## 2021-01-05 DIAGNOSIS — N17.9 ACUTE KIDNEY INJURY (HCC): ICD-10-CM

## 2021-01-05 DIAGNOSIS — R11.2 NAUSEA WITH VOMITING, UNSPECIFIED: ICD-10-CM

## 2021-01-05 DIAGNOSIS — R10.9 UNSPECIFIED ABDOMINAL PAIN: ICD-10-CM

## 2021-01-05 DIAGNOSIS — K85.90 ACUTE PANCREATITIS WITHOUT NECROSIS OR INFECTION, UNSPECIFIE: ICD-10-CM

## 2021-01-05 DIAGNOSIS — R74.8 ELEVATED LIVER ENZYMES: ICD-10-CM

## 2021-01-05 PROBLEM — G47.33 OSA (OBSTRUCTIVE SLEEP APNEA): Chronic | Status: ACTIVE | Noted: 2021-01-05

## 2021-01-05 PROBLEM — E66.9 OBESITY (BMI 30-39.9): Chronic | Status: ACTIVE | Noted: 2021-01-05

## 2021-01-05 PROBLEM — E78.5 HYPERLIPIDEMIA: Chronic | Status: ACTIVE | Noted: 2019-08-29

## 2021-01-05 PROBLEM — I10 HYPERTENSION: Chronic | Status: ACTIVE | Noted: 2019-08-29

## 2021-01-05 PROBLEM — E11.65 TYPE 2 DIABETES MELLITUS WITH HYPERGLYCEMIA: Chronic | Status: ACTIVE | Noted: 2017-03-24

## 2021-01-05 PROBLEM — E11.42 DIABETIC PERIPHERAL NEUROPATHY (HCC): Chronic | Status: ACTIVE | Noted: 2020-10-06

## 2021-01-05 PROBLEM — I25.118 CORONARY ARTERY DISEASE OF NATIVE ARTERY OF NATIVE HEART WITH STABLE ANGINA PECTORIS: Chronic | Status: ACTIVE | Noted: 2019-07-26

## 2021-01-05 PROBLEM — I25.10 CORONARY ARTERY DISEASE INVOLVING NATIVE CORONARY ARTERY OF NATIVE HEART WITHOUT ANGINA PECTORIS: Chronic | Status: ACTIVE | Noted: 2019-07-26

## 2021-01-05 PROBLEM — R79.89 ELEVATED LFTS: Status: ACTIVE | Noted: 2021-01-05

## 2021-01-05 LAB
ALBUMIN SERPL-MCNC: 4.6 G/DL (ref 3.5–5.2)
ALBUMIN/GLOB SERPL: 1.5 G/DL
ALP SERPL-CCNC: 154 U/L (ref 39–117)
ALT SERPL W P-5'-P-CCNC: 184 U/L (ref 1–41)
ANION GAP SERPL CALCULATED.3IONS-SCNC: 13 MMOL/L (ref 5–15)
APTT PPP: 23.9 SECONDS (ref 24–31)
AST SERPL-CCNC: 169 U/L (ref 1–40)
BASOPHILS # BLD AUTO: 0 10*3/MM3 (ref 0–0.2)
BASOPHILS NFR BLD AUTO: 0.2 % (ref 0–1.5)
BILIRUB SERPL-MCNC: 0.9 MG/DL (ref 0–1.2)
BILIRUB UR QL STRIP: NEGATIVE
BUN SERPL-MCNC: 31 MG/DL (ref 8–23)
BUN/CREAT SERPL: 17.9 (ref 7–25)
CALCIUM SPEC-SCNC: 9.8 MG/DL (ref 8.6–10.5)
CHLORIDE SERPL-SCNC: 97 MMOL/L (ref 98–107)
CLARITY UR: CLEAR
CO2 SERPL-SCNC: 24 MMOL/L (ref 22–29)
COLOR UR: YELLOW
CREAT SERPL-MCNC: 1.73 MG/DL (ref 0.76–1.27)
DEPRECATED RDW RBC AUTO: 40.7 FL (ref 37–54)
EOSINOPHIL # BLD AUTO: 0 10*3/MM3 (ref 0–0.4)
EOSINOPHIL NFR BLD AUTO: 0.1 % (ref 0.3–6.2)
ERYTHROCYTE [DISTWIDTH] IN BLOOD BY AUTOMATED COUNT: 13.4 % (ref 12.3–15.4)
GFR SERPL CREATININE-BSD FRML MDRD: 39 ML/MIN/1.73
GLOBULIN UR ELPH-MCNC: 3.1 GM/DL
GLUCOSE BLDC GLUCOMTR-MCNC: 166 MG/DL (ref 70–105)
GLUCOSE BLDC GLUCOMTR-MCNC: 167 MG/DL (ref 70–105)
GLUCOSE BLDC GLUCOMTR-MCNC: 225 MG/DL (ref 70–105)
GLUCOSE SERPL-MCNC: 221 MG/DL (ref 65–99)
GLUCOSE UR STRIP-MCNC: NEGATIVE MG/DL
HCT VFR BLD AUTO: 40.5 % (ref 37.5–51)
HGB BLD-MCNC: 13.5 G/DL (ref 13–17.7)
HGB UR QL STRIP.AUTO: NEGATIVE
HOLD SPECIMEN: NORMAL
HOLD SPECIMEN: NORMAL
INR PPP: 1.01 (ref 0.93–1.1)
KETONES UR QL STRIP: ABNORMAL
LEUKOCYTE ESTERASE UR QL STRIP.AUTO: NEGATIVE
LIPASE SERPL-CCNC: 1703 U/L (ref 13–60)
LYMPHOCYTES # BLD AUTO: 1.4 10*3/MM3 (ref 0.7–3.1)
LYMPHOCYTES NFR BLD AUTO: 8.8 % (ref 19.6–45.3)
MCH RBC QN AUTO: 28.9 PG (ref 26.6–33)
MCHC RBC AUTO-ENTMCNC: 33.3 G/DL (ref 31.5–35.7)
MCV RBC AUTO: 86.8 FL (ref 79–97)
MONOCYTES # BLD AUTO: 1.2 10*3/MM3 (ref 0.1–0.9)
MONOCYTES NFR BLD AUTO: 7.6 % (ref 5–12)
NEUTROPHILS NFR BLD AUTO: 12.9 10*3/MM3 (ref 1.7–7)
NEUTROPHILS NFR BLD AUTO: 83.3 % (ref 42.7–76)
NITRITE UR QL STRIP: NEGATIVE
NRBC BLD AUTO-RTO: 0.1 /100 WBC (ref 0–0.2)
PH UR STRIP.AUTO: 5.5 [PH] (ref 5–8)
PLATELET # BLD AUTO: 291 10*3/MM3 (ref 140–450)
PMV BLD AUTO: 8.3 FL (ref 6–12)
POTASSIUM SERPL-SCNC: 4.7 MMOL/L (ref 3.5–5.2)
PROT SERPL-MCNC: 7.7 G/DL (ref 6–8.5)
PROT UR QL STRIP: NEGATIVE
PROTHROMBIN TIME: 11.1 SECONDS (ref 9.6–11.7)
RBC # BLD AUTO: 4.67 10*6/MM3 (ref 4.14–5.8)
SARS-COV-2 ORF1AB RESP QL NAA+PROBE: NOT DETECTED
SODIUM SERPL-SCNC: 134 MMOL/L (ref 136–145)
SP GR UR STRIP: 1.02 (ref 1–1.03)
TROPONIN T SERPL-MCNC: <0.01 NG/ML (ref 0–0.03)
UROBILINOGEN UR QL STRIP: ABNORMAL
WBC # BLD AUTO: 15.4 10*3/MM3 (ref 3.4–10.8)
WHOLE BLOOD HOLD SPECIMEN: NORMAL
WHOLE BLOOD HOLD SPECIMEN: NORMAL

## 2021-01-05 PROCEDURE — 99222 1ST HOSP IP/OBS MODERATE 55: CPT | Performed by: HOSPITALIST

## 2021-01-05 PROCEDURE — 74181 MRI ABDOMEN W/O CONTRAST: CPT

## 2021-01-05 PROCEDURE — 82962 GLUCOSE BLOOD TEST: CPT

## 2021-01-05 PROCEDURE — G0378 HOSPITAL OBSERVATION PER HR: HCPCS

## 2021-01-05 PROCEDURE — 63710000001 INSULIN LISPRO (HUMAN) PER 5 UNITS: Performed by: NURSE PRACTITIONER

## 2021-01-05 PROCEDURE — 85610 PROTHROMBIN TIME: CPT | Performed by: NURSE PRACTITIONER

## 2021-01-05 PROCEDURE — 76705 ECHO EXAM OF ABDOMEN: CPT

## 2021-01-05 PROCEDURE — 99284 EMERGENCY DEPT VISIT MOD MDM: CPT

## 2021-01-05 PROCEDURE — 74176 CT ABD & PELVIS W/O CONTRAST: CPT

## 2021-01-05 PROCEDURE — 25010000002 MORPHINE PER 10 MG: Performed by: NURSE PRACTITIONER

## 2021-01-05 PROCEDURE — 85025 COMPLETE CBC W/AUTO DIFF WBC: CPT | Performed by: NURSE PRACTITIONER

## 2021-01-05 PROCEDURE — 25010000002 PIPERACILLIN SOD-TAZOBACTAM PER 1 G: Performed by: NURSE PRACTITIONER

## 2021-01-05 PROCEDURE — 25010000002 ENOXAPARIN PER 10 MG: Performed by: NURSE PRACTITIONER

## 2021-01-05 PROCEDURE — 93005 ELECTROCARDIOGRAM TRACING: CPT | Performed by: NURSE PRACTITIONER

## 2021-01-05 PROCEDURE — 99285 EMERGENCY DEPT VISIT HI MDM: CPT

## 2021-01-05 PROCEDURE — 83036 HEMOGLOBIN GLYCOSYLATED A1C: CPT | Performed by: NURSE PRACTITIONER

## 2021-01-05 PROCEDURE — 87040 BLOOD CULTURE FOR BACTERIA: CPT | Performed by: NURSE PRACTITIONER

## 2021-01-05 PROCEDURE — 83690 ASSAY OF LIPASE: CPT | Performed by: NURSE PRACTITIONER

## 2021-01-05 PROCEDURE — U0004 COV-19 TEST NON-CDC HGH THRU: HCPCS | Performed by: HOSPITALIST

## 2021-01-05 PROCEDURE — 84484 ASSAY OF TROPONIN QUANT: CPT | Performed by: NURSE PRACTITIONER

## 2021-01-05 PROCEDURE — 80053 COMPREHEN METABOLIC PANEL: CPT | Performed by: NURSE PRACTITIONER

## 2021-01-05 PROCEDURE — 81003 URINALYSIS AUTO W/O SCOPE: CPT | Performed by: NURSE PRACTITIONER

## 2021-01-05 PROCEDURE — 25010000002 ONDANSETRON PER 1 MG: Performed by: NURSE PRACTITIONER

## 2021-01-05 PROCEDURE — 99222 1ST HOSP IP/OBS MODERATE 55: CPT | Performed by: NURSE PRACTITIONER

## 2021-01-05 PROCEDURE — 85730 THROMBOPLASTIN TIME PARTIAL: CPT | Performed by: NURSE PRACTITIONER

## 2021-01-05 RX ORDER — ACETAMINOPHEN 160 MG/5ML
650 SOLUTION ORAL EVERY 4 HOURS PRN
Status: DISCONTINUED | OUTPATIENT
Start: 2021-01-05 | End: 2021-01-07 | Stop reason: HOSPADM

## 2021-01-05 RX ORDER — MAGNESIUM SULFATE 1 G/100ML
1 INJECTION INTRAVENOUS AS NEEDED
Status: DISCONTINUED | OUTPATIENT
Start: 2021-01-05 | End: 2021-01-07 | Stop reason: HOSPADM

## 2021-01-05 RX ORDER — NITROGLYCERIN 0.4 MG/1
0.4 TABLET SUBLINGUAL
Status: DISCONTINUED | OUTPATIENT
Start: 2021-01-05 | End: 2021-01-07 | Stop reason: HOSPADM

## 2021-01-05 RX ORDER — SODIUM CHLORIDE 0.9 % (FLUSH) 0.9 %
10 SYRINGE (ML) INJECTION AS NEEDED
Status: DISCONTINUED | OUTPATIENT
Start: 2021-01-05 | End: 2021-01-07 | Stop reason: HOSPADM

## 2021-01-05 RX ORDER — CHOLECALCIFEROL (VITAMIN D3) 125 MCG
5 CAPSULE ORAL NIGHTLY PRN
Status: DISCONTINUED | OUTPATIENT
Start: 2021-01-05 | End: 2021-01-07 | Stop reason: HOSPADM

## 2021-01-05 RX ORDER — MAGNESIUM SULFATE HEPTAHYDRATE 40 MG/ML
2 INJECTION, SOLUTION INTRAVENOUS AS NEEDED
Status: DISCONTINUED | OUTPATIENT
Start: 2021-01-05 | End: 2021-01-07 | Stop reason: HOSPADM

## 2021-01-05 RX ORDER — INSULIN LISPRO 100 [IU]/ML
0-9 INJECTION, SOLUTION INTRAVENOUS; SUBCUTANEOUS AS NEEDED
Status: DISCONTINUED | OUTPATIENT
Start: 2021-01-05 | End: 2021-01-06

## 2021-01-05 RX ORDER — ALUMINA, MAGNESIA, AND SIMETHICONE 2400; 2400; 240 MG/30ML; MG/30ML; MG/30ML
15 SUSPENSION ORAL EVERY 6 HOURS PRN
Status: DISCONTINUED | OUTPATIENT
Start: 2021-01-05 | End: 2021-01-07 | Stop reason: HOSPADM

## 2021-01-05 RX ORDER — INSULIN GLARGINE 100 [IU]/ML
20 INJECTION, SOLUTION SUBCUTANEOUS EVERY MORNING
Status: DISCONTINUED | OUTPATIENT
Start: 2021-01-05 | End: 2021-01-07 | Stop reason: HOSPADM

## 2021-01-05 RX ORDER — INSULIN DEGLUDEC INJECTION 100 U/ML
45 INJECTION, SOLUTION SUBCUTANEOUS EVERY MORNING
COMMUNITY
End: 2021-04-19

## 2021-01-05 RX ORDER — POTASSIUM CHLORIDE 7.45 MG/ML
10 INJECTION INTRAVENOUS
Status: DISCONTINUED | OUTPATIENT
Start: 2021-01-05 | End: 2021-01-07 | Stop reason: HOSPADM

## 2021-01-05 RX ORDER — LABETALOL HYDROCHLORIDE 5 MG/ML
10 INJECTION, SOLUTION INTRAVENOUS EVERY 4 HOURS PRN
Status: DISCONTINUED | OUTPATIENT
Start: 2021-01-05 | End: 2021-01-07 | Stop reason: HOSPADM

## 2021-01-05 RX ORDER — ONDANSETRON 2 MG/ML
4 INJECTION INTRAMUSCULAR; INTRAVENOUS EVERY 6 HOURS PRN
Status: DISCONTINUED | OUTPATIENT
Start: 2021-01-05 | End: 2021-01-07 | Stop reason: HOSPADM

## 2021-01-05 RX ORDER — GLIPIZIDE 5 MG/1
10 TABLET ORAL EVERY MORNING
COMMUNITY
End: 2021-03-17 | Stop reason: SDUPTHER

## 2021-01-05 RX ORDER — PANTOPRAZOLE SODIUM 40 MG/1
40 TABLET, DELAYED RELEASE ORAL
Status: DISCONTINUED | OUTPATIENT
Start: 2021-01-05 | End: 2021-01-07 | Stop reason: HOSPADM

## 2021-01-05 RX ORDER — BISACODYL 10 MG
10 SUPPOSITORY, RECTAL RECTAL DAILY PRN
Status: DISCONTINUED | OUTPATIENT
Start: 2021-01-05 | End: 2021-01-07 | Stop reason: HOSPADM

## 2021-01-05 RX ORDER — GLIPIZIDE 5 MG/1
5 TABLET ORAL EVERY EVENING
COMMUNITY
End: 2021-03-17 | Stop reason: SDUPTHER

## 2021-01-05 RX ORDER — ACETAMINOPHEN 650 MG/1
650 SUPPOSITORY RECTAL EVERY 4 HOURS PRN
Status: DISCONTINUED | OUTPATIENT
Start: 2021-01-05 | End: 2021-01-07 | Stop reason: HOSPADM

## 2021-01-05 RX ORDER — MORPHINE SULFATE 4 MG/ML
2 INJECTION, SOLUTION INTRAMUSCULAR; INTRAVENOUS EVERY 4 HOURS PRN
Status: DISCONTINUED | OUTPATIENT
Start: 2021-01-05 | End: 2021-01-07

## 2021-01-05 RX ORDER — ACETAMINOPHEN 325 MG/1
650 TABLET ORAL EVERY 4 HOURS PRN
Status: DISCONTINUED | OUTPATIENT
Start: 2021-01-05 | End: 2021-01-07 | Stop reason: HOSPADM

## 2021-01-05 RX ORDER — MORPHINE SULFATE 4 MG/ML
2 INJECTION, SOLUTION INTRAMUSCULAR; INTRAVENOUS ONCE
Status: COMPLETED | OUTPATIENT
Start: 2021-01-05 | End: 2021-01-05

## 2021-01-05 RX ORDER — POTASSIUM CHLORIDE 1.5 G/1.77G
40 POWDER, FOR SOLUTION ORAL AS NEEDED
Status: DISCONTINUED | OUTPATIENT
Start: 2021-01-05 | End: 2021-01-07 | Stop reason: HOSPADM

## 2021-01-05 RX ORDER — INSULIN LISPRO 100 [IU]/ML
0-9 INJECTION, SOLUTION INTRAVENOUS; SUBCUTANEOUS EVERY 6 HOURS SCHEDULED
Status: DISCONTINUED | OUTPATIENT
Start: 2021-01-05 | End: 2021-01-06

## 2021-01-05 RX ORDER — SODIUM CHLORIDE 0.9 % (FLUSH) 0.9 %
10 SYRINGE (ML) INJECTION EVERY 12 HOURS SCHEDULED
Status: DISCONTINUED | OUTPATIENT
Start: 2021-01-05 | End: 2021-01-07 | Stop reason: HOSPADM

## 2021-01-05 RX ORDER — DEXTROSE MONOHYDRATE 25 G/50ML
25 INJECTION, SOLUTION INTRAVENOUS
Status: DISCONTINUED | OUTPATIENT
Start: 2021-01-05 | End: 2021-01-07 | Stop reason: HOSPADM

## 2021-01-05 RX ORDER — ONDANSETRON 4 MG/1
4 TABLET, FILM COATED ORAL EVERY 6 HOURS PRN
Status: DISCONTINUED | OUTPATIENT
Start: 2021-01-05 | End: 2021-01-07 | Stop reason: HOSPADM

## 2021-01-05 RX ORDER — POTASSIUM CHLORIDE 20 MEQ/1
40 TABLET, EXTENDED RELEASE ORAL AS NEEDED
Status: DISCONTINUED | OUTPATIENT
Start: 2021-01-05 | End: 2021-01-07 | Stop reason: HOSPADM

## 2021-01-05 RX ORDER — ONDANSETRON 2 MG/ML
4 INJECTION INTRAMUSCULAR; INTRAVENOUS ONCE
Status: COMPLETED | OUTPATIENT
Start: 2021-01-05 | End: 2021-01-05

## 2021-01-05 RX ORDER — SODIUM CHLORIDE 9 MG/ML
100 INJECTION, SOLUTION INTRAVENOUS CONTINUOUS
Status: DISCONTINUED | OUTPATIENT
Start: 2021-01-05 | End: 2021-01-07 | Stop reason: HOSPADM

## 2021-01-05 RX ORDER — NICOTINE POLACRILEX 4 MG
15 LOZENGE BUCCAL
Status: DISCONTINUED | OUTPATIENT
Start: 2021-01-05 | End: 2021-01-07 | Stop reason: HOSPADM

## 2021-01-05 RX ADMIN — ONDANSETRON 4 MG: 2 INJECTION, SOLUTION INTRAMUSCULAR; INTRAVENOUS at 07:47

## 2021-01-05 RX ADMIN — PANTOPRAZOLE SODIUM 40 MG: 40 TABLET, DELAYED RELEASE ORAL at 14:02

## 2021-01-05 RX ADMIN — MORPHINE SULFATE 2 MG: 4 INJECTION INTRAVENOUS at 20:50

## 2021-01-05 RX ADMIN — SODIUM CHLORIDE 100 ML/HR: 9 INJECTION, SOLUTION INTRAVENOUS at 14:03

## 2021-01-05 RX ADMIN — PIPERACILLIN AND TAZOBACTAM 3.38 G: 3; .375 INJECTION, POWDER, LYOPHILIZED, FOR SOLUTION INTRAVENOUS at 14:56

## 2021-01-05 RX ADMIN — ENOXAPARIN SODIUM 40 MG: 40 INJECTION SUBCUTANEOUS at 16:33

## 2021-01-05 RX ADMIN — PIPERACILLIN AND TAZOBACTAM 3.38 G: 3; .375 INJECTION, POWDER, LYOPHILIZED, FOR SOLUTION INTRAVENOUS; PARENTERAL at 07:50

## 2021-01-05 RX ADMIN — MORPHINE SULFATE 2 MG: 4 INJECTION INTRAVENOUS at 07:47

## 2021-01-05 RX ADMIN — ACETAMINOPHEN 650 MG: 325 TABLET, FILM COATED ORAL at 16:33

## 2021-01-05 RX ADMIN — INSULIN LISPRO 2 UNITS: 100 INJECTION, SOLUTION INTRAVENOUS; SUBCUTANEOUS at 17:48

## 2021-01-05 RX ADMIN — SODIUM CHLORIDE 500 ML: 9 INJECTION, SOLUTION INTRAVENOUS at 06:55

## 2021-01-05 RX ADMIN — Medication 10 ML: at 11:55

## 2021-01-05 RX ADMIN — PIPERACILLIN AND TAZOBACTAM 3.38 G: 3; .375 INJECTION, POWDER, LYOPHILIZED, FOR SOLUTION INTRAVENOUS at 20:55

## 2021-01-05 RX ADMIN — MORPHINE SULFATE 2 MG: 4 INJECTION INTRAVENOUS at 11:38

## 2021-01-05 RX ADMIN — Medication 10 ML: at 20:52

## 2021-01-05 NOTE — ED NOTES
Patient complains of epigastric abdominal pain since last night around 1800.  States he had an episode of vomiting and denies nausea at this time.     Brittany Franco RN  01/05/21 0612

## 2021-01-05 NOTE — CONSULTS
GI CONSULT  NOTE:    Referring Provider:  Ewa Daniels NP    Chief complaint: Pancreatitis    Subjective .     History of present illness: Shaka Trinidad is a 70 y.o. male with history of hypertension, hyperlipidemia, DMII, CAD, JEAN PAUL, and CABG who presents with complaints of abdominal pain and nausea/vomiting.  CT abdomen/pelvis on admission does show changes of acute pancreatitis.  LFTs also noted to be elevated.  Review of records show that LFTs were normal in 12/2020.  GI has been asked to consult for further evaluation and treatment.  The patient reports that he began having nausea/vomiting yesterday after eating out.  He denies any hematemesis or coffee-ground emesis.  He also reports the development of epigastric pain that is intermittent.  The pain is dull, but is occasionally sharp at times.  It does not radiate.  He denies any heartburn.  Does report some occasional dysphagia mainly to solid foods.  Denies any NSAID use.  States that he has been moving his bowels regularly 2-3 times daily.  No bright red blood per rectum or melena.  Denies acholic stools.  The patient is not aware of any previous history of elevated LFTs.  He denies any previous history of jaundice or hepatitis.  He does drink 1 glass of wine approximately once monthly.  Denies any history of IV drug use or intranasal use.  No tattoos.  Of note, the patient was seen in 3/2020 during an inpatient admission for possible pancreatitis.  At that time, EGD was performed which was unremarkable.  AMOS was negative, triglycerides were normal at 72, IgG subclasses were unremarkable, and CA 19-9 was normal.      Endo History:  3/2020 EGD (Dr. Logan) -normal  9/2014 colonoscopy (Dr. Ortega) -mucosal polyp, grade 2 internal and external hemorrhoids    Past Medical History:  Past Medical History:   Diagnosis Date   • Acute pancreatitis 3/14/2020   • Coronary artery disease    • Diabetes mellitus, type II (CMS/HCC)    • Elevated cholesterol    •  H/O cataract     cataracts    • Hyperlipidemia    • Hypertension    • Peripheral edema    • Peripheral edema    • Sleep apnea     oral appliance       Past Surgical History:  Past Surgical History:   Procedure Laterality Date   • CARDIAC CATHETERIZATION N/A 7/19/2019    Procedure: Left Heart Cath with angiogram;  Surgeon: Bautista Lopez MD;  Location: [facility] CATH INVASIVE LOCATION;  Service: Cardiovascular   • CARDIAC CATHETERIZATION N/A 7/19/2019    Procedure: Coronary angiography;  Surgeon: Bautista Lopez MD;  Location: [facility] CATH INVASIVE LOCATION;  Service: Cardiovascular   • CARDIAC CATHETERIZATION N/A 7/19/2019    Procedure: Left ventriculography;  Surgeon: Bautista Lopez MD;  Location: [facility] CATH INVASIVE LOCATION;  Service: Cardiovascular   • COLONOSCOPY     • CORONARY ARTERY BYPASS GRAFT N/A 8/8/2019    Procedure: CORONARY ARTERY BYPASS GRAFTING;  Surgeon: Chet Mcarthur MD;  Location: [facility] CVOR;  Service: Cardiothoracic   • ENDOSCOPY N/A 3/15/2020    Procedure: ESOPHAGOGASTRODUODENOSCOPY;  Surgeon: Jaspal Logan MD;  Location: [facility] ENDOSCOPY;  Service: Gastroenterology;  Laterality: N/A;  EPIGASTRIC ABDOMINAL PAIN, ABNORMAL CT SCAN, PANCREATITIS  NORMAL EGD   • HERNIA REPAIR     • OTHER SURGICAL HISTORY  12/2015    2d echo-abstracted cent.        Social History:  Social History     Tobacco Use   • Smoking status: Never Smoker   • Smokeless tobacco: Never Used   Substance Use Topics   • Alcohol use: Yes     Alcohol/week: 1.0 standard drinks     Types: 1 Glasses of wine per week     Frequency: Never   • Drug use: Never       Family History:  Family History   Problem Relation Age of Onset   • Heart failure Mother    • Hypertension Brother    • Cancer Brother    • Heart disease Brother        Medications:  (Not in a hospital admission)      Scheduled Meds:enoxaparin, 40 mg, Subcutaneous, Q24H  insulin lispro, 0-9 Units, Subcutaneous, Q6H  pantoprazole, 40 mg, Oral, QAM  AC  piperacillin-tazobactam, 3.375 g, Intravenous, Q8H  sodium chloride, 10 mL, Intravenous, Q12H      Continuous Infusions:   PRN Meds:.•  acetaminophen **OR** acetaminophen **OR** acetaminophen  •  aluminum-magnesium hydroxide-simethicone  •  bisacodyl  •  dextrose  •  dextrose  •  glucagon (human recombinant)  •  insulin lispro **AND** insulin lispro  •  magnesium sulfate **OR** magnesium sulfate in D5W 1g/100mL (PREMIX)  •  melatonin  •  Morphine  •  nitroglycerin  •  ondansetron **OR** ondansetron  •  potassium chloride **OR** potassium chloride **OR** potassium chloride  •  sodium chloride  •  [COMPLETED] Insert peripheral IV **AND** sodium chloride  •  sodium chloride    ALLERGIES:  Patient has no known allergies.    ROS:  The following systems were reviewed and negative;   Constitution:  No fevers, chills, no unintentional weight loss  Skin: no rash, no jaundice  Eyes:  No blurry vision, no eye pain  HENT:  No change in hearing or smell  Resp:  No dyspnea or cough  CV:  No chest pain or palpitations  :  No dysuria, hematuria  Musculoskeletal:  No leg cramps or arthralgias  Neuro:  No tremor, no numbness  Psych:  No depression or confusion    Objective     Vital Signs:   Vitals:    01/05/21 0926 01/05/21 0941 01/05/21 1100 01/05/21 1211   BP: 128/52 132/73  132/75   BP Location:    Left arm   Patient Position:    Sitting   Pulse: 101 103  102   Resp:    16   Temp:       TempSrc:    Oral   SpO2: 96% 96%  96%   Weight:   95.4 kg (210 lb 5.1 oz)    Height:           Physical Exam:       General Appearance:    Awake and alert, in no acute distress   Head:    Normocephalic, without obvious abnormality, atraumatic   Throat:   No oral lesions, no thrush, oral mucosa moist   Lungs:     Respirations regular, even and unlabored   Chest Wall:    No abnormalities observed   Abdomen:     Soft, upper abdominal tenderness, no rebound or guarding, non-distended   Rectal:     Deferred   Extremities:   Moves all extremities,  no edema, no cyanosis   Pulses:   Pulses palpable and equal bilaterally   Skin:   No rash, no jaundice, normal palpation   Lymph nodes:   No cervical, supraclavicular or submandibular palpable adenopathy   Neurologic:   Cranial nerves 2 - 12 grossly intact, no asterixis       Results Review:   I reviewed the patient's labs and imaging.  CBC    Results from last 7 days   Lab Units 01/05/21  0634   WBC 10*3/mm3 15.40*   HEMOGLOBIN g/dL 13.5   PLATELETS 10*3/mm3 291     CMP   Results from last 7 days   Lab Units 01/05/21  0634   SODIUM mmol/L 134*   POTASSIUM mmol/L 4.7   CHLORIDE mmol/L 97*   CO2 mmol/L 24.0   BUN mg/dL 31*   CREATININE mg/dL 1.73*   GLUCOSE mg/dL 221*   ALBUMIN g/dL 4.60   BILIRUBIN mg/dL 0.9   ALK PHOS U/L 154*   AST (SGOT) U/L 169*   ALT (SGPT) U/L 184*   LIPASE U/L 1,703*     Cr Clearance Estimated Creatinine Clearance: 42 mL/min (A) (by C-G formula based on SCr of 1.73 mg/dL (H)).  Coag   Results from last 7 days   Lab Units 01/05/21  0634   INR  1.01   APTT seconds 23.9*     HbA1C   Lab Results   Component Value Date    HGBA1C 6.9 (H) 12/01/2020    HGBA1C 8.0 (H) 08/11/2020    HGBA1C 7.8 (H) 02/18/2020     Blood Glucose   Glucose   Date/Time Value Ref Range Status   01/05/2021 1216 167 (H) 70 - 105 mg/dL Final     Comment:     Serial Number: [id number]:  [id number]   01/05/2021 0617 225 (H) 70 - 105 mg/dL Final     Comment:     Serial Number: [id number]:  [id number]     Infection     UA    Results from last 7 days   Lab Units 01/05/21  0723   NITRITE UA  Negative     Radiology(recent) Ct Abdomen Pelvis Without Contrast    Result Date: 1/5/2021  1. Acute interstitial pancreatitis involving the pancreatic head and uncinate process. 2. No biliary dilatation. 3. Additional chronic findings above.  Electronically Signed By-Twin Redman MD On:1/5/2021 8:36 AM This report was finalized on [id number] by  Twin Redman MD.         ASSESSMENT:  -Acute uncomplicated pancreatitis -likely  gallstone pancreatitis  -Elevated LFTs  -Epigastric pain  -Nausea/vomiting  -Leukocytosis  -TRISH  -Hypertension  -Hyperlipidemia  -DMII  -CAD s/p CABG  -JEAN PAUL      PLAN:  Patient presents with complaints of abdominal pain and nausea/vomiting.  CT abdomen/pelvis on admission does show changes consistent with acute pancreatitis.  No CBD dilation on CT.  The patient had a similar episode of possible pancreatitis in 3/2020.  Our service was consulted at that time an EGD was performed, but this was unremarkable.  AMOS, IgG subclasses, and triglycerides were all unremarkable.  CA 19-9 also normal at that time.  Patient is on lisinopril.  Reports EtOH use only once monthly.  LFTs are now slightly elevated.  Were normal in 12/2020.  We will proceed with MRCP to evaluate for possible choledocholithiasis.  Would recommend general surgery consultation for laparoscopic cholecystectomy.  Start PPI.  Antiemetics/analgesics as needed.  Maintain NPO.  Start IVFs.  Continue antibiotics.  Supportive care.      I discussed the patients findings and my recommendations with the patient.  I will discuss the case with Dr. Campbell and change the plan accordingly.    We appreciate the referral.    ERICA Osuna  01/05/21  12:19 EST

## 2021-01-05 NOTE — H&P
[facility]      Patient Name: Shaka Trinidad  : 1950  MRN: [id number]  Primary Care Physician: Karly Marmolejo APRN  Date of admission: 2021    Patient Care Team:  Karly Marmolejo APRN as PCP - General (Nurse Practitioner)  Bautista Lopez MD as Consulting Physician (Cardiology)          Subjective   History Present Illness     Chief Complaint:   Chief Complaint   Patient presents with   • Abdominal Pain         Mr. Trinidad is a 70 y.o. male with a history of CAD, HTN, HLD, Type 2 DM with neuropathy, and JEAN PAUL who presents to [facility] ED on 2021 complaining of abdominal pain, nausea, and vomiting. He states he had similar pain last week, but it resolved. He states the pain is diffuse and sharp. He denies any fever, but reports chills. He denies chest pain or shortness of breath. He denies any exacerbating or alleviating factors.     The patient denies a history of pancreatitis, but review of records reveal he was hospitalized here in 2020 for acute pancreatitis. He had a normal EGD at that time and gastroenterology recommended outpatient endoscopic US. The patient apparently never followed up.     Family medical history and social history as documented below.    Upon arrival to the ER the patient was afebrile but tachycardic with . His labs were significant for WBC 15.4, Na 134, BUN 31, Cr 1.73, glucose 221, alk phos 154, , , lipase 1,703. Blood cultures were obtained. His urinalysis did not reflex for culture. His troponin was normal and EKG showed sinus tachycardia. His CT abd/pelvis showed acute pancreatitis. He was given Zosyn, IV fluids, morphine, and Zofran. He was admitted for further evaluation and treatment.          Review of Systems   Constitution: Positive for chills. Negative for fever.   Cardiovascular: Negative for chest pain.   Respiratory: Negative for shortness of breath.    Gastrointestinal:  Positive for abdominal pain, nausea and vomiting. Negative for diarrhea.   All other systems reviewed and are negative.        Personal History     Past Medical History:   Past Medical History:   Diagnosis Date   • Acute pancreatitis 3/14/2020   • Coronary artery disease    • Diabetes mellitus, type II (CMS/HCC)    • Elevated cholesterol    • H/O cataract     cataracts    • Hyperlipidemia    • Hypertension    • Peripheral edema    • Peripheral edema    • Sleep apnea     oral appliance       Surgical History:      Past Surgical History:   Procedure Laterality Date   • CARDIAC CATHETERIZATION N/A 7/19/2019    Procedure: Left Heart Cath with angiogram;  Surgeon: Bautista Lopez MD;  Location: [facility] CATH INVASIVE LOCATION;  Service: Cardiovascular   • CARDIAC CATHETERIZATION N/A 7/19/2019    Procedure: Coronary angiography;  Surgeon: Bautista Lopez MD;  Location: [facility] CATH INVASIVE LOCATION;  Service: Cardiovascular   • CARDIAC CATHETERIZATION N/A 7/19/2019    Procedure: Left ventriculography;  Surgeon: Bautista Lopez MD;  Location: [facility] CATH INVASIVE LOCATION;  Service: Cardiovascular   • COLONOSCOPY     • CORONARY ARTERY BYPASS GRAFT N/A 8/8/2019    Procedure: CORONARY ARTERY BYPASS GRAFTING;  Surgeon: Chet Mcarthur MD;  Location: [facility] CVOR;  Service: Cardiothoracic   • ENDOSCOPY N/A 3/15/2020    Procedure: ESOPHAGOGASTRODUODENOSCOPY;  Surgeon: Jaspal Logan MD;  Location: [facility] ENDOSCOPY;  Service: Gastroenterology;  Laterality: N/A;  EPIGASTRIC ABDOMINAL PAIN, ABNORMAL CT SCAN, PANCREATITIS  NORMAL EGD   • HERNIA REPAIR     • OTHER SURGICAL HISTORY  12/2015    2d echo-abstracted cent.        Family History: family history includes Cancer in his brother; Heart disease in his brother; Heart failure in his mother; Hypertension in his brother. Otherwise pertinent FHx was reviewed and unremarkable.     Social History:  reports that he has never smoked. He has never used smokeless tobacco. He  reports current alcohol use of about 1.0 standard drinks of alcohol per week. He reports that he does not use drugs.      Medications:  Prior to Admission medications    Medication Sig Start Date End Date Taking? Authorizing Provider   amLODIPine (NORVASC) 5 MG tablet Take 1 tablet by mouth Daily. 8/18/20   Karly Marmolejo APRN   aspirin 81 MG chewable tablet Chew 81 mg Every Night.    Jono Sifuentes MD   atorvastatin (LIPITOR) 40 MG tablet Take 1 tablet by mouth every night at bedtime. 8/18/20   Karly Marmolejo APRN   B-D UF III MINI PEN NEEDLES 31G X 5 MM misc USE ONCE DAILY AS DIRECTED 10/23/20   Karly Marmolejo APRN   cetirizine (zyrTEC) 10 MG tablet Take 10 mg by mouth Daily. 8/20/19   Jono Sifuentes MD   Cinnamon 500 MG tablet Take 1,000 mg by mouth Every Evening.    Jono Sifuentes MD   docusate sodium (COLACE) 100 MG capsule Take 100 mg by mouth 2 (Two) Times a Day.    Jono Sifuentes MD   esomeprazole (nexIUM) 20 MG capsule Take 20 mg by mouth Every Morning Before Breakfast.    Jono Sifuentes MD   furosemide (LASIX) 20 MG tablet Take 1 tablet by mouth Daily. 8/18/20   Karly Marmolejo APRN   glipizide (GLUCOTROL) 5 MG tablet Take 2 tablets po in am, 1 po every evening 8/18/20   Karly Marmolejo APRN   hydrALAZINE (APRESOLINE) 25 MG tablet TAKE 1 TABLET BY MOUTH THREE TIMES A DAY 12/16/20   Karly Marmolejo APRN   HYDROcodone-acetaminophen (Norco) 5-325 MG per tablet Take 1 tablet by mouth Every 6 (Six) Hours As Needed for Severe Pain . 3/18/20   Karly Marmolejo APRN   Iron-Vitamin C 100-250 MG tablet Take 1 tablet by mouth 2 (Two) Times a Day.    Jono Sifuentes MD   lisinopril (PRINIVIL,ZESTRIL) 40 MG tablet Take 1 tablet by mouth Daily. 8/18/20   Karly Marmolejo APRN   metFORMIN (GLUCOPHAGE) 1000 MG tablet TAKE 1 TABLET BY MOUTH TWICE A DAY WITH MEALS 10/7/20   Jaleel, Karly L, APRN   metoprolol tartrate (LOPRESSOR) 25 MG tablet TAKE 1 TABLET BY MOUTH EVERY 12 HOURS  9/17/20   Karly Marmolejo APRN   ONE TOUCH ULTRA TEST test strip Test blood sugar no more that once daily.  ICD E11.9 5/4/20   Karly Marmolejo APRN   potassium chloride (MICRO-K) 10 MEQ CR capsule TAKE 1 CAPSULE BY MOUTH EVERY DAY 9/11/20   Karly Marmolejo APRN   Tresiba FlexTouch 100 UNIT/ML solution pen-injector injection INJECT 45 UNITS UNDER THE SKIN INTO THE APPROPRIATE AREA AS DIRECTED DAILY. 10/23/20   Karly Marmolejo APRN   vitamin C (ASCORBIC ACID) 500 MG tablet Take 1,000 mg by mouth Every Evening.    Provider, Jono, MD       Allergies:  No Known Allergies      Objective   Objective     Vital Signs  Temp:  [98.4 °F (36.9 °C)] 98.4 °F (36.9 °C)  Heart Rate:  [101-115] 103  Resp:  [18] 18  BP: (121-165)/(52-98) 132/73  SpO2:  [93 %-98 %] 96 %  on   ;   Device (Oxygen Therapy): room air  Body mass index is 36.74 kg/m².    Physical Exam  Vitals signs reviewed.   Constitutional:       Appearance: He is obese.   HENT:      Head: Normocephalic and atraumatic.      Nose: Nose normal.      Mouth/Throat:      Mouth: Mucous membranes are moist.   Eyes:      Extraocular Movements: Extraocular movements intact.      Conjunctiva/sclera: Conjunctivae normal.      Pupils: Pupils are equal, round, and reactive to light.   Neck:      Musculoskeletal: Normal range of motion and neck supple.   Cardiovascular:      Rate and Rhythm: Regular rhythm. Tachycardia present.      Pulses: Normal pulses.   Pulmonary:      Effort: Pulmonary effort is normal.      Breath sounds: Normal breath sounds.   Abdominal:      General: Bowel sounds are normal. There is no distension.      Palpations: Abdomen is soft.      Tenderness: There is abdominal tenderness in the right upper quadrant, epigastric area and left upper quadrant.   Musculoskeletal: Normal range of motion.      Right lower leg: No edema.      Left lower leg: No edema.   Skin:     General: Skin is warm and dry.      Capillary Refill: Capillary refill takes less than 2  seconds.   Neurological:      General: No focal deficit present.      Mental Status: He is alert and oriented to person, place, and time.   Psychiatric:         Mood and Affect: Mood normal.         Behavior: Behavior normal.         Results Review:  I have personally reviewed most recent cardiac tracings, lab results, microbiology results and radiology images and interpretations and agree with findings.    Results from last 7 days   Lab Units 01/05/21  0634   WBC 10*3/mm3 15.40*   HEMOGLOBIN g/dL 13.5   HEMATOCRIT % 40.5   PLATELETS 10*3/mm3 291   INR  1.01     Results from last 7 days   Lab Units 01/05/21  0634   SODIUM mmol/L 134*   POTASSIUM mmol/L 4.7   CHLORIDE mmol/L 97*   CO2 mmol/L 24.0   BUN mg/dL 31*   CREATININE mg/dL 1.73*   GLUCOSE mg/dL 221*   CALCIUM mg/dL 9.8   ALT (SGPT) U/L 184*   AST (SGOT) U/L 169*   TROPONIN T ng/mL <0.010     Estimated Creatinine Clearance: 42.4 mL/min (A) (by C-G formula based on SCr of 1.73 mg/dL (H)).  Brief Urine Lab Results  (Last result in the past 365 days)      Color   Clarity   Blood   Leuk Est   Nitrite   Protein   CREAT   Urine HCG        01/05/21 0723 Yellow Clear Negative Negative Negative Negative               Microbiology Results (last 10 days)     ** No results found for the last 240 hours. **          ECG/EMG Results (most recent)     Procedure Component Value Units Date/Time    ECG 12 Lead [[id number]] Collected: 01/05/21 0656     Updated: 01/05/21 0658     QT Interval 324 ms     Narrative:      HEART RATE= 109  bpm  RR Interval= 552  ms  GA Interval= 203  ms  P Horizontal Axis= 10  deg  P Front Axis= 43  deg  QRSD Interval= 82  ms  QT Interval= 324  ms  QRS Axis= 114  deg  T Wave Axis= 16  deg  - OTHERWISE NORMAL ECG -  Sinus tachycardia  Right axis deviation  Electronically Signed By:   Date and Time of Study: 2021-01-05 06:56:57          Results for orders placed during the hospital encounter of 08/25/19   Duplex Venous Lower Extremity - Right CAR     Narrative · Normal right lower extremity venous duplex scan.        Ct Abdomen Pelvis Without Contrast    Result Date: 1/5/2021  1. Acute interstitial pancreatitis involving the pancreatic head and uncinate process. 2. No biliary dilatation. 3. Additional chronic findings above.  Electronically Signed By-Twin Redman MD On:1/5/2021 8:36 AM This report was finalized on [id number] by  Twin Redman MD.      Estimated Creatinine Clearance: 42.4 mL/min (A) (by C-G formula based on SCr of 1.73 mg/dL (H)).      Assessment/Plan   Assessment/Plan       Active Hospital Problems    Diagnosis  POA   • **Acute pancreatitis [K85.90]  Yes     Priority: High   • Elevated LFTs [R79.89]  Yes     Priority: High   • Acute abdominal pain [R10.9]  Yes     Priority: Medium   • Nausea and vomiting [R11.2]  Yes     Priority: Medium   • JEAN PAUL (obstructive sleep apnea) [G47.33]  Yes   • Obesity (BMI 30-39.9) [E66.9]  Yes   • Diabetic peripheral neuropathy (CMS/HCC) [E11.42]  Yes   • Hyperlipidemia [E78.5]  Yes   • Essential hypertension [I10]  Yes   • Coronary artery disease involving native coronary artery of native heart without angina pectoris [I25.10]  Yes   • Type 2 diabetes mellitus with hyperglycemia (CMS/HCC) [E11.65]  Yes      Resolved Hospital Problems   No resolved problems to display.       Acute pancreatitis with elevated LFTs  -lipase 1,703, alk phos 154, ,  on admission  -CT reviewed  -NPO, IVF  -PRN analgesia and antiemetics  -GI consulted   -monitor and trend LFTs and lipase     Acute abdominal pain with Nausea and vomiting  -as above    Coronary artery disease involving native coronary artery of native heart without angina pectoris / Hyperlipidemia / Essential hypertension, chronic  -hold home medication while NPO   -PRN IV labetalol   -monitor BP    Type 2 diabetes mellitus with hyperglycemia complicated with Diabetic peripheral neuropathy   -hold home medication while NPO   -check A1c  -accu checks q6h with  SSI  -Lantus substituted for Tresiba, dose decreased to 20 units; adjust as needed    JEAN PAUL (obstructive sleep apnea)  -patient states he uses an oral device at home    Obesity  -BMI 36.10 on admission  -encourage lifestyle modifications             VTE Prophylaxis -   Mechanical Order History:     None      Pharmalogical Order History:      Ordered     Dose Route Frequency Stop    01/05/21 1048  enoxaparin (LOVENOX) syringe 40 mg      40 mg SC Every 24 Hours --                CODE STATUS:    Code Status and Medical Interventions:   Ordered at: 01/05/21 1048     Code Status:    CPR     Medical Interventions (Level of Support Prior to Arrest):    Full       This patient has been examined wearing appropriate Personal Protective Equipment. 01/05/21      I discussed the patient's findings and my recommendations with patient.      Signature: Electronically signed by ERICA Mireles, 01/05/21, 3:13 PM EST.    Attending attestation: The patient is a 70-year-old gentleman who presented to the emergency room complaining of abdominal pain with nausea and vomiting that is recurrent, diffuse and sharp.  In the emergency room he was noted to have leukocytosis and transaminitis as well as elevated lipase of 1703 and evidence of acute pancreatitis on CT with a collapsed gallbladder.  He was started on IV fluids analgesics antiemetics and antibiotics and was admitted.  Patient reports that his pain is significantly improved since admission.  He has had no further nausea or vomiting. GI has been consulted.  MRCP showed no evidence of choledocholithiasis and no biliary ductal dilatation.    Physical exam: Well-developed over nourished elderly gentleman sitting up in bed awake and alert comfortable on room air in no acute distress; mucous membranes moist; sclerae anicteric; lungs clear to auscultation bilaterally; CV regular rate and rhythm; abdomen soft, nondistended, tender right mid abdomen without guarding or masses, active bowel  sounds; extremities with no edema cyanosis or calf tenderness.  Nurses notes and vital signs reviewed.    Assessment and plan:    Acute abdominal pain with nausea and vomiting secondary to acute pancreatitis, likely secondary to gallstones with transaminitis.  -Continue medications for symptom control and IV antibiotics.  -GI is recommending surgery consultation.    Patient was seen and examined and chart reviewed on 1/5/2021.  I agree with the assessment and plan of the APRN.  Shelli Lamb MD  Electronically signed by Shelli Lamb MD, 01/06/21, 8:08 PM EST.            [facility] Hospitalist Team

## 2021-01-05 NOTE — ED NOTES
Pt reports that pain is reportedly 3 out of 10 s/p Morphine Injection.  Pt resting on stretcher.     Katlyn Cavazos RN  01/05/21 0819

## 2021-01-05 NOTE — ED PROVIDER NOTES
Subjective   Patient is a 70-year-old white male with history of hypertension, high cholesterol, diabetes, CAD who presents today with complaints of upper abdominal pain, nausea vomiting.  Patient states his symptoms started around 6 PM last night after he had eaten dinner.  He reports intermittent nausea with few episodes of nonbloody vomiting.  He states he has had persistent waxing and waning sharp pain in his upper abdomen greatest over the epigastrium and right upper quadrant.  He does report some belching.  He denies any diarrhea, black or bloody stool.  Reports his last bowel movement was yesterday.  He denies any fever chills cough congestion chest pain shortness of breath or other complaint.          Review of Systems   Constitutional: Negative for chills and fever.   HENT: Negative for congestion.    Respiratory: Negative for cough and shortness of breath.    Cardiovascular: Negative for chest pain.   Gastrointestinal: Positive for abdominal pain, nausea and vomiting. Negative for blood in stool, constipation and diarrhea.   Genitourinary: Negative for decreased urine volume, difficulty urinating, dysuria, frequency and urgency.   Neurological: Negative for dizziness and weakness.       Past Medical History:   Diagnosis Date   • Coronary artery disease    • Diabetes mellitus, type II (CMS/HCC)    • Elevated cholesterol    • H/O cataract     cataracts    • Hyperlipidemia    • Hypertension    • Peripheral edema    • Peripheral edema    • Sleep apnea     oral appliance       No Known Allergies    Past Surgical History:   Procedure Laterality Date   • CARDIAC CATHETERIZATION N/A 7/19/2019    Procedure: Left Heart Cath with angiogram;  Surgeon: Bautista Lopez MD;  Location: [facility] CATH INVASIVE LOCATION;  Service: Cardiovascular   • CARDIAC CATHETERIZATION N/A 7/19/2019    Procedure: Coronary angiography;  Surgeon: Bautista Lopez MD;  Location: [facility] CATH INVASIVE LOCATION;  Service: Cardiovascular   • CARDIAC  CATHETERIZATION N/A 7/19/2019    Procedure: Left ventriculography;  Surgeon: Bautista Lopez MD;  Location: [facility] CATH INVASIVE LOCATION;  Service: Cardiovascular   • COLONOSCOPY     • CORONARY ARTERY BYPASS GRAFT N/A 8/8/2019    Procedure: CORONARY ARTERY BYPASS GRAFTING;  Surgeon: Chet Mcarthur MD;  Location: [facility] CVOR;  Service: Cardiothoracic   • ENDOSCOPY N/A 3/15/2020    Procedure: ESOPHAGOGASTRODUODENOSCOPY;  Surgeon: Jaspal Logan MD;  Location: [facility] ENDOSCOPY;  Service: Gastroenterology;  Laterality: N/A;  EPIGASTRIC ABDOMINAL PAIN, ABNORMAL CT SCAN, PANCREATITIS  NORMAL EGD   • HERNIA REPAIR     • OTHER SURGICAL HISTORY  12/2015    2d echo-abstracted cent.        Family History   Problem Relation Age of Onset   • Heart failure Mother    • Hypertension Brother    • Cancer Brother        Social History     Socioeconomic History   • Marital status:      Spouse name: Not on file   • Number of children: Not on file   • Years of education: Not on file   • Highest education level: Not on file   Tobacco Use   • Smoking status: Never Smoker   • Smokeless tobacco: Never Used   Substance and Sexual Activity   • Alcohol use: No     Frequency: Never   • Drug use: No   • Sexual activity: Defer           Objective   Physical Exam  Vital signs and triage nurse note reviewed.  Constitutional: Awake, alert; well-developed and well-nourished. No acute distress is noted.  HEENT: Normocephalic, atraumatic; pupils are PERRL with intact EOM; oropharynx is pink and moist without exudate or erythema.  No drooling or pooling of oral secretions.  Neck: Supple, full range of motion without pain; no cervical lymphadenopathy. Normal phonation.  Cardiovascular: Mildly tachycardic rate and rhythm, normal S1-S2.  No murmur noted.  Pulmonary: Respiratory effort regular nonlabored, breath sounds clear to auscultation all fields.  Abdomen: Soft, tender over the epigastrium and right upper quadrant, nondistended  with normoactive bowel sounds; no rebound or guarding.  No May sign.  No tenderness over McBurney's point.  Musculoskeletal: Independent range of motion of all extremities with no palpable tenderness or edema.  Neuro: Alert oriented x3, speech is clear and appropriate, GCS 15.    Skin: Flesh tone, warm, dry, intact; no erythematous or petechial rash or lesion.      Procedures           ED Course      Labs Reviewed   COMPREHENSIVE METABOLIC PANEL - Abnormal; Notable for the following components:       Result Value    Glucose 221 (*)     BUN 31 (*)     Creatinine 1.73 (*)     Sodium 134 (*)     Chloride 97 (*)     ALT (SGPT) 184 (*)     AST (SGOT) 169 (*)     Alkaline Phosphatase 154 (*)     eGFR Non  Amer 39 (*)     All other components within normal limits    Narrative:     GFR Normal >60  Chronic Kidney Disease <60  Kidney Failure <15     LIPASE - Abnormal; Notable for the following components:    Lipase 1,703 (*)     All other components within normal limits   APTT - Abnormal; Notable for the following components:    PTT 23.9 (*)     All other components within normal limits   URINALYSIS W/ CULTURE IF INDICATED - Abnormal; Notable for the following components:    Ketones, UA 15 mg/dL (1+) (*)     All other components within normal limits    Narrative:     Urine microscopic not indicated.   CBC WITH AUTO DIFFERENTIAL - Abnormal; Notable for the following components:    WBC 15.40 (*)     Neutrophil % 83.3 (*)     Lymphocyte % 8.8 (*)     Eosinophil % 0.1 (*)     Neutrophils, Absolute 12.90 (*)     Monocytes, Absolute 1.20 (*)     All other components within normal limits   POCT GLUCOSE FINGERSTICK - Abnormal; Notable for the following components:    Glucose 225 (*)     All other components within normal limits   PROTIME-INR - Normal   TROPONIN (IN-HOUSE) - Normal    Narrative:     Troponin T Reference Range:  <= 0.03 ng/mL-   Negative for AMI  >0.03 ng/mL-     Abnormal for myocardial necrosis.  Clinicians  would have to utilize clinical acumen, EKG, Troponin and serial changes to determine if it is an Acute Myocardial Infarction or myocardial injury due to an underlying chronic condition.       Results may be falsely decreased if patient taking Biotin.     BLOOD CULTURE   BLOOD CULTURE   COVID PRE-OP / PRE-PROCEDURE SCREENING ORDER (NO ISOLATION)    Narrative:     The following orders were created for panel order COVID PRE-OP / PRE-PROCEDURE SCREENING ORDER (NO ISOLATION) - Swab, Nasopharynx.  Procedure                               Abnormality         Status                     ---------                               -----------         ------                     COVID-19,APTIMA PANTHER,...[[id number]]                                                   Please view results for these tests on the individual orders.   COVID-19,APTIMA PANTHER,NATE IN-HOUSE,NP/OP SWAB IN UTM/VTM/SALINE TRANSPORT MEDIA,24 HR TAT   RAINBOW DRAW    Narrative:     The following orders were created for panel order [place] Draw.  Procedure                               Abnormality         Status                     ---------                               -----------         ------                     Light Blue Top[[id number]]                                   Final result               Green Top (Gel)[[id number]]                                  Final result               Lavender Top[[id number]]                                     Final result               Gold Top - SST[[id number]]                                   Final result                 Please view results for these tests on the individual orders.   LIGHT BLUE TOP   GREEN TOP   LAVENDER TOP   GOLD TOP - SST   CBC AND DIFFERENTIAL    Narrative:     The following orders were created for panel order CBC & Differential.  Procedure                               Abnormality         Status                     ---------                               -----------         ------                      CBC Auto Differential[[id number]]        Abnormal            Final result                 Please view results for these tests on the individual orders.     Ct Abdomen Pelvis Without Contrast    Result Date: 1/5/2021  1. Acute interstitial pancreatitis involving the pancreatic head and uncinate process. 2. No biliary dilatation. 3. Additional chronic findings above.  Electronically Signed By-Twin Redman MD On:1/5/2021 8:36 AM This report was finalized on [id number] by  Twin Redman MD.    Medications   sodium chloride 0.9 % flush 10 mL (has no administration in time range)   sodium chloride 0.9 % flush 10 mL (has no administration in time range)   sodium chloride 0.9 % bolus 500 mL (0 mL Intravenous Stopped 1/5/21 0740)   ondansetron (ZOFRAN) injection 4 mg (4 mg Intravenous Given 1/5/21 0747)   Morphine sulfate (PF) injection 2 mg (2 mg Intravenous Given 1/5/21 0747)   piperacillin-tazobactam (ZOSYN) IVPB 3.375 g in 100 mL NS (CD) (0 g Intravenous Stopped 1/5/21 0910)                                          MDM  Number of Diagnoses or Management Options  Acute pancreatitis without infection or necrosis, unspecified pancreatitis type:   Elevated liver enzymes:   Nausea:   Pain of upper abdomen:   Diagnosis management comments: Comorbidities: Hypertension, high cholesterol, diabetes, CAD  Differentials: Gastritis, pancreatitis, cholecystitis, cholelithiasis, obstruction, perforation, gastric ulcer;this list is not all inclusive and does not constitute the entirety of considered causes  Discussion with provider:  Radiology interpretation: X-rays reviewed by me and interpreted by radiologist: As above  Lab interpretation: Labs viewed by me significant for: As above    Patient was placed on continuous cardiac monitor.  He had IV established.  He had labs and CT obtained.  He was given a small fluid bolus as well as morphine and Zofran for pain and nausea.    Patient's lab work-up is significant for the following:  White blood cell count 15.4, no bands.  Metabolic panel reveals a glucose of 220, BUN 31, creatinine 1.73, , , alk phos 154, total bili 0.9.  Lipase 1700.  Urinalysis shows 80 ketones otherwise negative.  Per troponin negative.  CT scan shows findings of acute pancreatitis without biliary ductal dilation.    Patient was given a dose of IV Zosyn given his elevated white blood cell count and abdominal pain.    He will be admitted to hospital for further evaluation and treatment.  He was discussed with the hospitalist practitioner.         Amount and/or Complexity of Data Reviewed  Clinical lab tests: ordered and reviewed  Tests in the radiology section of CPT®: ordered and reviewed    Patient Progress  Patient progress: stable      Final diagnoses:   Pain of upper abdomen   Nausea   Acute pancreatitis without infection or necrosis, unspecified pancreatitis type   Elevated liver enzymes   Acute kidney injury (CMS/HCC)            Sadaf Warren, ERICA  01/05/21 0947

## 2021-01-05 NOTE — PROGRESS NOTES
RT talked to pt about bipap/cpap. Pt states he does not wear one at home but does have dental appliance. Will have wife bring appliance to hospital. Dalia Whelan, RRT

## 2021-01-06 ENCOUNTER — ANESTHESIA EVENT (OUTPATIENT)
Dept: PERIOP | Facility: HOSPITAL | Age: 71
End: 2021-01-06

## 2021-01-06 ENCOUNTER — APPOINTMENT (OUTPATIENT)
Dept: CARDIAC REHAB | Facility: HOSPITAL | Age: 71
End: 2021-01-06

## 2021-01-06 ENCOUNTER — ANESTHESIA (OUTPATIENT)
Dept: PERIOP | Facility: HOSPITAL | Age: 71
End: 2021-01-06

## 2021-01-06 PROBLEM — K85.90 ACUTE PANCREATITIS WITHOUT INFECTION OR NECROSIS: Status: ACTIVE | Noted: 2021-01-05

## 2021-01-06 LAB
ALBUMIN SERPL-MCNC: 3.5 G/DL (ref 3.5–5.2)
ALBUMIN/GLOB SERPL: 1.3 G/DL
ALP SERPL-CCNC: 105 U/L (ref 39–117)
ALT SERPL W P-5'-P-CCNC: 92 U/L (ref 1–41)
AMYLASE SERPL-CCNC: 189 U/L (ref 28–100)
ANION GAP SERPL CALCULATED.3IONS-SCNC: 7 MMOL/L (ref 5–15)
AST SERPL-CCNC: 46 U/L (ref 1–40)
BASOPHILS # BLD AUTO: 0 10*3/MM3 (ref 0–0.2)
BASOPHILS NFR BLD AUTO: 0.4 % (ref 0–1.5)
BILIRUB SERPL-MCNC: 1.1 MG/DL (ref 0–1.2)
BUN SERPL-MCNC: 23 MG/DL (ref 8–23)
BUN/CREAT SERPL: 16 (ref 7–25)
CALCIUM SPEC-SCNC: 8.8 MG/DL (ref 8.6–10.5)
CHLORIDE SERPL-SCNC: 104 MMOL/L (ref 98–107)
CO2 SERPL-SCNC: 27 MMOL/L (ref 22–29)
CREAT SERPL-MCNC: 1.44 MG/DL (ref 0.76–1.27)
DEPRECATED RDW RBC AUTO: 41.1 FL (ref 37–54)
EOSINOPHIL # BLD AUTO: 0 10*3/MM3 (ref 0–0.4)
EOSINOPHIL NFR BLD AUTO: 0.3 % (ref 0.3–6.2)
ERYTHROCYTE [DISTWIDTH] IN BLOOD BY AUTOMATED COUNT: 13.4 % (ref 12.3–15.4)
GFR SERPL CREATININE-BSD FRML MDRD: 49 ML/MIN/1.73
GLOBULIN UR ELPH-MCNC: 2.6 GM/DL
GLUCOSE BLDC GLUCOMTR-MCNC: 119 MG/DL (ref 70–105)
GLUCOSE BLDC GLUCOMTR-MCNC: 120 MG/DL (ref 70–105)
GLUCOSE BLDC GLUCOMTR-MCNC: 139 MG/DL (ref 70–105)
GLUCOSE BLDC GLUCOMTR-MCNC: 142 MG/DL (ref 70–105)
GLUCOSE BLDC GLUCOMTR-MCNC: 159 MG/DL (ref 70–105)
GLUCOSE BLDC GLUCOMTR-MCNC: 260 MG/DL (ref 70–105)
GLUCOSE SERPL-MCNC: 127 MG/DL (ref 65–99)
HBA1C MFR BLD: 7.3 % (ref 3.5–5.6)
HCT VFR BLD AUTO: 32.9 % (ref 37.5–51)
HGB BLD-MCNC: 11 G/DL (ref 13–17.7)
LIPASE SERPL-CCNC: 156 U/L (ref 13–60)
LYMPHOCYTES # BLD AUTO: 1.2 10*3/MM3 (ref 0.7–3.1)
LYMPHOCYTES NFR BLD AUTO: 11.5 % (ref 19.6–45.3)
MAGNESIUM SERPL-MCNC: 1.8 MG/DL (ref 1.6–2.4)
MCH RBC QN AUTO: 29.4 PG (ref 26.6–33)
MCHC RBC AUTO-ENTMCNC: 33.3 G/DL (ref 31.5–35.7)
MCV RBC AUTO: 88.1 FL (ref 79–97)
MONOCYTES # BLD AUTO: 1.2 10*3/MM3 (ref 0.1–0.9)
MONOCYTES NFR BLD AUTO: 11.7 % (ref 5–12)
NEUTROPHILS NFR BLD AUTO: 7.7 10*3/MM3 (ref 1.7–7)
NEUTROPHILS NFR BLD AUTO: 76.1 % (ref 42.7–76)
NRBC BLD AUTO-RTO: 0.1 /100 WBC (ref 0–0.2)
PLATELET # BLD AUTO: 189 10*3/MM3 (ref 140–450)
PMV BLD AUTO: 8 FL (ref 6–12)
POTASSIUM SERPL-SCNC: 4.5 MMOL/L (ref 3.5–5.2)
PROT SERPL-MCNC: 6.1 G/DL (ref 6–8.5)
RBC # BLD AUTO: 3.73 10*6/MM3 (ref 4.14–5.8)
SODIUM SERPL-SCNC: 138 MMOL/L (ref 136–145)
WBC # BLD AUTO: 10.2 10*3/MM3 (ref 3.4–10.8)

## 2021-01-06 PROCEDURE — 25010000002 PIPERACILLIN SOD-TAZOBACTAM PER 1 G: Performed by: SURGERY

## 2021-01-06 PROCEDURE — 82150 ASSAY OF AMYLASE: CPT | Performed by: NURSE PRACTITIONER

## 2021-01-06 PROCEDURE — 25010000002 PIPERACILLIN SOD-TAZOBACTAM PER 1 G: Performed by: NURSE PRACTITIONER

## 2021-01-06 PROCEDURE — 80053 COMPREHEN METABOLIC PANEL: CPT | Performed by: NURSE PRACTITIONER

## 2021-01-06 PROCEDURE — 25010000002 PROPOFOL 10 MG/ML EMULSION: Performed by: NURSE ANESTHETIST, CERTIFIED REGISTERED

## 2021-01-06 PROCEDURE — 25010000002 ONDANSETRON PER 1 MG: Performed by: NURSE PRACTITIONER

## 2021-01-06 PROCEDURE — 83735 ASSAY OF MAGNESIUM: CPT | Performed by: NURSE PRACTITIONER

## 2021-01-06 PROCEDURE — 25010000002 FENTANYL CITRATE (PF) 100 MCG/2ML SOLUTION: Performed by: NURSE ANESTHETIST, CERTIFIED REGISTERED

## 2021-01-06 PROCEDURE — 0FT44ZZ RESECTION OF GALLBLADDER, PERCUTANEOUS ENDOSCOPIC APPROACH: ICD-10-PCS | Performed by: SURGERY

## 2021-01-06 PROCEDURE — 99222 1ST HOSP IP/OBS MODERATE 55: CPT | Performed by: SURGERY

## 2021-01-06 PROCEDURE — 85025 COMPLETE CBC W/AUTO DIFF WBC: CPT | Performed by: NURSE PRACTITIONER

## 2021-01-06 PROCEDURE — 99232 SBSQ HOSP IP/OBS MODERATE 35: CPT | Performed by: HOSPITALIST

## 2021-01-06 PROCEDURE — 88304 TISSUE EXAM BY PATHOLOGIST: CPT | Performed by: SURGERY

## 2021-01-06 PROCEDURE — 83690 ASSAY OF LIPASE: CPT | Performed by: NURSE PRACTITIONER

## 2021-01-06 PROCEDURE — 82962 GLUCOSE BLOOD TEST: CPT

## 2021-01-06 PROCEDURE — 0DNU4ZZ RELEASE OMENTUM, PERCUTANEOUS ENDOSCOPIC APPROACH: ICD-10-PCS | Performed by: SURGERY

## 2021-01-06 PROCEDURE — 47562 LAPAROSCOPIC CHOLECYSTECTOMY: CPT | Performed by: SURGERY

## 2021-01-06 DEVICE — LIGAMAX 5 MM ENDOSCOPIC MULTIPLE CLIP APPLIER
Type: IMPLANTABLE DEVICE | Site: ABDOMEN | Status: FUNCTIONAL
Brand: LIGAMAX

## 2021-01-06 RX ORDER — ONDANSETRON 2 MG/ML
4 INJECTION INTRAMUSCULAR; INTRAVENOUS ONCE AS NEEDED
Status: DISCONTINUED | OUTPATIENT
Start: 2021-01-06 | End: 2021-01-06 | Stop reason: HOSPADM

## 2021-01-06 RX ORDER — FENTANYL CITRATE 50 UG/ML
INJECTION, SOLUTION INTRAMUSCULAR; INTRAVENOUS AS NEEDED
Status: DISCONTINUED | OUTPATIENT
Start: 2021-01-06 | End: 2021-01-06 | Stop reason: SURG

## 2021-01-06 RX ORDER — LABETALOL HYDROCHLORIDE 5 MG/ML
INJECTION, SOLUTION INTRAVENOUS AS NEEDED
Status: DISCONTINUED | OUTPATIENT
Start: 2021-01-06 | End: 2021-01-06 | Stop reason: SURG

## 2021-01-06 RX ORDER — PROPOFOL 10 MG/ML
VIAL (ML) INTRAVENOUS AS NEEDED
Status: DISCONTINUED | OUTPATIENT
Start: 2021-01-06 | End: 2021-01-06 | Stop reason: SURG

## 2021-01-06 RX ORDER — INSULIN LISPRO 100 [IU]/ML
0-9 INJECTION, SOLUTION INTRAVENOUS; SUBCUTANEOUS AS NEEDED
Status: DISCONTINUED | OUTPATIENT
Start: 2021-01-06 | End: 2021-01-07 | Stop reason: HOSPADM

## 2021-01-06 RX ORDER — LIDOCAINE HYDROCHLORIDE 20 MG/ML
INJECTION, SOLUTION EPIDURAL; INFILTRATION; INTRACAUDAL; PERINEURAL AS NEEDED
Status: DISCONTINUED | OUTPATIENT
Start: 2021-01-06 | End: 2021-01-06 | Stop reason: SURG

## 2021-01-06 RX ORDER — NEOSTIGMINE METHYLSULFATE 5 MG/5 ML
SYRINGE (ML) INTRAVENOUS AS NEEDED
Status: DISCONTINUED | OUTPATIENT
Start: 2021-01-06 | End: 2021-01-06 | Stop reason: SURG

## 2021-01-06 RX ORDER — HYDROMORPHONE HCL 110MG/55ML
0.2 PATIENT CONTROLLED ANALGESIA SYRINGE INTRAVENOUS
Status: DISCONTINUED | OUTPATIENT
Start: 2021-01-06 | End: 2021-01-06 | Stop reason: HOSPADM

## 2021-01-06 RX ORDER — HYDRALAZINE HYDROCHLORIDE 20 MG/ML
5 INJECTION INTRAMUSCULAR; INTRAVENOUS
Status: DISCONTINUED | OUTPATIENT
Start: 2021-01-06 | End: 2021-01-06 | Stop reason: HOSPADM

## 2021-01-06 RX ORDER — BUPIVACAINE HYDROCHLORIDE 2.5 MG/ML
INJECTION, SOLUTION EPIDURAL; INFILTRATION; INTRACAUDAL AS NEEDED
Status: DISCONTINUED | OUTPATIENT
Start: 2021-01-06 | End: 2021-01-06 | Stop reason: HOSPADM

## 2021-01-06 RX ORDER — HYDROCODONE BITARTRATE AND ACETAMINOPHEN 5; 325 MG/1; MG/1
2 TABLET ORAL EVERY 4 HOURS PRN
Status: DISCONTINUED | OUTPATIENT
Start: 2021-01-06 | End: 2021-01-07 | Stop reason: HOSPADM

## 2021-01-06 RX ORDER — PROMETHAZINE HYDROCHLORIDE 25 MG/1
25 SUPPOSITORY RECTAL ONCE AS NEEDED
Status: DISCONTINUED | OUTPATIENT
Start: 2021-01-06 | End: 2021-01-06 | Stop reason: HOSPADM

## 2021-01-06 RX ORDER — HYDROMORPHONE HCL 110MG/55ML
0.5 PATIENT CONTROLLED ANALGESIA SYRINGE INTRAVENOUS
Status: DISCONTINUED | OUTPATIENT
Start: 2021-01-06 | End: 2021-01-06 | Stop reason: HOSPADM

## 2021-01-06 RX ORDER — GLYCOPYRROLATE 1 MG/5 ML
SYRINGE (ML) INTRAVENOUS AS NEEDED
Status: DISCONTINUED | OUTPATIENT
Start: 2021-01-06 | End: 2021-01-06 | Stop reason: SURG

## 2021-01-06 RX ORDER — HYDROCODONE BITARTRATE AND ACETAMINOPHEN 5; 325 MG/1; MG/1
1 TABLET ORAL EVERY 4 HOURS PRN
Status: DISCONTINUED | OUTPATIENT
Start: 2021-01-06 | End: 2021-01-07 | Stop reason: HOSPADM

## 2021-01-06 RX ORDER — ROCURONIUM BROMIDE 10 MG/ML
INJECTION, SOLUTION INTRAVENOUS AS NEEDED
Status: DISCONTINUED | OUTPATIENT
Start: 2021-01-06 | End: 2021-01-06 | Stop reason: SURG

## 2021-01-06 RX ORDER — ESMOLOL HYDROCHLORIDE 10 MG/ML
INJECTION INTRAVENOUS AS NEEDED
Status: DISCONTINUED | OUTPATIENT
Start: 2021-01-06 | End: 2021-01-06 | Stop reason: SURG

## 2021-01-06 RX ORDER — PROMETHAZINE HYDROCHLORIDE 25 MG/1
25 TABLET ORAL ONCE AS NEEDED
Status: DISCONTINUED | OUTPATIENT
Start: 2021-01-06 | End: 2021-01-06 | Stop reason: HOSPADM

## 2021-01-06 RX ORDER — INSULIN LISPRO 100 [IU]/ML
0-9 INJECTION, SOLUTION INTRAVENOUS; SUBCUTANEOUS
Status: DISCONTINUED | OUTPATIENT
Start: 2021-01-07 | End: 2021-01-07 | Stop reason: HOSPADM

## 2021-01-06 RX ADMIN — FENTANYL CITRATE 100 MCG: 50 INJECTION, SOLUTION INTRAMUSCULAR; INTRAVENOUS at 14:26

## 2021-01-06 RX ADMIN — ONDANSETRON 4 MG: 2 INJECTION INTRAMUSCULAR; INTRAVENOUS at 14:26

## 2021-01-06 RX ADMIN — ROCURONIUM BROMIDE 50 MG: 10 INJECTION, SOLUTION INTRAVENOUS at 14:26

## 2021-01-06 RX ADMIN — Medication 5 MG: at 16:04

## 2021-01-06 RX ADMIN — ESMOLOL HYDROCHLORIDE 5 MG: 10 INJECTION, SOLUTION INTRAVENOUS at 15:11

## 2021-01-06 RX ADMIN — SODIUM CHLORIDE: 9 INJECTION, SOLUTION INTRAVENOUS at 15:17

## 2021-01-06 RX ADMIN — LABETALOL 20 MG/4 ML (5 MG/ML) INTRAVENOUS SYRINGE 5 MG: at 15:48

## 2021-01-06 RX ADMIN — HYDROCODONE BITARTRATE AND ACETAMINOPHEN 1 TABLET: 5; 325 TABLET ORAL at 21:30

## 2021-01-06 RX ADMIN — PIPERACILLIN AND TAZOBACTAM 3.38 G: 3; .375 INJECTION, POWDER, LYOPHILIZED, FOR SOLUTION INTRAVENOUS at 05:49

## 2021-01-06 RX ADMIN — Medication 10 ML: at 21:31

## 2021-01-06 RX ADMIN — FENTANYL CITRATE 100 MCG: 50 INJECTION, SOLUTION INTRAMUSCULAR; INTRAVENOUS at 15:39

## 2021-01-06 RX ADMIN — PIPERACILLIN AND TAZOBACTAM 3.38 G: 3; .375 INJECTION, POWDER, LYOPHILIZED, FOR SOLUTION INTRAVENOUS at 21:30

## 2021-01-06 RX ADMIN — PROPOFOL 200 MG: 10 INJECTION, EMULSION INTRAVENOUS at 14:26

## 2021-01-06 RX ADMIN — Medication 0.4 MG: at 16:04

## 2021-01-06 RX ADMIN — PANTOPRAZOLE SODIUM 40 MG: 40 TABLET, DELAYED RELEASE ORAL at 09:14

## 2021-01-06 RX ADMIN — FENTANYL CITRATE 50 MCG: 50 INJECTION, SOLUTION INTRAMUSCULAR; INTRAVENOUS at 16:04

## 2021-01-06 RX ADMIN — LIDOCAINE HYDROCHLORIDE 50 MG: 20 INJECTION, SOLUTION EPIDURAL; INFILTRATION; INTRACAUDAL; PERINEURAL at 14:26

## 2021-01-06 NOTE — ANESTHESIA PREPROCEDURE EVALUATION
Anesthesia Evaluation     Patient summary reviewed   NPO Solid Status: > 8 hours  NPO Liquid Status: > 8 hours           Airway   Mallampati: II  TM distance: >3 FB  Neck ROM: full  No difficulty expected  Dental - normal exam     Pulmonary - normal exam   (+) sleep apnea on CPAP,   Cardiovascular - normal exam  Exercise tolerance: poor (<4 METS)    ECG reviewed    (+) hypertension, CAD, CABG >6 Months, hyperlipidemia,       Neuro/Psych  GI/Hepatic/Renal/Endo    (+) obesity,   diabetes mellitus type 2,     Musculoskeletal     Abdominal  - normal exam    Bowel sounds: normal.   Substance History      OB/GYN          Other                        Anesthesia Plan    ASA 3     general     intravenous induction     Anesthetic plan, all risks, benefits, and alternatives have been provided, discussed and informed consent has been obtained with: patient.

## 2021-01-06 NOTE — ANESTHESIA PROCEDURE NOTES
Airway  Urgency: elective    Date/Time: 1/6/2021 2:26 PM  Airway not difficult    General Information and Staff    Patient location during procedure: OR    Indications and Patient Condition  Indications for airway management: airway protection    Preoxygenated: yes  Mask difficulty assessment: 1 - vent by mask    Final Airway Details  Final airway type: endotracheal airway      Successful airway: ETT  Cuffed: yes   Successful intubation technique: direct laryngoscopy  Facilitating devices/methods: intubating stylet  Endotracheal tube insertion site: oral  Blade: Berenice  Blade size: 4  ETT size (mm): 7.5  Cormack-Lehane Classification: grade I - full view of glottis  Placement verified by: chest auscultation and capnometry   Measured from: teeth  ETT/EBT  to teeth (cm): 24  Number of attempts at approach: 1  Assessment: lips, teeth, and gum same as pre-op and atraumatic intubation

## 2021-01-06 NOTE — PROGRESS NOTES
Discharge Planning Assessment   Yuri     Patient Name: Shaka Trinidad  MRN: [id number]  Today's Date: 1/6/2021    Admit Date: 1/5/2021    Discharge Needs Assessment     Row Name 01/06/21 1134       Living Environment    Lives With  spouse    Name(s) of Who Lives With Patient  Anna Marie    Current Living Arrangements  home/apartment/condo    Primary Care Provided by  self    Provides Primary Care For  spouse    Caregiving Concerns  States his wife can stay hme alone for short amount of time but he helps her with ADLs.    Family Caregiver if Needed  sibling(s)    Able to Return to Prior Arrangements  yes       Resource/Environmental Concerns    Resource/Environmental Concerns  none    Transportation Concerns  car, none       Transition Planning    Patient/Family Anticipates Transition to  home with family    Patient/Family Anticipated Services at Transition  none    Transportation Anticipated  family or friend will provide       Discharge Needs Assessment    Readmission Within the Last 30 Days  no previous admission in last 30 days    Equipment Currently Used at Home  none    Concerns to be Addressed  denies needs/concerns at this time    Anticipated Changes Related to Illness  inability to care for self        Discharge Plan     Row Name 01/06/21 113       Plan    Plan  Anticipate return to home pending clinical course (surgery consulted)    Patient/Family in Agreement with Plan  yes    Plan Comments  Spoke to patient at bedside.  Patient states he lives with spouse whom he primary cares for.  Reports he still drives and is independent with ADls.  Denies any issues obtaining medciations.  Denies any needs at this time.  PCP: [facility]: [facility]          Demographic Summary     Row Name 01/06/21 1133       General Information    Admission Type  inpatient    Arrived From  emergency department    Required Notices Provided  Important Message from Medicare given by registration    Referral Source   admission list    Reason for Consult  discharge planning     Used During This Interaction  no        Functional Status     Row Name 01/06/21 1134       Functional Status, IADL    Medications  independent    Meal Preparation  independent    Housekeeping  independent    Laundry  independent    Shopping  independent        Met with patient in room wearing PPE: mask, face shield/goggles.      Maintained distance greater than six feet and spent less than 15 minutes in the room.    Shama Arevalo,RN    /[place] [address]    [phone] office  [phone] fax  [email or web address]  [email or web address]    Hospital NPI:   Hospital Tax ID: [id number]

## 2021-01-06 NOTE — ANESTHESIA POSTPROCEDURE EVALUATION
Patient: Shaka Trinidad    Procedure Summary     Date: 01/06/21 Room / Location: [facility] OR  / [facility] MAIN OR    Anesthesia Start: 1426 Anesthesia Stop: 1612    Procedure: CHOLECYSTECTOMY LAPAROSCOPIC (N/A Abdomen) Diagnosis:       Acute pancreatitis without infection or necrosis, unspecified pancreatitis type      (Acute pancreatitis without infection or necrosis, unspecified pancreatitis type [K85.90])    Surgeon: Giacomo Akins MD Provider: Cristobal Gagnon MD    Anesthesia Type: general ASA Status: 3          Anesthesia Type: general    Vitals  Vitals Value Taken Time   /62 01/06/21 1659   Temp 98.3 °F (36.8 °C) 01/06/21 1657   Pulse 81 01/06/21 1659   Resp 17 01/06/21 1657   SpO2 96 % 01/06/21 1659   Vitals shown include unvalidated device data.        Post Anesthesia Care and Evaluation    Patient location during evaluation: PACU  Patient participation: complete - patient participated  Level of consciousness: awake  Pain scale: See nurse's notes for pain score.  Pain management: adequate  Airway patency: patent  Anesthetic complications: No anesthetic complications  PONV Status: none  Cardiovascular status: acceptable  Respiratory status: acceptable  Hydration status: acceptable    Comments: Patient seen and examined postoperatively; vital signs stable; SpO2 greater than or equal to 90%; cardiopulmonary status stable; nausea/vomiting adequately controlled; pain adequately controlled; no apparent anesthesia complications; patient discharged from anesthesia care when discharge criteria were met

## 2021-01-06 NOTE — CONSULTS
GENERAL SURGERY CONSULTATION NOTE    Consult requested by: ERICA Green    Patient Care Team:  Karly Marmolejo APRN as PCP - General (Nurse Practitioner)  Bautista Lopez MD as Consulting Physician (Cardiology)    Reason for consult:.  Pancreatitis    Subjective     Patient is a 70 y.o. male presents with new findings of acute pancreatitis and elevated liver enzymes.  The patient reported that he started having epigastric abdominal pain, nausea, and vomiting which started approximately 2 days ago.  He has been unable to tolerate any oral intake since that time.  Given his severe symptoms, he presented to the emergency room where laboratory values were obtained and demonstrated an elevated ALT, AST, alkaline phosphatase, but a normal bilirubin.  CT scan of the abdomen and pelvis demonstrated diffuse inflammation of the pancreas most specifically in the head and uncinate process.  The patient did have an elevated serum lipase at 1703.  He was given IV fluids, pain medication, and his pain resolved.  Ultrasound demonstrated no evidence of any gallstones, but a contracted gallbladder.  MRCP did reveal small gallstones, and no evidence of choledocholithiasis.  Since being admitted to the hospital he reports an overall improvement in his epigastric pain.  He was able to tolerate liquid diet without nausea or vomiting last night, and is n.p.o. for potential surgery today.  His repeat laboratory values indicate resolution of his ALT/AST, alkaline phosphatase and lipase.    Review of Systems   Constitutional: Negative for appetite change, chills and fever.   HENT: Negative for congestion and sore throat.    Respiratory: Negative for cough and shortness of breath.    Cardiovascular: Negative for chest pain and palpitations.   Gastrointestinal: Positive for abdominal pain, nausea and vomiting. Negative for constipation, diarrhea and GERD.   Genitourinary: Negative for difficulty urinating, dysuria and frequency.      Musculoskeletal: Negative for arthralgias and back pain.   Skin: Negative for rash and skin lesions.   Neurological: Negative for dizziness, seizures and memory problem.   Hematological: Negative for adenopathy. Does not bruise/bleed easily.   Psychiatric/Behavioral: Negative for sleep disturbance and depressed mood.        History  Past Medical History:   Diagnosis Date   • Acute pancreatitis 3/14/2020   • Coronary artery disease    • Diabetes mellitus, type II (CMS/HCC)    • Elevated cholesterol    • H/O cataract     cataracts    • Hyperlipidemia    • Hypertension    • Peripheral edema    • Peripheral edema    • Sleep apnea     oral appliance     Past Surgical History:   Procedure Laterality Date   • CARDIAC CATHETERIZATION N/A 7/19/2019    Procedure: Left Heart Cath with angiogram;  Surgeon: Bautista Lopez MD;  Location: [facility] CATH INVASIVE LOCATION;  Service: Cardiovascular   • CARDIAC CATHETERIZATION N/A 7/19/2019    Procedure: Coronary angiography;  Surgeon: Bautista Lopez MD;  Location: [facility] CATH INVASIVE LOCATION;  Service: Cardiovascular   • CARDIAC CATHETERIZATION N/A 7/19/2019    Procedure: Left ventriculography;  Surgeon: Bautista Lopez MD;  Location: [facility] CATH INVASIVE LOCATION;  Service: Cardiovascular   • COLONOSCOPY     • CORONARY ARTERY BYPASS GRAFT N/A 8/8/2019    Procedure: CORONARY ARTERY BYPASS GRAFTING;  Surgeon: Chet Mcarthur MD;  Location: [facility] CVOR;  Service: Cardiothoracic   • ENDOSCOPY N/A 3/15/2020    Procedure: ESOPHAGOGASTRODUODENOSCOPY;  Surgeon: Jaspal Logan MD;  Location: [facility] ENDOSCOPY;  Service: Gastroenterology;  Laterality: N/A;  EPIGASTRIC ABDOMINAL PAIN, ABNORMAL CT SCAN, PANCREATITIS  NORMAL EGD   • HERNIA REPAIR     • OTHER SURGICAL HISTORY  12/2015    2d echo-abstracted cent.      Family History   Problem Relation Age of Onset   • Heart failure Mother    • Hypertension Brother    • Cancer Brother    • Heart disease Brother      Social History      Tobacco Use   • Smoking status: Never Smoker   • Smokeless tobacco: Never Used   Substance Use Topics   • Alcohol use: Yes     Alcohol/week: 1.0 standard drinks     Types: 1 Glasses of wine per week     Frequency: Never   • Drug use: Never     (Not in a hospital admission)    Allergies:  Patient has no known allergies.    Objective     Vital Signs  Temp:  [98.2 °F (36.8 °C)-100.1 °F (37.8 °C)] 98.2 °F (36.8 °C)  Heart Rate:  [] 90  Resp:  [17-20] 18  BP: (115-128)/(55-75) 128/70    Physical Exam  Vitals signs reviewed.   Constitutional:       Appearance: He is well-developed.   HENT:      Head: Normocephalic and atraumatic.   Eyes:      Pupils: Pupils are equal, round, and reactive to light.   Neck:      Musculoskeletal: Normal range of motion.   Cardiovascular:      Rate and Rhythm: Normal rate and regular rhythm.   Pulmonary:      Effort: Pulmonary effort is normal.      Breath sounds: Normal breath sounds.   Abdominal:      General: There is no distension.      Palpations: Abdomen is soft.      Tenderness: There is no abdominal tenderness.      Hernia: No hernia is present.   Musculoskeletal: Normal range of motion.   Lymphadenopathy:      Cervical: No cervical adenopathy.   Skin:     General: Skin is warm and dry.      Findings: No rash.   Neurological:      Mental Status: He is alert and oriented to person, place, and time.         Results Review:   Lab Results (last 24 hours)     Procedure Component Value Units Date/Time    POC Glucose Once [[id number]]  (Abnormal) Collected: 01/06/21 1105    Specimen: Blood Updated: 01/06/21 1106     Glucose 139 mg/dL      Comment: Serial Number: [id number]:  [id number]       POC Glucose Once [[id number]]  (Abnormal) Collected: 01/06/21 0813    Specimen: Blood Updated: 01/06/21 0815     Glucose 142 mg/dL      Comment: Serial Number: [id number]:  [id number]       Blood Culture - Blood, Arm, Left [[id number]] Collected: 01/05/21 0750    Specimen: Blood from  Arm, Left Updated: 01/06/21 0800     Blood Culture No growth at 24 hours    Blood Culture - Blood, Arm, Right [[id number]] Collected: 01/05/21 0744    Specimen: Blood from Arm, Right Updated: 01/06/21 0800     Blood Culture No growth at 24 hours    Magnesium [[id number]]  (Normal) Collected: 01/06/21 0425    Specimen: Blood Updated: 01/06/21 0603     Magnesium 1.8 mg/dL     Comprehensive Metabolic Panel [[id number]]  (Abnormal) Collected: 01/06/21 0425    Specimen: Blood Updated: 01/06/21 0603     Glucose 127 mg/dL      BUN 23 mg/dL      Creatinine 1.44 mg/dL      Sodium 138 mmol/L      Potassium 4.5 mmol/L      Chloride 104 mmol/L      CO2 27.0 mmol/L      Calcium 8.8 mg/dL      Total Protein 6.1 g/dL      Albumin 3.50 g/dL      ALT (SGPT) 92 U/L      AST (SGOT) 46 U/L      Alkaline Phosphatase 105 U/L      Total Bilirubin 1.1 mg/dL      eGFR Non African Amer 49 mL/min/1.73      Globulin 2.6 gm/dL      A/G Ratio 1.3 g/dL      BUN/Creatinine Ratio 16.0     Anion Gap 7.0 mmol/L     Narrative:      GFR Normal >60  Chronic Kidney Disease <60  Kidney Failure <15      Lipase [[id number]]  (Abnormal) Collected: 01/06/21 0425    Specimen: Blood Updated: 01/06/21 0603     Lipase 156 U/L     Amylase [[id number]]  (Abnormal) Collected: 01/06/21 0425    Specimen: Blood Updated: 01/06/21 0603     Amylase 189 U/L     POC Glucose Once [[id number]]  (Abnormal) Collected: 01/06/21 0549    Specimen: Blood Updated: 01/06/21 0550     Glucose 119 mg/dL      Comment: Serial Number: [id number]:  [id number]       CBC & Differential [[id number]]  (Abnormal) Collected: 01/06/21 0425    Specimen: Blood Updated: 01/06/21 0537    Narrative:      The following orders were created for panel order CBC & Differential.  Procedure                               Abnormality         Status                     ---------                               -----------         ------                     CBC Auto Differential[[id number]]        Abnormal             Final result                 Please view results for these tests on the individual orders.    CBC Auto Differential [[id number]]  (Abnormal) Collected: 01/06/21 0425    Specimen: Blood Updated: 01/06/21 0537     WBC 10.20 10*3/mm3      RBC 3.73 10*6/mm3      Hemoglobin 11.0 g/dL      Comment: Result checked         Hematocrit 32.9 %      MCV 88.1 fL      MCH 29.4 pg      MCHC 33.3 g/dL      RDW 13.4 %      RDW-SD 41.1 fl      MPV 8.0 fL      Platelets 189 10*3/mm3      Neutrophil % 76.1 %      Lymphocyte % 11.5 %      Monocyte % 11.7 %      Eosinophil % 0.3 %      Basophil % 0.4 %      Neutrophils, Absolute 7.70 10*3/mm3      Lymphocytes, Absolute 1.20 10*3/mm3      Monocytes, Absolute 1.20 10*3/mm3      Eosinophils, Absolute 0.00 10*3/mm3      Basophils, Absolute 0.00 10*3/mm3      nRBC 0.1 /100 WBC     COVID PRE-OP / PRE-PROCEDURE SCREENING ORDER (NO ISOLATION) - Swab, Nasopharynx [[id number]]  (Normal) Collected: 01/05/21 1213    Specimen: Swab from Nasopharynx Updated: 01/05/21 2026    Narrative:      The following orders were created for panel order COVID PRE-OP / PRE-PROCEDURE SCREENING ORDER (NO ISOLATION) - Swab, Nasopharynx.  Procedure                               Abnormality         Status                     ---------                               -----------         ------                     COVID-19,APTIMA PANTHER,...[[id number]]  Normal              Final result                 Please view results for these tests on the individual orders.    COVID-19,APTIMA PANTHER,[facility] IN-HOUSE, NP/OP SWAB IN UTM/VTM/[facility] TRANSPORT MEDIA,24 HR TAT - Swab, Nasopharynx [[id number]]  (Normal) Collected: 01/05/21 1213    Specimen: Swab from Nasopharynx Updated: 01/05/21 2026     COVID19 Not Detected    Narrative:      Fact sheet for providers: https://www.fda.gov/media/687437/download     Fact sheet for patients: https://www.fda.gov/media/483955/download    Test performed by PCR.    POC Glucose Once [[id number]]  (Abnormal)  Collected: 01/05/21 1741    Specimen: Blood Updated: 01/05/21 1745     Glucose 166 mg/dL      Comment: Serial Number: [id number]:  [id number]           Ct Abdomen Pelvis Without Contrast    Result Date: 1/5/2021  1. Acute interstitial pancreatitis involving the pancreatic head and uncinate process. 2. No biliary dilatation. 3. Additional chronic findings above.  Electronically Signed By-Twin Redman MD On:1/5/2021 8:36 AM This report was finalized on [id number] by  Twin Redman MD.    Mri Abdomen Wo Contrast Mrcp    Result Date: 1/5/2021  1. Acute interstitial pancreatitis involving the pancreatic head and uncinate process. 2. Cholelithiasis. 3. No biliary dilatation or visualized choledocholithiasis.  Electronically Signed By-Twin Redman MD On:1/5/2021 1:53 PM This report was finalized on [id number] by  Twin Redman MD.    Us Abdomen Limited    Result Date: 1/5/2021  Examination limited by bowel gas. The gallbladder is contracted without definitive stones or sludge. No biliary dilatation. Otherwise unremarkable exam.  Electronically Signed By-Obed Lagunas DO On:1/5/2021 1:13 PM This report was finalized on [id number] by  Obed Lagunas DO.        I reviewed the patient's new imaging results and agree with the interpretation.  I reviewed the patient's other test results and agree with the interpretation    Assessment/Plan     Principal Problem:    Acute pancreatitis  Active Problems:    Coronary artery disease involving native coronary artery of native heart without angina pectoris    Hyperlipidemia    Essential hypertension    Type 2 diabetes mellitus with hyperglycemia (CMS/HCC)    Diabetic peripheral neuropathy (CMS/HCC)    Acute abdominal pain    Nausea and vomiting    JEAN PAUL (obstructive sleep apnea)    Obesity (BMI 30-39.9)    Elevated LFTs    Acute pancreatitis without infection or necrosis    I discussed with the patient the findings of gallstone pancreatitis, and reviewed the natural  history of gallstone pancreatitis as well as management options which include both operative and nonoperative strategies.  We discussed the risk, benefits, and alternatives to laparoscopic cholecystectomy which include but are not limited to: bleeding, infection, damage to surrounding structures including bowel and common bile duct, cystic stump leak, and possible need to convert to open.  The patient understands, and agrees to proceed with surgery.    I discussed the patients findings and my recommendations with the patient.     Giacomo Akins MD  01/06/21  12:43 EST

## 2021-01-07 ENCOUNTER — READMISSION MANAGEMENT (OUTPATIENT)
Dept: CALL CENTER | Facility: HOSPITAL | Age: 71
End: 2021-01-07

## 2021-01-07 VITALS
OXYGEN SATURATION: 94 % | WEIGHT: 210.32 LBS | SYSTOLIC BLOOD PRESSURE: 123 MMHG | HEART RATE: 95 BPM | RESPIRATION RATE: 18 BRPM | BODY MASS INDEX: 35.91 KG/M2 | DIASTOLIC BLOOD PRESSURE: 68 MMHG | HEIGHT: 64 IN | TEMPERATURE: 98.6 F

## 2021-01-07 PROBLEM — R11.2 NAUSEA AND VOMITING: Status: RESOLVED | Noted: 2021-01-05 | Resolved: 2021-01-07

## 2021-01-07 LAB
ALBUMIN SERPL-MCNC: 3.2 G/DL (ref 3.5–5.2)
ALBUMIN/GLOB SERPL: 1.3 G/DL
ALP SERPL-CCNC: 83 U/L (ref 39–117)
ALT SERPL W P-5'-P-CCNC: 90 U/L (ref 1–41)
ANION GAP SERPL CALCULATED.3IONS-SCNC: 8 MMOL/L (ref 5–15)
AST SERPL-CCNC: 63 U/L (ref 1–40)
BASOPHILS # BLD AUTO: 0 10*3/MM3 (ref 0–0.2)
BASOPHILS NFR BLD AUTO: 0.1 % (ref 0–1.5)
BILIRUB SERPL-MCNC: 0.8 MG/DL (ref 0–1.2)
BUN SERPL-MCNC: 26 MG/DL (ref 8–23)
BUN/CREAT SERPL: 19.1 (ref 7–25)
CALCIUM SPEC-SCNC: 8.3 MG/DL (ref 8.6–10.5)
CHLORIDE SERPL-SCNC: 101 MMOL/L (ref 98–107)
CO2 SERPL-SCNC: 24 MMOL/L (ref 22–29)
CREAT SERPL-MCNC: 1.36 MG/DL (ref 0.76–1.27)
DEPRECATED RDW RBC AUTO: 40.3 FL (ref 37–54)
EOSINOPHIL # BLD AUTO: 0 10*3/MM3 (ref 0–0.4)
EOSINOPHIL NFR BLD AUTO: 0 % (ref 0.3–6.2)
ERYTHROCYTE [DISTWIDTH] IN BLOOD BY AUTOMATED COUNT: 13 % (ref 12.3–15.4)
GFR SERPL CREATININE-BSD FRML MDRD: 52 ML/MIN/1.73
GLOBULIN UR ELPH-MCNC: 2.5 GM/DL
GLUCOSE BLDC GLUCOMTR-MCNC: 235 MG/DL (ref 70–105)
GLUCOSE BLDC GLUCOMTR-MCNC: 247 MG/DL (ref 70–105)
GLUCOSE SERPL-MCNC: 260 MG/DL (ref 65–99)
HCT VFR BLD AUTO: 29.4 % (ref 37.5–51)
HGB BLD-MCNC: 9.9 G/DL (ref 13–17.7)
LIPASE SERPL-CCNC: 38 U/L (ref 13–60)
LYMPHOCYTES # BLD AUTO: 0.8 10*3/MM3 (ref 0.7–3.1)
LYMPHOCYTES NFR BLD AUTO: 8.3 % (ref 19.6–45.3)
MAGNESIUM SERPL-MCNC: 1.7 MG/DL (ref 1.6–2.4)
MCH RBC QN AUTO: 29.7 PG (ref 26.6–33)
MCHC RBC AUTO-ENTMCNC: 33.6 G/DL (ref 31.5–35.7)
MCV RBC AUTO: 88.3 FL (ref 79–97)
MONOCYTES # BLD AUTO: 0.7 10*3/MM3 (ref 0.1–0.9)
MONOCYTES NFR BLD AUTO: 7.3 % (ref 5–12)
NEUTROPHILS NFR BLD AUTO: 8.1 10*3/MM3 (ref 1.7–7)
NEUTROPHILS NFR BLD AUTO: 84.3 % (ref 42.7–76)
NRBC BLD AUTO-RTO: 0 /100 WBC (ref 0–0.2)
PLATELET # BLD AUTO: 167 10*3/MM3 (ref 140–450)
PMV BLD AUTO: 7.8 FL (ref 6–12)
POTASSIUM SERPL-SCNC: 5 MMOL/L (ref 3.5–5.2)
PROT SERPL-MCNC: 5.7 G/DL (ref 6–8.5)
RBC # BLD AUTO: 3.33 10*6/MM3 (ref 4.14–5.8)
SODIUM SERPL-SCNC: 133 MMOL/L (ref 136–145)
WBC # BLD AUTO: 9.6 10*3/MM3 (ref 3.4–10.8)

## 2021-01-07 PROCEDURE — 80053 COMPREHEN METABOLIC PANEL: CPT | Performed by: SURGERY

## 2021-01-07 PROCEDURE — 63710000001 INSULIN GLARGINE PER 5 UNITS: Performed by: SURGERY

## 2021-01-07 PROCEDURE — 25010000002 PIPERACILLIN SOD-TAZOBACTAM PER 1 G: Performed by: SURGERY

## 2021-01-07 PROCEDURE — 85025 COMPLETE CBC W/AUTO DIFF WBC: CPT | Performed by: HOSPITALIST

## 2021-01-07 PROCEDURE — 99238 HOSP IP/OBS DSCHRG MGMT 30/<: CPT | Performed by: HOSPITALIST

## 2021-01-07 PROCEDURE — 63710000001 INSULIN LISPRO (HUMAN) PER 5 UNITS: Performed by: HOSPITALIST

## 2021-01-07 PROCEDURE — 82962 GLUCOSE BLOOD TEST: CPT

## 2021-01-07 PROCEDURE — 83735 ASSAY OF MAGNESIUM: CPT | Performed by: SURGERY

## 2021-01-07 PROCEDURE — 83690 ASSAY OF LIPASE: CPT | Performed by: SURGERY

## 2021-01-07 PROCEDURE — 99024 POSTOP FOLLOW-UP VISIT: CPT | Performed by: SURGERY

## 2021-01-07 PROCEDURE — 25010000002 ENOXAPARIN PER 10 MG: Performed by: SURGERY

## 2021-01-07 PROCEDURE — 63710000001 INSULIN LISPRO (HUMAN) PER 5 UNITS: Performed by: SURGERY

## 2021-01-07 PROCEDURE — 25010000002 MAGNESIUM SULFATE IN D5W 1G/100ML (PREMIX) 1-5 GM/100ML-% SOLUTION: Performed by: SURGERY

## 2021-01-07 RX ORDER — HYDROCODONE BITARTRATE AND ACETAMINOPHEN 5; 325 MG/1; MG/1
1 TABLET ORAL EVERY 4 HOURS PRN
Qty: 30 TABLET | Refills: 0 | Status: SHIPPED | OUTPATIENT
Start: 2021-01-07 | End: 2021-01-16

## 2021-01-07 RX ADMIN — Medication 10 ML: at 09:26

## 2021-01-07 RX ADMIN — PANTOPRAZOLE SODIUM 40 MG: 40 TABLET, DELAYED RELEASE ORAL at 09:25

## 2021-01-07 RX ADMIN — SODIUM CHLORIDE 100 ML/HR: 9 INJECTION, SOLUTION INTRAVENOUS at 05:57

## 2021-01-07 RX ADMIN — INSULIN LISPRO 4 UNITS: 100 INJECTION, SOLUTION INTRAVENOUS; SUBCUTANEOUS at 13:58

## 2021-01-07 RX ADMIN — PIPERACILLIN AND TAZOBACTAM 3.38 G: 3; .375 INJECTION, POWDER, LYOPHILIZED, FOR SOLUTION INTRAVENOUS at 05:54

## 2021-01-07 RX ADMIN — PIPERACILLIN AND TAZOBACTAM 3.38 G: 3; .375 INJECTION, POWDER, LYOPHILIZED, FOR SOLUTION INTRAVENOUS at 13:59

## 2021-01-07 RX ADMIN — INSULIN GLARGINE 20 UNITS: 100 INJECTION, SOLUTION SUBCUTANEOUS at 06:00

## 2021-01-07 RX ADMIN — INSULIN LISPRO 4 UNITS: 100 INJECTION, SOLUTION INTRAVENOUS; SUBCUTANEOUS at 09:26

## 2021-01-07 RX ADMIN — ENOXAPARIN SODIUM 40 MG: 40 INJECTION SUBCUTANEOUS at 13:59

## 2021-01-07 RX ADMIN — MAGNESIUM SULFATE HEPTAHYDRATE 1 G: 1 INJECTION, SOLUTION INTRAVENOUS at 06:34

## 2021-01-07 NOTE — DISCHARGE SUMMARY
[facility] Medicine Services  DISCHARGE SUMMARY        Prepared For PCP:  Karly Marmolejo APRN    Patient Name: Shaka Trinidad  : 1950  MRN: [id number]      Date of Admission:   2021    Date of Discharge:  2021    Length of stay:  LOS: 2 days     Hospital Course     Presenting Problem:   Nausea [R11.0]  Elevated liver enzymes [R74.8]  Acute kidney injury (CMS/HCC) [N17.9]  Pain of upper abdomen [R10.10]  Acute pancreatitis without infection or necrosis, unspecified pancreatitis type [K85.90]      Active Hospital Problems    Diagnosis  POA    **Acute abdominal pain [R10.9]  Yes     Priority: High    Acute pancreatitis without infection or necrosis [K85.90]  Yes     Priority: High    Acute pancreatitis [K85.90]  Yes    JEAN PAUL (obstructive sleep apnea) [G47.33]  Yes    Obesity (BMI 30-39.9) [E66.9]  Yes    Elevated LFTs [R79.89]  Yes    Diabetic peripheral neuropathy (CMS/HCC) [E11.42]  Yes    Hyperlipidemia [E78.5]  Yes    Essential hypertension [I10]  Yes    Coronary artery disease involving native coronary artery of native heart without angina pectoris [I25.10]  Yes    Type 2 diabetes mellitus with hyperglycemia (CMS/HCC) [E11.65]  Yes      Resolved Hospital Problems    Diagnosis Date Resolved POA    Nausea and vomiting [R11.2] 2021 Yes     [place] and plan:  Acute abdominal pain secondary to acute pancreatitis with transaminitis  -lipase 1,703, alk phos 154, ,  on admission  -CT and MRCP reviewed  -Gastroenterology was consulted and recommended surgical consult for laparoscopic cholecystectomy  -General surgery performed laparoscopic cholecystectomy on 2021    Acute renal failure secondary to prerenal azotemia from volume deficit  -Creatinine 1.73 on admission and improved to 1.36 with IV fluid hydration     Coronary artery disease involving native coronary artery of native heart without angina pectoris / Hyperlipidemia / Essential hypertension,  chronic  -Continue amlodipine, aspirin, atorvastatin, Lasix, lisinopril, metoprolol and hydralazine     Type 2 diabetes mellitus with hyperglycemia complicated with Diabetic peripheral neuropathy   -Hemoglobin A1c 7.3  -Continue Tresiba, Metformin and glipizide     JEAN PAUL (obstructive sleep apnea)  -patient states he uses an oral device at home     Obesity  -BMI 36.10 on admission  -encourage lifestyle modifications      Hospital Course:  Shaka Trinidad is a 70 y.o. male with a history of CAD, HTN, HLD, Type 2 DM with neuropathy, and JEAN PAUL who presents to [facility] ED on 01/05/2021 complaining of abdominal pain, nausea, and vomiting. He states he had similar pain last week, but it resolved. He states the pain is diffuse and sharp. He denies any fever, but reports chills. He denies chest pain or shortness of breath. He denies any exacerbating or alleviating factors.      The patient denies a history of pancreatitis, but review of records reveal he was hospitalized here in March 2020 for acute pancreatitis. He had a normal EGD at that time and gastroenterology recommended outpatient endoscopic US. The patient apparently never followed up.      Family medical history and social history as documented below.     Upon arrival to the ER the patient was afebrile but tachycardic with . His labs were significant for WBC 15.4, Na 134, BUN 31, Cr 1.73, glucose 221, alk phos 154, , , lipase 1,703. Blood cultures were obtained. His urinalysis did not reflex for culture. His troponin was normal and EKG showed sinus tachycardia. His CT abd/pelvis showed acute pancreatitis. He was given Zosyn, IV fluids, morphine, and Zofran. He was admitted for further evaluation and treatment.      Date: 1/6/2021: Patient underwent laparoscopic cholecystectomy today without complications and he tolerated the procedure well.  At the time of my visit he was sleeping but arousable and denies current  complaints.  1/7/2021: Patient reports doing well.  He is anxious to go home. Patient is felt to be stable for discharge at this time.        Day of Discharge     HPI: No current complaints.      Vital Signs:   Temp:  [97.5 °F (36.4 °C)-99.5 °F (37.5 °C)] 99 °F (37.2 °C)  Heart Rate:  [64-85] 75  Resp:  [14-21] 14  BP: (101-175)/(51-90) 131/70     Physical Exam:  Physical Exam  Well-developed over-nourished gentleman awake and alert comfortable on room air in no acute distress; mucous membranes moist; sclerae anicteric; lungs clear to auscultation bilaterally; CV regular rate and rhythm; abdomen soft nontender nondistended with clean dry surgical dressings in place; extremities with no edema, cyanosis or calf tenderness; palpable pedal pulses bilaterally; no Angel catheter.      Pertinent  and/or Most Recent Results     Results from last 7 days   Lab Units 01/07/21  0422 01/06/21 0425 01/05/21  0634   WBC 10*3/mm3 9.60 10.20 15.40*   HEMOGLOBIN g/dL 9.9* 11.0* 13.5   HEMATOCRIT % 29.4* 32.9* 40.5   PLATELETS 10*3/mm3 167 189 291   SODIUM mmol/L 133* 138 134*   POTASSIUM mmol/L 5.0 4.5 4.7   CHLORIDE mmol/L 101 104 97*   CO2 mmol/L 24.0 27.0 24.0   BUN mg/dL 26* 23 31*   CREATININE mg/dL 1.36* 1.44* 1.73*   GLUCOSE mg/dL 260* 127* 221*   CALCIUM mg/dL 8.3* 8.8 9.8     Results from last 7 days   Lab Units 01/07/21  0422 01/06/21  0425 01/05/21  0634   BILIRUBIN mg/dL 0.8 1.1 0.9   ALK PHOS U/L 83 105 154*   ALT (SGPT) U/L 90* 92* 184*   AST (SGOT) U/L 63* 46* 169*   PROTIME Seconds  --   --  11.1   INR   --   --  1.01   APTT seconds  --   --  23.9*           Invalid input(s): TG, LDLCALC, LDLREALC  Results from last 7 days   Lab Units 01/05/21  0634   HEMOGLOBIN A1C % 7.3*   TROPONIN T ng/mL <0.010       Brief Urine Lab Results  (Last result in the past 365 days)        Color   Clarity   Blood   Leuk Est   Nitrite   Protein   CREAT   Urine HCG        01/05/21 0723 Yellow Clear Negative Negative Negative Negative                  Microbiology Results Abnormal       Procedure Component Value - Date/Time    Blood Culture - Blood, Arm, Left [[id number]] Collected: 01/05/21 0750    Lab Status: Preliminary result Specimen: Blood from Arm, Left Updated: 01/07/21 0800     Blood Culture No growth at 2 days    Blood Culture - Blood, Arm, Right [[id number]] Collected: 01/05/21 0744    Lab Status: Preliminary result Specimen: Blood from Arm, Right Updated: 01/07/21 0800     Blood Culture No growth at 2 days    COVID PRE-OP / PRE-PROCEDURE SCREENING ORDER (NO ISOLATION) - Swab, Nasopharynx [[id number]]  (Normal) Collected: 01/05/21 1213    Lab Status: Final result Specimen: Swab from Nasopharynx Updated: 01/05/21 2026    Narrative:      The following orders were created for panel order COVID PRE-OP / PRE-PROCEDURE SCREENING ORDER (NO ISOLATION) - Swab, Nasopharynx.  Procedure                               Abnormality         Status                     ---------                               -----------         ------                     COVID-19,APTIMA PANTHER,...[[id number]]  Normal              Final result                 Please view results for these tests on the individual orders.    COVID-19,APTIMA PANTHER,NATE IN-HOUSE, NP/OP SWAB IN UTM/VTM/SALINE TRANSPORT MEDIA,24 HR TAT - Swab, Nasopharynx [[id number]]  (Normal) Collected: 01/05/21 1213    Lab Status: Final result Specimen: Swab from Nasopharynx Updated: 01/05/21 2026     COVID19 Not Detected    Narrative:      Fact sheet for providers: https://www.fda.gov/media/213193/download     Fact sheet for patients: https://www.fda.gov/media/858562/download    Test performed by PCR.            Ct Abdomen Pelvis Without Contrast    Result Date: 1/5/2021  Impression: 1. Acute interstitial pancreatitis involving the pancreatic head and uncinate process. 2. No biliary dilatation. 3. Additional chronic findings above.  Electronically Signed By-Twin Redman MD On:1/5/2021 8:36 AM This report was  finalized on [id number] by  Twin Redman MD.    Mri Abdomen Wo Contrast Mrcp    Result Date: 1/5/2021  Impression: 1. Acute interstitial pancreatitis involving the pancreatic head and uncinate process. 2. Cholelithiasis. 3. No biliary dilatation or visualized choledocholithiasis.  Electronically Signed By-Twin Redman MD On:1/5/2021 1:53 PM This report was finalized on [id number] by  Twin Redman MD.    Us Abdomen Limited    Result Date: 1/5/2021  Impression: Examination limited by bowel gas. The gallbladder is contracted without definitive stones or sludge. No biliary dilatation. Otherwise unremarkable exam.  Electronically Signed By-Obed Lagunas DO On:1/5/2021 1:13 PM This report was finalized on [id number] by  Obed Lagunas DO.      Results for orders placed during the hospital encounter of 08/25/19   Duplex Venous Lower Extremity - Right CAR    Narrative · Normal right lower extremity venous duplex scan.          Results for orders placed during the hospital encounter of 08/25/19   Duplex Venous Lower Extremity - Right CAR    Narrative · Normal right lower extremity venous duplex scan.                       Test Results Pending at Discharge  Pending Labs       Order Current Status    Tissue Pathology Exam In process    Blood Culture - Blood, Arm, Left Preliminary result    Blood Culture - Blood, Arm, Right Preliminary result              Procedures Performed  Procedure(s):  CHOLECYSTECTOMY LAPAROSCOPIC         Consults:   Consults       Date and Time Order Name Status Description    1/5/2021 1453 Inpatient General Surgery Consult Completed     1/5/2021 1057 Inpatient Gastroenterology Consult Completed               Discharge Details        Discharge Medications        New Medications        Instructions Start Date   HYDROcodone-acetaminophen 5-325 MG per tablet  Commonly known as: NORCO   1 tablet, Oral, Every 4 Hours PRN             Continue These Medications        Instructions Start Date    amLODIPine 5 MG tablet  Commonly known as: NORVASC   5 mg, Oral, Daily      aspirin 81 MG chewable tablet   81 mg, Oral, Nightly      atorvastatin 40 MG tablet  Commonly known as: LIPITOR   40 mg, Oral, Every Night at Bedtime      furosemide 20 MG tablet  Commonly known as: LASIX   20 mg, Oral, Daily      glipizide 5 MG tablet  Commonly known as: GLUCOTROL   10 mg, Oral, Every Morning      glipizide 5 MG tablet  Commonly known as: GLUCOTROL   5 mg, Oral, Every Evening      hydrALAZINE 25 MG tablet  Commonly known as: APRESOLINE   TAKE 1 TABLET BY MOUTH THREE TIMES A DAY      lisinopril 40 MG tablet  Commonly known as: PRINIVIL,ZESTRIL   40 mg, Oral, Daily      metFORMIN 1000 MG tablet  Commonly known as: GLUCOPHAGE   TAKE 1 TABLET BY MOUTH TWICE A DAY WITH MEALS      metoprolol tartrate 25 MG tablet  Commonly known as: LOPRESSOR   TAKE 1 TABLET BY MOUTH EVERY 12 HOURS      potassium chloride 10 MEQ CR capsule  Commonly known as: MICRO-K   TAKE 1 CAPSULE BY MOUTH EVERY DAY      Tresiba FlexTouch 100 UNIT/ML solution pen-injector injection  Generic drug: insulin degludec   45 Units, Subcutaneous, Every Morning      vitamin C 500 MG tablet  Commonly known as: ASCORBIC ACID   1,000 mg, Oral, Every Evening               No Known Allergies      Discharge Disposition:  Home or Self Care    Diet:  Hospital:  Diet Order   Procedures    Diet Regular         Discharge Activity:   Activity Instructions       Activity as Tolerated        Additional Activity Instructions:       Ok to shower starting tomorrow. No tub baths, pools, lakes, or streams for 1 week.  No heavy lifting (greater than 20 lbs) for 6 weeks after surgery                        CODE STATUS:    Code Status and Medical Interventions:   Ordered at: 01/05/21 1048     Code Status:    CPR     Medical Interventions (Level of Support Prior to Arrest):    Full         Follow-up Appointments  Future Appointments   Date Time Provider Department Center   1/8/2021  1:00  PM CARDIAC EXERCISE [facility] None   1/11/2021  1:00 PM CARDIAC EXERCISE [facility] None   1/13/2021  1:00 PM CARDIAC EXERCISE [facility] None   1/15/2021  1:00 PM CARDIAC EXERCISE [facility] None   1/18/2021  8:20 AM Giacomo Akins MD [facility] None   1/18/2021  1:00 PM CARDIAC EXERCISE [facility] None   1/20/2021  1:00 PM CARDIAC EXERCISE [facility] None   1/22/2021  1:00 PM CARDIAC EXERCISE [facility] None   1/25/2021  1:00 PM CARDIAC EXERCISE [facility] None   1/27/2021  1:00 PM CARDIAC EXERCISE [facility] None   1/29/2021  1:00 PM CARDIAC EXERCISE [facility] None   2/1/2021  1:00 PM CARDIAC EXERCISE [facility] None   4/19/2021  1:40 PM Bautista Lopez MD [facility]   6/1/2021  8:15 AM NURSE/MA [facility] [place] [facility]   6/8/2021  9:30 AM Karly Marmolejo APRN [facility]       Additional Instructions for the Follow-ups that You Need to Schedule       Call MD With Problems / Concerns   As directed      Instructions: Call [phone] or email [email or web address] for problems or concerns.    Order Comments: Instructions: Call [phone] or email [email or web address] for problems or concerns.          Discharge Follow-up with PCP   As directed       Currently Documented PCP:    Karly Marmolejo APRN    PCP Phone Number:    [phone]     Follow Up Details: 2-3 business days via telehealth if possible                   Condition on Discharge:      Stable      This patient has been examined wearing appropriate Personal Protective Equipment . 01/07/21      Electronically signed by Shelli Lamb MD, 01/07/21, 3:28 PM EST.      Time: I spent 30 minutes on this discharge activity which included face-to-face encounter with the patient/reviewing the data in the system/coordination of the care with the nursing staff as well as consultants/documentation/entering orders.

## 2021-01-07 NOTE — PROGRESS NOTES
Case Management Discharge Note    Final Discharge Disposition Code: 01 - home or self-care     Gina Ramírez RN, [facility]  Office: [phone]  Fax: [phone]  [email or web address]

## 2021-01-07 NOTE — PROGRESS NOTES
[facility] Medicine Services Daily Progress Note      Hospitalist Team  LOS 1 days      Patient Care Team:  Karly Marmolejo APRN as PCP - General (Nurse Practitioner)  Bautista Lopez MD as Consulting Physician (Cardiology)    Patient Location: 4120/1      Subjective   Subjective     Chief Complaint / Subjective  Chief Complaint   Patient presents with   • Abdominal Pain         Brief Synopsis of Hospital Course/HPI  Mr. Trinidad is a 70 y.o. male with a history of CAD, HTN, HLD, Type 2 DM with neuropathy, and JEAN PAUL who presents to [facility] ED on 01/05/2021 complaining of abdominal pain, nausea, and vomiting. He states he had similar pain last week, but it resolved. He states the pain is diffuse and sharp. He denies any fever, but reports chills. He denies chest pain or shortness of breath. He denies any exacerbating or alleviating factors.      The patient denies a history of pancreatitis, but review of records reveal he was hospitalized here in March 2020 for acute pancreatitis. He had a normal EGD at that time and gastroenterology recommended outpatient endoscopic US. The patient apparently never followed up.      Family medical history and social history as documented below.     Upon arrival to the ER the patient was afebrile but tachycardic with . His labs were significant for WBC 15.4, Na 134, BUN 31, Cr 1.73, glucose 221, alk phos 154, , , lipase 1,703. Blood cultures were obtained. His urinalysis did not reflex for culture. His troponin was normal and EKG showed sinus tachycardia. His CT abd/pelvis showed acute pancreatitis. He was given Zosyn, IV fluids, morphine, and Zofran. He was admitted for further evaluation and treatment.          Date: 1/6/2021:  Patient underwent laparoscopic cholecystectomy today without complications and he tolerated the procedure well.  At the time of my visit he was sleeping but arousable and denies current  "complaints.      ROS  12 point review of systems was reviewed and was negative except as above.  Objective   Objective      Vital Signs  Temp:  [97.9 °F (36.6 °C)-99.8 °F (37.7 °C)] 99.5 °F (37.5 °C)  Heart Rate:  [] 79  Resp:  [14-21] 18  BP: (106-175)/(51-90) 106/51  Oxygen Therapy  SpO2: 96 %  Pulse Oximetry Type: Intermittent  Device (Oxygen Therapy): nasal cannula  Flow (L/min): 2  Oximetry Probe Site Changed: Yes  Flowsheet Rows      First Filed Value   Admission Height  162.6 cm (64\") Documented at 01/05/2021 0612   Admission Weight  97.1 kg (214 lb 1.1 oz) Documented at 01/05/2021 0612        Intake & Output (last 3 days)       01/04 0701 - 01/05 0700 01/05 0701 - 01/06 0700 01/06 0701 - 01/07 0700    I.V. (mL/kg)   600 (6.3)    IV Piggyback 500 700     Total Intake(mL/kg) 500 (5.1) 700 (7.3) 600 (6.3)    Urine (mL/kg/hr)   750 (0.6)    Total Output   750    Net +500 +700 -150               Lines, Drains & Airways    Active LDAs     Name:   Placement date:   Placement time:   Site:   Days:    Peripheral IV 01/05/21 0633 Right Antecubital   01/05/21 0633    Antecubital   1                  Physical Exam:    Physical Exam  Vital signs and nurses notes reviewed; well-developed well-nourished in no acute distress sitting up in bed awake and alert; mucous membranes moist; sclerae anicteric; neck supple; lungs clear to auscultation bilaterally; CV regular rate and rhythm; abdomen soft nontender nondistended with active bowel sounds, surgical abdominal wound dressings in place with scant serosanguineous drainage on the dressings; extremities with no edema, cyanosis or calf tenderness; palpable pedal pulses bilaterally; no Angel catheter.       Wounds (last 24 hours)      LDA Wound     Row Name 01/06/21 1657 01/06/21 1612 01/06/21 1547       Wound 01/06/21 1455 umbilical area Incision    Wound - Properties Group Placement Date: 01/06/21  - Placement Time: 1455  -LH Present on Hospital Admission: N  -LH " Location: umbilical area  -LH Primary Wound Type: Incision  -LH, x4     Dressing Appearance  dry;intact;no drainage  -AH  dry;intact;no drainage  -AH  --    Closure  Liquid skin adhesive  -AH  Liquid skin adhesive  -AH  --    Dressing Care  --  --  dressing applied skin affix x4  -LH    Retired Wound - Properties Group Date first assessed: 01/06/21  - Time first assessed: 1455  -LH Present on Hospital Admission: N  -LH Location: umbilical area  -LH Primary Wound Type: Incision  -LH, x4       User Key  (r) = Recorded By, (t) = Taken By, (c) = Cosigned By    Initials Name Provider Type    Lisa Rivera, RN Registered Nurse     Jil Oconnell RN Registered Nurse          Procedures:    Procedure(s):  CHOLECYSTECTOMY LAPAROSCOPIC          Results Review:     I reviewed the patient's new clinical results.      Lab Results (last 24 hours)     Procedure Component Value Units Date/Time    POC Glucose Once [[id number]]  (Abnormal) Collected: 01/06/21 1616    Specimen: Blood Updated: 01/06/21 1620     Glucose 159 mg/dL      Comment: Serial Number: [id number]:  [id number]       POC Glucose Once [[id number]]  (Abnormal) Collected: 01/06/21 1400    Specimen: Blood Updated: 01/06/21 1401     Glucose 120 mg/dL      Comment: Serial Number: [id number]:  [id number]       Hemoglobin A1c [[id number]]  (Abnormal) Collected: 01/05/21 0634    Specimen: Blood Updated: 01/06/21 1333     Hemoglobin A1C 7.3 %     Narrative:      Hemoglobin A1C Reference Range:    <5.7 %        Normal  5.7-6.4 %     Increased risk for diabetes  > 6.4 %        Diabetes       These guidelines have been recommended by the American Diabetic Association for Hgb A1c.      The following 2010 guidelines have been recommended by the American Diabetes Association for Hemoglobin A1c.    HBA1c 5.7-6.4% Increased risk for future diabetes (pre-diabetes)  HBA1c     >6.4% Diabetes      POC Glucose Once [[id number]]  (Abnormal) Collected: 01/06/21 1105     Specimen: Blood Updated: 01/06/21 1106     Glucose 139 mg/dL      Comment: Serial Number: [id number]:  [id number]       POC Glucose Once [[id number]]  (Abnormal) Collected: 01/06/21 0813    Specimen: Blood Updated: 01/06/21 0815     Glucose 142 mg/dL      Comment: Serial Number: [id number]:  [id number]       Blood Culture - Blood, Arm, Left [[id number]] Collected: 01/05/21 0750    Specimen: Blood from Arm, Left Updated: 01/06/21 0800     Blood Culture No growth at 24 hours    Blood Culture - Blood, Arm, Right [[id number]] Collected: 01/05/21 0744    Specimen: Blood from Arm, Right Updated: 01/06/21 0800     Blood Culture No growth at 24 hours    Magnesium [[id number]]  (Normal) Collected: 01/06/21 0425    Specimen: Blood Updated: 01/06/21 0603     Magnesium 1.8 mg/dL     Comprehensive Metabolic Panel [[id number]]  (Abnormal) Collected: 01/06/21 0425    Specimen: Blood Updated: 01/06/21 0603     Glucose 127 mg/dL      BUN 23 mg/dL      Creatinine 1.44 mg/dL      Sodium 138 mmol/L      Potassium 4.5 mmol/L      Chloride 104 mmol/L      CO2 27.0 mmol/L      Calcium 8.8 mg/dL      Total Protein 6.1 g/dL      Albumin 3.50 g/dL      ALT (SGPT) 92 U/L      AST (SGOT) 46 U/L      Alkaline Phosphatase 105 U/L      Total Bilirubin 1.1 mg/dL      eGFR Non African Amer 49 mL/min/1.73      Globulin 2.6 gm/dL      A/G Ratio 1.3 g/dL      BUN/Creatinine Ratio 16.0     Anion Gap 7.0 mmol/L     Narrative:      GFR Normal >60  Chronic Kidney Disease <60  Kidney Failure <15      Lipase [[id number]]  (Abnormal) Collected: 01/06/21 0425    Specimen: Blood Updated: 01/06/21 0603     Lipase 156 U/L     Amylase [[id number]]  (Abnormal) Collected: 01/06/21 0425    Specimen: Blood Updated: 01/06/21 0603     Amylase 189 U/L     POC Glucose Once [[id number]]  (Abnormal) Collected: 01/06/21 0549    Specimen: Blood Updated: 01/06/21 0550     Glucose 119 mg/dL      Comment: Serial Number: [id number]:  [id number]       CBC & Differential  [[id number]]  (Abnormal) Collected: 01/06/21 0425    Specimen: Blood Updated: 01/06/21 0537    Narrative:      The following orders were created for panel order CBC & Differential.  Procedure                               Abnormality         Status                     ---------                               -----------         ------                     CBC Auto Differential[[id number]]        Abnormal            Final result                 Please view results for these tests on the individual orders.    CBC Auto Differential [[id number]]  (Abnormal) Collected: 01/06/21 0425    Specimen: Blood Updated: 01/06/21 0537     WBC 10.20 10*3/mm3      RBC 3.73 10*6/mm3      Hemoglobin 11.0 g/dL      Comment: Result checked         Hematocrit 32.9 %      MCV 88.1 fL      MCH 29.4 pg      MCHC 33.3 g/dL      RDW 13.4 %      RDW-SD 41.1 fl      MPV 8.0 fL      Platelets 189 10*3/mm3      Neutrophil % 76.1 %      Lymphocyte % 11.5 %      Monocyte % 11.7 %      Eosinophil % 0.3 %      Basophil % 0.4 %      Neutrophils, Absolute 7.70 10*3/mm3      Lymphocytes, Absolute 1.20 10*3/mm3      Monocytes, Absolute 1.20 10*3/mm3      Eosinophils, Absolute 0.00 10*3/mm3      Basophils, Absolute 0.00 10*3/mm3      nRBC 0.1 /100 WBC     COVID PRE-OP / PRE-PROCEDURE SCREENING ORDER (NO ISOLATION) - Swab, Nasopharynx [[id number]]  (Normal) Collected: 01/05/21 1213    Specimen: Swab from Nasopharynx Updated: 01/05/21 2026    Narrative:      The following orders were created for panel order COVID PRE-OP / PRE-PROCEDURE SCREENING ORDER (NO ISOLATION) - Swab, Nasopharynx.  Procedure                               Abnormality         Status                     ---------                               -----------         ------                     COVID-19,APTIMA PANTHER,...[[id number]]  Normal              Final result                 Please view results for these tests on the individual orders.    COVID-19,APTIMA PANTHER,NATE IN-HOUSE, NP/OP SWAB IN  UTM/VTM/SALINE TRANSPORT MEDIA,24 HR TAT - Swab, Nasopharynx [[id number]]  (Normal) Collected: 01/05/21 1213    Specimen: Swab from Nasopharynx Updated: 01/05/21 2026     COVID19 Not Detected    Narrative:      Fact sheet for providers: https://www.fda.gov/media/459122/download     Fact sheet for patients: https://www.fda.gov/media/724896/download    Test performed by PCR.        Hemoglobin A1C   Date Value Ref Range Status   01/05/2021 7.3 (H) 3.5 - 5.6 % Final     Results from last 7 days   Lab Units 01/05/21  0634   INR  1.01           Lab Results   Component Value Date    LIPASE 156 (H) 01/06/2021     Lab Results   Component Value Date    CHOL 89 12/01/2020    TRIG 220 (H) 12/01/2020    HDL 29 (L) 12/01/2020    LDL 26 12/01/2020       No results found for: INTRAOP, PREDX, FINALDX, COMDX    Microbiology Results (last 10 days)     Procedure Component Value - Date/Time    COVID PRE-OP / PRE-PROCEDURE SCREENING ORDER (NO ISOLATION) - Swab, Nasopharynx [[id number]]  (Normal) Collected: 01/05/21 1213    Lab Status: Final result Specimen: Swab from Nasopharynx Updated: 01/05/21 2026    Narrative:      The following orders were created for panel order COVID PRE-OP / PRE-PROCEDURE SCREENING ORDER (NO ISOLATION) - Swab, Nasopharynx.  Procedure                               Abnormality         Status                     ---------                               -----------         ------                     COVID-19,APTIMA PANTHER,...[[id number]]  Normal              Final result                 Please view results for these tests on the individual orders.    COVID-19,APTIMA PANTHER,NATE IN-HOUSE, NP/OP SWAB IN UTM/VTM/SALINE TRANSPORT MEDIA,24 HR TAT - Swab, Nasopharynx [[id number]]  (Normal) Collected: 01/05/21 1213    Lab Status: Final result Specimen: Swab from Nasopharynx Updated: 01/05/21 2026     COVID19 Not Detected    Narrative:      Fact sheet for providers: https://www.fda.gov/media/783992/download     Fact sheet for  patients: https://www.[employer].gov/media/741935/download    Test performed by PCR.    Blood Culture - Blood, Arm, Left [[id number]] Collected: 01/05/21 0750    Lab Status: Preliminary result Specimen: Blood from Arm, Left Updated: 01/06/21 0800     Blood Culture No growth at 24 hours    Blood Culture - Blood, Arm, Right [[id number]] Collected: 01/05/21 0744    Lab Status: Preliminary result Specimen: Blood from Arm, Right Updated: 01/06/21 0800     Blood Culture No growth at 24 hours          ECG/EMG Results (most recent)     Procedure Component Value Units Date/Time    ECG 12 Lead [[id number]] Collected: 01/05/21 0656     Updated: 01/05/21 0658     QT Interval 324 ms     Narrative:      HEART RATE= 109  bpm  RR Interval= 552  ms  SC Interval= 203  ms  P Horizontal Axis= 10  deg  P Front Axis= 43  deg  QRSD Interval= 82  ms  QT Interval= 324  ms  QRS Axis= 114  deg  T Wave Axis= 16  deg  - OTHERWISE NORMAL ECG -  Sinus tachycardia  Right axis deviation  Electronically Signed By:   Date and Time of Study: 2021-01-05 06:56:57          Results for orders placed during the hospital encounter of 08/25/19   Duplex Venous Lower Extremity - Right CAR    Narrative · Normal right lower extremity venous duplex scan.               Ct Abdomen Pelvis Without Contrast    Result Date: 1/5/2021  1. Acute interstitial pancreatitis involving the pancreatic head and uncinate process. 2. No biliary dilatation. 3. Additional chronic findings above.  Electronically Signed By-Twin Redman MD On:1/5/2021 8:36 AM This report was finalized on [id number] by  Twin Redman MD.    Mri Abdomen Wo Contrast Mrcp    Result Date: 1/5/2021  1. Acute interstitial pancreatitis involving the pancreatic head and uncinate process. 2. Cholelithiasis. 3. No biliary dilatation or visualized choledocholithiasis.  Electronically Signed By-Twin Redman MD On:1/5/2021 1:53 PM This report was finalized on [id number] by  Twin Redman MD.    Us Abdomen  Limited    Result Date: 1/5/2021  Examination limited by bowel gas. The gallbladder is contracted without definitive stones or sludge. No biliary dilatation. Otherwise unremarkable exam.  Electronically Signed By-Obed Lagunas DO On:1/5/2021 1:13 PM This report was finalized on [id number] by  Obed Lagunas DO.          Xrays, labs reviewed personally by physician.    Medication Review:   I have reviewed the patient's current medication list      Scheduled Meds  enoxaparin, 40 mg, Subcutaneous, Q24H  insulin glargine, 20 Units, Subcutaneous, QAM  insulin lispro, 0-9 Units, Subcutaneous, Q6H  pantoprazole, 40 mg, Oral, QAM AC  piperacillin-tazobactam, 3.375 g, Intravenous, Q8H  sodium chloride, 10 mL, Intravenous, Q12H        Meds Infusions  sodium chloride, 100 mL/hr, Last Rate: 100 mL/hr (01/06/21 1205)        Meds PRN  •  acetaminophen **OR** acetaminophen **OR** acetaminophen  •  aluminum-magnesium hydroxide-simethicone  •  bisacodyl  •  dextrose  •  dextrose  •  glucagon (human recombinant)  •  HYDROcodone-acetaminophen **OR** HYDROcodone-acetaminophen  •  insulin lispro **AND** insulin lispro  •  labetalol  •  magnesium sulfate **OR** magnesium sulfate in D5W 1g/100mL (PREMIX)  •  melatonin  •  [MAR Hold] Morphine  •  nitroglycerin  •  ondansetron **OR** ondansetron  •  potassium chloride **OR** potassium chloride **OR** potassium chloride  •  sodium chloride  •  [COMPLETED] Insert peripheral IV **AND** sodium chloride  •  sodium chloride        Assessment/Plan   Assessment/Plan     Active Hospital Problems    Diagnosis  POA   • **Acute pancreatitis [K85.90]  Yes   • Acute abdominal pain [R10.9]  Yes   • Nausea and vomiting [R11.2]  Yes   • JEAN PAUL (obstructive sleep apnea) [G47.33]  Yes   • Obesity (BMI 30-39.9) [E66.9]  Yes   • Elevated LFTs [R79.89]  Yes   • Acute pancreatitis without infection or necrosis [K85.90]  Yes   • Diabetic peripheral neuropathy (CMS/HCC) [E11.42]  Yes   • Hyperlipidemia [E78.5]   Yes   • Essential hypertension [I10]  Yes   • Coronary artery disease involving native coronary artery of native heart without angina pectoris [I25.10]  Yes   • Type 2 diabetes mellitus with hyperglycemia (CMS/HCC) [E11.65]  Yes      Resolved Hospital Problems   No resolved problems to display.       MEDICAL DECISION MAKING COMPLEXITY BY PROBLEM:     Acute abdominal pain secondary to acute pancreatitis with transaminitis  -lipase 1,703, alk phos 154, ,  on admission  -CT and MRCP reviewed  -Gastroenterology was consulted and recommended surgical consult for laparoscopic cholecystectomy  -surgery performed laparoscopic cholecystectomy on 1/6/2021     Coronary artery disease involving native coronary artery of native heart without angina pectoris / Hyperlipidemia / Essential hypertension, chronic  -Resume home medication when able to take p.o.  -PRN IV labetalol   -monitor BP     Type 2 diabetes mellitus with hyperglycemia complicated with Diabetic peripheral neuropathy   -Resume home medication when able to take p.o.  -Globin A1c 7.3  -accu checks q6h with SSI  -Lantus substituted for Tresiba, dose decreased to 20 units; adjust as needed     JEAN PAUL (obstructive sleep apnea)  -patient states he uses an oral device at home     Obesity  -BMI 36.10 on admission  -encourage lifestyle modifications        VTE Prophylaxis -   Mechanical Order History:     None      Pharmalogical Order History:      Ordered     Dose Route Frequency Stop    01/05/21 1048  enoxaparin (LOVENOX) syringe 40 mg      40 mg SC Every 24 Hours --                  Code Status -   Code Status and Medical Interventions:   Ordered at: 01/05/21 1048     Code Status:    CPR     Medical Interventions (Level of Support Prior to Arrest):    Full       This patient has been examined wearing appropriate Personal Protective Equipment . 01/06/21        Discharge Planning  Hopefully home tomorrow if patient continues to improve.        Electronically  signed by Shelli Lamb MD, 01/06/21, 20:09 EST.  [facility] Hospitalist Team

## 2021-01-07 NOTE — PLAN OF CARE
Goal Outcome Evaluation:  Plan of Care Reviewed With: patient  Progress: improving   Patient cleared for D/c from surgical standpoint. Will f/u in 2 weeks.

## 2021-01-07 NOTE — PROGRESS NOTES
"Post-op Note  CHOLECYSTECTOMY LAPAROSCOPIC  1/6/2021    Subjective   Shaka Trinidad is a 70 y.o. male s/p lap donna on 1/6/2021 for gallstone pancreatitis, but was found to have cholecystitis on laparoscopy. Overall feeling well. Some periumbilical abdominal pain. No N/V. Tolerating CLD. Afebrile.      Objective   /70 (BP Location: Right arm, Patient Position: Sitting)   Pulse 75   Temp 99 °F (37.2 °C) (Oral)   Resp 14   Ht 162.6 cm (64\")   Wt 95.4 kg (210 lb 5.1 oz)   SpO2 95%   BMI 36.10 kg/m²   Incisions well approximated with some minor bruising surrounding. No erythema, induration, or drainage to suggest infection.       Assessment/Plan   71 yo gentleman s/p lap donna on 1/6/2021 for gallstone pancreatitis, found to have cholecystitis    Ok for discharge  Follow-up with me (Dr. Akins) in 2 weeks  Regular diet as tolerated  Ok to shower starting tomorrow. No tub baths, pools, lakes, or streams for 1 week.  No heavy lifting (greater than 20 lbs) for 6 weeks after surgery  Call my office or present to the ED with: fevers greater than 101.5, redness around the incisions, drainage from the incisions, intractable nausea/vomiting, or pain that is getting worse instead of better    Giacomo Akins MD  1/7/2021  12:46 EST    "

## 2021-01-07 NOTE — PLAN OF CARE
Goal Outcome Evaluation:  Plan of Care Reviewed With: patient  Progress: improving   Patient PO lap donna, voiding well, ambulating without assistance. Will continue to monitor

## 2021-01-07 NOTE — DISCHARGE INSTRUCTIONS
Follow-up with me (Dr. Akins) in 2 weeks  Regular diet as tolerated  Ok to shower starting tomorrow. No tub baths, pools, lakes, or streams for 1 week.  No heavy lifting (greater than 20 lbs) for 6 weeks after surgery  Call my office or present to the ED with: fevers greater than 101.5, redness around the incisions, drainage from the incisions, intractable nausea/vomiting, or pain that is getting worse instead of better

## 2021-01-07 NOTE — DISCHARGE INSTR - ACTIVITY
Ok to shower starting tomorrow. No tub baths, pools, lakes, or streams for 1 week.  No heavy lifting (greater than 20 lbs) for 6 weeks after surgery

## 2021-01-08 ENCOUNTER — TRANSITIONAL CARE MANAGEMENT TELEPHONE ENCOUNTER (OUTPATIENT)
Dept: CALL CENTER | Facility: HOSPITAL | Age: 71
End: 2021-01-08

## 2021-01-08 ENCOUNTER — APPOINTMENT (OUTPATIENT)
Dept: CARDIAC REHAB | Facility: HOSPITAL | Age: 71
End: 2021-01-08

## 2021-01-08 LAB
LAB AP CASE REPORT: NORMAL
PATH REPORT.FINAL DX SPEC: NORMAL
PATH REPORT.GROSS SPEC: NORMAL
QT INTERVAL: 324 MS

## 2021-01-08 NOTE — OUTREACH NOTE
Call Center TCM Note      Responses   [facility] patient discharged from?  [facility]   Does the patient have one of the following disease processes/diagnoses(primary or secondary)?  Other   TCM attempt successful?  No   Unsuccessful attempts  Attempt 1   Call Status  Left message          Guillaume Weber RN    1/8/2021, 12:16 EST

## 2021-01-08 NOTE — OUTREACH NOTE
Call Center TCM Note      Responses   [facility] patient discharged from?  [facility]   Does the patient have one of the following disease processes/diagnoses(primary or secondary)?  Other   TCM attempt successful?  Yes   Call start time  1311   Call end time  1325   Discharge diagnosis  Pancreatitis   Meds reviewed with patient/caregiver?  Yes   Is the patient having any side effects they believe may be caused by any medication additions or changes?  No   Does the patient have all medications ordered at discharge?  Yes   Is the patient taking all medications as directed (includes completed medication regime)?  Yes   Does the patient have a primary care provider?   Yes   Does the patient have an appointment with their PCP within 7 days of discharge?  No   Comments regarding PCP  Patient prefers to scheduled his own followup. Encouraged him he should be seen in next week or two. He verbalized understanding.    What is preventing the patient from scheduling follow up appointments within 7 days of discharge?  Haven't had time   Nursing Interventions  Educated patient on importance of making appointment, Advised patient to make appointment   Has the patient kept scheduled appointments due by today?  N/A   Has home health visited the patient within 72 hours of discharge?  N/A   Psychosocial issues?  No   Did the patient receive a copy of their discharge instructions?  Yes   Nursing interventions  Reviewed instructions with patient   What is the patient's perception of their health status since discharge?  Improving   Is the patient/caregiver able to teach back signs and symptoms related to disease process for when to call PCP?  Yes   Is the patient/caregiver able to teach back signs and symptoms related to disease process for when to call 911?  Yes   Is the patient/caregiver able to teach back the hierarchy of who to call/visit for symptoms/problems? PCP, Specialist, Home health nurse, Urgent Care, ED, 911  Yes   If the  patient is a current smoker, are they able to teach back resources for cessation?  Not a smoker   TCM call completed?  Yes          Guillaume Weber RN    1/8/2021, 13:25 EST

## 2021-01-08 NOTE — OUTREACH NOTE
Prep Survey      Responses   [facility] patient discharged from?  [facility]   Is LACE score < 7 ?  No   Emergency Room discharge w/ pulse ox?  No   Eligibility  [facility]   Date of Admission  01/05/21   Date of Discharge  01/07/21   Discharge Disposition  Home or Self Care   Discharge diagnosis  Pancreatitis   Does the patient have one of the following disease processes/diagnoses(primary or secondary)?  Other   Does the patient have Home health ordered?  No   Is there a DME ordered?  No   Prep survey completed?  Yes          Charlene Sullivan RN

## 2021-01-10 LAB
BACTERIA SPEC AEROBE CULT: NORMAL
BACTERIA SPEC AEROBE CULT: NORMAL

## 2021-01-11 ENCOUNTER — APPOINTMENT (OUTPATIENT)
Dept: CARDIAC REHAB | Facility: HOSPITAL | Age: 71
End: 2021-01-11

## 2021-01-12 ENCOUNTER — OFFICE VISIT (OUTPATIENT)
Dept: FAMILY MEDICINE CLINIC | Facility: CLINIC | Age: 71
End: 2021-01-12

## 2021-01-12 VITALS
HEART RATE: 74 BPM | WEIGHT: 208.4 LBS | TEMPERATURE: 97.5 F | BODY MASS INDEX: 35.58 KG/M2 | SYSTOLIC BLOOD PRESSURE: 125 MMHG | HEIGHT: 64 IN | OXYGEN SATURATION: 98 % | DIASTOLIC BLOOD PRESSURE: 72 MMHG

## 2021-01-12 DIAGNOSIS — Z90.49 STATUS POST CHOLECYSTECTOMY: Primary | ICD-10-CM

## 2021-01-12 PROCEDURE — 1111F DSCHRG MED/CURRENT MED MERGE: CPT | Performed by: NURSE PRACTITIONER

## 2021-01-12 PROCEDURE — 99496 TRANSJ CARE MGMT HIGH F2F 7D: CPT | Performed by: NURSE PRACTITIONER

## 2021-01-12 NOTE — PROGRESS NOTES
"Chief Complaint  Post-op Gallbladder    Subjective          Shaka Trinidad presents to [facility] for   History of Present Illness     Pt presented to [facility] on 1/5/2021 with abd pain, vomiting diarrhea. Happened 1 week prior and then resolved. He was found to have cholecystitis and laparoscopic cholecystectomy was performed on 1/6/21, he was discharged on 1/6/21 in stable condition. He denies constipation, diarrhea, and is recovering well, he does report increased gas and belching.     Objective   Vital Signs:   /72 (BP Location: Left arm, Patient Position: Sitting, Cuff Size: Adult)   Pulse 74   Temp 97.5 °F (36.4 °C) (Skin)   Ht 162.6 cm (64.02\")   Wt 94.5 kg (208 lb 6.4 oz)   SpO2 98%   BMI 35.75 kg/m²     Physical Exam  Vitals signs and nursing note reviewed.   Constitutional:       General: He is not in acute distress.     Appearance: He is well-developed. He is not diaphoretic.   HENT:      Head: Normocephalic and atraumatic.   Eyes:      Pupils: Pupils are equal, round, and reactive to light.   Neck:      Musculoskeletal: Normal range of motion.      Thyroid: No thyromegaly.   Cardiovascular:      Rate and Rhythm: Normal rate and regular rhythm.      Heart sounds: Normal heart sounds. No murmur.   Pulmonary:      Effort: Pulmonary effort is normal. No respiratory distress.      Breath sounds: Normal breath sounds.   Abdominal:      General: Bowel sounds are normal. There is no distension.      Palpations: Abdomen is soft.      Tenderness: There is no abdominal tenderness.      Comments: x4 lap choley abd incisions c/d/i with skin glue.    Musculoskeletal: Normal range of motion.   Skin:     General: Skin is warm and dry.      Findings: No erythema.   Neurological:      Mental Status: He is alert and oriented to person, place, and time.   Psychiatric:         Behavior: Behavior normal.         Thought Content: Thought content normal.         Judgment: Judgment " normal.        Result Review :                 Assessment and Plan    Problem List Items Addressed This Visit     None      Visit Diagnoses     Status post cholecystectomy    -  Primary        1. Doing well overall, keep incisions clean and dry. Ok for gas x prn. Notify if develops any problems, fever. F/u with surgeon as directed.         Follow Up   Return if symptoms worsen or fail to improve, for Next scheduled follow up in june for chronic conditions .  Patient was given instructions and counseling regarding his condition or for health maintenance advice. Please see specific information pulled into the AVS if appropriate.

## 2021-01-13 ENCOUNTER — APPOINTMENT (OUTPATIENT)
Dept: CARDIAC REHAB | Facility: HOSPITAL | Age: 71
End: 2021-01-13

## 2021-01-15 ENCOUNTER — APPOINTMENT (OUTPATIENT)
Dept: CARDIAC REHAB | Facility: HOSPITAL | Age: 71
End: 2021-01-15

## 2021-01-15 ENCOUNTER — READMISSION MANAGEMENT (OUTPATIENT)
Dept: CALL CENTER | Facility: HOSPITAL | Age: 71
End: 2021-01-15

## 2021-01-15 NOTE — OUTREACH NOTE
Medical Week 2 Survey      Responses   [facility] patient discharged from?  [facility]   Does the patient have one of the following disease processes/diagnoses(primary or secondary)?  Other   Week 2 attempt successful?  No   Unsuccessful attempts  Attempt 1          Cynthia Alexandre LPN

## 2021-01-18 ENCOUNTER — APPOINTMENT (OUTPATIENT)
Dept: CARDIAC REHAB | Facility: HOSPITAL | Age: 71
End: 2021-01-18

## 2021-01-18 ENCOUNTER — OFFICE VISIT (OUTPATIENT)
Dept: SURGERY | Facility: CLINIC | Age: 71
End: 2021-01-18

## 2021-01-18 VITALS
TEMPERATURE: 96.9 F | DIASTOLIC BLOOD PRESSURE: 78 MMHG | HEART RATE: 70 BPM | HEIGHT: 64 IN | OXYGEN SATURATION: 99 % | WEIGHT: 210 LBS | SYSTOLIC BLOOD PRESSURE: 125 MMHG | RESPIRATION RATE: 18 BRPM | BODY MASS INDEX: 35.85 KG/M2

## 2021-01-18 DIAGNOSIS — E11.65 TYPE 2 DIABETES MELLITUS WITH HYPERGLYCEMIA, WITH LONG-TERM CURRENT USE OF INSULIN (HCC): ICD-10-CM

## 2021-01-18 DIAGNOSIS — K85.10 ACUTE BILIARY PANCREATITIS WITHOUT INFECTION OR NECROSIS: Primary | ICD-10-CM

## 2021-01-18 DIAGNOSIS — Z79.4 TYPE 2 DIABETES MELLITUS WITH HYPERGLYCEMIA, WITH LONG-TERM CURRENT USE OF INSULIN (HCC): ICD-10-CM

## 2021-01-18 DIAGNOSIS — E11.51 TYPE 2 DIABETES MELLITUS WITH DIABETIC PERIPHERAL ANGIOPATHY WITHOUT GANGRENE, UNSPECIFIED WHETHER LONG TERM INSULIN USE (HCC): ICD-10-CM

## 2021-01-18 DIAGNOSIS — I25.118 CORONARY ARTERY DISEASE OF NATIVE ARTERY OF NATIVE HEART WITH STABLE ANGINA PECTORIS (HCC): ICD-10-CM

## 2021-01-18 DIAGNOSIS — E66.01 MORBIDLY OBESE (HCC): ICD-10-CM

## 2021-01-18 PROCEDURE — 99024 POSTOP FOLLOW-UP VISIT: CPT | Performed by: SURGERY

## 2021-01-18 RX ORDER — CETIRIZINE HYDROCHLORIDE 10 MG/1
10 TABLET ORAL DAILY
COMMUNITY
End: 2022-06-13

## 2021-01-18 RX ORDER — DOCUSATE SODIUM 100 MG/1
100 CAPSULE, LIQUID FILLED ORAL 2 TIMES DAILY
COMMUNITY
End: 2021-11-03

## 2021-01-18 RX ORDER — FERROUS SULFATE 325(65) MG
325 TABLET ORAL
COMMUNITY
End: 2021-06-08 | Stop reason: SDUPTHER

## 2021-01-18 RX ORDER — HYDROCODONE BITARTRATE AND ACETAMINOPHEN 5; 325 MG/1; MG/1
1 TABLET ORAL EVERY 6 HOURS PRN
COMMUNITY
End: 2021-06-08

## 2021-01-18 NOTE — PROGRESS NOTES
"Post-op Note    Subjective   Shaka Trinidad is a 70 y.o. male status post laparoscopic cholecystectomy performed on 1/6/2021 for gallstone pancreatitis.  Overall the patient has done well.  He states that when he got home he had loose bowel movements for the first 4 to 5 days, but now they have become more regular.  He does not have any fevers, nausea, vomiting, or abdominal pain.      Objective   /78 (BP Location: Left arm, Patient Position: Sitting, Cuff Size: Adult)   Pulse 70   Temp 96.9 °F (36.1 °C) (Temporal)   Resp 18   Ht 162.6 cm (64\")   Wt 95.3 kg (210 lb)   SpO2 99%   BMI 36.05 kg/m²   Incisions are well-healed without any surrounding erythema, induration, or drainage to suggest infection.  No evidence of any hernia at the umbilicus      Assessment/Plan   Patient is a 70-year-old gentleman status post laparoscopic cholecystectomy performed on 1/6/2021 for gallstone pancreatitis.    Pathology reviewed, demonstrated chronic acalculus cholecystitis.  Regular diet as tolerated  No heavy lifting for 6 weeks  Follow-up with me as needed    Giacomo Akins MD  1/18/2021  08:40 EST  "

## 2021-01-20 ENCOUNTER — APPOINTMENT (OUTPATIENT)
Dept: CARDIAC REHAB | Facility: HOSPITAL | Age: 71
End: 2021-01-20

## 2021-01-22 ENCOUNTER — APPOINTMENT (OUTPATIENT)
Dept: CARDIAC REHAB | Facility: HOSPITAL | Age: 71
End: 2021-01-22

## 2021-01-25 ENCOUNTER — APPOINTMENT (OUTPATIENT)
Dept: CARDIAC REHAB | Facility: HOSPITAL | Age: 71
End: 2021-01-25

## 2021-01-27 ENCOUNTER — APPOINTMENT (OUTPATIENT)
Dept: CARDIAC REHAB | Facility: HOSPITAL | Age: 71
End: 2021-01-27

## 2021-01-29 ENCOUNTER — APPOINTMENT (OUTPATIENT)
Dept: CARDIAC REHAB | Facility: HOSPITAL | Age: 71
End: 2021-01-29

## 2021-02-01 ENCOUNTER — APPOINTMENT (OUTPATIENT)
Dept: CARDIAC REHAB | Facility: HOSPITAL | Age: 71
End: 2021-02-01

## 2021-02-03 ENCOUNTER — APPOINTMENT (OUTPATIENT)
Dept: CARDIAC REHAB | Facility: HOSPITAL | Age: 71
End: 2021-02-03

## 2021-02-05 ENCOUNTER — APPOINTMENT (OUTPATIENT)
Dept: CARDIAC REHAB | Facility: HOSPITAL | Age: 71
End: 2021-02-05

## 2021-02-08 ENCOUNTER — APPOINTMENT (OUTPATIENT)
Dept: CARDIAC REHAB | Facility: HOSPITAL | Age: 71
End: 2021-02-08

## 2021-02-10 ENCOUNTER — APPOINTMENT (OUTPATIENT)
Dept: CARDIAC REHAB | Facility: HOSPITAL | Age: 71
End: 2021-02-10

## 2021-02-12 ENCOUNTER — APPOINTMENT (OUTPATIENT)
Dept: CARDIAC REHAB | Facility: HOSPITAL | Age: 71
End: 2021-02-12

## 2021-02-15 ENCOUNTER — APPOINTMENT (OUTPATIENT)
Dept: CARDIAC REHAB | Facility: HOSPITAL | Age: 71
End: 2021-02-15

## 2021-02-17 ENCOUNTER — APPOINTMENT (OUTPATIENT)
Dept: CARDIAC REHAB | Facility: HOSPITAL | Age: 71
End: 2021-02-17

## 2021-02-19 ENCOUNTER — APPOINTMENT (OUTPATIENT)
Dept: CARDIAC REHAB | Facility: HOSPITAL | Age: 71
End: 2021-02-19

## 2021-02-22 ENCOUNTER — APPOINTMENT (OUTPATIENT)
Dept: CARDIAC REHAB | Facility: HOSPITAL | Age: 71
End: 2021-02-22

## 2021-02-24 ENCOUNTER — APPOINTMENT (OUTPATIENT)
Dept: CARDIAC REHAB | Facility: HOSPITAL | Age: 71
End: 2021-02-24

## 2021-02-26 ENCOUNTER — APPOINTMENT (OUTPATIENT)
Dept: CARDIAC REHAB | Facility: HOSPITAL | Age: 71
End: 2021-02-26

## 2021-03-01 ENCOUNTER — APPOINTMENT (OUTPATIENT)
Dept: CARDIAC REHAB | Facility: HOSPITAL | Age: 71
End: 2021-03-01

## 2021-03-03 ENCOUNTER — APPOINTMENT (OUTPATIENT)
Dept: CARDIAC REHAB | Facility: HOSPITAL | Age: 71
End: 2021-03-03

## 2021-03-04 DIAGNOSIS — D50.9 IRON DEFICIENCY ANEMIA, UNSPECIFIED IRON DEFICIENCY ANEMIA TYPE: ICD-10-CM

## 2021-03-04 RX ORDER — POTASSIUM CHLORIDE 750 MG/1
CAPSULE, EXTENDED RELEASE ORAL
Qty: 90 CAPSULE | Refills: 0 | Status: SHIPPED | OUTPATIENT
Start: 2021-03-04 | End: 2021-05-27

## 2021-03-05 ENCOUNTER — APPOINTMENT (OUTPATIENT)
Dept: CARDIAC REHAB | Facility: HOSPITAL | Age: 71
End: 2021-03-05

## 2021-03-08 ENCOUNTER — APPOINTMENT (OUTPATIENT)
Dept: CARDIAC REHAB | Facility: HOSPITAL | Age: 71
End: 2021-03-08

## 2021-03-10 ENCOUNTER — APPOINTMENT (OUTPATIENT)
Dept: CARDIAC REHAB | Facility: HOSPITAL | Age: 71
End: 2021-03-10

## 2021-03-12 ENCOUNTER — APPOINTMENT (OUTPATIENT)
Dept: CARDIAC REHAB | Facility: HOSPITAL | Age: 71
End: 2021-03-12

## 2021-03-15 ENCOUNTER — APPOINTMENT (OUTPATIENT)
Dept: CARDIAC REHAB | Facility: HOSPITAL | Age: 71
End: 2021-03-15

## 2021-03-16 DIAGNOSIS — E11.65 TYPE 2 DIABETES MELLITUS WITH HYPERGLYCEMIA, WITH LONG-TERM CURRENT USE OF INSULIN (HCC): ICD-10-CM

## 2021-03-16 DIAGNOSIS — Z79.4 TYPE 2 DIABETES MELLITUS WITH HYPERGLYCEMIA, WITH LONG-TERM CURRENT USE OF INSULIN (HCC): ICD-10-CM

## 2021-03-16 NOTE — TELEPHONE ENCOUNTER
Please confirm patient is still taking glipizide, unsure why it is historical. I did order this 7 months ago: 2 tabs in a.m. and 1 in the evening

## 2021-03-17 ENCOUNTER — APPOINTMENT (OUTPATIENT)
Dept: CARDIAC REHAB | Facility: HOSPITAL | Age: 71
End: 2021-03-17

## 2021-03-17 RX ORDER — GLIPIZIDE 5 MG/1
TABLET ORAL
Qty: 270 TABLET | Refills: 1 | Status: SHIPPED | OUTPATIENT
Start: 2021-03-17 | End: 2021-09-07

## 2021-03-17 NOTE — TELEPHONE ENCOUNTER
Please update the med list, I believe there are several other glipizide orders on the chart. thanks

## 2021-03-19 ENCOUNTER — APPOINTMENT (OUTPATIENT)
Dept: CARDIAC REHAB | Facility: HOSPITAL | Age: 71
End: 2021-03-19

## 2021-03-22 ENCOUNTER — APPOINTMENT (OUTPATIENT)
Dept: CARDIAC REHAB | Facility: HOSPITAL | Age: 71
End: 2021-03-22

## 2021-04-02 RX ORDER — BLOOD SUGAR DIAGNOSTIC
STRIP MISCELLANEOUS
COMMUNITY
Start: 2021-01-25 | End: 2021-08-02

## 2021-04-02 RX ORDER — FLURBIPROFEN SODIUM 0.3 MG/ML
SOLUTION/ DROPS OPHTHALMIC DAILY
COMMUNITY
Start: 2021-01-20 | End: 2021-10-25

## 2021-04-17 DIAGNOSIS — E11.65 TYPE 2 DIABETES MELLITUS WITH HYPERGLYCEMIA (HCC): ICD-10-CM

## 2021-04-19 ENCOUNTER — OFFICE VISIT (OUTPATIENT)
Dept: CARDIOLOGY | Facility: CLINIC | Age: 71
End: 2021-04-19

## 2021-04-19 VITALS
TEMPERATURE: 96.6 F | SYSTOLIC BLOOD PRESSURE: 139 MMHG | WEIGHT: 219 LBS | DIASTOLIC BLOOD PRESSURE: 77 MMHG | HEART RATE: 72 BPM | HEIGHT: 64 IN | BODY MASS INDEX: 37.39 KG/M2

## 2021-04-19 DIAGNOSIS — I25.118 CORONARY ARTERY DISEASE OF NATIVE ARTERY OF NATIVE HEART WITH STABLE ANGINA PECTORIS (HCC): Primary | ICD-10-CM

## 2021-04-19 DIAGNOSIS — E78.2 MIXED HYPERLIPIDEMIA: ICD-10-CM

## 2021-04-19 DIAGNOSIS — E11.9 TYPE 2 DIABETES MELLITUS WITHOUT COMPLICATION, WITHOUT LONG-TERM CURRENT USE OF INSULIN (HCC): ICD-10-CM

## 2021-04-19 DIAGNOSIS — I10 ESSENTIAL HYPERTENSION: ICD-10-CM

## 2021-04-19 DIAGNOSIS — G47.33 OBSTRUCTIVE SLEEP APNEA: ICD-10-CM

## 2021-04-19 PROCEDURE — 99214 OFFICE O/P EST MOD 30 MIN: CPT | Performed by: INTERNAL MEDICINE

## 2021-04-19 RX ORDER — INSULIN DEGLUDEC INJECTION 100 U/ML
INJECTION, SOLUTION SUBCUTANEOUS
Qty: 3 PEN | Refills: 0 | Status: SHIPPED | OUTPATIENT
Start: 2021-04-19 | End: 2021-06-01

## 2021-04-19 NOTE — PROGRESS NOTES
Subjective:     Encounter Date:04/19/2021      Patient ID: Shaka Trinidad is a 71 y.o. male.    Chief Complaint:  History of Present Illness 71-year-old white male with history of coronary disease history of diabetes hypertension hyperlipidemia and sleep apnea presents to my office for follow-up.  Patient is currently stable without any symptoms of chest pain or shortness of breath at rest on exertion.  No complains any PND orthopnea.  No palpitation dizziness syncope or patient has mild swelling of the feet.  Patient is taking all meds reviewed.  Patient does not smoke.  He also uses his oral appliance for sleep apnea.    The following portions of the patient's history were reviewed and updated as appropriate: allergies, current medications, past family history, past medical history, past social history, past surgical history and problem list.  Past Medical History:   Diagnosis Date   • Acute pancreatitis 3/14/2020   • Coronary artery disease    • Diabetes mellitus, type II (CMS/[facility])    • Elevated cholesterol    • H/O cataract     cataracts    • Hyperlipidemia    • Hypertension    • Peripheral edema    • Peripheral edema    • Sleep apnea     oral appliance     Past Surgical History:   Procedure Laterality Date   • CARDIAC CATHETERIZATION N/A 7/19/2019    Procedure: Left Heart Cath with angiogram;  Surgeon: Bautista Lopez MD;  Location: [facility] CATH INVASIVE LOCATION;  Service: Cardiovascular   • CARDIAC CATHETERIZATION N/A 7/19/2019    Procedure: Coronary angiography;  Surgeon: Bautista Lopez MD;  Location: [facility] CATH INVASIVE LOCATION;  Service: Cardiovascular   • CARDIAC CATHETERIZATION N/A 7/19/2019    Procedure: Left ventriculography;  Surgeon: Bautista Lopez MD;  Location: [facility] CATH INVASIVE LOCATION;  Service: Cardiovascular   • CHOLECYSTECTOMY N/A 1/6/2021    Procedure: CHOLECYSTECTOMY LAPAROSCOPIC;  Surgeon: Giacomo Akins MD;  Location: [facility] MAIN OR;  Service: General;  Laterality:  "N/A;   • COLONOSCOPY     • CORONARY ARTERY BYPASS GRAFT N/A 8/8/2019    Procedure: CORONARY ARTERY BYPASS GRAFTING;  Surgeon: Chet Mcarthur MD;  Location: [facility] CVOR;  Service: Cardiothoracic   • ENDOSCOPY N/A 3/15/2020    Procedure: ESOPHAGOGASTRODUODENOSCOPY;  Surgeon: Jaspal Logan MD;  Location: [facility] ENDOSCOPY;  Service: Gastroenterology;  Laterality: N/A;  EPIGASTRIC ABDOMINAL PAIN, ABNORMAL CT SCAN, PANCREATITIS  NORMAL EGD   • HERNIA REPAIR     • OTHER SURGICAL HISTORY  12/2015    2d echo-abstracted cent.      /77 (BP Location: Right arm, Patient Position: Sitting, Cuff Size: Adult)   Pulse 72   Temp 96.6 °F (35.9 °C) (Infrared)   Ht 162.6 cm (64\")   Wt 99.3 kg (219 lb)   BMI 37.59 kg/m²   Family History   Problem Relation Age of Onset   • Heart failure Mother    • Hypertension Brother    • Cancer Brother    • Heart disease Brother        Current Outpatient Medications:   •  amLODIPine (NORVASC) 5 MG tablet, Take 1 tablet by mouth Daily., Disp: 90 tablet, Rfl: 3  •  aspirin 81 MG chewable tablet, Chew 81 mg Every Night., Disp: , Rfl:   •  atorvastatin (LIPITOR) 40 MG tablet, Take 1 tablet by mouth every night at bedtime., Disp: 90 tablet, Rfl: 3  •  B-D UF III MINI PEN NEEDLES 31G X 5 MM misc, Daily. as directed, Disp: , Rfl:   •  cetirizine (zyrTEC) 10 MG tablet, Take 10 mg by mouth Daily., Disp: , Rfl:   •  CINNAMON PO, Take  by mouth., Disp: , Rfl:   •  docusate sodium (COLACE) 100 MG capsule, Take 100 mg by mouth 2 (Two) Times a Day., Disp: , Rfl:   •  esomeprazole (nexIUM) 20 MG capsule, Take 20 mg by mouth Every Morning Before Breakfast., Disp: , Rfl:   •  ferrous sulfate 325 (65 FE) MG tablet, Take 325 mg by mouth Daily With Breakfast., Disp: , Rfl:   •  furosemide (LASIX) 20 MG tablet, Take 1 tablet by mouth Daily., Disp: 90 tablet, Rfl: 3  •  glipizide (GLUCOTROL) 5 MG tablet, TAKE 2 TABS IN THE MORNING AND 1 TAB IN THE EVENING, Disp: 270 tablet, Rfl: 1  •  hydrALAZINE " (APRESOLINE) 25 MG tablet, TAKE 1 TABLET BY MOUTH THREE TIMES A DAY, Disp: 270 tablet, Rfl: 1  •  HYDROcodone-acetaminophen (NORCO) 5-325 MG per tablet, Take 1 tablet by mouth Every 6 (Six) Hours As Needed., Disp: , Rfl:   •  insulin degludec (Tresiba FlexTouch) 100 UNIT/ML solution pen-injector injection, Inject 45 Units under the skin into the appropriate area as directed Every Morning., Disp: , Rfl:   •  lisinopril (PRINIVIL,ZESTRIL) 40 MG tablet, Take 1 tablet by mouth Daily., Disp: 90 tablet, Rfl: 3  •  metFORMIN (GLUCOPHAGE) 1000 MG tablet, TAKE 1 TABLET BY MOUTH TWICE A DAY WITH MEALS, Disp: 180 tablet, Rfl: 1  •  metoprolol tartrate (LOPRESSOR) 25 MG tablet, TAKE 1 TABLET BY MOUTH EVERY 12 HOURS, Disp: 180 tablet, Rfl: 1  •  OneTouch Ultra test strip, TEST BLOOD SUGAR NO MORE THAN ONCE DAILY. DX E11.9, Disp: , Rfl:   •  potassium chloride (MICRO-K) 10 MEQ CR capsule, TAKE 1 CAPSULE BY MOUTH EVERY DAY, Disp: 90 capsule, Rfl: 0  •  vitamin C (ASCORBIC ACID) 500 MG tablet, Take 1,000 mg by mouth Every Evening., Disp: , Rfl:   No Known Allergies  Social History     Socioeconomic History   • Marital status:      Spouse name: Not on file   • Number of children: Not on file   • Years of education: Not on file   • Highest education level: Not on file   Tobacco Use   • Smoking status: Never Smoker   • Smokeless tobacco: Never Used   Vaping Use   • Vaping Use: Never used   Substance and Sexual Activity   • Alcohol use: Yes     Alcohol/week: 1.0 standard drinks     Types: 1 Glasses of wine per week   • Drug use: Never   • Sexual activity: Defer     Review of Systems   Constitutional: Negative for fever and malaise/fatigue.   Cardiovascular: Positive for leg swelling. Negative for chest pain, dyspnea on exertion and palpitations.   Respiratory: Negative for cough and shortness of breath.    Skin: Negative for rash.   Gastrointestinal: Negative for abdominal pain, nausea and vomiting.   Neurological: Negative for  focal weakness, headaches, light-headedness and numbness.   All other systems reviewed and are negative.             Objective:     Constitutional:       Appearance: Well-developed.   Eyes:      General: No scleral icterus.     Conjunctiva/sclera: Conjunctivae normal.   HENT:      Head: Normocephalic and atraumatic.   Neck:      Vascular: No carotid bruit or JVD.   Pulmonary:      Effort: Pulmonary effort is normal.      Breath sounds: Normal breath sounds. No wheezing. No rales.   Cardiovascular:      Normal rate. Regular rhythm.   Pulses:     Intact distal pulses.   Edema:     Peripheral edema present.  Abdominal:      General: Bowel sounds are normal.      Palpations: Abdomen is soft.   Musculoskeletal:      Cervical back: Normal range of motion and neck supple. Skin:     General: Skin is warm and dry.      Findings: No rash.   Neurological:      Mental Status: Alert.       Procedures    Lab Review:         MDM  1.  Coronary disease  Patient history of coronary bypass surgery x3 vessels with a LIMA to LAD and saphenous graft to the marginal branch and RCA  Patient has normal be systolic function  2.  Hypertension  Patient blood pressure currently stable on medications  3.  Hyperlipidemia  Patient has been on a statin and is cooperative cholesterol is low and hence I will decrease the Lipitor but his triglycerides still high and have asked him to watch his diet  4.  Diabetes  Patient is currently on oral medicines  5.  Sleep apnea  Patient has sleep apnea uses a oral appliance.

## 2021-04-22 ENCOUNTER — OFFICE VISIT (OUTPATIENT)
Dept: FAMILY MEDICINE CLINIC | Facility: CLINIC | Age: 71
End: 2021-04-22

## 2021-04-22 VITALS
WEIGHT: 215 LBS | TEMPERATURE: 97.3 F | HEIGHT: 64 IN | BODY MASS INDEX: 36.7 KG/M2 | SYSTOLIC BLOOD PRESSURE: 127 MMHG | HEART RATE: 104 BPM | DIASTOLIC BLOOD PRESSURE: 84 MMHG | OXYGEN SATURATION: 97 %

## 2021-04-22 DIAGNOSIS — M17.11 PRIMARY OSTEOARTHRITIS OF RIGHT KNEE: Primary | ICD-10-CM

## 2021-04-22 PROCEDURE — 20610 DRAIN/INJ JOINT/BURSA W/O US: CPT | Performed by: NURSE PRACTITIONER

## 2021-04-22 PROCEDURE — 99213 OFFICE O/P EST LOW 20 MIN: CPT | Performed by: NURSE PRACTITIONER

## 2021-04-22 RX ORDER — METHYLPREDNISOLONE ACETATE 80 MG/ML
80 INJECTION, SUSPENSION INTRA-ARTICULAR; INTRALESIONAL; INTRAMUSCULAR; SOFT TISSUE
Status: COMPLETED | OUTPATIENT
Start: 2021-04-22 | End: 2021-04-22

## 2021-04-22 RX ADMIN — METHYLPREDNISOLONE ACETATE 80 MG: 80 INJECTION, SUSPENSION INTRA-ARTICULAR; INTRALESIONAL; INTRAMUSCULAR; SOFT TISSUE at 13:03

## 2021-04-22 NOTE — ASSESSMENT & PLAN NOTE
1.  Depo-Medrol knee injection performed  2.  Patient to ice and rest today  3.  If swelling and pain persists or reoccurs, will refer back to Ortho

## 2021-04-22 NOTE — PROGRESS NOTES
"Chief Complaint  Knee Pain (right knee pain started Sunday. Swollen and very painful)    Subjective          Shaka Trinidad presents to [facility]  History of Present Illness    Patient complains of right-sided knee pain and swelling that began on Sunday.  He denies any known injury.  Patient has a history of bilateral primary osteoarthritis and has had to have knee injections per Ortho in the past.    Objective   Vital Signs:   /84 (BP Location: Left arm, Patient Position: Sitting, Cuff Size: Adult)   Pulse 104   Temp 97.3 °F (36.3 °C) (Skin)   Ht 162.6 cm (64\")   Wt 97.5 kg (215 lb)   SpO2 97%   BMI 36.90 kg/m²       Physical Exam  Constitutional:       Appearance: He is obese.   Cardiovascular:      Rate and Rhythm: Normal rate and regular rhythm.   Pulmonary:      Effort: Pulmonary effort is normal.   Abdominal:      Palpations: Abdomen is soft.   Musculoskeletal:      Right knee: Swelling present. Decreased range of motion. Tenderness present over the lateral joint line. No MCL, LCL, ACL or PCL tenderness.   Skin:     General: Skin is warm and dry.   Neurological:      Mental Status: He is alert and oriented to person, place, and time.   Psychiatric:         Mood and Affect: Mood normal.         Behavior: Behavior normal.        Result Review :          Arthrocentesis    Date/Time: 4/22/2021 1:03 PM  Performed by: Renea Mendoza APRN  Authorized by: Renea Mendoza APRN   Indications: joint swelling and pain   Local anesthesia used: no    Anesthesia:  Local anesthesia used: no    Sedation:  Patient sedated: no    Needle size: 20 G  Ultrasound guidance: no  Approach: lateral  Aspirate amount (ml): injected only, did not aspirate fluid.  Patient tolerance: patient tolerated the procedure well with no immediate complications            Assessment and Plan    Diagnoses and all orders for this visit:    1. Primary osteoarthritis of right knee " (Primary)  Assessment & Plan:  1.  Depo-Medrol knee injection performed  2.  Patient to ice and rest today  3.  If swelling and pain persists or reoccurs, will refer back to Ortho      Other orders  -     Arthrocentesis    I spent 20 minutes caring for Shaka on this date of service. This time includes time spent by me in the following activities:preparing for the visit, reviewing tests, obtaining and/or reviewing a separately obtained history, performing a medically appropriate examination and/or evaluation , counseling and educating the patient/family/caregiver, ordering medications, tests, or procedures and documenting information in the medical record  Follow Up   No follow-ups on file.  Patient was given instructions and counseling regarding his condition or for health maintenance advice. Please see specific information pulled into the AVS if appropriate.

## 2021-05-27 DIAGNOSIS — D50.9 IRON DEFICIENCY ANEMIA, UNSPECIFIED IRON DEFICIENCY ANEMIA TYPE: ICD-10-CM

## 2021-05-27 DIAGNOSIS — N18.31 STAGE 3A CHRONIC KIDNEY DISEASE (HCC): Primary | ICD-10-CM

## 2021-05-27 RX ORDER — POTASSIUM CHLORIDE 750 MG/1
CAPSULE, EXTENDED RELEASE ORAL
Qty: 90 CAPSULE | Refills: 0 | Status: SHIPPED | OUTPATIENT
Start: 2021-05-27 | End: 2021-06-08 | Stop reason: SDUPTHER

## 2021-05-31 DIAGNOSIS — E11.65 TYPE 2 DIABETES MELLITUS WITH HYPERGLYCEMIA (HCC): ICD-10-CM

## 2021-06-01 ENCOUNTER — LAB (OUTPATIENT)
Dept: FAMILY MEDICINE CLINIC | Facility: CLINIC | Age: 71
End: 2021-06-01

## 2021-06-01 DIAGNOSIS — Z79.4 TYPE 2 DIABETES MELLITUS WITH HYPERGLYCEMIA, WITH LONG-TERM CURRENT USE OF INSULIN (HCC): ICD-10-CM

## 2021-06-01 DIAGNOSIS — E11.65 TYPE 2 DIABETES MELLITUS WITH HYPERGLYCEMIA, WITH LONG-TERM CURRENT USE OF INSULIN (HCC): ICD-10-CM

## 2021-06-01 DIAGNOSIS — E78.2 MIXED HYPERLIPIDEMIA: Primary | ICD-10-CM

## 2021-06-01 DIAGNOSIS — I10 ESSENTIAL HYPERTENSION: ICD-10-CM

## 2021-06-01 LAB — HBA1C MFR BLD: 7.1 % (ref 3.5–5.6)

## 2021-06-01 PROCEDURE — 80061 LIPID PANEL: CPT | Performed by: NURSE PRACTITIONER

## 2021-06-01 PROCEDURE — 85027 COMPLETE CBC AUTOMATED: CPT | Performed by: NURSE PRACTITIONER

## 2021-06-01 PROCEDURE — 84443 ASSAY THYROID STIM HORMONE: CPT | Performed by: NURSE PRACTITIONER

## 2021-06-01 PROCEDURE — 83036 HEMOGLOBIN GLYCOSYLATED A1C: CPT | Performed by: NURSE PRACTITIONER

## 2021-06-01 PROCEDURE — 80053 COMPREHEN METABOLIC PANEL: CPT | Performed by: NURSE PRACTITIONER

## 2021-06-01 PROCEDURE — 36415 COLL VENOUS BLD VENIPUNCTURE: CPT | Performed by: NURSE PRACTITIONER

## 2021-06-01 RX ORDER — INSULIN DEGLUDEC INJECTION 100 U/ML
INJECTION, SOLUTION SUBCUTANEOUS
Qty: 15 PEN | Refills: 3 | Status: SHIPPED | OUTPATIENT
Start: 2021-06-01 | End: 2021-12-16

## 2021-06-02 LAB
ALBUMIN SERPL-MCNC: 4.2 G/DL (ref 3.5–5.2)
ALBUMIN/GLOB SERPL: 1.3 G/DL
ALP SERPL-CCNC: 75 U/L (ref 39–117)
ALT SERPL W P-5'-P-CCNC: 19 U/L (ref 1–41)
ANION GAP SERPL CALCULATED.3IONS-SCNC: 11.7 MMOL/L (ref 5–15)
AST SERPL-CCNC: 27 U/L (ref 1–40)
BILIRUB SERPL-MCNC: 0.4 MG/DL (ref 0–1.2)
BUN SERPL-MCNC: 32 MG/DL (ref 8–23)
BUN/CREAT SERPL: 23.9 (ref 7–25)
CALCIUM SPEC-SCNC: 9.9 MG/DL (ref 8.6–10.5)
CHLORIDE SERPL-SCNC: 102 MMOL/L (ref 98–107)
CHOLEST SERPL-MCNC: 98 MG/DL (ref 0–200)
CO2 SERPL-SCNC: 25.3 MMOL/L (ref 22–29)
CREAT SERPL-MCNC: 1.34 MG/DL (ref 0.76–1.27)
DEPRECATED RDW RBC AUTO: 43.3 FL (ref 37–54)
ERYTHROCYTE [DISTWIDTH] IN BLOOD BY AUTOMATED COUNT: 13.2 % (ref 12.3–15.4)
GFR SERPL CREATININE-BSD FRML MDRD: 53 ML/MIN/1.73
GLOBULIN UR ELPH-MCNC: 3.3 GM/DL
GLUCOSE SERPL-MCNC: 82 MG/DL (ref 65–99)
HCT VFR BLD AUTO: 38.8 % (ref 37.5–51)
HDLC SERPL-MCNC: 32 MG/DL (ref 40–60)
HGB BLD-MCNC: 12.8 G/DL (ref 13–17.7)
LDLC SERPL CALC-MCNC: 45 MG/DL (ref 0–100)
LDLC/HDLC SERPL: 1.33 {RATIO}
MCH RBC QN AUTO: 29.6 PG (ref 26.6–33)
MCHC RBC AUTO-ENTMCNC: 33 G/DL (ref 31.5–35.7)
MCV RBC AUTO: 89.6 FL (ref 79–97)
PLATELET # BLD AUTO: 271 10*3/MM3 (ref 140–450)
PMV BLD AUTO: 10.9 FL (ref 6–12)
POTASSIUM SERPL-SCNC: 5 MMOL/L (ref 3.5–5.2)
PROT SERPL-MCNC: 7.5 G/DL (ref 6–8.5)
RBC # BLD AUTO: 4.33 10*6/MM3 (ref 4.14–5.8)
SODIUM SERPL-SCNC: 139 MMOL/L (ref 136–145)
TRIGL SERPL-MCNC: 117 MG/DL (ref 0–150)
TSH SERPL DL<=0.05 MIU/L-ACNC: 2.69 UIU/ML (ref 0.27–4.2)
VLDLC SERPL-MCNC: 21 MG/DL (ref 5–40)
WBC # BLD AUTO: 9.01 10*3/MM3 (ref 3.4–10.8)

## 2021-06-08 ENCOUNTER — OFFICE VISIT (OUTPATIENT)
Dept: FAMILY MEDICINE CLINIC | Facility: CLINIC | Age: 71
End: 2021-06-08

## 2021-06-08 VITALS
DIASTOLIC BLOOD PRESSURE: 71 MMHG | HEART RATE: 64 BPM | SYSTOLIC BLOOD PRESSURE: 124 MMHG | OXYGEN SATURATION: 97 % | BODY MASS INDEX: 37.8 KG/M2 | HEIGHT: 64 IN | WEIGHT: 221.4 LBS

## 2021-06-08 DIAGNOSIS — E78.2 MIXED HYPERLIPIDEMIA: ICD-10-CM

## 2021-06-08 DIAGNOSIS — I10 ESSENTIAL HYPERTENSION: ICD-10-CM

## 2021-06-08 DIAGNOSIS — Z79.4 TYPE 2 DIABETES MELLITUS WITH HYPERGLYCEMIA, WITH LONG-TERM CURRENT USE OF INSULIN (HCC): Primary | ICD-10-CM

## 2021-06-08 DIAGNOSIS — D50.9 IRON DEFICIENCY ANEMIA, UNSPECIFIED IRON DEFICIENCY ANEMIA TYPE: ICD-10-CM

## 2021-06-08 DIAGNOSIS — E11.65 TYPE 2 DIABETES MELLITUS WITH HYPERGLYCEMIA, WITH LONG-TERM CURRENT USE OF INSULIN (HCC): Primary | ICD-10-CM

## 2021-06-08 DIAGNOSIS — N18.31 STAGE 3A CHRONIC KIDNEY DISEASE (HCC): ICD-10-CM

## 2021-06-08 DIAGNOSIS — Z00.00 PREVENTATIVE HEALTH CARE: ICD-10-CM

## 2021-06-08 PROCEDURE — 99214 OFFICE O/P EST MOD 30 MIN: CPT | Performed by: NURSE PRACTITIONER

## 2021-06-08 RX ORDER — POTASSIUM CHLORIDE 750 MG/1
10 CAPSULE, EXTENDED RELEASE ORAL DAILY
Qty: 120 CAPSULE | Refills: 1 | Status: SHIPPED | OUTPATIENT
Start: 2021-06-08 | End: 2021-11-30

## 2021-06-08 RX ORDER — LISINOPRIL 40 MG/1
40 TABLET ORAL DAILY
Qty: 90 TABLET | Refills: 3 | Status: SHIPPED | OUTPATIENT
Start: 2021-06-08 | End: 2022-09-09

## 2021-06-08 RX ORDER — FERROUS SULFATE 325(65) MG
325 TABLET ORAL
Qty: 90 TABLET | Refills: 1 | Status: SHIPPED | OUTPATIENT
Start: 2021-06-08 | End: 2021-11-24

## 2021-06-08 RX ORDER — FUROSEMIDE 20 MG/1
20 TABLET ORAL DAILY
Qty: 120 TABLET | Refills: 3 | Status: SHIPPED | OUTPATIENT
Start: 2021-06-08 | End: 2022-07-21

## 2021-06-08 NOTE — PROGRESS NOTES
Chief Complaint  Diabetes, Hypertension, Hyperlipidemia, Follow-up (6 mo), and Results (6/1/21 labs)    Subjective          Shaka Trinidad presents to [facility] for   History of Present Illness     Diabetes mellitus type II, feels stable on meds, here to review labs, on metformin 1000 twice daily, Tresiba 45 units daily and glipizide 5 mg daily.  Denies any signs/symptoms of hyper/hypoglycemia, blurry vision, polydipsia, polyuria, nocturia, and unintentional weight loss.      Hyperlipidemia/CAD/hx of CABG, Dr. Lopez decreased atorva to 20mg and todays labs reflect that dose. The patient denies muscle aches, constipation, diarrhea, GI upset, fatigue, chest pain/pressure, exercise intolerance, dyspnea, palpitations, syncope.       HTN, stable on meds and takes as directed, denies chest pain, headache, shortness of air, palpitations and does report intermittent swelling of extremities worse at the end of the day.     BPH, stable, symptoms started over 1 year ago, last PSA reported stable per 1st urology Dr. Wood, denies dysuria, hesitancy, hematuria, does report occasional nocturia.      Anemia, on iron daily, denies constipation.     GERD, stable on medication, denies nausea, vomiting, constipation, abdominal pain and diarrhea.      Here to review labs      The following portions of the patient's history were reviewed and updated as appropriate: allergies, current medications, past family history, past medical history, past social history, past surgical history and problem list.    Past Medical History:   Diagnosis Date   • Acute pancreatitis 3/14/2020   • Coronary artery disease    • Diabetes mellitus, type II (CMS/[facility])    • Elevated cholesterol    • H/O cataract    • Hyperlipidemia    • Hypertension    • Peripheral edema    • Peripheral edema    • Sleep apnea      Past Surgical History:   Procedure Laterality Date   • CARDIAC CATHETERIZATION N/A 7/19/2019    Procedure: Left  Heart Cath with angiogram;  Surgeon: Bautista Lopez MD;  Location: [facility] CATH INVASIVE LOCATION;  Service: Cardiovascular   • CARDIAC CATHETERIZATION N/A 7/19/2019    Procedure: Coronary angiography;  Surgeon: Bautista Lopez MD;  Location: [facility] CATH INVASIVE LOCATION;  Service: Cardiovascular   • CARDIAC CATHETERIZATION N/A 7/19/2019    Procedure: Left ventriculography;  Surgeon: Bautista Lopez MD;  Location: [facility] CATH INVASIVE LOCATION;  Service: Cardiovascular   • CHOLECYSTECTOMY N/A 1/6/2021    Procedure: CHOLECYSTECTOMY LAPAROSCOPIC;  Surgeon: Giacomo Akins MD;  Location: [facility] MAIN OR;  Service: General;  Laterality: N/A;   • COLONOSCOPY     • CORONARY ARTERY BYPASS GRAFT N/A 8/8/2019    Procedure: CORONARY ARTERY BYPASS GRAFTING;  Surgeon: Chet Mcarthur MD;  Location: [facility] CVOR;  Service: Cardiothoracic   • ENDOSCOPY N/A 3/15/2020    Procedure: ESOPHAGOGASTRODUODENOSCOPY;  Surgeon: Jaspal Logan MD;  Location: [facility] ENDOSCOPY;  Service: Gastroenterology;  Laterality: N/A;  EPIGASTRIC ABDOMINAL PAIN, ABNORMAL CT SCAN, PANCREATITIS  NORMAL EGD   • HERNIA REPAIR     • OTHER SURGICAL HISTORY  12/2015    2d echo-abstracted cent.      Family History   Problem Relation Age of Onset   • Heart failure Mother    • Hypertension Brother    • Cancer Brother    • Heart disease Brother      Social History     Tobacco Use   • Smoking status: Never Smoker   • Smokeless tobacco: Never Used   Substance Use Topics   • Alcohol use: Yes     Alcohol/week: 1.0 standard drinks     Types: 1 Glasses of wine per week       Current Outpatient Medications:   •  amLODIPine (NORVASC) 5 MG tablet, Take 1 tablet by mouth Daily., Disp: 90 tablet, Rfl: 3  •  aspirin 81 MG chewable tablet, Chew 81 mg Every Night., Disp: , Rfl:   •  atorvastatin (LIPITOR) 40 MG tablet, Take 1 tablet by mouth every night at bedtime. (Patient taking differently: Take 20 mg by mouth every night at bedtime.), Disp: 90 tablet, Rfl:  "3  •  B-D UF III MINI PEN NEEDLES 31G X 5 MM misc, Daily. as directed, Disp: , Rfl:   •  cetirizine (zyrTEC) 10 MG tablet, Take 10 mg by mouth Daily., Disp: , Rfl:   •  CINNAMON PO, Take  by mouth., Disp: , Rfl:   •  docusate sodium (COLACE) 100 MG capsule, Take 100 mg by mouth 2 (Two) Times a Day., Disp: , Rfl:   •  esomeprazole (nexIUM) 20 MG capsule, Take 20 mg by mouth Every Morning Before Breakfast., Disp: , Rfl:   •  ferrous sulfate 325 (65 FE) MG tablet, Take 1 tablet by mouth Daily With Breakfast., Disp: 90 tablet, Rfl: 1  •  furosemide (LASIX) 20 MG tablet, Take 1 tablet by mouth Daily., Disp: 120 tablet, Rfl: 3  •  glipizide (GLUCOTROL) 5 MG tablet, TAKE 2 TABS IN THE MORNING AND 1 TAB IN THE EVENING, Disp: 270 tablet, Rfl: 1  •  hydrALAZINE (APRESOLINE) 25 MG tablet, TAKE 1 TABLET BY MOUTH THREE TIMES A DAY, Disp: 270 tablet, Rfl: 1  •  lisinopril (PRINIVIL,ZESTRIL) 40 MG tablet, Take 1 tablet by mouth Daily., Disp: 90 tablet, Rfl: 3  •  metFORMIN (GLUCOPHAGE) 1000 MG tablet, TAKE 1 TABLET BY MOUTH TWICE A DAY WITH MEALS, Disp: 180 tablet, Rfl: 1  •  metoprolol tartrate (LOPRESSOR) 25 MG tablet, TAKE 1 TABLET BY MOUTH EVERY 12 HOURS, Disp: 180 tablet, Rfl: 1  •  OneTouch Ultra test strip, TEST BLOOD SUGAR NO MORE THAN ONCE DAILY. DX E11.9, Disp: , Rfl:   •  potassium chloride (MICRO-K) 10 MEQ CR capsule, Take 1 capsule by mouth Daily. And may take extra dose with lasix for swelling prn, Disp: 120 capsule, Rfl: 1  •  Tresiba FlexTouch 100 UNIT/ML solution pen-injector injection, INJECT 45 UNITS UNDER THE SKIN EVERY DAY AS DIRECTED, Disp: 15 pen, Rfl: 3  •  vitamin C (ASCORBIC ACID) 500 MG tablet, Take 1,000 mg by mouth Every Evening., Disp: , Rfl:     Objective   Vital Signs:   /71 (BP Location: Left arm, Patient Position: Sitting, Cuff Size: Adult)   Pulse 64   Ht 162.6 cm (64.02\")   Wt 100 kg (221 lb 6.4 oz)   SpO2 97%   BMI 37.98 kg/m²       Physical Exam  Vitals and nursing note reviewed. "   Constitutional:       General: He is not in acute distress.     Appearance: He is well-developed. He is not diaphoretic.   HENT:      Head: Normocephalic and atraumatic.   Eyes:      Pupils: Pupils are equal, round, and reactive to light.   Neck:      Thyroid: No thyromegaly.   Cardiovascular:      Rate and Rhythm: Normal rate and regular rhythm.      Heart sounds: Normal heart sounds. No murmur heard.     Pulmonary:      Effort: Pulmonary effort is normal. No respiratory distress.      Breath sounds: Normal breath sounds.   Abdominal:      General: Bowel sounds are normal. There is no distension.      Palpations: Abdomen is soft.      Tenderness: There is no abdominal tenderness.   Musculoskeletal:         General: Normal range of motion.      Cervical back: Normal range of motion.   Skin:     General: Skin is warm and dry.      Findings: No erythema.   Neurological:      Mental Status: He is alert and oriented to person, place, and time.   Psychiatric:         Behavior: Behavior normal.         Thought Content: Thought content normal.         Judgment: Judgment normal.          Result Review :     No visits with results within 7 Day(s) from this visit.   Latest known visit with results is:   Lab on 06/01/2021   Component Date Value Ref Range Status   • Glucose 06/01/2021 82  65 - 99 mg/dL Final   • BUN 06/01/2021 32* 8 - 23 mg/dL Final   • Creatinine 06/01/2021 1.34* 0.76 - 1.27 mg/dL Final   • Sodium 06/01/2021 139  136 - 145 mmol/L Final   • Potassium 06/01/2021 5.0  3.5 - 5.2 mmol/L Final   • Chloride 06/01/2021 102  98 - 107 mmol/L Final   • CO2 06/01/2021 25.3  22.0 - 29.0 mmol/L Final   • Calcium 06/01/2021 9.9  8.6 - 10.5 mg/dL Final   • Total Protein 06/01/2021 7.5  6.0 - 8.5 g/dL Final   • Albumin 06/01/2021 4.20  3.50 - 5.20 g/dL Final   • ALT (SGPT) 06/01/2021 19  1 - 41 U/L Final   • AST (SGOT) 06/01/2021 27  1 - 40 U/L Final   • Alkaline Phosphatase 06/01/2021 75  39 - 117 U/L Final   • Total  Bilirubin 06/01/2021 0.4  0.0 - 1.2 mg/dL Final   • eGFR Non African Amer 06/01/2021 53* >60 mL/min/1.73 Final   • Globulin 06/01/2021 3.3  gm/dL Final   • A/G Ratio 06/01/2021 1.3  g/dL Final   • BUN/Creatinine Ratio 06/01/2021 23.9  7.0 - 25.0 Final   • Anion Gap 06/01/2021 11.7  5.0 - 15.0 mmol/L Final   • WBC 06/01/2021 9.01  3.40 - 10.80 10*3/mm3 Final   • RBC 06/01/2021 4.33  4.14 - 5.80 10*6/mm3 Final   • Hemoglobin 06/01/2021 12.8* 13.0 - 17.7 g/dL Final   • Hematocrit 06/01/2021 38.8  37.5 - 51.0 % Final   • MCV 06/01/2021 89.6  79.0 - 97.0 fL Final   • MCH 06/01/2021 29.6  26.6 - 33.0 pg Final   • MCHC 06/01/2021 33.0  31.5 - 35.7 g/dL Final   • RDW 06/01/2021 13.2  12.3 - 15.4 % Final   • RDW-SD 06/01/2021 43.3  37.0 - 54.0 fl Final   • MPV 06/01/2021 10.9  6.0 - 12.0 fL Final   • Platelets 06/01/2021 271  140 - 450 10*3/mm3 Final   • Total Cholesterol 06/01/2021 98  0 - 200 mg/dL Final   • Triglycerides 06/01/2021 117  0 - 150 mg/dL Final   • HDL Cholesterol 06/01/2021 32* 40 - 60 mg/dL Final   • LDL Cholesterol  06/01/2021 45  0 - 100 mg/dL Final   • VLDL Cholesterol 06/01/2021 21  5 - 40 mg/dL Final   • LDL/HDL Ratio 06/01/2021 1.33   Final   • TSH 06/01/2021 2.690  0.270 - 4.200 uIU/mL Final   • Hemoglobin A1C 06/01/2021 7.1* 3.5 - 5.6 % Final                       Assessment and Plan    Diagnoses and all orders for this visit:    1. Type 2 diabetes mellitus with hyperglycemia, with long-term current use of insulin (CMS/[facility]) (Primary)  Comments:  A1c improved, continue diabetic diet, continue Tresiba, glipizide and Metformin    2. Essential hypertension  Comments:  BP stable, continue amlodipine, hydralazine and lisinopril  Orders:  -     lisinopril (PRINIVIL,ZESTRIL) 40 MG tablet; Take 1 tablet by mouth Daily.  Dispense: 90 tablet; Refill: 3  -     furosemide (LASIX) 20 MG tablet; Take 1 tablet by mouth Daily.  Dispense: 120 tablet; Refill: 3    3. Stage 3a chronic kidney disease  (CMS/[facility])  Comments:  Creatinine is stable, consider referral to nephrology if changes.  Continue Lasix and potassium  Orders:  -     potassium chloride (MICRO-K) 10 MEQ CR capsule; Take 1 capsule by mouth Daily. And may take extra dose with lasix for swelling prn  Dispense: 120 capsule; Refill: 1    4. Iron deficiency anemia, unspecified iron deficiency anemia type  Comments:  Patient has been taking OTC iron, recommend he start ferrous sulfate 325 once daily.  Monitor for constipation    5. Mixed hyperlipidemia  Comments:  Reviewed lipids, stable on 20 mg atorvastatin which was decreased by cardiology in April.  Continue same dose and refill when needed    6. Preventative health care  Comments:  PSA per urology  Reviewed labs with patient today      Other orders  -     ferrous sulfate 325 (65 FE) MG tablet; Take 1 tablet by mouth Daily With Breakfast.  Dispense: 90 tablet; Refill: 1        I spent 30 minutes caring for Shaka Trinidad on this date of service. This time includes time spent by me in the following activities: preparing for the visit, reviewing tests, performing a medically appropriate examination and/or evaluation , counseling and educating the patient/family/caregiver, ordering medications, tests, or procedures and documenting information in the medical record        Follow Up     Return in about 6 months (around 12/8/2021), or if symptoms worsen or fail to improve, for Diabetes, hypertension, anemia, lipids.  DM panel 1 week prior.  Patient was given instructions and counseling regarding his condition or for health maintenance advice. Please see specific information pulled into the AVS if appropriate.

## 2021-06-14 RX ORDER — HYDRALAZINE HYDROCHLORIDE 25 MG/1
TABLET, FILM COATED ORAL
Qty: 270 TABLET | Refills: 1 | Status: SHIPPED | OUTPATIENT
Start: 2021-06-14 | End: 2021-12-06

## 2021-08-02 RX ORDER — BLOOD SUGAR DIAGNOSTIC
STRIP MISCELLANEOUS
Qty: 100 EACH | Refills: 1 | Status: SHIPPED | OUTPATIENT
Start: 2021-08-02 | End: 2022-01-31

## 2021-08-26 DIAGNOSIS — E78.2 MIXED HYPERLIPIDEMIA: ICD-10-CM

## 2021-08-26 RX ORDER — ATORVASTATIN CALCIUM 40 MG/1
TABLET, FILM COATED ORAL
Qty: 90 TABLET | Refills: 3 | Status: SHIPPED | OUTPATIENT
Start: 2021-08-26 | End: 2022-03-08 | Stop reason: SDUPTHER

## 2021-09-05 DIAGNOSIS — E11.65 TYPE 2 DIABETES MELLITUS WITH HYPERGLYCEMIA, WITH LONG-TERM CURRENT USE OF INSULIN (HCC): ICD-10-CM

## 2021-09-05 DIAGNOSIS — Z79.4 TYPE 2 DIABETES MELLITUS WITH HYPERGLYCEMIA, WITH LONG-TERM CURRENT USE OF INSULIN (HCC): ICD-10-CM

## 2021-09-07 RX ORDER — GLIPIZIDE 5 MG/1
TABLET ORAL
Qty: 270 TABLET | Refills: 1 | Status: SHIPPED | OUTPATIENT
Start: 2021-09-07 | End: 2022-03-03

## 2021-10-20 ENCOUNTER — FLU SHOT (OUTPATIENT)
Dept: FAMILY MEDICINE CLINIC | Facility: CLINIC | Age: 71
End: 2021-10-20

## 2021-10-20 DIAGNOSIS — Z23 NEED FOR INFLUENZA VACCINATION: Primary | ICD-10-CM

## 2021-10-20 PROCEDURE — 90662 IIV NO PRSV INCREASED AG IM: CPT | Performed by: NURSE PRACTITIONER

## 2021-10-20 PROCEDURE — G0008 ADMIN INFLUENZA VIRUS VAC: HCPCS | Performed by: NURSE PRACTITIONER

## 2021-10-20 NOTE — PROGRESS NOTES
Injection  Injection performed in right deltoid by Martha Cordova MA. Patient tolerated the procedure well without complications.  10/20/21   Martha Cordova MA

## 2021-10-25 RX ORDER — FLURBIPROFEN SODIUM 0.3 MG/ML
SOLUTION/ DROPS OPHTHALMIC
Qty: 100 EACH | Refills: 3 | Status: SHIPPED | OUTPATIENT
Start: 2021-10-25 | End: 2022-10-24

## 2021-10-26 DIAGNOSIS — I10 ESSENTIAL HYPERTENSION: ICD-10-CM

## 2021-10-26 RX ORDER — AMLODIPINE BESYLATE 5 MG/1
TABLET ORAL
Qty: 90 TABLET | Refills: 3 | Status: SHIPPED | OUTPATIENT
Start: 2021-10-26 | End: 2022-10-24

## 2021-11-03 ENCOUNTER — OFFICE VISIT (OUTPATIENT)
Dept: CARDIOLOGY | Facility: CLINIC | Age: 71
End: 2021-11-03

## 2021-11-03 VITALS
OXYGEN SATURATION: 99 % | DIASTOLIC BLOOD PRESSURE: 81 MMHG | HEIGHT: 64 IN | WEIGHT: 220 LBS | HEART RATE: 66 BPM | SYSTOLIC BLOOD PRESSURE: 146 MMHG | BODY MASS INDEX: 37.56 KG/M2

## 2021-11-03 DIAGNOSIS — G47.33 OBSTRUCTIVE SLEEP APNEA: ICD-10-CM

## 2021-11-03 DIAGNOSIS — E78.2 MIXED HYPERLIPIDEMIA: ICD-10-CM

## 2021-11-03 DIAGNOSIS — I10 ESSENTIAL HYPERTENSION: ICD-10-CM

## 2021-11-03 DIAGNOSIS — E11.9 TYPE 2 DIABETES MELLITUS WITHOUT COMPLICATION, WITHOUT LONG-TERM CURRENT USE OF INSULIN (HCC): ICD-10-CM

## 2021-11-03 DIAGNOSIS — I25.118 CORONARY ARTERY DISEASE OF NATIVE ARTERY OF NATIVE HEART WITH STABLE ANGINA PECTORIS (HCC): Primary | ICD-10-CM

## 2021-11-03 PROCEDURE — 99214 OFFICE O/P EST MOD 30 MIN: CPT | Performed by: INTERNAL MEDICINE

## 2021-11-03 NOTE — PROGRESS NOTES
Subjective:     Encounter Date:11/03/2021      Patient ID: Shaka Trinidad is a 71 y.o. male.    Chief Complaint:  History of Present Illness 71-year-old white male with history of coronary disease status post coronary bypass surgery history of diabetes hypertension hyperlipidemia sleep apnea presents to my office for follow-up. Patient is currently stable without any symptoms of chest pain or shortness of breath at rest on exertion but no complains any PND orthopnea. No palpitation dizziness syncope or swelling of the feet but is taking his medicine regularly but he does not smoke but is quite active. He follows a good diet.    The following portions of the patient's history were reviewed and updated as appropriate: allergies, current medications, past family history, past medical history, past social history, past surgical history and problem list.  Past Medical History:   Diagnosis Date   • Acute pancreatitis 3/14/2020   • Coronary artery disease    • Diabetes mellitus, type II (HCC)    • Elevated cholesterol    • H/O cataract     cataracts    • Hyperlipidemia    • Hypertension    • Peripheral edema    • Peripheral edema    • Sleep apnea     oral appliance     Past Surgical History:   Procedure Laterality Date   • CARDIAC CATHETERIZATION N/A 7/19/2019    Procedure: Left Heart Cath with angiogram;  Surgeon: Bautista Lopez MD;  Location: [facility] CATH INVASIVE LOCATION;  Service: Cardiovascular   • CARDIAC CATHETERIZATION N/A 7/19/2019    Procedure: Coronary angiography;  Surgeon: Bautista Lopez MD;  Location: [facility] CATH INVASIVE LOCATION;  Service: Cardiovascular   • CARDIAC CATHETERIZATION N/A 7/19/2019    Procedure: Left ventriculography;  Surgeon: Bautista Lopez MD;  Location: [facility] CATH INVASIVE LOCATION;  Service: Cardiovascular   • CHOLECYSTECTOMY N/A 1/6/2021    Procedure: CHOLECYSTECTOMY LAPAROSCOPIC;  Surgeon: Giacomo Akins MD;  Location: [facility] MAIN OR;  Service: General;  Laterality:  "N/A;   • COLONOSCOPY     • CORONARY ARTERY BYPASS GRAFT N/A 8/8/2019    Procedure: CORONARY ARTERY BYPASS GRAFTING;  Surgeon: Chet Mcarthur MD;  Location: [facility] CVOR;  Service: Cardiothoracic   • ENDOSCOPY N/A 3/15/2020    Procedure: ESOPHAGOGASTRODUODENOSCOPY;  Surgeon: Jaspal Logan MD;  Location: [facility] ENDOSCOPY;  Service: Gastroenterology;  Laterality: N/A;  EPIGASTRIC ABDOMINAL PAIN, ABNORMAL CT SCAN, PANCREATITIS  NORMAL EGD   • HERNIA REPAIR     • OTHER SURGICAL HISTORY  12/2015    2d echo-abstracted cent.      Ht 162.6 cm (64\")   BMI 38.00 kg/m²   Family History   Problem Relation Age of Onset   • Heart failure Mother    • Hypertension Brother    • Cancer Brother    • Heart disease Brother        Current Outpatient Medications:   •  amLODIPine (NORVASC) 5 MG tablet, TAKE 1 TABLET BY MOUTH EVERY DAY, Disp: 90 tablet, Rfl: 3  •  aspirin 81 MG chewable tablet, Chew 81 mg Every Night., Disp: , Rfl:   •  atorvastatin (LIPITOR) 40 MG tablet, TAKE 1 TABLET BY MOUTH EVERYDAY AT BEDTIME, Disp: 90 tablet, Rfl: 3  •  B-D UF III MINI PEN NEEDLES 31G X 5 MM misc, USE ONCE DAILY AS DIRECTED, Disp: 100 each, Rfl: 3  •  cetirizine (zyrTEC) 10 MG tablet, Take 10 mg by mouth Daily., Disp: , Rfl:   •  CINNAMON PO, Take  by mouth., Disp: , Rfl:   •  docusate sodium (COLACE) 100 MG capsule, Take 100 mg by mouth 2 (Two) Times a Day., Disp: , Rfl:   •  esomeprazole (nexIUM) 20 MG capsule, Take 20 mg by mouth Every Morning Before Breakfast., Disp: , Rfl:   •  ferrous sulfate 325 (65 FE) MG tablet, Take 1 tablet by mouth Daily With Breakfast., Disp: 90 tablet, Rfl: 1  •  furosemide (LASIX) 20 MG tablet, Take 1 tablet by mouth Daily., Disp: 120 tablet, Rfl: 3  •  glipizide (GLUCOTROL) 5 MG tablet, TAKE 2 TABS IN THE MORNING AND 1 TAB IN THE EVENING, Disp: 270 tablet, Rfl: 1  •  hydrALAZINE (APRESOLINE) 25 MG tablet, TAKE 1 TABLET BY MOUTH THREE TIMES A DAY, Disp: 270 tablet, Rfl: 1  •  lisinopril " (PRINIVIL,ZESTRIL) 40 MG tablet, Take 1 tablet by mouth Daily., Disp: 90 tablet, Rfl: 3  •  metFORMIN (GLUCOPHAGE) 1000 MG tablet, TAKE 1 TABLET BY MOUTH TWICE A DAY WITH MEALS, Disp: 180 tablet, Rfl: 1  •  metoprolol tartrate (LOPRESSOR) 25 MG tablet, TAKE 1 TABLET BY MOUTH EVERY 12 HOURS, Disp: 180 tablet, Rfl: 1  •  OneTouch Ultra test strip, TEST BLOOD SUGAR NO MORE THAN ONCE DAILY. DX:E11.9, Disp: 100 each, Rfl: 1  •  potassium chloride (MICRO-K) 10 MEQ CR capsule, Take 1 capsule by mouth Daily. And may take extra dose with lasix for swelling prn, Disp: 120 capsule, Rfl: 1  •  Tresiba FlexTouch 100 UNIT/ML solution pen-injector injection, INJECT 45 UNITS UNDER THE SKIN EVERY DAY AS DIRECTED, Disp: 15 pen, Rfl: 3  •  vitamin C (ASCORBIC ACID) 500 MG tablet, Take 1,000 mg by mouth Every Evening., Disp: , Rfl:   No Known Allergies  Social History     Socioeconomic History   • Marital status:    Tobacco Use   • Smoking status: Never Smoker   • Smokeless tobacco: Never Used   Vaping Use   • Vaping Use: Never used   Substance and Sexual Activity   • Alcohol use: Yes     Alcohol/week: 1.0 standard drink     Types: 1 Glasses of wine per week   • Drug use: Never   • Sexual activity: Defer     Review of Systems   Constitutional: Negative for fever and malaise/fatigue.   Cardiovascular: Negative for chest pain, dyspnea on exertion and palpitations.   Respiratory: Negative for cough and shortness of breath.    Skin: Negative for rash.   Gastrointestinal: Negative for abdominal pain, nausea and vomiting.   Neurological: Negative for focal weakness and headaches.   All other systems reviewed and are negative.             Objective:     Constitutional:       Appearance: Well-developed.   Eyes:      General: No scleral icterus.     Conjunctiva/sclera: Conjunctivae normal.   HENT:      Head: Normocephalic and atraumatic.   Neck:      Vascular: No carotid bruit or JVD.   Pulmonary:      Effort: Pulmonary effort is normal.       Breath sounds: Normal breath sounds. No wheezing. No rales.   Cardiovascular:      Normal rate. Regular rhythm.   Pulses:     Intact distal pulses.   Abdominal:      General: Bowel sounds are normal.      Palpations: Abdomen is soft.   Musculoskeletal:      Cervical back: Normal range of motion and neck supple. Skin:     General: Skin is warm and dry.      Findings: No rash.   Neurological:      Mental Status: Alert.       Procedures    Lab Review:         MDM  1. Coronary disease  Patient had coronary bypass surgery x3 vessels with a LIMA to LAD and saphenous graft to the marginal branch and RCA and is currently stable on medical therapy and has normal function  2. Hypertension  Patient blood pressure is currently stable on hydralazine lisinopril and metoprolol  3. Hyperlipidemia  Patient is currently on a statin and the lipid levels are well within normal limits  4. Diabetes  Patient is currently on oral medicines and followed by the primary care doctor  5. Sleep apnea  Patient has obstructive sleep apnea and is currently stable on CPAP machine      Patient's previous medical records, labs, and EKG were reviewed and discussed with the patient at today's visit.

## 2021-11-17 ENCOUNTER — OFFICE VISIT (OUTPATIENT)
Dept: FAMILY MEDICINE CLINIC | Facility: CLINIC | Age: 71
End: 2021-11-17

## 2021-11-17 VITALS
DIASTOLIC BLOOD PRESSURE: 80 MMHG | BODY MASS INDEX: 37.94 KG/M2 | SYSTOLIC BLOOD PRESSURE: 149 MMHG | OXYGEN SATURATION: 97 % | WEIGHT: 222.2 LBS | HEART RATE: 80 BPM | HEIGHT: 64 IN

## 2021-11-17 DIAGNOSIS — J30.89 SEASONAL ALLERGIC RHINITIS DUE TO OTHER ALLERGIC TRIGGER: ICD-10-CM

## 2021-11-17 DIAGNOSIS — M54.50 ACUTE MIDLINE LOW BACK PAIN WITHOUT SCIATICA: Primary | ICD-10-CM

## 2021-11-17 PROCEDURE — 99213 OFFICE O/P EST LOW 20 MIN: CPT | Performed by: NURSE PRACTITIONER

## 2021-11-17 RX ORDER — METHYLPREDNISOLONE 4 MG/1
TABLET ORAL
Qty: 21 TABLET | Refills: 0 | Status: SHIPPED | OUTPATIENT
Start: 2021-11-17 | End: 2021-12-08

## 2021-11-17 NOTE — PROGRESS NOTES
Chief Complaint  Back Pain (reports that it hurts when he gets up, and when he wakes up in the AM.  reports it's a sharp.)    Subjective            Shaka Trinidad presents to [facility] for   Back Pain  This is a new problem. The current episode started in the past 7 days. The problem occurs intermittently. The problem is unchanged. The pain is present in the lumbar spine. The quality of the pain is described as stabbing. The pain does not radiate. The pain is moderate. The symptoms are aggravated by standing. Stiffness is present in the morning (and from sit to stand). Pertinent negatives include no bladder incontinence, bowel incontinence, chest pain, fever, headaches, leg pain, numbness, paresis, perianal numbness, tingling or weakness. Risk factors include sedentary lifestyle. He has tried ice and heat for the symptoms. The treatment provided no relief.   Cough  This is a recurrent problem. The current episode started more than 1 month ago. The problem has been waxing and waning. The cough is non-productive. Associated symptoms include postnasal drip. Pertinent negatives include no chest pain, ear congestion, ear pain, fever, headaches, hemoptysis, nasal congestion, sore throat or shortness of breath. The symptoms are aggravated by pollens and dust. Treatments tried: Zyrtec. The treatment provided mild relief. His past medical history is significant for environmental allergies.       The following portions of the patient's history were reviewed and updated as appropriate: allergies, current medications, past family history, past medical history, past social history, past surgical history and problem list.    Past Medical History:   Diagnosis Date   • Acute pancreatitis 3/14/2020   • Coronary artery disease    • Diabetes mellitus, type II (HCC)    • Elevated cholesterol    • H/O cataract    • Hyperlipidemia    • Hypertension    • Peripheral edema    • Peripheral edema    • Sleep  apnea      Past Surgical History:   Procedure Laterality Date   • CARDIAC CATHETERIZATION N/A 7/19/2019    Procedure: Left Heart Cath with angiogram;  Surgeon: Bautista Lopez MD;  Location: [facility] CATH INVASIVE LOCATION;  Service: Cardiovascular   • CARDIAC CATHETERIZATION N/A 7/19/2019    Procedure: Coronary angiography;  Surgeon: Bautista Lopez MD;  Location: [facility] CATH INVASIVE LOCATION;  Service: Cardiovascular   • CARDIAC CATHETERIZATION N/A 7/19/2019    Procedure: Left ventriculography;  Surgeon: Bautista Lopez MD;  Location: [facility] CATH INVASIVE LOCATION;  Service: Cardiovascular   • CHOLECYSTECTOMY N/A 1/6/2021    Procedure: CHOLECYSTECTOMY LAPAROSCOPIC;  Surgeon: Giacomo Akins MD;  Location: [facility] MAIN OR;  Service: General;  Laterality: N/A;   • COLONOSCOPY     • CORONARY ARTERY BYPASS GRAFT N/A 8/8/2019    Procedure: CORONARY ARTERY BYPASS GRAFTING;  Surgeon: Chet Mcarthur MD;  Location: [facility] CVOR;  Service: Cardiothoracic   • ENDOSCOPY N/A 3/15/2020    Procedure: ESOPHAGOGASTRODUODENOSCOPY;  Surgeon: Jaspal Logan MD;  Location: [facility] ENDOSCOPY;  Service: Gastroenterology;  Laterality: N/A;  EPIGASTRIC ABDOMINAL PAIN, ABNORMAL CT SCAN, PANCREATITIS  NORMAL EGD   • HERNIA REPAIR     • OTHER SURGICAL HISTORY  12/2015    2d echo-abstracted cent.      Family History   Problem Relation Age of Onset   • Heart failure Mother    • Hypertension Brother    • Cancer Brother    • Heart disease Brother      Social History     Tobacco Use   • Smoking status: Never Smoker   • Smokeless tobacco: Never Used   Substance Use Topics   • Alcohol use: Yes     Alcohol/week: 1.0 standard drink     Types: 1 Glasses of wine per week       Current Outpatient Medications:   •  amLODIPine (NORVASC) 5 MG tablet, TAKE 1 TABLET BY MOUTH EVERY DAY, Disp: 90 tablet, Rfl: 3  •  aspirin 81 MG chewable tablet, Chew 81 mg Every Night., Disp: , Rfl:   •  atorvastatin (LIPITOR) 40 MG tablet, TAKE 1 TABLET BY  MOUTH EVERYDAY AT BEDTIME (Patient taking differently: Take 10 mg by mouth Every Night.), Disp: 90 tablet, Rfl: 3  •  B-D UF III MINI PEN NEEDLES 31G X 5 MM misc, USE ONCE DAILY AS DIRECTED, Disp: 100 each, Rfl: 3  •  cetirizine (zyrTEC) 10 MG tablet, Take 10 mg by mouth Daily., Disp: , Rfl:   •  CINNAMON PO, Take  by mouth., Disp: , Rfl:   •  esomeprazole (nexIUM) 20 MG capsule, Take 20 mg by mouth Every Morning Before Breakfast., Disp: , Rfl:   •  ferrous sulfate 325 (65 FE) MG tablet, Take 1 tablet by mouth Daily With Breakfast., Disp: 90 tablet, Rfl: 1  •  furosemide (LASIX) 20 MG tablet, Take 1 tablet by mouth Daily., Disp: 120 tablet, Rfl: 3  •  glipizide (GLUCOTROL) 5 MG tablet, TAKE 2 TABS IN THE MORNING AND 1 TAB IN THE EVENING, Disp: 270 tablet, Rfl: 1  •  hydrALAZINE (APRESOLINE) 25 MG tablet, TAKE 1 TABLET BY MOUTH THREE TIMES A DAY, Disp: 270 tablet, Rfl: 1  •  lisinopril (PRINIVIL,ZESTRIL) 40 MG tablet, Take 1 tablet by mouth Daily., Disp: 90 tablet, Rfl: 3  •  metFORMIN (GLUCOPHAGE) 1000 MG tablet, TAKE 1 TABLET BY MOUTH TWICE A DAY WITH MEALS, Disp: 180 tablet, Rfl: 1  •  metoprolol tartrate (LOPRESSOR) 25 MG tablet, TAKE 1 TABLET BY MOUTH EVERY 12 HOURS, Disp: 180 tablet, Rfl: 1  •  OneTouch Ultra test strip, TEST BLOOD SUGAR NO MORE THAN ONCE DAILY. DX:E11.9, Disp: 100 each, Rfl: 1  •  potassium chloride (MICRO-K) 10 MEQ CR capsule, Take 1 capsule by mouth Daily. And may take extra dose with lasix for swelling prn, Disp: 120 capsule, Rfl: 1  •  Tresiba FlexTouch 100 UNIT/ML solution pen-injector injection, INJECT 45 UNITS UNDER THE SKIN EVERY DAY AS DIRECTED, Disp: 15 pen, Rfl: 3  •  vitamin C (ASCORBIC ACID) 500 MG tablet, Take 1,000 mg by mouth Every Evening., Disp: , Rfl:   •  methylPREDNISolone (MEDROL) 4 MG dose pack, Take as directed on package instructions., Disp: 21 tablet, Rfl: 0    Objective   Vital Signs:   /80 (BP Location: Left arm, Patient Position: Sitting, Cuff Size: Adult)    "Pulse 80   Ht 162.6 cm (64.02\")   Wt 101 kg (222 lb 3.2 oz)   SpO2 97%   BMI 38.12 kg/m²       Physical Exam  Vitals and nursing note reviewed.   Constitutional:       General: He is not in acute distress.     Appearance: He is well-developed. He is not diaphoretic.   HENT:      Head: Normocephalic and atraumatic.   Eyes:      Pupils: Pupils are equal, round, and reactive to light.   Neck:      Thyroid: No thyromegaly.   Cardiovascular:      Rate and Rhythm: Normal rate and regular rhythm.      Heart sounds: Normal heart sounds. No murmur heard.      Pulmonary:      Effort: Pulmonary effort is normal. No respiratory distress.      Breath sounds: Normal breath sounds.   Abdominal:      General: Bowel sounds are normal. There is no distension.      Palpations: Abdomen is soft.      Tenderness: There is no abdominal tenderness.   Musculoskeletal:         General: Tenderness (no L-spine tenderness w/ palpation, pt had brief sharp 1sec pain with sit to stand) present. Normal range of motion.      Cervical back: Normal range of motion.   Skin:     General: Skin is warm and dry.      Findings: No erythema.   Neurological:      Mental Status: He is alert and oriented to person, place, and time.   Psychiatric:         Behavior: Behavior normal.         Thought Content: Thought content normal.         Judgment: Judgment normal.          Result Review :     No visits with results within 7 Day(s) from this visit.   Latest known visit with results is:   Lab on 06/01/2021   Component Date Value Ref Range Status   • Glucose 06/01/2021 82  65 - 99 mg/dL Final   • BUN 06/01/2021 32* 8 - 23 mg/dL Final   • Creatinine 06/01/2021 1.34* 0.76 - 1.27 mg/dL Final   • Sodium 06/01/2021 139  136 - 145 mmol/L Final   • Potassium 06/01/2021 5.0  3.5 - 5.2 mmol/L Final   • Chloride 06/01/2021 102  98 - 107 mmol/L Final   • CO2 06/01/2021 25.3  22.0 - 29.0 mmol/L Final   • Calcium 06/01/2021 9.9  8.6 - 10.5 mg/dL Final   • Total Protein " 06/01/2021 7.5  6.0 - 8.5 g/dL Final   • Albumin 06/01/2021 4.20  3.50 - 5.20 g/dL Final   • ALT (SGPT) 06/01/2021 19  1 - 41 U/L Final   • AST (SGOT) 06/01/2021 27  1 - 40 U/L Final   • Alkaline Phosphatase 06/01/2021 75  39 - 117 U/L Final   • Total Bilirubin 06/01/2021 0.4  0.0 - 1.2 mg/dL Final   • eGFR Non African Amer 06/01/2021 53* >60 mL/min/1.73 Final   • Globulin 06/01/2021 3.3  gm/dL Final   • A/G Ratio 06/01/2021 1.3  g/dL Final   • BUN/Creatinine Ratio 06/01/2021 23.9  7.0 - 25.0 Final   • Anion Gap 06/01/2021 11.7  5.0 - 15.0 mmol/L Final   • WBC 06/01/2021 9.01  3.40 - 10.80 10*3/mm3 Final   • RBC 06/01/2021 4.33  4.14 - 5.80 10*6/mm3 Final   • Hemoglobin 06/01/2021 12.8* 13.0 - 17.7 g/dL Final   • Hematocrit 06/01/2021 38.8  37.5 - 51.0 % Final   • MCV 06/01/2021 89.6  79.0 - 97.0 fL Final   • MCH 06/01/2021 29.6  26.6 - 33.0 pg Final   • MCHC 06/01/2021 33.0  31.5 - 35.7 g/dL Final   • RDW 06/01/2021 13.2  12.3 - 15.4 % Final   • RDW-SD 06/01/2021 43.3  37.0 - 54.0 fl Final   • MPV 06/01/2021 10.9  6.0 - 12.0 fL Final   • Platelets 06/01/2021 271  140 - 450 10*3/mm3 Final   • Total Cholesterol 06/01/2021 98  0 - 200 mg/dL Final   • Triglycerides 06/01/2021 117  0 - 150 mg/dL Final   • HDL Cholesterol 06/01/2021 32* 40 - 60 mg/dL Final   • LDL Cholesterol  06/01/2021 45  0 - 100 mg/dL Final   • VLDL Cholesterol 06/01/2021 21  5 - 40 mg/dL Final   • LDL/HDL Ratio 06/01/2021 1.33   Final   • TSH 06/01/2021 2.690  0.270 - 4.200 uIU/mL Final   • Hemoglobin A1C 06/01/2021 7.1* 3.5 - 5.6 % Final                       Assessment and Plan    Diagnoses and all orders for this visit:    1. Acute midline low back pain without sciatica (Primary)  Comments:  trial medrol dose pack, no NSAIDS d/t CKD. cont tylenol.  Patient given education on proper body mechanics, using knees more to bend and stand.     2. Seasonal allergic rhinitis due to other allergic trigger  Comments:  try mucinex DM and continue cetirizine,  may also try Flonase as needed    Other orders  -     methylPREDNISolone (MEDROL) 4 MG dose pack; Take as directed on package instructions.  Dispense: 21 tablet; Refill: 0         I spent 20 minutes caring for Shaka Trinidad on this date of service. This time includes time spent by me in the following activities: preparing for the visit, reviewing tests, performing a medically appropriate examination and/or evaluation , counseling and educating the patient/family/caregiver, ordering medications, tests, or procedures and documenting information in the medical record        Follow Up     Return for Next scheduled follow up 12/1/2021 or earlier as needed.  Patient was given instructions and counseling regarding his condition or for health maintenance advice. Please see specific information pulled into the AVS if appropriate.      EMR Dragon transcription disclaimer:  Some of this encounter note is an electronic transcription translation of spoken language to printed text. The electronic translation of spoken language may permit erroneous, or at times, nonsensical words or phrases to be inadvertently transcribed; Although I have reviewed the note for such errors some may still exist.

## 2021-11-17 NOTE — PATIENT INSTRUCTIONS
Sciatica Rehab  Ask your health care provider which exercises are safe for you. Do exercises exactly as told by your health care provider and adjust them as directed. It is normal to feel mild stretching, pulling, tightness, or discomfort as you do these exercises. Stop right away if you feel sudden pain or your pain gets worse. Do not begin these exercises until told by your health care provider.  Stretching and range-of-motion exercises  These exercises warm up your muscles and joints and improve the movement and flexibility of your hips and back. These exercises also help to relieve pain, numbness, and tingling.  Sciatic nerve glide  1. Sit in a chair with your head facing down toward your chest. Place your hands behind your back. Let your shoulders slump forward.  2. Slowly straighten one of your legs while you tilt your head back as if you are looking toward the ceiling. Only straighten your leg as far as you can without making your symptoms worse.  3. Hold this position for __________ seconds.  4. Slowly return to the starting position.  5. Repeat with your other leg.  Repeat __________ times. Complete this exercise __________ times a day.  Knee to chest with hip adduction and internal rotation    1. Lie on your back on a firm surface with both legs straight.  2. Bend one of your knees and move it up toward your chest until you feel a gentle stretch in your lower back and buttock. Then, move your knee toward the shoulder that is on the opposite side from your leg. This is hip adduction and internal rotation.  ? Hold your leg in this position by holding on to the front of your knee.  3. Hold this position for __________ seconds.  4. Slowly return to the starting position.  5. Repeat with your other leg.  Repeat __________ times. Complete this exercise __________ times a day.  Prone extension on elbows    1. Lie on your abdomen on a firm surface. A bed may be too soft for this exercise.  2. Prop yourself up on  your elbows.  3. Use your arms to help lift your chest up until you feel a gentle stretch in your abdomen and your lower back.  ? This will place some of your body weight on your elbows. If this is uncomfortable, try stacking pillows under your chest.  ? Your hips should stay down, against the surface that you are lying on. Keep your hip and back muscles relaxed.  4. Hold this position for __________ seconds.  5. Slowly relax your upper body and return to the starting position.  Repeat __________ times. Complete this exercise __________ times a day.  Strengthening exercises  These exercises build strength and endurance in your back. Endurance is the ability to use your muscles for a long time, even after they get tired.  Pelvic tilt  This exercise strengthens the muscles that lie deep in the abdomen.  1. Lie on your back on a firm surface. Bend your knees and keep your feet flat on the floor.  2. Tense your abdominal muscles. Tip your pelvis up toward the ceiling and flatten your lower back into the floor.  ? To help with this exercise, you may place a small towel under your lower back and try to push your back into the towel.  3. Hold this position for __________ seconds.  4. Let your muscles relax completely before you repeat this exercise.  Repeat __________ times. Complete this exercise __________ times a day.  Alternating arm and leg raises    1. Get on your hands and knees on a firm surface. If you are on a hard floor, you may want to use padding, such as an exercise mat, to cushion your knees.  2. Line up your arms and legs. Your hands should be directly below your shoulders, and your knees should be directly below your hips.  3. Lift your left leg behind you. At the same time, raise your right arm and straighten it in front of you.  ? Do not lift your leg higher than your hip.  ? Do not lift your arm higher than your shoulder.  ? Keep your abdominal and back muscles tight.  ? Keep your hips facing the  ground.  ? Do not arch your back.  ? Keep your balance carefully, and do not hold your breath.  4. Hold this position for __________ seconds.  5. Slowly return to the starting position.  6. Repeat with your right leg and your left arm.  Repeat __________ times. Complete this exercise __________ times a day.  Posture and body mechanics  Good posture and healthy body mechanics can help to relieve stress in your body's tissues and joints. Body mechanics refers to the movements and positions of your body while you do your daily activities. Posture is part of body mechanics. Good posture means:  · Your spine is in its natural S-curve position (neutral).  · Your shoulders are pulled back slightly.  · Your head is not tipped forward.  Follow these guidelines to improve your posture and body mechanics in your everyday activities.  Standing    · When standing, keep your spine neutral and your feet about hip width apart. Keep a slight bend in your knees. Your ears, shoulders, and hips should line up.  · When you do a task in which you  one place for a long time, place one foot up on a stable object that is 2-4 inches (5-10 cm) high, such as a footstool. This helps keep your spine neutral.    Sitting    · When sitting, keep your spine neutral and keep your feet flat on the floor. Use a footrest, if necessary, and keep your thighs parallel to the floor. Avoid rounding your shoulders, and avoid tilting your head forward.  · When working at a desk or a computer, keep your desk at a height where your hands are slightly lower than your elbows. Slide your chair under your desk so you are close enough to maintain good posture.  · When working at a computer, place your monitor at a height where you are looking straight ahead and you do not have to tilt your head forward or downward to look at the screen.    Resting  · When lying down and resting, avoid positions that are most painful for you.  · If you have pain with  activities such as sitting, bending, stooping, or squatting, lie in a position in which your body does not bend very much. For example, avoid curling up on your side with your arms and knees near your chest (fetal position).  · If you have pain with activities such as standing for a long time or reaching with your arms, lie with your spine in a neutral position and bend your knees slightly. Try the following positions:  ? Lying on your side with a pillow between your knees.  ? Lying on your back with a pillow under your knees.  Lifting    · When lifting objects, keep your feet at least shoulder width apart and tighten your abdominal muscles.  · Bend your knees and hips and keep your spine neutral. It is important to lift using the strength of your legs, not your back. Do not lock your knees straight out.  · Always ask for help to lift heavy or awkward objects.    This information is not intended to replace advice given to you by your health care provider. Make sure you discuss any questions you have with your health care provider.  Document Revised: 04/10/2020 Document Reviewed: 01/09/2020  [employer] Patient Education © 2021 [employer]

## 2021-11-24 RX ORDER — FERROUS SULFATE 325(65) MG
TABLET ORAL
Qty: 90 TABLET | Refills: 1 | Status: SHIPPED | OUTPATIENT
Start: 2021-11-24 | End: 2022-05-26

## 2021-11-30 DIAGNOSIS — N18.31 STAGE 3A CHRONIC KIDNEY DISEASE (HCC): ICD-10-CM

## 2021-11-30 RX ORDER — POTASSIUM CHLORIDE 750 MG/1
CAPSULE, EXTENDED RELEASE ORAL
Qty: 120 CAPSULE | Refills: 1 | Status: SHIPPED | OUTPATIENT
Start: 2021-11-30 | End: 2021-12-23

## 2021-12-01 ENCOUNTER — LAB (OUTPATIENT)
Dept: FAMILY MEDICINE CLINIC | Facility: CLINIC | Age: 71
End: 2021-12-01

## 2021-12-01 DIAGNOSIS — E78.2 MIXED HYPERLIPIDEMIA: Primary | ICD-10-CM

## 2021-12-01 DIAGNOSIS — E11.51 TYPE 2 DIABETES MELLITUS WITH DIABETIC PERIPHERAL ANGIOPATHY WITHOUT GANGRENE, UNSPECIFIED WHETHER LONG TERM INSULIN USE (HCC): ICD-10-CM

## 2021-12-01 DIAGNOSIS — I10 ESSENTIAL HYPERTENSION: ICD-10-CM

## 2021-12-01 LAB — HBA1C MFR BLD: 8.1 % (ref 3.5–5.6)

## 2021-12-01 PROCEDURE — 83036 HEMOGLOBIN GLYCOSYLATED A1C: CPT | Performed by: NURSE PRACTITIONER

## 2021-12-01 PROCEDURE — 85027 COMPLETE CBC AUTOMATED: CPT | Performed by: NURSE PRACTITIONER

## 2021-12-01 PROCEDURE — 80053 COMPREHEN METABOLIC PANEL: CPT | Performed by: NURSE PRACTITIONER

## 2021-12-01 PROCEDURE — 80061 LIPID PANEL: CPT | Performed by: NURSE PRACTITIONER

## 2021-12-01 PROCEDURE — 36415 COLL VENOUS BLD VENIPUNCTURE: CPT | Performed by: NURSE PRACTITIONER

## 2021-12-02 LAB
ALBUMIN SERPL-MCNC: 4.2 G/DL (ref 3.5–5.2)
ALBUMIN/GLOB SERPL: 1.5 G/DL
ALP SERPL-CCNC: 69 U/L (ref 39–117)
ALT SERPL W P-5'-P-CCNC: 20 U/L (ref 1–41)
ANION GAP SERPL CALCULATED.3IONS-SCNC: 12.1 MMOL/L (ref 5–15)
AST SERPL-CCNC: 24 U/L (ref 1–40)
BILIRUB SERPL-MCNC: 0.4 MG/DL (ref 0–1.2)
BUN SERPL-MCNC: 27 MG/DL (ref 8–23)
BUN/CREAT SERPL: 18.6 (ref 7–25)
CALCIUM SPEC-SCNC: 9.5 MG/DL (ref 8.6–10.5)
CHLORIDE SERPL-SCNC: 101 MMOL/L (ref 98–107)
CHOLEST SERPL-MCNC: 113 MG/DL (ref 0–200)
CO2 SERPL-SCNC: 26.9 MMOL/L (ref 22–29)
CREAT SERPL-MCNC: 1.45 MG/DL (ref 0.76–1.27)
DEPRECATED RDW RBC AUTO: 41.2 FL (ref 37–54)
ERYTHROCYTE [DISTWIDTH] IN BLOOD BY AUTOMATED COUNT: 12.6 % (ref 12.3–15.4)
GFR SERPL CREATININE-BSD FRML MDRD: 48 ML/MIN/1.73
GLOBULIN UR ELPH-MCNC: 2.8 GM/DL
GLUCOSE SERPL-MCNC: 93 MG/DL (ref 65–99)
HCT VFR BLD AUTO: 38.3 % (ref 37.5–51)
HDLC SERPL-MCNC: 33 MG/DL (ref 40–60)
HGB BLD-MCNC: 13 G/DL (ref 13–17.7)
LDLC SERPL CALC-MCNC: 54 MG/DL (ref 0–100)
LDLC/HDLC SERPL: 1.52 {RATIO}
MCH RBC QN AUTO: 30.2 PG (ref 26.6–33)
MCHC RBC AUTO-ENTMCNC: 33.9 G/DL (ref 31.5–35.7)
MCV RBC AUTO: 88.9 FL (ref 79–97)
PLATELET # BLD AUTO: 238 10*3/MM3 (ref 140–450)
PMV BLD AUTO: 10.8 FL (ref 6–12)
POTASSIUM SERPL-SCNC: 4.8 MMOL/L (ref 3.5–5.2)
PROT SERPL-MCNC: 7 G/DL (ref 6–8.5)
RBC # BLD AUTO: 4.31 10*6/MM3 (ref 4.14–5.8)
SODIUM SERPL-SCNC: 140 MMOL/L (ref 136–145)
TRIGL SERPL-MCNC: 149 MG/DL (ref 0–150)
VLDLC SERPL-MCNC: 26 MG/DL (ref 5–40)
WBC NRBC COR # BLD: 8.73 10*3/MM3 (ref 3.4–10.8)

## 2021-12-06 RX ORDER — HYDRALAZINE HYDROCHLORIDE 25 MG/1
TABLET, FILM COATED ORAL
Qty: 270 TABLET | Refills: 1 | Status: SHIPPED | OUTPATIENT
Start: 2021-12-06 | End: 2022-06-06

## 2021-12-08 ENCOUNTER — OFFICE VISIT (OUTPATIENT)
Dept: FAMILY MEDICINE CLINIC | Facility: CLINIC | Age: 71
End: 2021-12-08

## 2021-12-08 VITALS
OXYGEN SATURATION: 98 % | HEIGHT: 64 IN | WEIGHT: 221.4 LBS | DIASTOLIC BLOOD PRESSURE: 74 MMHG | HEART RATE: 76 BPM | SYSTOLIC BLOOD PRESSURE: 135 MMHG | BODY MASS INDEX: 37.8 KG/M2

## 2021-12-08 DIAGNOSIS — N18.31 STAGE 3A CHRONIC KIDNEY DISEASE (HCC): ICD-10-CM

## 2021-12-08 DIAGNOSIS — D50.9 IRON DEFICIENCY ANEMIA, UNSPECIFIED IRON DEFICIENCY ANEMIA TYPE: ICD-10-CM

## 2021-12-08 DIAGNOSIS — R05.9 COUGH: ICD-10-CM

## 2021-12-08 DIAGNOSIS — Z79.4 TYPE 2 DIABETES MELLITUS WITH HYPERGLYCEMIA, WITH LONG-TERM CURRENT USE OF INSULIN (HCC): Primary | ICD-10-CM

## 2021-12-08 DIAGNOSIS — R14.0 FLATULENCE/GAS PAIN/BELCHING: ICD-10-CM

## 2021-12-08 DIAGNOSIS — E78.2 MIXED HYPERLIPIDEMIA: ICD-10-CM

## 2021-12-08 DIAGNOSIS — I10 ESSENTIAL HYPERTENSION: ICD-10-CM

## 2021-12-08 DIAGNOSIS — E11.65 TYPE 2 DIABETES MELLITUS WITH HYPERGLYCEMIA, WITH LONG-TERM CURRENT USE OF INSULIN (HCC): Primary | ICD-10-CM

## 2021-12-08 DIAGNOSIS — J30.89 SEASONAL ALLERGIC RHINITIS DUE TO OTHER ALLERGIC TRIGGER: ICD-10-CM

## 2021-12-08 PROCEDURE — 99214 OFFICE O/P EST MOD 30 MIN: CPT | Performed by: NURSE PRACTITIONER

## 2021-12-08 RX ORDER — MONTELUKAST SODIUM 10 MG/1
10 TABLET ORAL NIGHTLY
Qty: 90 TABLET | Refills: 0 | Status: SHIPPED | OUTPATIENT
Start: 2021-12-08 | End: 2022-03-08

## 2021-12-08 NOTE — PROGRESS NOTES
Chief Complaint  Diabetes, Hypertension, Anemia, Hyperlipidemia, Follow-up (6 mo), Results (12/1 labs), and Cough (pt reports that it has been present since the summer)    Subjective          Shaka Trinidad presents to [facility] for   History of Present Illness     Diabetes mellitus type II, patient reports poor diet over the last few weeks.  Taking medications as directed.  Denies any signs/symptoms of hyper/hypoglycemia, blurry vision, polydipsia, polyuria, nocturia, and unintentional weight loss. Eye exam wnl, foot exam per Dr. Lucas    HTN/CKD, stable on meds and takes as directed, denies chest pain, headache, shortness of air, palpitations and swelling of extremities.     Hyperlipidemia, The patient denies muscle aches, constipation, diarrhea, GI upset, fatigue, chest pain/pressure, exercise intolerance, dyspnea, palpitations, syncope and pedal edema.      Anemia, on iron.     Belching, frequent, since gallbladder removal    Cough still persists on zyrtec, clear sputum, denies shortness of air    Here to review labs      The following portions of the patient's history were reviewed and updated as appropriate: allergies, current medications, past family history, past medical history, past social history, past surgical history and problem list.    Past Medical History:   Diagnosis Date   • Acute pancreatitis 3/14/2020   • Coronary artery disease    • Diabetes mellitus, type II (HCC)    • Elevated cholesterol    • H/O cataract    • Hyperlipidemia    • Hypertension    • Peripheral edema    • Peripheral edema    • Sleep apnea      Past Surgical History:   Procedure Laterality Date   • CARDIAC CATHETERIZATION N/A 7/19/2019    Procedure: Left Heart Cath with angiogram;  Surgeon: Bautista Lopez MD;  Location: [facility] CATH INVASIVE LOCATION;  Service: Cardiovascular   • CARDIAC CATHETERIZATION N/A 7/19/2019    Procedure: Coronary angiography;  Surgeon: Bautista Lopez MD;  Location:   [facility] CATH INVASIVE LOCATION;  Service: Cardiovascular   • CARDIAC CATHETERIZATION N/A 7/19/2019    Procedure: Left ventriculography;  Surgeon: Bautista Lopez MD;  Location: [facility] CATH INVASIVE LOCATION;  Service: Cardiovascular   • CHOLECYSTECTOMY N/A 1/6/2021    Procedure: CHOLECYSTECTOMY LAPAROSCOPIC;  Surgeon: Giacomo Akins MD;  Location: [facility] MAIN OR;  Service: General;  Laterality: N/A;   • COLONOSCOPY     • CORONARY ARTERY BYPASS GRAFT N/A 8/8/2019    Procedure: CORONARY ARTERY BYPASS GRAFTING;  Surgeon: Chet Mcarthur MD;  Location: [facility] CVOR;  Service: Cardiothoracic   • ENDOSCOPY N/A 3/15/2020    Procedure: ESOPHAGOGASTRODUODENOSCOPY;  Surgeon: Jaspal Logan MD;  Location: [facility] ENDOSCOPY;  Service: Gastroenterology;  Laterality: N/A;  EPIGASTRIC ABDOMINAL PAIN, ABNORMAL CT SCAN, PANCREATITIS  NORMAL EGD   • HERNIA REPAIR     • OTHER SURGICAL HISTORY  12/2015    2d echo-abstracted cent.      Family History   Problem Relation Age of Onset   • Heart failure Mother    • Hypertension Brother    • Cancer Brother    • Heart disease Brother      Social History     Tobacco Use   • Smoking status: Never Smoker   • Smokeless tobacco: Never Used   Substance Use Topics   • Alcohol use: Yes     Alcohol/week: 1.0 standard drink     Types: 1 Glasses of wine per week       Current Outpatient Medications:   •  amLODIPine (NORVASC) 5 MG tablet, TAKE 1 TABLET BY MOUTH EVERY DAY, Disp: 90 tablet, Rfl: 3  •  aspirin 81 MG chewable tablet, Chew 81 mg Every Night., Disp: , Rfl:   •  atorvastatin (LIPITOR) 40 MG tablet, TAKE 1 TABLET BY MOUTH EVERYDAY AT BEDTIME (Patient taking differently: Take 10 mg by mouth Every Night.), Disp: 90 tablet, Rfl: 3  •  B-D UF III MINI PEN NEEDLES 31G X 5 MM misc, USE ONCE DAILY AS DIRECTED, Disp: 100 each, Rfl: 3  •  cetirizine (zyrTEC) 10 MG tablet, Take 10 mg by mouth Daily., Disp: , Rfl:   •  CINNAMON PO, Take  by mouth., Disp: , Rfl:   •  esomeprazole (nexIUM)  "20 MG capsule, Take 20 mg by mouth Every Morning Before Breakfast., Disp: , Rfl:   •  ferrous sulfate 325 (65 FE) MG tablet, TAKE 1 TABLET BY MOUTH EVERY DAY WITH BREAKFAST, Disp: 90 tablet, Rfl: 1  •  furosemide (LASIX) 20 MG tablet, Take 1 tablet by mouth Daily., Disp: 120 tablet, Rfl: 3  •  glipizide (GLUCOTROL) 5 MG tablet, TAKE 2 TABS IN THE MORNING AND 1 TAB IN THE EVENING, Disp: 270 tablet, Rfl: 1  •  hydrALAZINE (APRESOLINE) 25 MG tablet, TAKE 1 TABLET BY MOUTH THREE TIMES A DAY, Disp: 270 tablet, Rfl: 1  •  lisinopril (PRINIVIL,ZESTRIL) 40 MG tablet, Take 1 tablet by mouth Daily., Disp: 90 tablet, Rfl: 3  •  metFORMIN (GLUCOPHAGE) 1000 MG tablet, TAKE 1 TABLET BY MOUTH TWICE A DAY WITH MEALS, Disp: 180 tablet, Rfl: 1  •  metoprolol tartrate (LOPRESSOR) 25 MG tablet, TAKE 1 TABLET BY MOUTH EVERY 12 HOURS, Disp: 180 tablet, Rfl: 1  •  OneTouch Ultra test strip, TEST BLOOD SUGAR NO MORE THAN ONCE DAILY. DX:E11.9, Disp: 100 each, Rfl: 1  •  potassium chloride (MICRO-K) 10 MEQ CR capsule, TAKE 1 CAPSULE BY MOUTH DAILY. AND MAY TAKE EXTRA DOSE WITH LASIX FOR SWELLING AS NEEDED, Disp: 120 capsule, Rfl: 1  •  Tresiba FlexTouch 100 UNIT/ML solution pen-injector injection, INJECT 45 UNITS UNDER THE SKIN EVERY DAY AS DIRECTED, Disp: 15 pen, Rfl: 3  •  vitamin C (ASCORBIC ACID) 500 MG tablet, Take 1,000 mg by mouth Every Evening., Disp: , Rfl:   •  montelukast (Singulair) 10 MG tablet, Take 1 tablet by mouth Every Night., Disp: 90 tablet, Rfl: 0    Objective   Vital Signs:   /74 (BP Location: Left arm, Patient Position: Sitting, Cuff Size: Adult)   Pulse 76   Ht 162.6 cm (64.02\")   Wt 100 kg (221 lb 6.4 oz)   SpO2 98%   BMI 37.98 kg/m²       Physical Exam  Vitals and nursing note reviewed.   Constitutional:       General: He is not in acute distress.     Appearance: He is well-developed. He is not diaphoretic.   HENT:      Head: Normocephalic and atraumatic.   Eyes:      Pupils: Pupils are equal, round, and " reactive to light.   Neck:      Thyroid: No thyromegaly.   Cardiovascular:      Rate and Rhythm: Normal rate and regular rhythm.      Heart sounds: Normal heart sounds. No murmur heard.      Pulmonary:      Effort: Pulmonary effort is normal. No respiratory distress.      Breath sounds: Normal breath sounds.   Abdominal:      General: Bowel sounds are normal. There is no distension.      Palpations: Abdomen is soft.      Tenderness: There is no abdominal tenderness.   Musculoskeletal:         General: Normal range of motion.      Cervical back: Normal range of motion.   Skin:     General: Skin is warm and dry.      Findings: No erythema.   Neurological:      Mental Status: He is alert and oriented to person, place, and time.   Psychiatric:         Behavior: Behavior normal.         Thought Content: Thought content normal.         Judgment: Judgment normal.          Result Review :     No visits with results within 7 Day(s) from this visit.   Latest known visit with results is:   Lab on 12/01/2021   Component Date Value Ref Range Status   • WBC 12/01/2021 8.73  3.40 - 10.80 10*3/mm3 Final   • RBC 12/01/2021 4.31  4.14 - 5.80 10*6/mm3 Final   • Hemoglobin 12/01/2021 13.0  13.0 - 17.7 g/dL Final   • Hematocrit 12/01/2021 38.3  37.5 - 51.0 % Final   • MCV 12/01/2021 88.9  79.0 - 97.0 fL Final   • MCH 12/01/2021 30.2  26.6 - 33.0 pg Final   • MCHC 12/01/2021 33.9  31.5 - 35.7 g/dL Final   • RDW 12/01/2021 12.6  12.3 - 15.4 % Final   • RDW-SD 12/01/2021 41.2  37.0 - 54.0 fl Final   • MPV 12/01/2021 10.8  6.0 - 12.0 fL Final   • Platelets 12/01/2021 238  140 - 450 10*3/mm3 Final   • Glucose 12/01/2021 93  65 - 99 mg/dL Final   • BUN 12/01/2021 27* 8 - 23 mg/dL Final   • Creatinine 12/01/2021 1.45* 0.76 - 1.27 mg/dL Final   • Sodium 12/01/2021 140  136 - 145 mmol/L Final   • Potassium 12/01/2021 4.8  3.5 - 5.2 mmol/L Final   • Chloride 12/01/2021 101  98 - 107 mmol/L Final   • CO2 12/01/2021 26.9  22.0 - 29.0 mmol/L Final    • Calcium 12/01/2021 9.5  8.6 - 10.5 mg/dL Final   • Total Protein 12/01/2021 7.0  6.0 - 8.5 g/dL Final   • Albumin 12/01/2021 4.20  3.50 - 5.20 g/dL Final   • ALT (SGPT) 12/01/2021 20  1 - 41 U/L Final   • AST (SGOT) 12/01/2021 24  1 - 40 U/L Final   • Alkaline Phosphatase 12/01/2021 69  39 - 117 U/L Final   • Total Bilirubin 12/01/2021 0.4  0.0 - 1.2 mg/dL Final   • eGFR Non  Amer 12/01/2021 48* >60 mL/min/1.73 Final   • Globulin 12/01/2021 2.8  gm/dL Final   • A/G Ratio 12/01/2021 1.5  g/dL Final   • BUN/Creatinine Ratio 12/01/2021 18.6  7.0 - 25.0 Final   • Anion Gap 12/01/2021 12.1  5.0 - 15.0 mmol/L Final   • Total Cholesterol 12/01/2021 113  0 - 200 mg/dL Final   • Triglycerides 12/01/2021 149  0 - 150 mg/dL Final   • HDL Cholesterol 12/01/2021 33* 40 - 60 mg/dL Final   • LDL Cholesterol  12/01/2021 54  0 - 100 mg/dL Final   • VLDL Cholesterol 12/01/2021 26  5 - 40 mg/dL Final   • LDL/HDL Ratio 12/01/2021 1.52   Final   • Hemoglobin A1C 12/01/2021 8.1* 3.5 - 5.6 % Final                       Assessment and Plan    Diagnoses and all orders for this visit:    1. Type 2 diabetes mellitus with hyperglycemia, with long-term current use of insulin (HCC) (Primary)  Comments:  A1c elevated, inc tresiba to 50 u daily, continue glipizide and Metformin.  D/w pt to improve DM diet, lower carbs.   Orders:  -     MicroAlbumin, Urine, Random - Urine, Clean Catch    2. Mixed hyperlipidemia  Comments:  Reviewed labs, lipids are stable, continue statin and aspirin    3. Essential hypertension  Comments:  BP stable, continue medications and refill when needed    4. Stage 3a chronic kidney disease (HCC)  Comments:  Stable    5. Iron deficiency anemia, unspecified iron deficiency anemia type  Comments:  Hemoglobin low normal range, continue iron daily    6. Seasonal allergic rhinitis due to other allergic trigger  Comments:  cont zyrtec, trial Singulair nightly    7. Cough  Comments:  Likely secondary to allergy and  postnasal drip, check cxray, start singulair nightly.   Orders:  -     XR Chest 2 View; Future    8. Flatulence/gas pain/belching  Comments:  s/p cholecystectomy, denies diarrhea or other symptoms.  Okay for Gas-X as needed    Other orders  -     montelukast (Singulair) 10 MG tablet; Take 1 tablet by mouth Every Night.  Dispense: 90 tablet; Refill: 0      -Continue checking blood glucose 2-3 times daily  -pt reports running >$300. pt to check prices of Basaglar, Levemir or Lantus.       I spent 30 minutes caring for Shaka Trinidad on this date of service. This time includes time spent by me in the following activities: preparing for the visit, reviewing tests, performing a medically appropriate examination and/or evaluation , counseling and educating the patient/family/caregiver, ordering medications, tests, or procedures and documenting information in the medical record        Follow Up     Return in about 3 months (around 3/8/2022) for DM, bmp and hgba1c prior to appt in 6mo. shced AMWV by end of december.  Patient was given instructions and counseling regarding his condition or for health maintenance advice. Please see specific information pulled into the AVS if appropriate.      EMR Dragon transcription disclaimer:  Some of this encounter note is an electronic transcription translation of spoken language to printed text. The electronic translation of spoken language may permit erroneous, or at times, nonsensical words or phrases to be inadvertently transcribed; Although I have reviewed the note for such errors some may still exist.

## 2021-12-16 ENCOUNTER — OFFICE VISIT (OUTPATIENT)
Dept: FAMILY MEDICINE CLINIC | Facility: CLINIC | Age: 71
End: 2021-12-16

## 2021-12-16 VITALS
BODY MASS INDEX: 37.8 KG/M2 | HEART RATE: 80 BPM | HEIGHT: 64 IN | DIASTOLIC BLOOD PRESSURE: 79 MMHG | OXYGEN SATURATION: 97 % | WEIGHT: 221.4 LBS | SYSTOLIC BLOOD PRESSURE: 129 MMHG

## 2021-12-16 DIAGNOSIS — Z79.4 TYPE 2 DIABETES MELLITUS WITH HYPERGLYCEMIA, WITH LONG-TERM CURRENT USE OF INSULIN (HCC): ICD-10-CM

## 2021-12-16 DIAGNOSIS — N18.31 STAGE 3A CHRONIC KIDNEY DISEASE (HCC): ICD-10-CM

## 2021-12-16 DIAGNOSIS — Z00.00 MEDICARE ANNUAL WELLNESS VISIT, SUBSEQUENT: Primary | ICD-10-CM

## 2021-12-16 DIAGNOSIS — E11.65 TYPE 2 DIABETES MELLITUS WITH HYPERGLYCEMIA, WITH LONG-TERM CURRENT USE OF INSULIN (HCC): ICD-10-CM

## 2021-12-16 DIAGNOSIS — E78.2 MIXED HYPERLIPIDEMIA: ICD-10-CM

## 2021-12-16 PROCEDURE — 1160F RVW MEDS BY RX/DR IN RCRD: CPT | Performed by: NURSE PRACTITIONER

## 2021-12-16 PROCEDURE — 1126F AMNT PAIN NOTED NONE PRSNT: CPT | Performed by: NURSE PRACTITIONER

## 2021-12-16 PROCEDURE — G0439 PPPS, SUBSEQ VISIT: HCPCS | Performed by: NURSE PRACTITIONER

## 2021-12-16 PROCEDURE — 1170F FXNL STATUS ASSESSED: CPT | Performed by: NURSE PRACTITIONER

## 2021-12-16 RX ORDER — INSULIN GLARGINE 100 [IU]/ML
50 INJECTION, SOLUTION SUBCUTANEOUS EVERY MORNING
Qty: 15 PEN | Refills: 3 | Status: SHIPPED | OUTPATIENT
Start: 2021-12-16 | End: 2022-04-25

## 2021-12-16 NOTE — PROGRESS NOTES
The ABCs of the Annual Wellness Visit  Subsequent Medicare Wellness Visit    Chief Complaint   Patient presents with   • Medicare Wellness-subsequent     pt has a list with him of meds and cost     Subjective    History of Present Illness:  Shaka Trinidad is a 71 y.o. male who presents for a Subsequent Medicare Wellness Visit.    The following portions of the patient's history were reviewed and   updated as appropriate: allergies, current medications, past family history, past medical history, past social history, past surgical history and problem list.    Compared to one year ago, the patient feels his physical   health is worse.    Compared to one year ago, the patient feels his mental   health is the same.    Recent Hospitalizations:  This patient has had a [facility] admission record on file within the last 365 days.    Current Medical Providers:  Patient Care Team:  Karly Marmolejo APRN as PCP - General (Nurse Practitioner)  Bautista Lopez MD as Consulting Physician (Cardiology)    Outpatient Medications Prior to Visit   Medication Sig Dispense Refill   • amLODIPine (NORVASC) 5 MG tablet TAKE 1 TABLET BY MOUTH EVERY DAY 90 tablet 3   • aspirin 81 MG chewable tablet Chew 81 mg Every Night.     • atorvastatin (LIPITOR) 40 MG tablet TAKE 1 TABLET BY MOUTH EVERYDAY AT BEDTIME (Patient taking differently: Take 10 mg by mouth Every Night.) 90 tablet 3   • B-D UF III MINI PEN NEEDLES 31G X 5 MM misc USE ONCE DAILY AS DIRECTED 100 each 3   • cetirizine (zyrTEC) 10 MG tablet Take 10 mg by mouth Daily.     • CINNAMON PO Take  by mouth.     • esomeprazole (nexIUM) 20 MG capsule Take 20 mg by mouth Every Morning Before Breakfast.     • ferrous sulfate 325 (65 FE) MG tablet TAKE 1 TABLET BY MOUTH EVERY DAY WITH BREAKFAST 90 tablet 1   • furosemide (LASIX) 20 MG tablet Take 1 tablet by mouth Daily. 120 tablet 3   • glipizide (GLUCOTROL) 5 MG tablet TAKE 2 TABS IN THE MORNING AND 1 TAB IN THE EVENING 270 tablet 1    • hydrALAZINE (APRESOLINE) 25 MG tablet TAKE 1 TABLET BY MOUTH THREE TIMES A  tablet 1   • lisinopril (PRINIVIL,ZESTRIL) 40 MG tablet Take 1 tablet by mouth Daily. 90 tablet 3   • metFORMIN (GLUCOPHAGE) 1000 MG tablet TAKE 1 TABLET BY MOUTH TWICE A DAY WITH MEALS 180 tablet 1   • metoprolol tartrate (LOPRESSOR) 25 MG tablet TAKE 1 TABLET BY MOUTH EVERY 12 HOURS 180 tablet 1   • montelukast (Singulair) 10 MG tablet Take 1 tablet by mouth Every Night. 90 tablet 0   • OneTouch Ultra test strip TEST BLOOD SUGAR NO MORE THAN ONCE DAILY. DX:E11.9 100 each 1   • potassium chloride (MICRO-K) 10 MEQ CR capsule TAKE 1 CAPSULE BY MOUTH DAILY. AND MAY TAKE EXTRA DOSE WITH LASIX FOR SWELLING AS NEEDED 120 capsule 1   • vitamin C (ASCORBIC ACID) 500 MG tablet Take 1,000 mg by mouth Every Evening.     • Tresiba FlexTouch 100 UNIT/ML solution pen-injector injection INJECT 45 UNITS UNDER THE SKIN EVERY DAY AS DIRECTED 15 pen 3     No facility-administered medications prior to visit.       No opioid medication identified on active medication list. I have reviewed chart for other potential  high risk medication/s and harmful drug interactions in the elderly.          Aspirin is on active medication list. Aspirin use is indicated based on review of current medical condition/s. Pros and cons of this therapy have been discussed today. Benefits of this medication outweigh potential harm.  Patient has been encouraged to continue taking this medication.  .      Patient Active Problem List   Diagnosis   • Angina at rest ([facility])   • Abnormal nuclear stress test   • Coronary artery disease involving native coronary artery of native heart without angina pectoris   • Encounter for diabetic foot exam ([facility])   • Hyperlipidemia   • Erectile dysfunction   • Essential hypertension   • Iron deficiency anemia   • Type 2 diabetes mellitus with hyperglycemia ([facility])   • S/P CABG (coronary artery bypass graft)   • Acute pancreatitis   • Abnormal CT of  "the abdomen   • Diabetic peripheral neuropathy (HCC)   • Acute abdominal pain   • JEAN PAUL (obstructive sleep apnea)   • Obesity (BMI 30-39.9)   • Elevated LFTs   • Acute pancreatitis without infection or necrosis   • Morbidly obese (HCC)   • Type 2 diabetes mellitus with diabetic peripheral angiopathy without gangrene (HCC)   • Primary osteoarthritis of right knee     Advance Care Planning  Advance Directive is not on file.  ACP discussion was held with the patient during this visit. Patient does not have an advance directive, information provided.          Objective    Vitals:    12/16/21 1424   BP: 129/79   BP Location: Left arm   Patient Position: Sitting   Cuff Size: Adult   Pulse: 80   SpO2: 97%   Weight: 100 kg (221 lb 6.4 oz)   Height: 162.6 cm (64.02\")   PainSc: 0-No pain     BMI Readings from Last 1 Encounters:   12/16/21 37.98 kg/m²   BMI is above normal parameters. Recommendations include: educational material, exercise counseling and nutrition counseling    Does the patient have evidence of cognitive impairment? No    Physical Exam  Vitals and nursing note reviewed.   Constitutional:       General: He is not in acute distress.     Appearance: He is well-developed. He is not diaphoretic.   HENT:      Head: Normocephalic and atraumatic.   Eyes:      Pupils: Pupils are equal, round, and reactive to light.   Neck:      Thyroid: No thyromegaly.   Cardiovascular:      Rate and Rhythm: Normal rate and regular rhythm.      Heart sounds: Normal heart sounds. No murmur heard.      Pulmonary:      Effort: Pulmonary effort is normal. No respiratory distress.      Breath sounds: Normal breath sounds.   Abdominal:      General: Bowel sounds are normal. There is no distension.      Palpations: Abdomen is soft.      Tenderness: There is no abdominal tenderness.   Musculoskeletal:         General: Normal range of motion.      Cervical back: Normal range of motion.   Skin:     General: Skin is warm and dry.      Findings: No " erythema.   Neurological:      Mental Status: He is alert and oriented to person, place, and time.   Psychiatric:         Behavior: Behavior normal.         Thought Content: Thought content normal.         Judgment: Judgment normal.       Lab Results   Component Value Date    TRIG 149 12/01/2021    HDL 33 (L) 12/01/2021    LDL 54 12/01/2021    VLDL 26 12/01/2021    HGBA1C 8.1 (H) 12/01/2021            HEALTH RISK ASSESSMENT    Smoking Status:  Social History     Tobacco Use   Smoking Status Never Smoker   Smokeless Tobacco Never Used     Alcohol Consumption:  Social History     Substance and Sexual Activity   Alcohol Use Yes   • Alcohol/week: 1.0 standard drink   • Types: 1 Glasses of wine per week     Fall Risk Screen:    BONNIE Fall Risk Assessment was completed, and patient is at LOW risk for falls.Assessment completed on:12/16/2021    Depression Screening:  PHQ-2/PHQ-9 Depression Screening 12/16/2021   Little interest or pleasure in doing things 0   Feeling down, depressed, or hopeless 0   Trouble falling or staying asleep, or sleeping too much 0   Feeling tired or having little energy 0   Poor appetite or overeating 0   Feeling bad about yourself - or that you are a failure or have let yourself or your family down 0   Trouble concentrating on things, such as reading the newspaper or watching television 0   Moving or speaking so slowly that other people could have noticed. Or the opposite - being so fidgety or restless that you have been moving around a lot more than usual 0   Thoughts that you would be better off dead, or of hurting yourself in some way 0   Total Score 0   If you checked off any problems, how difficult have these problems made it for you to do your work, take care of things at home, or get along with other people? Not difficult at all     ATTENTION  What is the year: correct  What is the month of the year: correct  What is the day of the week?: correct  What is the date?:  incorrect  MEMORY  Repeat address three times, only score third attempt: [address] [place], [place]: 6  HOW MANY ANIMALS DID THE PATIENT NAME  Verbal Fluency -- Animal Names (0-25): 14-16  CLOCK DRAWING  Clock Drawing: All Correct  MEMORY RECALL  Tell me what you remember about that name and address we were repeating at the beginnin  ACE TOTAL SCORE  Total ACE Score - <25/30 strongly suggests cognitive impairment; <21/30 almost certainly shows dementia: 25      Health Habits and Functional and Cognitive Screening:  Functional & Cognitive Status 2021   Do you have difficulty preparing food and eating? No   Do you have difficulty bathing yourself, getting dressed or grooming yourself? No   Do you have difficulty using the toilet? No   Do you have difficulty moving around from place to place? No   Do you have trouble with steps or getting out of a bed or a chair? No   Do you need help using the phone?  No   Are you deaf or do you have serious difficulty hearing?  No   Do you need help with transportation? No   Do you need help shopping? No   Do you need help preparing meals?  No   Do you need help with housework?  No   Do you need help with laundry? No   Do you need help taking your medications? No   Do you need help managing money? No   Do you ever drive or ride in a car without wearing a seat belt? No   Have you felt unusual stress, anger or loneliness in the last month? Yes   Who do you live with? Spouse   If you need help, do you have trouble finding someone available to you? No   Have you been bothered in the last four weeks by sexual problems? No   Do you have difficulty concentrating, remembering or making decisions? No       Age-appropriate Screening Schedule:  Refer to the list below for future screening recommendations based on patient's age, sex and/or medical conditions. Orders for these recommended tests are listed in the plan section. The patient has been provided with a  written plan.    Health Maintenance   Topic Date Due   • ZOSTER VACCINE (1 of 2) Never done   • URINE MICROALBUMIN  12/01/2021   • HEMOGLOBIN A1C  06/01/2022   • DIABETIC FOOT EXAM  10/06/2022   • DIABETIC EYE EXAM  11/03/2022   • LIPID PANEL  12/01/2022   • TDAP/TD VACCINES (2 - Td or Tdap) 06/25/2030   • INFLUENZA VACCINE  Completed              Assessment/Plan   CMS Preventative Services Quick Reference  Risk Factors Identified During Encounter  Cardiovascular Disease  Immunizations Discussed/Encouraged (specific Immunizations; Shingrix and COVID19  Inactivity/Sedentary  Obesity/Overweight        The above risks/problems have been discussed with the patient.  Follow up actions/plans if indicated are seen below in the Assessment/Plan Section.  Pertinent information has been shared with the patient in the After Visit Summary.    Diagnoses and all orders for this visit:    1. Medicare annual wellness visit, subsequent (Primary)    2. Type 2 diabetes mellitus with hyperglycemia, with long-term current use of insulin (HCC)  Comments:  pt has prices available for insulin, will order basaglar as is much lower cost. cont same, 50 u daily.     3. Mixed hyperlipidemia    4. Stage 3a chronic kidney disease (HCC)    Other orders  -     Insulin Glargine (BASAGLAR KWIKPEN) 100 UNIT/ML injection pen; Inject 50 Units under the skin into the appropriate area as directed Every Morning.  Dispense: 15 pen; Refill: 3    -Keep follow-ups with specialist as directed  -Age appropriate preventative counseling provided, including healthy lifestyle modifications and exercise  -Recommend shingles vaccine  -Covid booster up-to-date      Follow Up:   Return for Next scheduled follow up or earlier if needed.     An After Visit Summary and PPPS were made available to the patient.        I spent 30 minutes caring for Shaka on this date of service. This time includes time spent by me in the following activities:preparing for the visit,  reviewing tests, performing a medically appropriate examination and/or evaluation , counseling and educating the patient/family/caregiver and documenting information in the medical record       EMR Dragon transcription disclaimer:  Some of this encounter note is an electronic transcription translation of spoken language to printed text. The electronic translation of spoken language may permit erroneous, or at times, nonsensical words or phrases to be inadvertently transcribed; Although I have reviewed the note for such errors some may still exist.

## 2021-12-23 DIAGNOSIS — N18.31 STAGE 3A CHRONIC KIDNEY DISEASE (HCC): ICD-10-CM

## 2021-12-23 RX ORDER — POTASSIUM CHLORIDE 750 MG/1
CAPSULE, EXTENDED RELEASE ORAL
Qty: 120 CAPSULE | Refills: 1 | Status: SHIPPED | OUTPATIENT
Start: 2021-12-23 | End: 2022-06-24

## 2022-01-31 RX ORDER — BLOOD SUGAR DIAGNOSTIC
STRIP MISCELLANEOUS
Qty: 100 EACH | Refills: 5 | Status: SHIPPED | OUTPATIENT
Start: 2022-01-31 | End: 2022-12-13 | Stop reason: SDUPTHER

## 2022-03-01 ENCOUNTER — CLINICAL SUPPORT (OUTPATIENT)
Dept: FAMILY MEDICINE CLINIC | Facility: CLINIC | Age: 72
End: 2022-03-01

## 2022-03-01 DIAGNOSIS — Z79.4 TYPE 2 DIABETES MELLITUS WITH HYPERGLYCEMIA, WITH LONG-TERM CURRENT USE OF INSULIN: ICD-10-CM

## 2022-03-01 DIAGNOSIS — I10 ESSENTIAL HYPERTENSION: Primary | ICD-10-CM

## 2022-03-01 DIAGNOSIS — E78.2 MIXED HYPERLIPIDEMIA: ICD-10-CM

## 2022-03-01 DIAGNOSIS — E11.65 TYPE 2 DIABETES MELLITUS WITH HYPERGLYCEMIA, WITH LONG-TERM CURRENT USE OF INSULIN: ICD-10-CM

## 2022-03-01 LAB — HBA1C MFR BLD: 7.5 % (ref 3.5–5.6)

## 2022-03-01 PROCEDURE — 80048 BASIC METABOLIC PNL TOTAL CA: CPT | Performed by: NURSE PRACTITIONER

## 2022-03-01 PROCEDURE — 36415 COLL VENOUS BLD VENIPUNCTURE: CPT | Performed by: NURSE PRACTITIONER

## 2022-03-01 PROCEDURE — 83036 HEMOGLOBIN GLYCOSYLATED A1C: CPT | Performed by: NURSE PRACTITIONER

## 2022-03-02 LAB
ANION GAP SERPL CALCULATED.3IONS-SCNC: 14 MMOL/L (ref 5–15)
BUN SERPL-MCNC: 26 MG/DL (ref 8–23)
BUN/CREAT SERPL: 18.6 (ref 7–25)
CALCIUM SPEC-SCNC: 9.4 MG/DL (ref 8.6–10.5)
CHLORIDE SERPL-SCNC: 98 MMOL/L (ref 98–107)
CO2 SERPL-SCNC: 24 MMOL/L (ref 22–29)
CREAT SERPL-MCNC: 1.4 MG/DL (ref 0.76–1.27)
EGFRCR SERPLBLD CKD-EPI 2021: 54.1 ML/MIN/1.73
GLUCOSE SERPL-MCNC: 106 MG/DL (ref 65–99)
POTASSIUM SERPL-SCNC: 4.6 MMOL/L (ref 3.5–5.2)
SODIUM SERPL-SCNC: 136 MMOL/L (ref 136–145)

## 2022-03-03 DIAGNOSIS — E11.65 TYPE 2 DIABETES MELLITUS WITH HYPERGLYCEMIA, WITH LONG-TERM CURRENT USE OF INSULIN: ICD-10-CM

## 2022-03-03 DIAGNOSIS — Z79.4 TYPE 2 DIABETES MELLITUS WITH HYPERGLYCEMIA, WITH LONG-TERM CURRENT USE OF INSULIN: ICD-10-CM

## 2022-03-03 RX ORDER — GLIPIZIDE 5 MG/1
TABLET ORAL
Qty: 270 TABLET | Refills: 1 | Status: SHIPPED | OUTPATIENT
Start: 2022-03-03 | End: 2022-09-01

## 2022-03-08 ENCOUNTER — OFFICE VISIT (OUTPATIENT)
Dept: FAMILY MEDICINE CLINIC | Facility: CLINIC | Age: 72
End: 2022-03-08

## 2022-03-08 VITALS
HEART RATE: 81 BPM | DIASTOLIC BLOOD PRESSURE: 80 MMHG | BODY MASS INDEX: 37.49 KG/M2 | HEIGHT: 64 IN | SYSTOLIC BLOOD PRESSURE: 146 MMHG | WEIGHT: 219.6 LBS | OXYGEN SATURATION: 97 %

## 2022-03-08 DIAGNOSIS — E11.65 TYPE 2 DIABETES MELLITUS WITH HYPERGLYCEMIA, WITH LONG-TERM CURRENT USE OF INSULIN: Primary | ICD-10-CM

## 2022-03-08 DIAGNOSIS — K91.5 POST-CHOLECYSTECTOMY SYNDROME: ICD-10-CM

## 2022-03-08 DIAGNOSIS — J30.89 SEASONAL ALLERGIC RHINITIS DUE TO OTHER ALLERGIC TRIGGER: ICD-10-CM

## 2022-03-08 DIAGNOSIS — N18.31 STAGE 3A CHRONIC KIDNEY DISEASE: ICD-10-CM

## 2022-03-08 DIAGNOSIS — K21.9 GASTROESOPHAGEAL REFLUX DISEASE WITHOUT ESOPHAGITIS: ICD-10-CM

## 2022-03-08 DIAGNOSIS — E78.2 MIXED HYPERLIPIDEMIA: ICD-10-CM

## 2022-03-08 DIAGNOSIS — I10 ESSENTIAL HYPERTENSION: ICD-10-CM

## 2022-03-08 DIAGNOSIS — Z79.4 TYPE 2 DIABETES MELLITUS WITH HYPERGLYCEMIA, WITH LONG-TERM CURRENT USE OF INSULIN: Primary | ICD-10-CM

## 2022-03-08 PROCEDURE — 99214 OFFICE O/P EST MOD 30 MIN: CPT | Performed by: NURSE PRACTITIONER

## 2022-03-08 RX ORDER — MONTELUKAST SODIUM 4 MG/1
1 TABLET, CHEWABLE ORAL DAILY
Qty: 90 TABLET | Refills: 0 | Status: SHIPPED | OUTPATIENT
Start: 2022-03-08 | End: 2022-06-06

## 2022-03-08 RX ORDER — ATORVASTATIN CALCIUM 10 MG/1
10 TABLET, FILM COATED ORAL NIGHTLY
Qty: 90 TABLET | Refills: 3 | Status: SHIPPED | OUTPATIENT
Start: 2022-03-08 | End: 2023-02-28

## 2022-03-08 RX ORDER — MONTELUKAST SODIUM 10 MG/1
TABLET ORAL
Qty: 90 TABLET | Refills: 0 | Status: SHIPPED | OUTPATIENT
Start: 2022-03-08 | End: 2022-06-06

## 2022-03-08 NOTE — PROGRESS NOTES
Chief Complaint  Diabetes, Hypertension, Follow-up (3 mo), and Results (Labs 3/1)    Subjective          Shaka Trinidad presents to [facility] PRIMARY CARE for   History of Present Illness     Diabetes mellitus type II, feels stable on meds, denies any signs/symptoms of hyper/hypoglycemia, blurry vision, polydipsia, polyuria, nocturia, and unintentional weight loss  -He follows with Dr. Lucas for podiatry and foot exams    HTN, slight elevation today. Feels stable on meds and takes as directed, denies chest pain, headache, shortness of air, palpitations and swelling of extremities.     GERD, stable on medication, denies nausea, vomiting, constipation, abdominal pain, he does report daily multiple loose/diarrhea stools since having cholecystectomy.    Stage 3 CKD, stable, monitoring now, has not required referral to nephrology.     Patient is here to review labs        The following portions of the patient's history were reviewed and updated as appropriate: allergies, current medications, past family history, past medical history, past social history, past surgical history and problem list.    Past Medical History:   Diagnosis Date   • Acute pancreatitis 3/14/2020   • Coronary artery disease    • Diabetes mellitus, type II (HCC)    • Elevated cholesterol    • H/O cataract    • Hyperlipidemia    • Hypertension    • Peripheral edema    • Peripheral edema    • Sleep apnea      Past Surgical History:   Procedure Laterality Date   • CARDIAC CATHETERIZATION N/A 7/19/2019    Procedure: Left Heart Cath with angiogram;  Surgeon: Bautista Lopez MD;  Location: [facility] CATH INVASIVE LOCATION;  Service: Cardiovascular   • CARDIAC CATHETERIZATION N/A 7/19/2019    Procedure: Coronary angiography;  Surgeon: Bautsita Lopez MD;  Location: [facility] CATH INVASIVE LOCATION;  Service: Cardiovascular   • CARDIAC CATHETERIZATION N/A 7/19/2019    Procedure: Left ventriculography;  Surgeon: Bautista Lopez MD;  Location:   [facility] CATH INVASIVE LOCATION;  Service: Cardiovascular   • CHOLECYSTECTOMY N/A 1/6/2021    Procedure: CHOLECYSTECTOMY LAPAROSCOPIC;  Surgeon: Giacomo Akins MD;  Location: [facility] MAIN OR;  Service: General;  Laterality: N/A;   • COLONOSCOPY     • CORONARY ARTERY BYPASS GRAFT N/A 8/8/2019    Procedure: CORONARY ARTERY BYPASS GRAFTING;  Surgeon: Chet Mcarthur MD;  Location: [facility] CVOR;  Service: Cardiothoracic   • ENDOSCOPY N/A 3/15/2020    Procedure: ESOPHAGOGASTRODUODENOSCOPY;  Surgeon: Jaspal Logan MD;  Location: [facility] ENDOSCOPY;  Service: Gastroenterology;  Laterality: N/A;  EPIGASTRIC ABDOMINAL PAIN, ABNORMAL CT SCAN, PANCREATITIS  NORMAL EGD   • HERNIA REPAIR     • OTHER SURGICAL HISTORY  12/2015    2d echo-abstracted cent.      Family History   Problem Relation Age of Onset   • Heart failure Mother    • Hypertension Brother    • Cancer Brother    • Heart disease Brother      Social History     Tobacco Use   • Smoking status: Never Smoker   • Smokeless tobacco: Never Used   Substance Use Topics   • Alcohol use: Yes     Alcohol/week: 1.0 standard drink     Types: 1 Glasses of wine per week       Current Outpatient Medications:   •  amLODIPine (NORVASC) 5 MG tablet, TAKE 1 TABLET BY MOUTH EVERY DAY, Disp: 90 tablet, Rfl: 3  •  aspirin 81 MG chewable tablet, Chew 81 mg Every Night., Disp: , Rfl:   •  atorvastatin (LIPITOR) 10 MG tablet, Take 1 tablet by mouth Every Night., Disp: 90 tablet, Rfl: 3  •  B-D UF III MINI PEN NEEDLES 31G X 5 MM misc, USE ONCE DAILY AS DIRECTED, Disp: 100 each, Rfl: 3  •  cetirizine (zyrTEC) 10 MG tablet, Take 10 mg by mouth Daily., Disp: , Rfl:   •  CINNAMON PO, Take  by mouth., Disp: , Rfl:   •  CRANBERRY PO, Take  by mouth., Disp: , Rfl:   •  esomeprazole (nexIUM) 20 MG capsule, Take 20 mg by mouth Every Morning Before Breakfast., Disp: , Rfl:   •  ferrous sulfate 325 (65 FE) MG tablet, TAKE 1 TABLET BY MOUTH EVERY DAY WITH BREAKFAST, Disp: 90 tablet, Rfl:  "1  •  furosemide (LASIX) 20 MG tablet, Take 1 tablet by mouth Daily., Disp: 120 tablet, Rfl: 3  •  glipizide (GLUCOTROL) 5 MG tablet, TAKE 2 TABS IN THE MORNING AND 1 TAB IN THE EVENING, Disp: 270 tablet, Rfl: 1  •  hydrALAZINE (APRESOLINE) 25 MG tablet, TAKE 1 TABLET BY MOUTH THREE TIMES A DAY, Disp: 270 tablet, Rfl: 1  •  Insulin Glargine (BASAGLAR KWIKPEN) 100 UNIT/ML injection pen, Inject 50 Units under the skin into the appropriate area as directed Every Morning., Disp: 15 pen, Rfl: 3  •  lisinopril (PRINIVIL,ZESTRIL) 40 MG tablet, Take 1 tablet by mouth Daily., Disp: 90 tablet, Rfl: 3  •  metFORMIN (GLUCOPHAGE) 1000 MG tablet, TAKE 1 TABLET BY MOUTH TWICE A DAY WITH MEALS, Disp: 180 tablet, Rfl: 1  •  metoprolol tartrate (LOPRESSOR) 25 MG tablet, TAKE 1 TABLET BY MOUTH EVERY 12 HOURS, Disp: 180 tablet, Rfl: 1  •  montelukast (SINGULAIR) 10 MG tablet, TAKE 1 TABLET BY MOUTH EVERY DAY AT NIGHT, Disp: 90 tablet, Rfl: 0  •  OneTouch Ultra test strip, TEST BLOOD SUGAR NO MORE THAN ONE TIME DAILY. E11.9, Disp: 100 each, Rfl: 5  •  potassium chloride (MICRO-K) 10 MEQ CR capsule, TAKE 1 CAPSULE BY MOUTH DAILY. AND MAY TAKE EXTRA DOSE WITH LASIX FOR SWELLING AS NEEDED, Disp: 120 capsule, Rfl: 1  •  vitamin C (ASCORBIC ACID) 500 MG tablet, Take 1,000 mg by mouth Every Evening., Disp: , Rfl:   •  colestipol (COLESTID) 1 g tablet, Take 1 tablet by mouth Daily., Disp: 90 tablet, Rfl: 0    Objective   Vital Signs:   /80 (BP Location: Left arm, Patient Position: Sitting, Cuff Size: Adult)   Pulse 81   Ht 162.6 cm (64.02\")   Wt 99.6 kg (219 lb 9.6 oz)   SpO2 97%   BMI 37.68 kg/m²       Physical Exam  Vitals and nursing note reviewed.   Constitutional:       General: He is not in acute distress.     Appearance: He is well-developed. He is obese. He is not diaphoretic.   HENT:      Head: Normocephalic and atraumatic.   Eyes:      Pupils: Pupils are equal, round, and reactive to light.   Neck:      Thyroid: No " thyromegaly.   Cardiovascular:      Rate and Rhythm: Normal rate and regular rhythm.      Heart sounds: Normal heart sounds. No murmur heard.  Pulmonary:      Effort: Pulmonary effort is normal. No respiratory distress.      Breath sounds: Normal breath sounds.   Abdominal:      General: Bowel sounds are normal. There is no distension.      Palpations: Abdomen is soft.      Tenderness: There is no abdominal tenderness.   Musculoskeletal:         General: No swelling or tenderness. Normal range of motion.      Cervical back: Normal range of motion.   Skin:     General: Skin is warm and dry.      Findings: No erythema.   Neurological:      Mental Status: He is alert and oriented to person, place, and time.   Psychiatric:         Behavior: Behavior normal.         Thought Content: Thought content normal.         Judgment: Judgment normal.          Result Review :     No visits with results within 7 Day(s) from this visit.   Latest known visit with results is:   Appointment on 03/01/2022   Component Date Value Ref Range Status   • Glucose 03/01/2022 106 (A) 65 - 99 mg/dL Final   • BUN 03/01/2022 26 (A) 8 - 23 mg/dL Final   • Creatinine 03/01/2022 1.40 (A) 0.76 - 1.27 mg/dL Final   • Sodium 03/01/2022 136  136 - 145 mmol/L Final   • Potassium 03/01/2022 4.6  3.5 - 5.2 mmol/L Final   • Chloride 03/01/2022 98  98 - 107 mmol/L Final   • CO2 03/01/2022 24.0  22.0 - 29.0 mmol/L Final   • Calcium 03/01/2022 9.4  8.6 - 10.5 mg/dL Final   • BUN/Creatinine Ratio 03/01/2022 18.6  7.0 - 25.0 Final   • Anion Gap 03/01/2022 14.0  5.0 - 15.0 mmol/L Final   • eGFR 03/01/2022 54.1 (A) >60.0 mL/min/1.73 Final    National Kidney Foundation and American Society of Nephrology (ASN) Task Force recommended calculation based on the Chronic Kidney Disease Epidemiology Collaboration (CKD-EPI) equation refit without adjustment for race.   • Hemoglobin A1C 03/01/2022 7.5 (A) 3.5 - 5.6 % Final                       Assessment and Plan    Diagnoses  and all orders for this visit:    1. Type 2 diabetes mellitus with hyperglycemia, with long-term current use of insulin (HCC) (Primary)  Comments:  improved hgba1c, cont meds and basaglar. no changes. continue DM/HHD diet.     2. Mixed hyperlipidemia  -     atorvastatin (LIPITOR) 10 MG tablet; Take 1 tablet by mouth Every Night.  Dispense: 90 tablet; Refill: 3    3. Essential hypertension  Comments:  keep monitoring at home, notify if consistently over 140 systolic notify for med adjustment.     4. Post-cholecystectomy syndrome  Comments:  try colestid daily    5. Seasonal allergic rhinitis due to other allergic trigger  Comments:  Continue Singulair, refilled today    6. Stage 3a chronic kidney disease (HCC)  Comments:  stable, unchanged continue BP/glucose control, limit salt, no nsaids. will cont to monitor, cons nephrology referral if worsens in future    7. Gastroesophageal reflux disease without esophagitis  Comments:  Stable, continue Nexium    Other orders  -     colestipol (COLESTID) 1 g tablet; Take 1 tablet by mouth Daily.  Dispense: 90 tablet; Refill: 0          I spent 30 minutes caring for Shaka Trinidad on this date of service. This time includes time spent by me in the following activities: preparing for the visit, reviewing tests, performing a medically appropriate examination and/or evaluation , counseling and educating the patient/family/caregiver, ordering medications, tests, or procedures and documenting information in the medical record        Follow Up {Instructions Charge Capture  Follow-up Communications :23}    Return in about 3 months (around 6/8/2022) for Recheck DM, HTN, CKD. DM panel and urine micro prior to appt.  Patient was given instructions and counseling regarding his condition or for health maintenance advice. Please see specific information pulled into the AVS if appropriate.        Part of this note may be an electronic transcription/translation of spoken language to  printed text using the Dragon Dictation System

## 2022-04-25 RX ORDER — INSULIN GLARGINE 100 [IU]/ML
50 INJECTION, SOLUTION SUBCUTANEOUS EVERY MORNING
Qty: 15 PEN | Refills: 3 | Status: SHIPPED | OUTPATIENT
Start: 2022-04-25 | End: 2022-08-22

## 2022-05-26 RX ORDER — FERROUS SULFATE 325(65) MG
TABLET ORAL
Qty: 90 TABLET | Refills: 1 | Status: SHIPPED | OUTPATIENT
Start: 2022-05-26 | End: 2022-11-22

## 2022-06-06 RX ORDER — HYDRALAZINE HYDROCHLORIDE 25 MG/1
TABLET, FILM COATED ORAL
Qty: 270 TABLET | Refills: 1 | Status: SHIPPED | OUTPATIENT
Start: 2022-06-06 | End: 2022-12-01

## 2022-06-06 RX ORDER — MONTELUKAST SODIUM 10 MG/1
TABLET ORAL
Qty: 90 TABLET | Refills: 0 | Status: SHIPPED | OUTPATIENT
Start: 2022-06-06 | End: 2022-06-13

## 2022-06-06 RX ORDER — MONTELUKAST SODIUM 4 MG/1
TABLET, CHEWABLE ORAL
Qty: 90 TABLET | Refills: 0 | Status: SHIPPED | OUTPATIENT
Start: 2022-06-06 | End: 2022-06-13

## 2022-06-07 ENCOUNTER — LAB (OUTPATIENT)
Dept: FAMILY MEDICINE CLINIC | Facility: CLINIC | Age: 72
End: 2022-06-07

## 2022-06-07 DIAGNOSIS — I10 ESSENTIAL HYPERTENSION: ICD-10-CM

## 2022-06-07 DIAGNOSIS — E78.2 MIXED HYPERLIPIDEMIA: ICD-10-CM

## 2022-06-07 DIAGNOSIS — Z79.4 TYPE 2 DIABETES MELLITUS WITH HYPERGLYCEMIA, WITH LONG-TERM CURRENT USE OF INSULIN: Primary | ICD-10-CM

## 2022-06-07 DIAGNOSIS — E11.65 TYPE 2 DIABETES MELLITUS WITH HYPERGLYCEMIA, WITH LONG-TERM CURRENT USE OF INSULIN: Primary | ICD-10-CM

## 2022-06-07 LAB — HBA1C MFR BLD: 7.2 % (ref 3.5–5.6)

## 2022-06-07 PROCEDURE — 85027 COMPLETE CBC AUTOMATED: CPT | Performed by: NURSE PRACTITIONER

## 2022-06-07 PROCEDURE — 80053 COMPREHEN METABOLIC PANEL: CPT | Performed by: NURSE PRACTITIONER

## 2022-06-07 PROCEDURE — 84443 ASSAY THYROID STIM HORMONE: CPT | Performed by: NURSE PRACTITIONER

## 2022-06-07 PROCEDURE — 80061 LIPID PANEL: CPT | Performed by: NURSE PRACTITIONER

## 2022-06-07 PROCEDURE — 82043 UR ALBUMIN QUANTITATIVE: CPT | Performed by: NURSE PRACTITIONER

## 2022-06-07 PROCEDURE — 83036 HEMOGLOBIN GLYCOSYLATED A1C: CPT | Performed by: NURSE PRACTITIONER

## 2022-06-07 PROCEDURE — 36415 COLL VENOUS BLD VENIPUNCTURE: CPT | Performed by: NURSE PRACTITIONER

## 2022-06-08 LAB
ALBUMIN SERPL-MCNC: 4.1 G/DL (ref 3.5–5.2)
ALBUMIN UR-MCNC: <1.2 MG/DL
ALBUMIN/GLOB SERPL: 1.6 G/DL
ALP SERPL-CCNC: 54 U/L (ref 39–117)
ALT SERPL W P-5'-P-CCNC: 19 U/L (ref 1–41)
ANION GAP SERPL CALCULATED.3IONS-SCNC: 13 MMOL/L (ref 5–15)
AST SERPL-CCNC: 18 U/L (ref 1–40)
BILIRUB SERPL-MCNC: 0.3 MG/DL (ref 0–1.2)
BUN SERPL-MCNC: 23 MG/DL (ref 8–23)
BUN/CREAT SERPL: 19.8 (ref 7–25)
CALCIUM SPEC-SCNC: 9.2 MG/DL (ref 8.6–10.5)
CHLORIDE SERPL-SCNC: 101 MMOL/L (ref 98–107)
CHOLEST SERPL-MCNC: 90 MG/DL (ref 0–200)
CO2 SERPL-SCNC: 21 MMOL/L (ref 22–29)
CREAT SERPL-MCNC: 1.16 MG/DL (ref 0.76–1.27)
DEPRECATED RDW RBC AUTO: 41.8 FL (ref 37–54)
EGFRCR SERPLBLD CKD-EPI 2021: 67.3 ML/MIN/1.73
ERYTHROCYTE [DISTWIDTH] IN BLOOD BY AUTOMATED COUNT: 13.2 % (ref 12.3–15.4)
GLOBULIN UR ELPH-MCNC: 2.5 GM/DL
GLUCOSE SERPL-MCNC: 76 MG/DL (ref 65–99)
HCT VFR BLD AUTO: 36.7 % (ref 37.5–51)
HDLC SERPL-MCNC: 31 MG/DL (ref 40–60)
HGB BLD-MCNC: 12.3 G/DL (ref 13–17.7)
LDLC SERPL CALC-MCNC: 38 MG/DL (ref 0–100)
LDLC/HDLC SERPL: 1.15 {RATIO}
MCH RBC QN AUTO: 29.4 PG (ref 26.6–33)
MCHC RBC AUTO-ENTMCNC: 33.5 G/DL (ref 31.5–35.7)
MCV RBC AUTO: 87.6 FL (ref 79–97)
PLATELET # BLD AUTO: 234 10*3/MM3 (ref 140–450)
PMV BLD AUTO: 11.4 FL (ref 6–12)
POTASSIUM SERPL-SCNC: 4.2 MMOL/L (ref 3.5–5.2)
PROT SERPL-MCNC: 6.6 G/DL (ref 6–8.5)
RBC # BLD AUTO: 4.19 10*6/MM3 (ref 4.14–5.8)
SODIUM SERPL-SCNC: 135 MMOL/L (ref 136–145)
TRIGL SERPL-MCNC: 117 MG/DL (ref 0–150)
TSH SERPL DL<=0.05 MIU/L-ACNC: 2.58 UIU/ML (ref 0.27–4.2)
VLDLC SERPL-MCNC: 21 MG/DL (ref 5–40)
WBC NRBC COR # BLD: 7.44 10*3/MM3 (ref 3.4–10.8)

## 2022-06-13 ENCOUNTER — OFFICE VISIT (OUTPATIENT)
Dept: FAMILY MEDICINE CLINIC | Facility: CLINIC | Age: 72
End: 2022-06-13

## 2022-06-13 VITALS
HEART RATE: 78 BPM | BODY MASS INDEX: 37.05 KG/M2 | HEIGHT: 64 IN | WEIGHT: 217 LBS | DIASTOLIC BLOOD PRESSURE: 78 MMHG | OXYGEN SATURATION: 97 % | SYSTOLIC BLOOD PRESSURE: 128 MMHG

## 2022-06-13 DIAGNOSIS — E11.65 TYPE 2 DIABETES MELLITUS WITH HYPERGLYCEMIA, WITH LONG-TERM CURRENT USE OF INSULIN: Primary | ICD-10-CM

## 2022-06-13 DIAGNOSIS — E78.2 MIXED HYPERLIPIDEMIA: ICD-10-CM

## 2022-06-13 DIAGNOSIS — Z79.4 TYPE 2 DIABETES MELLITUS WITH HYPERGLYCEMIA, WITH LONG-TERM CURRENT USE OF INSULIN: Primary | ICD-10-CM

## 2022-06-13 DIAGNOSIS — K21.9 GASTROESOPHAGEAL REFLUX DISEASE WITHOUT ESOPHAGITIS: ICD-10-CM

## 2022-06-13 DIAGNOSIS — R51.9 PRESSURE IN HEAD: ICD-10-CM

## 2022-06-13 DIAGNOSIS — D50.9 IRON DEFICIENCY ANEMIA, UNSPECIFIED IRON DEFICIENCY ANEMIA TYPE: ICD-10-CM

## 2022-06-13 DIAGNOSIS — I10 ESSENTIAL HYPERTENSION: ICD-10-CM

## 2022-06-13 DIAGNOSIS — J30.2 SEASONAL ALLERGIC RHINITIS, UNSPECIFIED TRIGGER: ICD-10-CM

## 2022-06-13 DIAGNOSIS — N18.2 CKD (CHRONIC KIDNEY DISEASE) STAGE 2, GFR 60-89 ML/MIN: ICD-10-CM

## 2022-06-13 DIAGNOSIS — K91.5 POST-CHOLECYSTECTOMY SYNDROME: ICD-10-CM

## 2022-06-13 PROCEDURE — 99214 OFFICE O/P EST MOD 30 MIN: CPT | Performed by: NURSE PRACTITIONER

## 2022-06-13 NOTE — PROGRESS NOTES
Chief Complaint  Chief Complaint   Patient presents with   • Diabetes   • Hypertension   • Chronic Kidney Disease   • Follow-up     3 mo   • Results     Labs 6/7   • Headache     Pressure in forehead    • Neck Pain     Left side of neck, reports pain when he moves it sometimes   • Medication Problem     Pt reports that colestipol made symptoms worse, singulair and zyrtecmade cough worse.           Subjective          Shaka Trinidad presents to [facility] PRIMARY CARE for   History of Present Illness     Diabetes mellitus type II, feels stable on insulin and oral meds, denies any signs/symptoms of hyper/hypoglycemia, blurry vision, polydipsia, polyuria, nocturia, and unintentional weight loss    HTN, stable on meds and takes as directed, denies chest pain, shortness of air, palpitations and he does have swelling of extremities.  He reports his blood pressure is always elevated 160-170 SBP in the mornings, then around 120's through the day.     He reports having a pressure like headache that comes and goes, has been present for several months, he has been off balance 1-2 times, denies dizziness, falls, sinus/nasal drainage.  He stopped Singulair due to developing cough and reports the cough has resolved. He also reports a left-sided neck pain that occurs seldomly with rotation.  His wife has been ill and in the hospital several times, she is currently in rehab and he reports having more stress.     CKD, monitoring, has been stable.  Does not follow with nephrology    Allergy symptoms resolved, he denies nasal drainage, postnasal drip, stopped singulair d/t cough side effect    Post Choley syndrome, patient reports Colestid did not improve symptoms    Anemia, on ferrous sulfate daily    Here to review labs        The following portions of the patient's history were reviewed and updated as appropriate: allergies, current medications, past family history, past medical history, past social history,  past surgical history and problem list.    Past Medical History:   Diagnosis Date   • Acute pancreatitis 3/14/2020   • Coronary artery disease    • Diabetes mellitus, type II (HCC)    • Elevated cholesterol    • H/O cataract    • Hyperlipidemia    • Hypertension    • Peripheral edema    • Peripheral edema    • Sleep apnea      Past Surgical History:   Procedure Laterality Date   • CARDIAC CATHETERIZATION N/A 7/19/2019    Procedure: Left Heart Cath with angiogram;  Surgeon: Bautista Lopez MD;  Location: [facility] CATH INVASIVE LOCATION;  Service: Cardiovascular   • CARDIAC CATHETERIZATION N/A 7/19/2019    Procedure: Coronary angiography;  Surgeon: Bautista Lopez MD;  Location: [facility] CATH INVASIVE LOCATION;  Service: Cardiovascular   • CARDIAC CATHETERIZATION N/A 7/19/2019    Procedure: Left ventriculography;  Surgeon: Bautista Lopez MD;  Location: [facility] CATH INVASIVE LOCATION;  Service: Cardiovascular   • CHOLECYSTECTOMY N/A 1/6/2021    Procedure: CHOLECYSTECTOMY LAPAROSCOPIC;  Surgeon: Giacomo Akins MD;  Location: [facility] MAIN OR;  Service: General;  Laterality: N/A;   • COLONOSCOPY     • CORONARY ARTERY BYPASS GRAFT N/A 8/8/2019    Procedure: CORONARY ARTERY BYPASS GRAFTING;  Surgeon: Chet Mcarthur MD;  Location: [facility] CVOR;  Service: Cardiothoracic   • ENDOSCOPY N/A 3/15/2020    Procedure: ESOPHAGOGASTRODUODENOSCOPY;  Surgeon: Jaspal Logan MD;  Location: [facility] ENDOSCOPY;  Service: Gastroenterology;  Laterality: N/A;  EPIGASTRIC ABDOMINAL PAIN, ABNORMAL CT SCAN, PANCREATITIS  NORMAL EGD   • HERNIA REPAIR     • OTHER SURGICAL HISTORY  12/2015    2d echo-abstracted cent.      Family History   Problem Relation Age of Onset   • Heart failure Mother    • Hypertension Brother    • Cancer Brother    • Heart disease Brother      Social History     Tobacco Use   • Smoking status: Never Smoker   • Smokeless tobacco: Never Used   Substance Use Topics   • Alcohol use: Yes     Alcohol/week: 1.0  standard drink     Types: 1 Glasses of wine per week       Current Outpatient Medications:   •  ferrous sulfate 325 (65 FE) MG tablet, TAKE 1 TABLET BY MOUTH EVERY DAY WITH BREAKFAST, Disp: 90 tablet, Rfl: 1  •  amLODIPine (NORVASC) 5 MG tablet, TAKE 1 TABLET BY MOUTH EVERY DAY, Disp: 90 tablet, Rfl: 3  •  aspirin 81 MG chewable tablet, Chew 81 mg Every Night., Disp: , Rfl:   •  atorvastatin (LIPITOR) 10 MG tablet, Take 1 tablet by mouth Every Night., Disp: 90 tablet, Rfl: 3  •  B-D UF III MINI PEN NEEDLES 31G X 5 MM misc, USE ONCE DAILY AS DIRECTED, Disp: 100 each, Rfl: 3  •  CINNAMON PO, Take  by mouth., Disp: , Rfl:   •  CRANBERRY PO, Take  by mouth., Disp: , Rfl:   •  furosemide (LASIX) 20 MG tablet, Take 1 tablet by mouth Daily., Disp: 120 tablet, Rfl: 3  •  glipizide (GLUCOTROL) 5 MG tablet, TAKE 2 TABS IN THE MORNING AND 1 TAB IN THE EVENING, Disp: 270 tablet, Rfl: 1  •  hydrALAZINE (APRESOLINE) 25 MG tablet, TAKE 1 TABLET BY MOUTH THREE TIMES A DAY, Disp: 270 tablet, Rfl: 1  •  Insulin Glargine (BASAGLAR KWIKPEN) 100 UNIT/ML injection pen, INJECT 50 UNITS UNDER THE SKIN INTO THE APPROPRIATE AREA AS DIRECTED EVERY MORNING., Disp: 15 pen, Rfl: 3  •  lisinopril (PRINIVIL,ZESTRIL) 40 MG tablet, Take 1 tablet by mouth Daily., Disp: 90 tablet, Rfl: 3  •  metFORMIN (GLUCOPHAGE) 1000 MG tablet, TAKE 1 TABLET BY MOUTH TWICE A DAY WITH MEALS, Disp: 180 tablet, Rfl: 1  •  metoprolol tartrate (LOPRESSOR) 25 MG tablet, TAKE 1 TABLET BY MOUTH EVERY 12 HOURS, Disp: 180 tablet, Rfl: 1  •  OneTouch Ultra test strip, TEST BLOOD SUGAR NO MORE THAN ONE TIME DAILY. E11.9, Disp: 100 each, Rfl: 5  •  potassium chloride (MICRO-K) 10 MEQ CR capsule, TAKE 1 CAPSULE BY MOUTH DAILY. AND MAY TAKE EXTRA DOSE WITH LASIX FOR SWELLING AS NEEDED, Disp: 120 capsule, Rfl: 1  •  vitamin C (ASCORBIC ACID) 500 MG tablet, Take 1,000 mg by mouth Every Evening., Disp: , Rfl:     Objective   Vital Signs:   /78 (BP Location: Left arm, Patient  "Position: Sitting, Cuff Size: Adult)   Pulse 78   Ht 162.6 cm (64.02\")   Wt 98.4 kg (217 lb)   SpO2 97%   BMI 37.23 kg/m²           Physical Exam  Vitals and nursing note reviewed.   Constitutional:       General: He is not in acute distress.     Appearance: He is well-developed. He is not diaphoretic.   HENT:      Head: Normocephalic and atraumatic.      Right Ear: Tympanic membrane and ear canal normal. There is no impacted cerumen.      Left Ear: Tympanic membrane and ear canal normal. There is no impacted cerumen.      Nose: Rhinorrhea present. No congestion.      Mouth/Throat:      Pharynx: No oropharyngeal exudate or posterior oropharyngeal erythema.   Eyes:      Extraocular Movements: Extraocular movements intact.      Conjunctiva/sclera: Conjunctivae normal.      Pupils: Pupils are equal, round, and reactive to light.   Neck:      Thyroid: No thyromegaly.   Cardiovascular:      Rate and Rhythm: Normal rate and regular rhythm.      Heart sounds: Normal heart sounds. No murmur heard.  Pulmonary:      Effort: Pulmonary effort is normal. No respiratory distress.      Breath sounds: Normal breath sounds.   Abdominal:      General: Bowel sounds are normal. There is no distension.      Palpations: Abdomen is soft.      Tenderness: There is no abdominal tenderness.   Musculoskeletal:         General: Swelling (+1 pitting BLE) present. No tenderness (nontender C-spine or left neck on exam, good rom). Normal range of motion.      Cervical back: Normal range of motion.   Skin:     General: Skin is warm and dry.      Findings: No erythema.   Neurological:      Mental Status: He is alert and oriented to person, place, and time.   Psychiatric:         Behavior: Behavior normal.         Thought Content: Thought content normal.         Judgment: Judgment normal.          Result Review :     Lab on 06/07/2022   Component Date Value Ref Range Status   • WBC 06/07/2022 7.44  3.40 - 10.80 10*3/mm3 Final   • RBC 06/07/2022 " 4.19  4.14 - 5.80 10*6/mm3 Final   • Hemoglobin 06/07/2022 12.3 (A) 13.0 - 17.7 g/dL Final   • Hematocrit 06/07/2022 36.7 (A) 37.5 - 51.0 % Final   • MCV 06/07/2022 87.6  79.0 - 97.0 fL Final   • MCH 06/07/2022 29.4  26.6 - 33.0 pg Final   • MCHC 06/07/2022 33.5  31.5 - 35.7 g/dL Final   • RDW 06/07/2022 13.2  12.3 - 15.4 % Final   • RDW-SD 06/07/2022 41.8  37.0 - 54.0 fl Final   • MPV 06/07/2022 11.4  6.0 - 12.0 fL Final   • Platelets 06/07/2022 234  140 - 450 10*3/mm3 Final   • Glucose 06/07/2022 76  65 - 99 mg/dL Final   • BUN 06/07/2022 23  8 - 23 mg/dL Final   • Creatinine 06/07/2022 1.16  0.76 - 1.27 mg/dL Final   • Sodium 06/07/2022 135 (A) 136 - 145 mmol/L Final   • Potassium 06/07/2022 4.2  3.5 - 5.2 mmol/L Final   • Chloride 06/07/2022 101  98 - 107 mmol/L Final   • CO2 06/07/2022 21.0 (A) 22.0 - 29.0 mmol/L Final   • Calcium 06/07/2022 9.2  8.6 - 10.5 mg/dL Final   • Total Protein 06/07/2022 6.6  6.0 - 8.5 g/dL Final   • Albumin 06/07/2022 4.10  3.50 - 5.20 g/dL Final   • ALT (SGPT) 06/07/2022 19  1 - 41 U/L Final   • AST (SGOT) 06/07/2022 18  1 - 40 U/L Final   • Alkaline Phosphatase 06/07/2022 54  39 - 117 U/L Final   • Total Bilirubin 06/07/2022 0.3  0.0 - 1.2 mg/dL Final   • Globulin 06/07/2022 2.5  gm/dL Final   • A/G Ratio 06/07/2022 1.6  g/dL Final   • BUN/Creatinine Ratio 06/07/2022 19.8  7.0 - 25.0 Final   • Anion Gap 06/07/2022 13.0  5.0 - 15.0 mmol/L Final   • eGFR 06/07/2022 67.3  >60.0 mL/min/1.73 Final    National Kidney Foundation and American Society of Nephrology (ASN) Task Force recommended calculation based on the Chronic Kidney Disease Epidemiology Collaboration (CKD-EPI) equation refit without adjustment for race.   • Hemoglobin A1C 06/07/2022 7.2 (A) 3.5 - 5.6 % Final   • Total Cholesterol 06/07/2022 90  0 - 200 mg/dL Final   • Triglycerides 06/07/2022 117  0 - 150 mg/dL Final   • HDL Cholesterol 06/07/2022 31 (A) 40 - 60 mg/dL Final   • LDL Cholesterol  06/07/2022 38  0 - 100 mg/dL  Final   • VLDL Cholesterol 06/07/2022 21  5 - 40 mg/dL Final   • LDL/HDL Ratio 06/07/2022 1.15   Final   • TSH 06/07/2022 2.580  0.270 - 4.200 uIU/mL Final   • Microalbumin, Urine 06/07/2022 <1.2  mg/dL Final                  Class 2 Severe Obesity (BMI >=35 and <=39.9). Obesity-related health conditions include the following: hypertension, diabetes mellitus and dyslipidemias. Obesity is unchanged. BMI is is above average; BMI management plan is completed. We discussed portion control and increasing exercise.           Assessment and Plan    Diagnoses and all orders for this visit:    1. Type 2 diabetes mellitus with hyperglycemia, with long-term current use of insulin (HCC) (Primary)  Comments:  Hemoglobin A1c improved to 7.2, continue insulin and oral medications. check BG 1-2 times/day.  Recheck in 6 months    2. Mixed hyperlipidemia  Comments:  Lipids reviewed and stable, continue statin    3. Essential hypertension  Comments:  BP stable, continue meds, keep BP log and show to Dr. Lopez later this week.    4. Iron deficiency anemia, unspecified iron deficiency anemia type  Comments:  Chronic anemia, continue ferrous sulfate    5. Pressure in head  Comments:  likely allergy induced versus stress, pt to keep BP log at home and take to Dr. Lopez later this week. if s/s do not improve consider CT head    6. Seasonal allergic rhinitis, unspecified trigger  Comments:  rec try claritin or zyrtec prn when develops pressure in head and mucinex for 3-4 days.     7. Gastroesophageal reflux disease without esophagitis  Comments:  Stopped Nexium, symptoms are stable    8. Post-cholecystectomy syndrome  Comments:  stable off colestid    9. CKD (chronic kidney disease) stage 2, GFR 60-89 ml/min  Comments:  Improved from CKD stage III          I spent 30 minutes caring for Shaka Trinidad on this date of service. This time includes time spent by me in the following activities: preparing for the visit, reviewing tests,  performing a medically appropriate examination and/or evaluation , counseling and educating the patient/family/caregiver, ordering medications, tests, or procedures and documenting information in the medical record        Follow Up     Return in about 6 months (around 12/13/2022) for Recheck DM. DM panel prior .  Patient was given instructions and counseling regarding his condition or for health maintenance advice. Please see specific information pulled into the AVS if appropriate.        Part of this note may be an electronic transcription/translation of spoken language to printed text using the Dragon Dictation System

## 2022-06-15 ENCOUNTER — OFFICE VISIT (OUTPATIENT)
Dept: CARDIOLOGY | Facility: CLINIC | Age: 72
End: 2022-06-15

## 2022-06-15 VITALS
HEIGHT: 64 IN | DIASTOLIC BLOOD PRESSURE: 72 MMHG | BODY MASS INDEX: 36.88 KG/M2 | HEART RATE: 65 BPM | SYSTOLIC BLOOD PRESSURE: 129 MMHG | OXYGEN SATURATION: 96 % | WEIGHT: 216 LBS

## 2022-06-15 DIAGNOSIS — I25.118 CORONARY ARTERY DISEASE OF NATIVE ARTERY OF NATIVE HEART WITH STABLE ANGINA PECTORIS: Primary | ICD-10-CM

## 2022-06-15 DIAGNOSIS — I10 ESSENTIAL HYPERTENSION: ICD-10-CM

## 2022-06-15 DIAGNOSIS — E78.2 MIXED HYPERLIPIDEMIA: ICD-10-CM

## 2022-06-15 DIAGNOSIS — G47.33 OBSTRUCTIVE SLEEP APNEA: ICD-10-CM

## 2022-06-15 DIAGNOSIS — E11.9 TYPE 2 DIABETES MELLITUS WITHOUT COMPLICATION, WITHOUT LONG-TERM CURRENT USE OF INSULIN: ICD-10-CM

## 2022-06-15 PROCEDURE — 99214 OFFICE O/P EST MOD 30 MIN: CPT | Performed by: INTERNAL MEDICINE

## 2022-06-15 RX ORDER — GUAIFENESIN 600 MG/1
1200 TABLET, EXTENDED RELEASE ORAL 2 TIMES DAILY
COMMUNITY
End: 2023-03-13

## 2022-06-15 RX ORDER — LORATADINE 10 MG/1
10 TABLET ORAL DAILY
COMMUNITY
End: 2023-03-13

## 2022-06-15 NOTE — PROGRESS NOTES
Subjective:     Encounter Date:06/15/2022      Patient ID: Shaka Trinidad is a 72 y.o. male.    Chief Complaint:  History of Present Illness 72-year-old white male with history of coronary disease hyperlipidemia hypertension diabetes and obstructive sleep apnea presents to my office for follow-up.  Patient is currently stable without any symptoms of chest pain or shortness of breath at rest or exertion.  No complains of any PND orthopnea.  No palpitation dizziness syncope.  Patient has some swelling of the feet.  Patient has been taking all the medicines regularly.  Patient does not smoke.  Patient is trying exercise regularly.  Follows a good diet.    The following portions of the patient's history were reviewed and updated as appropriate: allergies, current medications, past family history, past medical history, past social history, past surgical history and problem list.  Past Medical History:   Diagnosis Date   • Acute pancreatitis 3/14/2020   • Coronary artery disease    • Diabetes mellitus, type II (HCC)    • Elevated cholesterol    • H/O cataract     cataracts    • Hyperlipidemia    • Hypertension    • Peripheral edema    • Peripheral edema    • Sleep apnea     oral appliance     Past Surgical History:   Procedure Laterality Date   • CARDIAC CATHETERIZATION N/A 7/19/2019    Procedure: Left Heart Cath with angiogram;  Surgeon: Bautista Lopez MD;  Location: [facility] CATH INVASIVE LOCATION;  Service: Cardiovascular   • CARDIAC CATHETERIZATION N/A 7/19/2019    Procedure: Coronary angiography;  Surgeon: Bautista Lopez MD;  Location: [facility] CATH INVASIVE LOCATION;  Service: Cardiovascular   • CARDIAC CATHETERIZATION N/A 7/19/2019    Procedure: Left ventriculography;  Surgeon: Bautista Lopez MD;  Location: [facility] CATH INVASIVE LOCATION;  Service: Cardiovascular   • CHOLECYSTECTOMY N/A 1/6/2021    Procedure: CHOLECYSTECTOMY LAPAROSCOPIC;  Surgeon: Giacomo Akins MD;  Location: [facility] MAIN OR;   "Service: General;  Laterality: N/A;   • COLONOSCOPY     • CORONARY ARTERY BYPASS GRAFT N/A 8/8/2019    Procedure: CORONARY ARTERY BYPASS GRAFTING;  Surgeon: Chet Mcarthur MD;  Location: [facility] CVOR;  Service: Cardiothoracic   • ENDOSCOPY N/A 3/15/2020    Procedure: ESOPHAGOGASTRODUODENOSCOPY;  Surgeon: Jaspal Logan MD;  Location: [facility] ENDOSCOPY;  Service: Gastroenterology;  Laterality: N/A;  EPIGASTRIC ABDOMINAL PAIN, ABNORMAL CT SCAN, PANCREATITIS  NORMAL EGD   • HERNIA REPAIR     • OTHER SURGICAL HISTORY  12/2015    2d echo-abstracted cent.      /72 (BP Location: Left arm, Patient Position: Sitting)   Pulse 65   Ht 162.6 cm (64\")   Wt 98 kg (216 lb)   SpO2 96%   BMI 37.08 kg/m²   Family History   Problem Relation Age of Onset   • Heart failure Mother    • Hypertension Brother    • Cancer Brother    • Heart disease Brother        Current Outpatient Medications:   •  amLODIPine (NORVASC) 5 MG tablet, TAKE 1 TABLET BY MOUTH EVERY DAY, Disp: 90 tablet, Rfl: 3  •  aspirin 81 MG chewable tablet, Chew 81 mg Every Night., Disp: , Rfl:   •  atorvastatin (LIPITOR) 10 MG tablet, Take 1 tablet by mouth Every Night., Disp: 90 tablet, Rfl: 3  •  B-D UF III MINI PEN NEEDLES 31G X 5 MM misc, USE ONCE DAILY AS DIRECTED, Disp: 100 each, Rfl: 3  •  CINNAMON PO, Take  by mouth., Disp: , Rfl:   •  CRANBERRY PO, Take  by mouth., Disp: , Rfl:   •  ferrous sulfate 325 (65 FE) MG tablet, TAKE 1 TABLET BY MOUTH EVERY DAY WITH BREAKFAST, Disp: 90 tablet, Rfl: 1  •  furosemide (LASIX) 20 MG tablet, Take 1 tablet by mouth Daily., Disp: 120 tablet, Rfl: 3  •  glipizide (GLUCOTROL) 5 MG tablet, TAKE 2 TABS IN THE MORNING AND 1 TAB IN THE EVENING, Disp: 270 tablet, Rfl: 1  •  guaiFENesin (MUCINEX) 600 MG 12 hr tablet, Take 1,200 mg by mouth 2 (Two) Times a Day., Disp: , Rfl:   •  hydrALAZINE (APRESOLINE) 25 MG tablet, TAKE 1 TABLET BY MOUTH THREE TIMES A DAY, Disp: 270 tablet, Rfl: 1  •  Insulin Glargine (BASAGLAR " KWIKPEN) 100 UNIT/ML injection pen, INJECT 50 UNITS UNDER THE SKIN INTO THE APPROPRIATE AREA AS DIRECTED EVERY MORNING., Disp: 15 pen, Rfl: 3  •  lisinopril (PRINIVIL,ZESTRIL) 40 MG tablet, Take 1 tablet by mouth Daily., Disp: 90 tablet, Rfl: 3  •  loratadine (Claritin) 10 MG tablet, Take 10 mg by mouth Daily., Disp: , Rfl:   •  metFORMIN (GLUCOPHAGE) 1000 MG tablet, TAKE 1 TABLET BY MOUTH TWICE A DAY WITH MEALS, Disp: 180 tablet, Rfl: 1  •  metoprolol tartrate (LOPRESSOR) 25 MG tablet, TAKE 1 TABLET BY MOUTH EVERY 12 HOURS, Disp: 180 tablet, Rfl: 1  •  OneTouch Ultra test strip, TEST BLOOD SUGAR NO MORE THAN ONE TIME DAILY. E11.9, Disp: 100 each, Rfl: 5  •  potassium chloride (MICRO-K) 10 MEQ CR capsule, TAKE 1 CAPSULE BY MOUTH DAILY. AND MAY TAKE EXTRA DOSE WITH LASIX FOR SWELLING AS NEEDED, Disp: 120 capsule, Rfl: 1  •  vitamin C (ASCORBIC ACID) 500 MG tablet, Take 1,000 mg by mouth Every Evening., Disp: , Rfl:   No Known Allergies  Social History     Socioeconomic History   • Marital status:    Tobacco Use   • Smoking status: Never Smoker   • Smokeless tobacco: Never Used   Vaping Use   • Vaping Use: Never used   Substance and Sexual Activity   • Alcohol use: Yes     Alcohol/week: 1.0 standard drink     Types: 1 Glasses of wine per week   • Drug use: Never   • Sexual activity: Defer     Review of Systems   Constitutional: Negative for fever and malaise/fatigue.   Cardiovascular: Positive for leg swelling. Negative for chest pain, dyspnea on exertion and palpitations.   Respiratory: Negative for cough and shortness of breath.    Skin: Negative for rash.   Gastrointestinal: Negative for abdominal pain, nausea and vomiting.   Neurological: Negative for focal weakness and headaches.   All other systems reviewed and are negative.             Objective:     Constitutional:       Appearance: Well-developed.   Eyes:      General: No scleral icterus.     Conjunctiva/sclera: Conjunctivae normal.   HENT:      Head:  Normocephalic and atraumatic.   Neck:      Vascular: No carotid bruit or JVD.   Pulmonary:      Effort: Pulmonary effort is normal.      Breath sounds: Normal breath sounds. No wheezing. No rales.   Cardiovascular:      Normal rate. Regular rhythm.   Pulses:     Intact distal pulses.   Abdominal:      General: Bowel sounds are normal.      Palpations: Abdomen is soft.   Musculoskeletal:      Cervical back: Normal range of motion and neck supple. Skin:     General: Skin is warm and dry.      Findings: No rash.   Neurological:      Mental Status: Alert.       Procedures    Lab Review:         MDM  1.  Coronary disease  Patient had coronary bypass surgery x3 vessels with a LIMA to LAD and saphenous graft to the marginal branch and RCA and has normal function  2.  Diabetes  Patient is on insulin and followed by the primary care doctor  3.  Hypertension  Patient blood pressure currently stable on multiple medications including beta-blockers lisinopril  Before hyperlipidemia  Patient on statins and the lipid levels are followed by the primary care doctor.    Patient's previous medical records, labs, and EKG were reviewed and discussed with the patient at today's visit.

## 2022-06-24 DIAGNOSIS — N18.31 STAGE 3A CHRONIC KIDNEY DISEASE: ICD-10-CM

## 2022-06-24 RX ORDER — POTASSIUM CHLORIDE 750 MG/1
CAPSULE, EXTENDED RELEASE ORAL
Qty: 120 CAPSULE | Refills: 1 | Status: SHIPPED | OUTPATIENT
Start: 2022-06-24 | End: 2022-12-19

## 2022-07-20 DIAGNOSIS — I10 ESSENTIAL HYPERTENSION: ICD-10-CM

## 2022-07-21 RX ORDER — FUROSEMIDE 20 MG/1
TABLET ORAL
Qty: 120 TABLET | Refills: 3 | Status: SHIPPED | OUTPATIENT
Start: 2022-07-21 | End: 2022-09-19

## 2022-08-14 NOTE — THERAPY TREATMENT NOTE
Acute Care - Physical Therapy Treatment Note  JESUS Welch     Patient Name: Shaka Trinidad  : 1950  MRN: [id number]  Today's Date: 2019             Admit Date: 2019    Visit Dx:    ICD-10-CM ICD-9-CM   1. Coronary artery disease of native artery of native heart with stable angina pectoris (CMS/HCC) I25.118 414.01     413.9     Patient Active Problem List   Diagnosis   • Angina at rest (CMS/HCC)   • Abnormal nuclear stress test   • Coronary artery disease of native artery of native heart with stable angina pectoris (CMS/HCC)   • CAD (coronary artery disease)       Therapy Treatment    Rehabilitation Treatment Summary     Row Name 19 1330 19 1012          Treatment Time/Intention    Discipline  physical therapist  -HD  occupational therapist  -SR     Document Type  therapy note (daily note)  -HD  therapy note (daily note)  -SR     Subjective Information  complains of;pain  -HD2  --     Therapy Frequency (PT Clinical Impression)  3 times/wk  -HD  --     Existing Precautions/Restrictions  sternal  -HD  --     Recorded by [HD] Soledad Rodriguez, PT 19 1335  [HD2] Soledad Rodriguez, PT 19 1344 [SR] María Mabry, OT 19 1243     Row Name 19 1330             Vital Signs    Pre Systolic BP Rehab  154  -HD      Pre Treatment Diastolic BP  68  -HD      Post Systolic BP Rehab  156  -HD2      Post Treatment Diastolic BP  67  -HD2      Pretreatment Heart Rate (beats/min)  75  -HD      Intratreatment Heart Rate (beats/min)  85  -HD3      Posttreatment Heart Rate (beats/min)  86  -HD4      Pre SpO2 (%)  100  -HD      O2 Delivery Pre Treatment  room air  -HD      Intra SpO2 (%)  97  -HD3      O2 Delivery Intra Treatment  room air  -HD3      Post SpO2 (%)  98  -HD2      O2 Delivery Post Treatment  room air  -HD2      Recorded by [HD] Soledad Rodriguez, PT 19 1335  [HD2] Soledad Rodriguez, PT 19 1356  [HD3] Soledad Rodriguez, PT 19 1344  [HD4] Soledad Rodriguez, PT  08/12/19 1349      Row Name 08/12/19 1330 08/12/19 1012          Cognitive Assessment/Intervention- PT/OT    Orientation Status (Cognition)  oriented x 4  -HD  oriented x 3  -SR     Recorded by [HD] Soledad Rodriguez, PT 08/12/19 1335 [SR] María Mabry, OT 08/12/19 1243     Row Name 08/12/19 1012             Functional Mobility    Functional Mobility- Ind. Level  supervision required  -SR      Functional Mobility- Device  rolling walker  -SR      Recorded by [SR] María Mabry, OT 08/12/19 1257      Row Name 08/12/19 1330 08/12/19 1012          Transfer Assessment/Treatment    Transfer Assessment/Treatment  sit-stand transfer;stand-sit transfer  -HD  sit-stand transfer  -SR     Comment (Transfers)  --  Pt required assist to stand from chair initially, though improved.  -SR     Recorded by [HD] Soledad Rodriguez, PT 08/12/19 1335 [SR] María Mabry, OT 08/12/19 1257     Row Name 08/12/19 1330 08/12/19 1012          Sit-Stand Transfer    Sit-Stand [place] (Transfers)  minimum assist (75% patient effort)  -HD  minimum assist (75% patient effort);contact guard  -SR     Recorded by [HD] Soledad Rodriguez, PT 08/12/19 1344 [SR] María Mabry, OT 08/12/19 1257     Row Name 08/12/19 1330             Stand-Sit Transfer    Stand-Sit [place] (Transfers)  minimum assist (75% patient effort)  -HD      Recorded by [HD] Soledad Rodriguez, PT 08/12/19 1344      Row Name 08/12/19 1330             Gait/Stairs Assessment/Training    Gait/Stairs Assessment/Training  gait/ambulation independence;gait/ambulation assistive device  -HD      [place] Level (Gait)  contact guard  -HD2      Assistive Device (Gait)  walker, front-wheeled  -HD2      Distance in Feet (Gait)  2x90 ft  -HD2      Comment (Gait/Stairs)  slow minesh, one standing rest break due to fatigue   -HD3      Recorded by [HD] Soledad Rodriguez, PT 08/12/19 1335  [HD2] Soledad Rodriguez, PT 08/12/19 1344  [HD3] Soledad Rodriguez, PT 08/12/19 9062       Row Name 08/12/19 1330 08/12/19 1012          Motor Skills Assessment/Interventions    Additional Documentation  Therapeutic Exercise (Group);Therapeutic Exercise Interventions (Group)  -HD  Therapeutic Exercise Interventions (Group);Therapeutic Exercise (Group)  -SR     Recorded by [HD] Soledad Rodriguez, PT 08/12/19 1356 [SR] María Mabry, OT 08/12/19 1257     Row Name 08/12/19 1330 08/12/19 1012          Therapeutic Exercise    Comment (Therapeutic Exercise)  BLE seated LAQ, seated marching, ankle pumps 2x10 reps  -HD  Pt completed cardiac exercises with therapist with min cues.  He also completed 2 sets of 5 sit to stands from chair for LE strengthening.    -SR     Recorded by [HD] Soledad Rodriguez, PT 08/12/19 1356 [SR] María Mabry, OT 08/12/19 1257     Row Name 08/12/19 1330 08/12/19 1012          Positioning and Restraints    Pre-Treatment Position  sitting in chair/recliner  -HD  sitting in chair/recliner  -SR     Post Treatment Position  chair  -HD  chair  -SR     In Chair  call light within reach;encouraged to call for assist;notified nsg  -HD  with family/caregiver;call light within reach;encouraged to call for assist  -SR     Recorded by [HD] Soledad Rodriguez, PT 08/12/19 1344 [SR] María Mabry, OT 08/12/19 1257     Row Name 08/12/19 1330 08/12/19 1012          Pain Scale: Numbers Pre/Post-Treatment    Pain Scale: Numbers, Pretreatment  1/10  -HD  0/10 - no pain  -SR     Pain Scale: Numbers, Post-Treatment  1/10  -HD2  --     Pain Location  -- chest and right knee  -HD3  --     Recorded by [HD] Soledad Rodriguez, PT 08/12/19 1335  [HD2] Soledad Rodriguez, PT 08/12/19 1356  [HD3] Soledad Rodriguez, PT 08/12/19 1344 [SR] María Mabry, OT 08/12/19 1257     Row Name                Wound 08/08/19 1256 Other (See comments) torso Incision    Wound - Properties Group Date first assessed: 08/08/19 [AT] Time first assessed: 1256 [AT] Side: Other (See comments) [AT] Location: torso  [AT] Primary Wound Type: Incision [AT] Recorded by:  [AT] Kvng Merino RN 08/08/19 1256    Row Name 08/12/19 1330             Plan of Care Review    Plan of Care Reviewed With  patient  -HD      Recorded by [HD] Soledad Rodriguez, PT 08/12/19 1344      Row Name 08/12/19 1330             Outcome Summary/Treatment Plan (PT)    Daily Summary of Progress (PT)  progress toward functional goals as expected  -HD      Anticipated Discharge Disposition (PT)  inpatient rehabilitation facility pt does not think wife will be able to assist as needed  -HD2      Recorded by [HD] Soledad Rodriguez, PT 08/12/19 1356  [HD2] Soledad Rodriguez, PT 08/12/19 1344        User Key  (r) = Recorded By, (t) = Taken By, (c) = Cosigned By    Initials Name Effective Dates Discipline    SR María Mabry, OT 03/01/19 -  OT    HD Soledad Rodriguez, PT 03/01/19 -  PT    AT Kvng Merino RN 03/01/19 -  Nurse          Wound midline sternal Incision (Active)   Dressing Appearance open to air 8/12/2019  8:00 AM   Closure Approximated 8/12/2019  8:00 AM   Drainage Amount none 8/12/2019  8:00 AM       Wound Right anterior;lower;proximal leg Incision (Active)   Dressing Appearance open to air 8/12/2019  8:00 AM   Closure Approximated 8/12/2019  8:00 AM   Drainage Amount none 8/12/2019  8:00 AM       Wound 08/08/19 1256 Other (See comments) torso Incision (Active)   Dressing Appearance intact;dry 8/12/2019  8:00 AM   Closure Approximated 8/12/2019  8:00 AM   Drainage Amount none 8/12/2019  8:00 AM       Wound Left anterior;lower;proximal leg Incision (Active)   Dressing Appearance open to air 8/12/2019  8:00 AM   Closure Approximated;Liquid skin adhesive 8/12/2019  8:00 AM   Drainage Amount none 8/12/2019  8:00 AM           Physical Therapy Education     Title: PT OT SLP Therapies (In Progress)     Topic: Physical Therapy (In Progress)     Point: Mobility training (Done)     Learning Progress Summary           Patient Acceptance, E, GRACE,DU by MOISES at 8/12/2019   1:35 PM    Acceptance, E, NR by HD at 8/9/2019  3:47 PM    Comment:  Pt will need further education on sternal precautions                   Point: Body mechanics (Done)     Learning Progress Summary           Patient Acceptance, E, VU,DU by  at 8/12/2019  1:35 PM    Acceptance, E, NR by HD at 8/9/2019  3:47 PM    Comment:  Pt will need further education on sternal precautions                   Point: Precautions (Done)     Learning Progress Summary           Patient Acceptance, E, VU,DU by HD at 8/12/2019  1:35 PM    Acceptance, E, NR by HD at 8/9/2019  3:47 PM    Comment:  Pt will need further education on sternal precautions                               User Key     Initials Effective Dates Name Provider Type Discipline     03/01/19 -  Soledad Rodriguez, PT Physical Therapist PT                PT Recommendation and Plan  Anticipated Discharge Disposition (PT): inpatient rehabilitation facility(pt does not think wife will be able to assist as needed)  Planned Therapy Interventions (PT Eval): balance training, bed mobility training, gait training, neuromuscular re-education, strengthening, transfer training, patient/family education, home exercise program  Therapy Frequency (PT Clinical Impression): 3 times/wk  Outcome Summary/Treatment Plan (PT)  Daily Summary of Progress (PT): progress toward functional goals as expected  Anticipated Discharge Disposition (PT): inpatient rehabilitation facility(pt does not think wife will be able to assist as needed)  Plan of Care Reviewed With: patient  Progress: improving  Outcome Summary: Pt able to complete two bouts of ambulation using RW while maintaining O2 >90%.  Pt contnues to require Corey for safety with transfers.  Pt also completes seated BLE exercises for strengthening. Pt has concerns with his wife's ability to assist at home and at this time pt is requiring physical assist with mobility.  Due to concerns and pt's current level of mobility, PT is now recommending  IP rehab to address deficits.       Time Calculation:   PT Charges     Row Name 08/12/19 1357             Time Calculation    Start Time  1330  -HD      Stop Time  1401  -HD      Time Calculation (min)  31 min  -HD      PT Received On  08/12/19  -HD      PT - Next Appointment  08/14/19  -HD         Time Calculation- PT    Total Timed Code Minutes- PT  31 minute(s)  -HD        User Key  (r) = Recorded By, (t) = Taken By, (c) = Cosigned By    Initials Name Provider Type     Soledad Rodriguez, PT Physical Therapist        Therapy Charges for Today     Code Description Service Date Service Provider Modifiers Qty    53299926280 HC GAIT TRAINING EA 15 MIN 8/12/2019 Soledad Rodriguez, PT GP 1    75269570567 HC PT THERAPEUTIC ACT EA 15 MIN 8/12/2019 Soledad Rodriguez, PT GP 1    96214792718 HC PT THER PROC EA 15 MIN 8/12/2019 Soledad Rodriguez, PT GP 1               Soledad Rodriguez PT  8/12/2019        11-Jul-2022 15:27

## 2022-08-22 RX ORDER — INSULIN GLARGINE 100 [IU]/ML
50 INJECTION, SOLUTION SUBCUTANEOUS EVERY MORNING
Qty: 15 PEN | Refills: 3 | Status: SHIPPED | OUTPATIENT
Start: 2022-08-22 | End: 2022-09-07

## 2022-08-23 DIAGNOSIS — E78.2 MIXED HYPERLIPIDEMIA: ICD-10-CM

## 2022-08-23 RX ORDER — ATORVASTATIN CALCIUM 40 MG/1
TABLET, FILM COATED ORAL
Qty: 90 TABLET | Refills: 3 | OUTPATIENT
Start: 2022-08-23

## 2022-09-01 DIAGNOSIS — E11.65 TYPE 2 DIABETES MELLITUS WITH HYPERGLYCEMIA, WITH LONG-TERM CURRENT USE OF INSULIN: ICD-10-CM

## 2022-09-01 DIAGNOSIS — Z79.4 TYPE 2 DIABETES MELLITUS WITH HYPERGLYCEMIA, WITH LONG-TERM CURRENT USE OF INSULIN: ICD-10-CM

## 2022-09-01 RX ORDER — GLIPIZIDE 5 MG/1
TABLET ORAL
Qty: 270 TABLET | Refills: 1 | Status: SHIPPED | OUTPATIENT
Start: 2022-09-01 | End: 2023-02-28

## 2022-09-06 RX ORDER — MONTELUKAST SODIUM 4 MG/1
TABLET, CHEWABLE ORAL
Qty: 90 TABLET | Refills: 0 | OUTPATIENT
Start: 2022-09-06

## 2022-09-07 RX ORDER — INSULIN GLARGINE 100 [IU]/ML
50 INJECTION, SOLUTION SUBCUTANEOUS EVERY MORNING
Qty: 45 ML | Refills: 1 | Status: SHIPPED | OUTPATIENT
Start: 2022-09-07

## 2022-09-09 DIAGNOSIS — I10 ESSENTIAL HYPERTENSION: ICD-10-CM

## 2022-09-09 RX ORDER — LISINOPRIL 40 MG/1
TABLET ORAL
Qty: 90 TABLET | Refills: 3 | Status: SHIPPED | OUTPATIENT
Start: 2022-09-09

## 2022-09-19 DIAGNOSIS — I10 ESSENTIAL HYPERTENSION: ICD-10-CM

## 2022-09-19 RX ORDER — FUROSEMIDE 20 MG/1
TABLET ORAL
Qty: 120 TABLET | Refills: 3 | Status: SHIPPED | OUTPATIENT
Start: 2022-09-19

## 2022-09-27 ENCOUNTER — APPOINTMENT (OUTPATIENT)
Dept: PHARMACY | Facility: HOSPITAL | Age: 72
End: 2022-09-27

## 2022-10-05 ENCOUNTER — APPOINTMENT (OUTPATIENT)
Dept: PHARMACY | Facility: HOSPITAL | Age: 72
End: 2022-10-05

## 2022-10-23 DIAGNOSIS — I10 ESSENTIAL HYPERTENSION: ICD-10-CM

## 2022-10-24 RX ORDER — FLURBIPROFEN SODIUM 0.3 MG/ML
SOLUTION/ DROPS OPHTHALMIC
Qty: 100 EACH | Refills: 3 | Status: SHIPPED | OUTPATIENT
Start: 2022-10-24

## 2022-10-24 RX ORDER — AMLODIPINE BESYLATE 5 MG/1
TABLET ORAL
Qty: 90 TABLET | Refills: 3 | Status: SHIPPED | OUTPATIENT
Start: 2022-10-24

## 2022-10-26 ENCOUNTER — APPOINTMENT (OUTPATIENT)
Dept: PHARMACY | Facility: HOSPITAL | Age: 72
End: 2022-10-26

## 2022-11-01 ENCOUNTER — HOSPITAL ENCOUNTER (OUTPATIENT)
Dept: GENERAL RADIOLOGY | Facility: HOSPITAL | Age: 72
Discharge: HOME | End: 2022-11-01
Admitting: NURSE PRACTITIONER

## 2022-11-01 ENCOUNTER — OFFICE VISIT (OUTPATIENT)
Dept: FAMILY MEDICINE CLINIC | Facility: CLINIC | Age: 72
End: 2022-11-01

## 2022-11-01 VITALS
DIASTOLIC BLOOD PRESSURE: 78 MMHG | HEIGHT: 67 IN | OXYGEN SATURATION: 97 % | SYSTOLIC BLOOD PRESSURE: 142 MMHG | WEIGHT: 214 LBS | BODY MASS INDEX: 33.59 KG/M2 | HEART RATE: 68 BPM

## 2022-11-01 DIAGNOSIS — M54.50 LUMBAR BACK PAIN: Primary | ICD-10-CM

## 2022-11-01 DIAGNOSIS — M79.605 LEFT LEG PAIN: ICD-10-CM

## 2022-11-01 PROCEDURE — 99213 OFFICE O/P EST LOW 20 MIN: CPT | Performed by: NURSE PRACTITIONER

## 2022-11-01 PROCEDURE — 72110 X-RAY EXAM L-2 SPINE 4/>VWS: CPT

## 2022-11-01 RX ORDER — GABAPENTIN 100 MG/1
100 CAPSULE ORAL NIGHTLY PRN
Qty: 30 CAPSULE | Refills: 2 | Status: SHIPPED | OUTPATIENT
Start: 2022-11-01 | End: 2023-03-13

## 2022-11-01 NOTE — PROGRESS NOTES
"Chief Complaint  Back Pain (Lower back pain with pain that radiates down his leg. Has been going on the last 4-5 months )    Subjective        Shaka Trinidad presents to [facility]  History of Present Illness     Patient presents today for evaluation of chronic and worsening low back pain, pain occurs with standing or sitting too long, unable to stand to do dishes for more than 5 minutes, shopping/walking long period of time causes pain, has to sit and rest.  He reports the pain has been present for the last 4-5 mo, he is now experiencing difficulty getting shoes/socks on L foot. He also reports an intermittent pain in the anterior right lateral leg that radiates to the knee and lower leg, this pain does not seem to correlate with the low back pain but often occurs at the same time.       Objective   Vital Signs:  /78 (BP Location: Left arm, Patient Position: Sitting, Cuff Size: Adult)   Pulse 68   Ht 170.2 cm (67\")   Wt 97.1 kg (214 lb)   SpO2 97%   BMI 33.52 kg/m²   Estimated body mass index is 33.52 kg/m² as calculated from the following:    Height as of this encounter: 170.2 cm (67\").    Weight as of this encounter: 97.1 kg (214 lb).    BMI is >= 30 and <35. (Class 1 Obesity). The following options were offered after discussion;: exercise counseling/recommendations and nutrition counseling/recommendations      Physical Exam  Vitals and nursing note reviewed.   Constitutional:       General: He is not in acute distress.     Appearance: Normal appearance. He is well-developed. He is not diaphoretic.   HENT:      Head: Normocephalic and atraumatic.   Eyes:      Pupils: Pupils are equal, round, and reactive to light.   Neck:      Thyroid: No thyromegaly.   Musculoskeletal:         General: Tenderness (mid lumbar spine w/o sciatica, mild nagy rom, L anterior leg nontender on exam-pain currently not present) present. No swelling. Normal range of motion.      Cervical back: " Normal range of motion.   Skin:     General: Skin is warm and dry.      Findings: No erythema.   Neurological:      Mental Status: He is alert and oriented to person, place, and time.      Gait: Gait abnormal (Intermittently, due to left lower leg pain).   Psychiatric:         Behavior: Behavior normal.         Thought Content: Thought content normal.         Judgment: Judgment normal.        Result Review :                Assessment and Plan   Diagnoses and all orders for this visit:    1. Lumbar back pain (Primary)  -     XR Spine Lumbar Complete 4+VW  -     Ambulatory Referral to Physical Therapy Evaluate and treat; Heat, Electrotherapy; Stretching, ROM, Strengthening    2. Left leg pain  -     Ambulatory Referral to Physical Therapy Evaluate and treat; Heat, Electrotherapy; Stretching, ROM, Strengthening    Other orders  -     gabapentin (NEURONTIN) 100 MG capsule; Take 1 capsule by mouth At Night As Needed (back/leg pain).  Dispense: 30 capsule; Refill: 2    Check lumbar spine x-ray  Referral to PT to eval and treat  Trial gabapentin as needed at nighttime  No NSAIDs due to CKD, okay for Tylenol as needed       I spent 20 minutes caring for Shaka on this date of service. This time includes time spent by me in the following activities:preparing for the visit, reviewing tests, performing a medically appropriate examination and/or evaluation , counseling and educating the patient/family/caregiver, ordering medications, tests, or procedures and documenting information in the medical record     Follow Up   Return if symptoms worsen or fail to improve, for Next scheduled follow up.  Patient was given instructions and counseling regarding his condition or for health maintenance advice. Please see specific information pulled into the AVS if appropriate.     EMR Dragon transcription disclaimer:  Part of this note may be an electronic transcription/translation of spoken language to printed text using the Dragon  Dictation System.

## 2022-11-21 ENCOUNTER — TREATMENT (OUTPATIENT)
Dept: PHYSICAL THERAPY | Facility: CLINIC | Age: 72
End: 2022-11-21

## 2022-11-21 DIAGNOSIS — M54.50 LUMBAR BACK PAIN: ICD-10-CM

## 2022-11-21 DIAGNOSIS — M54.50 LOW BACK PAIN, UNSPECIFIED BACK PAIN LATERALITY, UNSPECIFIED CHRONICITY, UNSPECIFIED WHETHER SCIATICA PRESENT: Primary | ICD-10-CM

## 2022-11-21 DIAGNOSIS — M79.605 LEFT LEG PAIN: ICD-10-CM

## 2022-11-21 PROCEDURE — 97140 MANUAL THERAPY 1/> REGIONS: CPT | Performed by: PHYSICAL THERAPIST

## 2022-11-21 PROCEDURE — 97161 PT EVAL LOW COMPLEX 20 MIN: CPT | Performed by: PHYSICAL THERAPIST

## 2022-11-21 PROCEDURE — 97110 THERAPEUTIC EXERCISES: CPT | Performed by: PHYSICAL THERAPIST

## 2022-11-21 NOTE — PROGRESS NOTES
Physical Therapy Initial Evaluation and Plan of Care    Patient: Shaka Trinidad   : 1950  Diagnosis/ICD-10 Code:  Low back pain, unspecified back pain laterality, unspecified chronicity, unspecified whether sciatica present [M54.50]  Referring practitioner: ERICA Marlow  Outcome Measure:Modified Oswestry: , LEFS:    Diagnoses and all orders for this visit:    1. Low back pain, unspecified back pain laterality, unspecified chronicity, unspecified whether sciatica present (Primary)    2. Left leg pain         Subjective Evaluation    History of Present Illness  Mechanism of injury: Pt reports to therapy with LB and LLE pain that started roughly 6 months ago. Pt reports that his back hurts with prolonged standing or ambulation, but his leg starts to hurt following only 3-4 minutes of walking. He pain in his leg radiates occasionally, but generally stays at the hip. He reports having difficulty tying his shoes, navigating stairs, donning socks. No numbness or tingling noted. No AD utilized. 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     Sleep: can only sleep on his back  Aggravating factors: activity, washing dishes, prolonged ambulation, prolonged standing    Alleviating factors: sitting      PMHx:no major pmhx      Patient Occupation: retired- [employer]  Quality of life: good    Pain  Current pain ratin  At best pain ratin  At worst pain ratin    Patient Goals  Patient goals for therapy: increased motion, decreased pain, increased strength, independence with ADLs/IADLs, return to sport/leisure activities and improved balance             Objective        Special Questions  Patient is experiencing night pain, disturbed sleep and dizziness.     Additional Special Questions  Dizziness related to orthostatic hypotension      Postural Observations  Seated posture: fair  Standing posture: fair        Palpation   Left   Hypertonic in the erector spinae, lumbar paraspinals  and quadratus lumborum.   Tenderness of the erector spinae, lumbar paraspinals and quadratus lumborum.     Right   Hypertonic in the erector spinae, lumbar paraspinals and quadratus lumborum. Tenderness of the erector spinae, lumbar paraspinals and quadratus lumborum.     Neurological Testing     Sensation     Lumbar   Left   Intact: light touch    Right   Intact: light touch    Active Range of Motion     Additional Active Range of Motion Details  Flex: WFL - pain  Ext:WFL - pain  R SB:WFL - pain  L SB:WFL - pain  R ROT:WFL - pain  L ROT:WFL - pain    Strength/Myotome Testing     Left Hip   Planes of Motion   Flexion: 4+  External rotation: 4+  Internal rotation: 4+ (pain)    Right Hip   Planes of Motion   Flexion: 5  External rotation: 5  Internal rotation: 5    Left Knee   Flexion: 4+  Extension: 4    Right Knee   Flexion: 5  Extension: 5    Left Ankle/Foot   Dorsiflexion: 5  Plantar flexion: 5    Right Ankle/Foot   Dorsiflexion: 5  Plantar flexion: 5    Tests     Lumbar     Left   Negative crossed SLR and quadrant.     Right   Negative crossed SLR and quadrant.      General Comments     Lumbar Comments  Decreased thoracic PA mobility, T7-T10 most notably.          Assessment & Plan     Assessment  Impairments: abnormal coordination, abnormal gait, abnormal muscle firing, abnormal muscle tone, abnormal or restricted ROM, activity intolerance, impaired balance, impaired physical strength, lacks appropriate home exercise program, pain with function, safety issue and weight-bearing intolerance  Functional Limitations: carrying objects, lifting, sleeping, walking, pulling, pushing, uncomfortable because of pain, moving in bed, sitting, standing, stooping, reaching behind back, reaching overhead and unable to perform repetitive tasks  Assessment details: Patient is a 72 y.o. year old male who presents to therapy with LBP and LLE pain.  he presents with significant impairments including decreased lumbar and thoracic  ROM, decreased BLE strength, decreased standing, walking, and activity tolerance, impaired Modified Oswestry score, and pain. Impairments affect ADL/IADL's, functional activities, recreational activities, and community activities. Pt will benefit from skilled physical therapy to address impairments, decrease pain and restore function.  Prognosis: good    Goals  Plan Goals: Pt presents to PT with symptoms consistent with mid and LBP, and LLE pain.  Pt would benefit from skilled PT intervention to address the deficits noted.           SHORT TERM GOALS: Time for Goal Achievement: 4 weeks    1.  Patient to be compliant and progression of HEP                             2.  Pain level < 5/10 at worst with mentioned activities to improve function  3.  Increased thoracic, lumbar and SIJ mobility to allow for increased lumbar AROM with less pain.  4.  Increased lumbar AROM to by 25% in all planes to allow for increased ease with sit-stand transfers and functional activities    LONG TERM GOALS: Time for Goal Achievement: 2 months  1.  Pt. to score < 30 % on Back Index  2.  Pain level < 3/10 with all listed activities to return to normal.  3.  Lumbar AROM to WFL to allow for return to household & recreational activities w/o increased symptoms  4.  (B) LE and lower abdominal strength to 5/5 to allow for pushing, pulling and activities to occur without pain (driving, sitting, household  & Job requirements)    Plan  Therapy options: will be seen for skilled therapy services  Planned modality interventions: cryotherapy, electrical stimulation/Russian stimulation, high voltage pulsed current (pain management), thermotherapy (hydrocollator packs), TENS and traction  Planned therapy interventions: abdominal trunk stabilization, ADL retraining, balance/weight-bearing training, body mechanics training, fine motor coordination training, flexibility, functional ROM exercises, gait training, home exercise program, IADL retraining, joint  mobilization, therapeutic activities, stretching, spinal/joint mobilization, strengthening, soft tissue mobilization, postural training, neuromuscular re-education, motor coordination training, manual therapy and transfer training  Frequency: 2x week  Duration in weeks: 8  Treatment plan discussed with: patient        History # of Personal Factors and/or Comorbidities: MODERATE (1-2)  Examination of Body System(s): # of elements: LOW (1-2)  Clinical Presentation: STABLE   Clinical Decision Making: LOW     Timed:         Manual Therapy:    10     mins  62357;     Therapeutic Exercise:    15     mins  45778;     Neuromuscular Dar:        mins  23087;    Therapeutic Activity:          mins  46034;     Gait Training:           mins  44475;     Ultrasound:          mins  59289;    Ionto                                   mins   11888  Self Care                            mins   66876        Un-Timed:  Electrical Stimulation:         mins  40377 ( );  Dry Needling          mins self-pay  Traction          mins 27189  Low Eval     20     Mins  68195  Mod Eval          Mins  27890  High Eval                            Mins  58170  Re-Eval                               mins  12224        Timed Treatment:   25   mins   Total Treatment:     45   mins  PT SIGNATURE: Leann Sands PT, DPT          DATE TREATMENT INITIATED: 11/21/2022    Initial Certification  Certification Period: 2/19/2023  I certify that the therapy services are furnished while this patient is under my care.  The services outlined above are required by this patient, and will be reviewed every 90 days.     PHYSICIAN: Karly Marmolejo, ERICA      DATE:     Please sign and return via fax to [phone].. Thank you, [facility].

## 2022-11-22 RX ORDER — FERROUS SULFATE 325(65) MG
TABLET ORAL
Qty: 90 TABLET | Refills: 1 | Status: SHIPPED | OUTPATIENT
Start: 2022-11-22

## 2022-11-30 RX ORDER — MONTELUKAST SODIUM 4 MG/1
TABLET, CHEWABLE ORAL
Qty: 90 TABLET | Refills: 0 | OUTPATIENT
Start: 2022-11-30

## 2022-12-01 RX ORDER — HYDRALAZINE HYDROCHLORIDE 25 MG/1
TABLET, FILM COATED ORAL
Qty: 270 TABLET | Refills: 1 | Status: SHIPPED | OUTPATIENT
Start: 2022-12-01

## 2022-12-02 ENCOUNTER — APPOINTMENT (OUTPATIENT)
Dept: GENERAL RADIOLOGY | Facility: HOSPITAL | Age: 72
End: 2022-12-02

## 2022-12-02 ENCOUNTER — HOSPITAL ENCOUNTER (EMERGENCY)
Facility: HOSPITAL | Age: 72
Discharge: HOME | End: 2022-12-02
Attending: EMERGENCY MEDICINE | Admitting: EMERGENCY MEDICINE

## 2022-12-02 VITALS
WEIGHT: 214.07 LBS | HEIGHT: 67 IN | SYSTOLIC BLOOD PRESSURE: 140 MMHG | DIASTOLIC BLOOD PRESSURE: 74 MMHG | BODY MASS INDEX: 33.6 KG/M2 | HEART RATE: 90 BPM | OXYGEN SATURATION: 99 % | TEMPERATURE: 98.1 F | RESPIRATION RATE: 16 BRPM

## 2022-12-02 DIAGNOSIS — M25.561 RIGHT KNEE PAIN, UNSPECIFIED CHRONICITY: Primary | ICD-10-CM

## 2022-12-02 DIAGNOSIS — M11.261 PSEUDOGOUT OF RIGHT KNEE: ICD-10-CM

## 2022-12-02 DIAGNOSIS — M25.461 EFFUSION OF RIGHT KNEE: ICD-10-CM

## 2022-12-02 LAB
APPEARANCE FLD: ABNORMAL
COLOR FLD: YELLOW
CRYSTALS FLD MICRO: NORMAL
METHOD: ABNORMAL
MONOCYTES NFR FLD: 15 %
NEUTROPHILS NFR FLD MANUAL: 85 %
NUC CELL # FLD: ABNORMAL /MM3

## 2022-12-02 PROCEDURE — 89060 EXAM SYNOVIAL FLUID CRYSTALS: CPT | Performed by: EMERGENCY MEDICINE

## 2022-12-02 PROCEDURE — 25010000002 TRIAMCINOLONE PER 10 MG

## 2022-12-02 PROCEDURE — 0 LIDOCAINE 1 % SOLUTION

## 2022-12-02 PROCEDURE — 87070 CULTURE OTHR SPECIMN AEROBIC: CPT | Performed by: EMERGENCY MEDICINE

## 2022-12-02 PROCEDURE — 99283 EMERGENCY DEPT VISIT LOW MDM: CPT

## 2022-12-02 PROCEDURE — 73562 X-RAY EXAM OF KNEE 3: CPT

## 2022-12-02 PROCEDURE — 87205 SMEAR GRAM STAIN: CPT | Performed by: EMERGENCY MEDICINE

## 2022-12-02 PROCEDURE — 89051 BODY FLUID CELL COUNT: CPT | Performed by: EMERGENCY MEDICINE

## 2022-12-02 RX ORDER — TRIAMCINOLONE ACETONIDE 40 MG/ML
40 INJECTION, SUSPENSION INTRA-ARTICULAR; INTRAMUSCULAR ONCE
Status: COMPLETED | OUTPATIENT
Start: 2022-12-02 | End: 2022-12-02

## 2022-12-02 RX ORDER — LIDOCAINE HYDROCHLORIDE 10 MG/ML
10 INJECTION, SOLUTION INFILTRATION; PERINEURAL ONCE
Status: COMPLETED | OUTPATIENT
Start: 2022-12-02 | End: 2022-12-02

## 2022-12-02 RX ADMIN — LIDOCAINE HYDROCHLORIDE 10 ML: 10 INJECTION, SOLUTION INFILTRATION; PERINEURAL at 17:04

## 2022-12-02 RX ADMIN — TRIAMCINOLONE ACETONIDE 40 MG: 40 INJECTION, SUSPENSION INTRA-ARTICULAR; INTRAMUSCULAR at 17:05

## 2022-12-02 NOTE — ED NOTES
Patient evaluated by provider and determined to be stable.  Patient will return to waiting room, pending further evaluation & testing / monitoring.  Patient instructed to alert staff for change in condition or if leaving premises.

## 2022-12-02 NOTE — DISCHARGE INSTRUCTIONS
Tylenol and ibuprofen as needed for discomfort, okay to put ice to the area 20 minutes at a time several times a day    Wear brace for comfort    Follow-up with primary care, phone number for orthopedist is also provided in your discharge instructions.    Return to the ER for new or worsening symptoms

## 2022-12-02 NOTE — ED PROVIDER NOTES
Subjective      Provider in Triage Note  Patient is a 72 year old male who presents to the ED by POV complaining of right knee pain and swelling.  States it started yesterday.  Has history of joint effusion, has no numbness, tingling or weakness.       History of Present Illness  See pit note    He has no complaints of fever.  He does not report injury.    Review of Systems   Constitutional: Negative.    HENT: Negative.    Respiratory: Negative.    Cardiovascular: Negative.    Gastrointestinal: Negative.    Genitourinary: Negative.    Musculoskeletal: Positive for arthralgias and joint swelling.   Skin: Negative.    Neurological: Negative.    Hematological: Negative.    Psychiatric/Behavioral: Negative.        Past Medical History:   Diagnosis Date   • Acute pancreatitis 3/14/2020   • Coronary artery disease    • Diabetes mellitus, type II (HCC)    • Elevated cholesterol    • H/O cataract     cataracts    • Hyperlipidemia    • Hypertension    • Peripheral edema    • Peripheral edema    • Sleep apnea     oral appliance       No Known Allergies    Past Surgical History:   Procedure Laterality Date   • CARDIAC CATHETERIZATION N/A 7/19/2019    Procedure: Left Heart Cath with angiogram;  Surgeon: Bautista Lopez MD;  Location: [facility] CATH INVASIVE LOCATION;  Service: Cardiovascular   • CARDIAC CATHETERIZATION N/A 7/19/2019    Procedure: Coronary angiography;  Surgeon: Bautista Lopez MD;  Location: [facility] CATH INVASIVE LOCATION;  Service: Cardiovascular   • CARDIAC CATHETERIZATION N/A 7/19/2019    Procedure: Left ventriculography;  Surgeon: Bautista Lopez MD;  Location: [facility] CATH INVASIVE LOCATION;  Service: Cardiovascular   • CHOLECYSTECTOMY N/A 1/6/2021    Procedure: CHOLECYSTECTOMY LAPAROSCOPIC;  Surgeon: Giacomo Akins MD;  Location: [facility] MAIN OR;  Service: General;  Laterality: N/A;   • COLONOSCOPY     • CORONARY ARTERY BYPASS GRAFT N/A 8/8/2019    Procedure: CORONARY ARTERY BYPASS GRAFTING;  Surgeon:  Chet Mcarthur MD;  Location: [facility] CVOR;  Service: Cardiothoracic   • ENDOSCOPY N/A 3/15/2020    Procedure: ESOPHAGOGASTRODUODENOSCOPY;  Surgeon: Jaspal Logan MD;  Location: [facility] ENDOSCOPY;  Service: Gastroenterology;  Laterality: N/A;  EPIGASTRIC ABDOMINAL PAIN, ABNORMAL CT SCAN, PANCREATITIS  NORMAL EGD   • HERNIA REPAIR     • OTHER SURGICAL HISTORY  12/2015    2d echo-abstracted cent.        Family History   Problem Relation Age of Onset   • Heart failure Mother    • Hypertension Brother    • Cancer Brother    • Heart disease Brother        Social History     Socioeconomic History   • Marital status:    Tobacco Use   • Smoking status: Never   • Smokeless tobacco: Never   Vaping Use   • Vaping Use: Never used   Substance and Sexual Activity   • Alcohol use: Yes     Alcohol/week: 1.0 standard drink     Types: 1 Glasses of wine per week   • Drug use: Never   • Sexual activity: Defer     Prior to Admission medications    Medication Sig Start Date End Date Taking? Authorizing Provider   amLODIPine (NORVASC) 5 MG tablet TAKE 1 TABLET BY MOUTH EVERY DAY 10/24/22   Karly Marmolejo APRN   aspirin 81 MG chewable tablet Chew 81 mg Every Night.    ProviderJono MD   atorvastatin (LIPITOR) 10 MG tablet Take 1 tablet by mouth Every Night. 3/8/22   Karly Marmolejo APRN   B-D UF III MINI PEN NEEDLES 31G X 5 MM misc USE ONCE DAILY AS DIRECTED 10/24/22   Karly Marmolejo APRN   CINNAMON PO Take  by mouth.    ProviderJono MD   CRANBERRY PO Take  by mouth.    ProviderJono MD   ferrous sulfate 325 (65 FE) MG tablet TAKE 1 TABLET BY MOUTH EVERY DAY WITH BREAKFAST 11/22/22   Karly Marmolejo APRN   furosemide (LASIX) 20 MG tablet TAKE 1 TABLET BY MOUTH EVERY DAY. MAY TAKE AN EXTRA DOSE FOR SWELLING ONE TIME DAILY AS NEEDED 9/19/22   Karly Marmolejo APRN   gabapentin (NEURONTIN) 100 MG capsule Take 1 capsule by mouth At Night As Needed (back/leg pain). 11/1/22   Karly Marmolejo APRN    glipizide (GLUCOTROL) 5 MG tablet TAKE 2 TABS IN THE MORNING AND 1 TAB IN THE EVENING 9/1/22   Karly Marmolejo APRN   guaiFENesin (MUCINEX) 600 MG 12 hr tablet Take 1,200 mg by mouth 2 (Two) Times a Day.    ProviderJono MD   hydrALAZINE (APRESOLINE) 25 MG tablet TAKE 1 TABLET BY MOUTH THREE TIMES A DAY 12/1/22   Karly Marmolejo APRN   Insulin Glargine (BASAGLAR KWIKPEN) 100 UNIT/ML injection pen INJECT 50 UNITS UNDER THE SKIN INTO THE APPROPRIATE AREA AS DIRECTED EVERY MORNING. 9/7/22   Karly Marmolejo APRN   lisinopril (PRINIVIL,ZESTRIL) 40 MG tablet TAKE 1 TABLET BY MOUTH EVERY DAY 9/9/22   Karly Marmolejo APRN   loratadine (CLARITIN) 10 MG tablet Take 10 mg by mouth Daily.    ProviderJono MD   metFORMIN (GLUCOPHAGE) 1000 MG tablet TAKE 1 TABLET BY MOUTH TWICE A DAY WITH MEALS 9/20/22   Karly Marmolejo APRN   metoprolol tartrate (LOPRESSOR) 25 MG tablet TAKE 1 TABLET BY MOUTH EVERY 12 HOURS 10/21/22   Karly Marmolejo APRN   OneTouch Ultra test strip TEST BLOOD SUGAR NO MORE THAN ONE TIME DAILY. E11.9 1/31/22   Karly Marmolejo APRN   Pneumococcal 20-Stacie Conj Vacc (Prevnar 20) 0.5 ML suspension prefilled syringe vaccine Inject  into the appropriate muscle as directed by prescriber. 9/26/22   Kevin De Paz MD   potassium chloride (MICRO-K) 10 MEQ CR capsule TAKE 1 CAPSULE BY MOUTH DAILY. AND MAY TAKE EXTRA DOSE WITH LASIX FOR SWELLING AS NEEDED 6/24/22   Karly Marmolejo APRN   vitamin C (ASCORBIC ACID) 500 MG tablet Take 1,000 mg by mouth Every Evening.    ProviderJono MD   Zoster Vac Recomb Adjuvanted (Shingrix) 50 MCG/0.5ML reconstituted suspension Inject  into the appropriate muscle as directed by prescriber. 9/26/22   Kevin De Paz MD           Objective   Physical Exam  Constitutional:       Appearance: He is obese. He is not ill-appearing or toxic-appearing.   Eyes:      Extraocular Movements: Extraocular movements intact.      Pupils: Pupils are equal, round, and reactive  "to light.   Cardiovascular:      Rate and Rhythm: Normal rate.      Pulses: Normal pulses.   Pulmonary:      Effort: Pulmonary effort is normal.   Musculoskeletal:      Cervical back: Normal range of motion.      Right knee: Swelling present. No erythema.      Comments: Edema noted to the lateral aspect of the right knee, no overlying erythema or ecchymosis.  No crepitus no laxity in the joint.  Patient is able to ambulate without difficulty.   Skin:     General: Skin is warm and dry.      Capillary Refill: Capillary refill takes less than 2 seconds.      Findings: No bruising.   Neurological:      General: No focal deficit present.      Mental Status: He is alert and oriented to person, place, and time. Mental status is at baseline.      Motor: No weakness.   Psychiatric:         Mood and Affect: Mood normal.         Behavior: Behavior normal.         Thought Content: Thought content normal.         Judgment: Judgment normal.         Arthocentesis    Date/Time: 12/2/2022 6:30 PM  Performed by: Wong Delvalle MD  Authorized by: Wong Delvalle MD     Consent:     Consent obtained:  Verbal  Universal protocol:     Immediately prior to procedure, a time out was called: yes      Patient identity confirmed:  Verbally with patient  Location:     Location:  Knee    Knee:  R knee  Anesthesia:     Anesthesia method:  Local infiltration    Local anesthetic:  Lidocaine 1% w/o epi  Procedure details:     Preparation: Patient was prepped and draped in usual sterile fashion      Approach:  Lateral    Aspirate amount:  38cc    Aspirate characteristics:  Cloudy and yellow    Steroid injected: yes      Specimen collected: yes    Post-procedure details:     Dressing:  Adhesive bandage    Procedure completion:  Tolerated well, no immediate complications               ED Course      /74   Pulse 90   Temp 98.1 °F (36.7 °C)   Resp 16   Ht 170.2 cm (67\")   Wt 97.1 kg (214 lb 1.1 oz)   SpO2 99%   BMI 33.53 kg/m²   Labs " Reviewed   BODY FLUID CELL COUNT - Abnormal; Notable for the following components:       Result Value    Appearance, Fluid Turbid (*)     All other components within normal limits   BODY FLUID CULTURE   CRYSTAL EXAM   BODY FLUID CELL COUNT WITH DIFFERENTIAL    Narrative:     The following orders were created for panel order Body Fluid Cell Count With Differential - Synovial Fluid, Knee, Right.  Procedure                               Abnormality         Status                     ---------                               -----------         ------                     Body fluid cell count - ...[[id number]]  Abnormal            Final result               Body fluid differential ...[[id number]]                      In process                   Please view results for these tests on the individual orders.   BODY FLUID DIFFERENTIAL     Medications   triamcinolone acetonide (KENALOG-40) injection 40 mg (40 mg Intra-articular Given 12/2/22 1705)   lidocaine (XYLOCAINE) 1 % injection 10 mL (10 mL Injection Given 12/2/22 1704)     XR Knee 3 View Right    Result Date: 12/2/2022   1. No acute bony abnormality 2. Moderate joint effusion 3. Tricompartment osteoarthritis, and chondrocalcinosis compatible with CPPD arthropathy  Electronically Signed By-Rubén Gee On:12/2/2022 3:29 PM This report was finalized on [id number] by  Rubén Gee, .                                         MDM  Reviewed x-ray of right knee.  There is tricompartment osteoarthritis chondrocalcinosis.  Laboratory evaluation White count 24,811.  Crystal exam reveals moderate calcium pyrophosphate intracellular crystals.  Patient's knee was injected with Kenalog 40 mg along with 4 cc of 1% Xylocaine.  Patient's findings were discussed with him.  He was discharged.  Advised to follow-up with his primary provider return new or worsening symptoms.  Final diagnoses:   Right knee pain, unspecified chronicity   Effusion of right knee   Pseudogout of right  knee       ED Disposition  ED Disposition     ED Disposition   Discharge    Condition   Stable    Comment   --             Karly Marmolejo, APRN  [address]  [place] [address]  [phone]    Call in 2 days           Medication List      No changes were made to your prescriptions during this visit.          Wong Delvalle MD  12/02/22 2457

## 2022-12-05 ENCOUNTER — TREATMENT (OUTPATIENT)
Dept: PHYSICAL THERAPY | Facility: CLINIC | Age: 72
End: 2022-12-05

## 2022-12-05 DIAGNOSIS — M54.50 LUMBAR BACK PAIN: ICD-10-CM

## 2022-12-05 DIAGNOSIS — M79.605 LEFT LEG PAIN: ICD-10-CM

## 2022-12-05 DIAGNOSIS — M54.50 LOW BACK PAIN, UNSPECIFIED BACK PAIN LATERALITY, UNSPECIFIED CHRONICITY, UNSPECIFIED WHETHER SCIATICA PRESENT: Primary | ICD-10-CM

## 2022-12-05 PROCEDURE — 97530 THERAPEUTIC ACTIVITIES: CPT | Performed by: PHYSICAL THERAPIST

## 2022-12-05 PROCEDURE — 97140 MANUAL THERAPY 1/> REGIONS: CPT | Performed by: PHYSICAL THERAPIST

## 2022-12-05 PROCEDURE — 97110 THERAPEUTIC EXERCISES: CPT | Performed by: PHYSICAL THERAPIST

## 2022-12-05 NOTE — PROGRESS NOTES
Physical Therapy Daily Treatment Note    VISIT#: 2    Subjective   Shaka Trinidad reports that he was in the hospital on Friday due to fluid on his R knee with improvements noted following. Pt reports that his back has had little to no change.   Pain Rating (0/10): 2 with activity, 0 at rest.     Objective     See Exercise, Manual, and Modality Logs for complete treatment.     Patient Education: Progress of therapy and POC    Assessment/Plan  Pt started on the Nustep today followed by STM performed in prone, along with PA in thoracic spine with improvements in thoracic mobility noted following. Pt then performed thoracic mobilization and strengthening exercises to maintain new motion.     Plan  Progress per Plan of Care and Progress strengthening /stabilization /functional activity            Timed:         Manual Therapy:    15     mins  23351;     Therapeutic Exercise:    12     mins  60047;     Neuromuscular Dar:        mins  96683;    Therapeutic Activity:     11     mins  07349;     Gait Training:           mins  84387;     Ultrasound:          mins  58324;    Ionto                                   mins   79254  Self Care                            mins   94634    Un-Timed:  Electrical Stimulation:         mins  64518 ( );  Dry Needling          mins self-pay  Traction          mins 93722  Low Eval          Mins  29324  Mod Eval          Mins  36471  High Eval                            Mins  56778  Re-Eval                               mins  68068    Timed Treatment:   38   mins   Total Treatment:     38   mins    Leann Sands PT, DPT  Physical Therapist

## 2022-12-06 ENCOUNTER — CLINICAL SUPPORT (OUTPATIENT)
Dept: FAMILY MEDICINE CLINIC | Facility: CLINIC | Age: 72
End: 2022-12-06

## 2022-12-06 DIAGNOSIS — I10 ESSENTIAL HYPERTENSION: Chronic | ICD-10-CM

## 2022-12-06 DIAGNOSIS — E78.2 MIXED HYPERLIPIDEMIA: Primary | Chronic | ICD-10-CM

## 2022-12-06 DIAGNOSIS — E11.51 TYPE 2 DIABETES MELLITUS WITH DIABETIC PERIPHERAL ANGIOPATHY WITHOUT GANGRENE, UNSPECIFIED WHETHER LONG TERM INSULIN USE: ICD-10-CM

## 2022-12-06 LAB — HBA1C MFR BLD: 7.4 % (ref 3.5–5.6)

## 2022-12-06 PROCEDURE — 36415 COLL VENOUS BLD VENIPUNCTURE: CPT | Performed by: NURSE PRACTITIONER

## 2022-12-06 PROCEDURE — 80053 COMPREHEN METABOLIC PANEL: CPT | Performed by: NURSE PRACTITIONER

## 2022-12-06 PROCEDURE — 85027 COMPLETE CBC AUTOMATED: CPT | Performed by: NURSE PRACTITIONER

## 2022-12-06 PROCEDURE — 83036 HEMOGLOBIN GLYCOSYLATED A1C: CPT | Performed by: NURSE PRACTITIONER

## 2022-12-06 PROCEDURE — 80061 LIPID PANEL: CPT | Performed by: NURSE PRACTITIONER

## 2022-12-06 NOTE — PROGRESS NOTES
Venipuncture Blood Specimen Collection  Venipuncture performed in the right arm by Nighat Cullen MA with good hemostasis. Patient tolerated the procedure well without complications.   12/06/22   Nighat Cullen MA

## 2022-12-07 LAB
ALBUMIN SERPL-MCNC: 4.3 G/DL (ref 3.5–5.2)
ALBUMIN/GLOB SERPL: 1.5 G/DL
ALP SERPL-CCNC: 60 U/L (ref 39–117)
ALT SERPL W P-5'-P-CCNC: 18 U/L (ref 1–41)
ANION GAP SERPL CALCULATED.3IONS-SCNC: 10 MMOL/L (ref 5–15)
AST SERPL-CCNC: 23 U/L (ref 1–40)
BACTERIA FLD CULT: NORMAL
BILIRUB SERPL-MCNC: 0.4 MG/DL (ref 0–1.2)
BUN SERPL-MCNC: 27 MG/DL (ref 8–23)
BUN/CREAT SERPL: 20.6 (ref 7–25)
CALCIUM SPEC-SCNC: 10 MG/DL (ref 8.6–10.5)
CHLORIDE SERPL-SCNC: 99 MMOL/L (ref 98–107)
CHOLEST SERPL-MCNC: 112 MG/DL (ref 0–200)
CO2 SERPL-SCNC: 27 MMOL/L (ref 22–29)
CREAT SERPL-MCNC: 1.31 MG/DL (ref 0.76–1.27)
DEPRECATED RDW RBC AUTO: 37.7 FL (ref 37–54)
EGFRCR SERPLBLD CKD-EPI 2021: 58.2 ML/MIN/1.73
ERYTHROCYTE [DISTWIDTH] IN BLOOD BY AUTOMATED COUNT: 12.3 % (ref 12.3–15.4)
GLOBULIN UR ELPH-MCNC: 2.9 GM/DL
GLUCOSE SERPL-MCNC: 90 MG/DL (ref 65–99)
GRAM STN SPEC: NORMAL
GRAM STN SPEC: NORMAL
HCT VFR BLD AUTO: 36.2 % (ref 37.5–51)
HDLC SERPL-MCNC: 41 MG/DL (ref 40–60)
HGB BLD-MCNC: 12.4 G/DL (ref 13–17.7)
LDLC SERPL CALC-MCNC: 54 MG/DL (ref 0–100)
LDLC/HDLC SERPL: 1.31 {RATIO}
MCH RBC QN AUTO: 29.2 PG (ref 26.6–33)
MCHC RBC AUTO-ENTMCNC: 34.3 G/DL (ref 31.5–35.7)
MCV RBC AUTO: 85.4 FL (ref 79–97)
PLATELET # BLD AUTO: 306 10*3/MM3 (ref 140–450)
PMV BLD AUTO: 10.5 FL (ref 6–12)
POTASSIUM SERPL-SCNC: 4.7 MMOL/L (ref 3.5–5.2)
PROT SERPL-MCNC: 7.2 G/DL (ref 6–8.5)
RBC # BLD AUTO: 4.24 10*6/MM3 (ref 4.14–5.8)
SODIUM SERPL-SCNC: 136 MMOL/L (ref 136–145)
TRIGL SERPL-MCNC: 87 MG/DL (ref 0–150)
VLDLC SERPL-MCNC: 17 MG/DL (ref 5–40)
WBC NRBC COR # BLD: 8.26 10*3/MM3 (ref 3.4–10.8)

## 2022-12-09 ENCOUNTER — TREATMENT (OUTPATIENT)
Dept: PHYSICAL THERAPY | Facility: CLINIC | Age: 72
End: 2022-12-09

## 2022-12-09 DIAGNOSIS — M79.605 LEFT LEG PAIN: ICD-10-CM

## 2022-12-09 DIAGNOSIS — M54.50 LUMBAR BACK PAIN: ICD-10-CM

## 2022-12-09 DIAGNOSIS — M54.50 LOW BACK PAIN, UNSPECIFIED BACK PAIN LATERALITY, UNSPECIFIED CHRONICITY, UNSPECIFIED WHETHER SCIATICA PRESENT: Primary | ICD-10-CM

## 2022-12-09 PROCEDURE — 97140 MANUAL THERAPY 1/> REGIONS: CPT | Performed by: PHYSICAL THERAPIST

## 2022-12-09 PROCEDURE — 97110 THERAPEUTIC EXERCISES: CPT | Performed by: PHYSICAL THERAPIST

## 2022-12-09 PROCEDURE — 97530 THERAPEUTIC ACTIVITIES: CPT | Performed by: PHYSICAL THERAPIST

## 2022-12-09 NOTE — PROGRESS NOTES
Physical Therapy Daily Treatment Note    VISIT#: 3    Subjective   Shaka Trinidad reports that he has no pain today but did have soreness the following day after his last session that resolved as the day progressed.   Pain Rating (0/10): 0    Objective   See Exercise, Manual, and Modality Logs for complete treatment.     Patient Education: Progress of therapy and POC    Assessment/Plan  Pt started on the Nustep today with increased time performed, then PA and STM was performed on thoracic and lumbar spine with noted improvements in thoracic mobility. Pt then performed thoracic mobility and strengthening exercises. Pt with noted improved posture upon arrival at the clinic with decreased kyphosis.     Plan  Progress per Plan of Care and Progress strengthening /stabilization /functional activity            Timed:         Manual Therapy:    15     mins  39628;     Therapeutic Exercise:    15     mins  97672;     Neuromuscular Dar:        mins  86955;    Therapeutic Activity:     10     mins  19977;     Gait Training:           mins  96885;     Ultrasound:          mins  64960;    Ionto                                   mins   88711  Self Care                            mins   09183    Un-Timed:  Electrical Stimulation:         mins  28069 ( );  Dry Needling          mins self-pay  Traction          mins 71923  Low Eval          Mins  68668  Mod Eval          Mins  04224  High Eval                            Mins  93864  Re-Eval                               mins  17011    Timed Treatment:   40   mins   Total Treatment:     40   mins    Leann Sands PT, DPT  Physical Therapist

## 2022-12-12 ENCOUNTER — TREATMENT (OUTPATIENT)
Dept: PHYSICAL THERAPY | Facility: CLINIC | Age: 72
End: 2022-12-12

## 2022-12-12 DIAGNOSIS — M54.50 LOW BACK PAIN, UNSPECIFIED BACK PAIN LATERALITY, UNSPECIFIED CHRONICITY, UNSPECIFIED WHETHER SCIATICA PRESENT: Primary | ICD-10-CM

## 2022-12-12 DIAGNOSIS — M79.605 LEFT LEG PAIN: ICD-10-CM

## 2022-12-12 DIAGNOSIS — M54.50 LUMBAR BACK PAIN: ICD-10-CM

## 2022-12-12 PROCEDURE — 97530 THERAPEUTIC ACTIVITIES: CPT | Performed by: PHYSICAL THERAPIST

## 2022-12-12 PROCEDURE — 97140 MANUAL THERAPY 1/> REGIONS: CPT | Performed by: PHYSICAL THERAPIST

## 2022-12-12 PROCEDURE — 97110 THERAPEUTIC EXERCISES: CPT | Performed by: PHYSICAL THERAPIST

## 2022-12-12 NOTE — PROGRESS NOTES
Physical Therapy Daily Treatment Note    VISIT#: 4    Subjective   Shaka Trinidad reports that he had some mild soreness the next day following his last session but did well overall. Pt reports that yesterday he had a large amount of pain in his LB and reports that washing dishes is the hardest activity to perform with his LB.   Pain Rating (0/10): 0    Objective     See Exercise, Manual, and Modality Logs for complete treatment.     Patient Education: Progress of therapy and POC    Assessment/Plan  Pt started on the Nustep today followed by thoracic PA for thoracic mobility with exercises performed to maintain ROM. Pt also progressed in functional core strengthening and stabilization with minimal to no increase in pain throughout the session.     Plan  Progress per Plan of Care and Progress strengthening /stabilization /functional activity            Timed:         Manual Therapy:    15     mins  52474;     Therapeutic Exercise:    15     mins  42463;     Neuromuscular Dar:        mins  15956;    Therapeutic Activity:     10     mins  09610;     Gait Training:           mins  06149;     Ultrasound:          mins  95225;    Ionto                                  mins   63208  Self Care                            mins   56908    Un-Timed:  Electrical Stimulation:         mins  16848 ( );  Dry Needling          mins self-pay  Traction          mins 29184  Low Eval          Mins  36301  Mod Eval          Mins  17754  High Eval                            Mins  57729  Re-Eval                               mins  03140    Timed Treatment:   40   mins   Total Treatment:     40   mins    Leann Sands PT, DPT  Physical Therapist

## 2022-12-13 ENCOUNTER — OFFICE VISIT (OUTPATIENT)
Dept: FAMILY MEDICINE CLINIC | Facility: CLINIC | Age: 72
End: 2022-12-13

## 2022-12-13 VITALS
HEART RATE: 67 BPM | HEIGHT: 67 IN | DIASTOLIC BLOOD PRESSURE: 74 MMHG | OXYGEN SATURATION: 98 % | BODY MASS INDEX: 33.62 KG/M2 | WEIGHT: 214.2 LBS | SYSTOLIC BLOOD PRESSURE: 116 MMHG

## 2022-12-13 DIAGNOSIS — D50.9 IRON DEFICIENCY ANEMIA, UNSPECIFIED IRON DEFICIENCY ANEMIA TYPE: ICD-10-CM

## 2022-12-13 DIAGNOSIS — N18.31 STAGE 3A CHRONIC KIDNEY DISEASE: Primary | ICD-10-CM

## 2022-12-13 DIAGNOSIS — K21.9 GASTROESOPHAGEAL REFLUX DISEASE WITHOUT ESOPHAGITIS: ICD-10-CM

## 2022-12-13 DIAGNOSIS — M54.50 LUMBAR BACK PAIN: ICD-10-CM

## 2022-12-13 DIAGNOSIS — E11.51 TYPE 2 DIABETES MELLITUS WITH DIABETIC PERIPHERAL ANGIOPATHY WITHOUT GANGRENE, UNSPECIFIED WHETHER LONG TERM INSULIN USE: ICD-10-CM

## 2022-12-13 DIAGNOSIS — Z23 INFLUENZA VACCINE ADMINISTERED: ICD-10-CM

## 2022-12-13 DIAGNOSIS — E78.2 MIXED HYPERLIPIDEMIA: ICD-10-CM

## 2022-12-13 DIAGNOSIS — I10 ESSENTIAL HYPERTENSION: ICD-10-CM

## 2022-12-13 PROCEDURE — 99214 OFFICE O/P EST MOD 30 MIN: CPT | Performed by: NURSE PRACTITIONER

## 2022-12-13 PROCEDURE — 90662 IIV NO PRSV INCREASED AG IM: CPT | Performed by: NURSE PRACTITIONER

## 2022-12-13 PROCEDURE — G0008 ADMIN INFLUENZA VIRUS VAC: HCPCS | Performed by: NURSE PRACTITIONER

## 2022-12-13 RX ORDER — BLOOD SUGAR DIAGNOSTIC
1 STRIP MISCELLANEOUS DAILY
Qty: 100 EACH | Refills: 5 | Status: SHIPPED | OUTPATIENT
Start: 2022-12-13

## 2022-12-13 NOTE — PROGRESS NOTES
Chief Complaint  Chief Complaint   Patient presents with   • Follow-up     6 month diabetes follow up            Subjective          Shaka Trinidad presents to [facility] for   History of Present Illness     Patient was seen 11/1 for low back pain radiating to the left leg.  He was started on gabapentin at night, x-ray completed and he was referred to PT, he reports some improvement in leg pain although the low back is still bothersome.    He presented to the ED on 12/2 for right knee pain/gout, drained effusion, he reports pain has resolved    Diabetes mellitus type II, feels stable on meds, denies any signs/symptoms of hyper/hypoglycemia, blurry vision, polydipsia, polyuria, nocturia, and unintentional weight loss    GERD, stable on medication, denies nausea, vomiting, constipation, abdominal pain and diarrhea.    Hyperlipidemia, The patient denies muscle aches, constipation, diarrhea, GI upset, fatigue, chest pain/pressure, exercise intolerance, dyspnea, palpitations, syncope and pedal edema.      HTN, stable on meds and takes as directed, denies chest pain, headache, shortness of air, palpitations and swelling of extremities.     CKD, monitoring, he does not follow with nephrology.     Here to review labs      The following portions of the patient's history were reviewed and updated as appropriate: allergies, current medications, past family history, past medical history, past social history, past surgical history and problem list.    Past Medical History:   Diagnosis Date   • Acute pancreatitis 3/14/2020   • Coronary artery disease    • Diabetes mellitus, type II (HCC)    • Elevated cholesterol    • H/O cataract    • Hyperlipidemia    • Hypertension    • Peripheral edema    • Peripheral edema    • Sleep apnea      Past Surgical History:   Procedure Laterality Date   • CARDIAC CATHETERIZATION N/A 7/19/2019    Procedure: Left Heart Cath with angiogram;  Surgeon: Bautista Lopez,  MD;  Location: [facility] CATH INVASIVE LOCATION;  Service: Cardiovascular   • CARDIAC CATHETERIZATION N/A 7/19/2019    Procedure: Coronary angiography;  Surgeon: Bautista Lopez MD;  Location: [facility] CATH INVASIVE LOCATION;  Service: Cardiovascular   • CARDIAC CATHETERIZATION N/A 7/19/2019    Procedure: Left ventriculography;  Surgeon: Bautista Lopez MD;  Location: [facility] CATH INVASIVE LOCATION;  Service: Cardiovascular   • CHOLECYSTECTOMY N/A 1/6/2021    Procedure: CHOLECYSTECTOMY LAPAROSCOPIC;  Surgeon: Giacomo Akins MD;  Location: [facility] MAIN OR;  Service: General;  Laterality: N/A;   • COLONOSCOPY     • CORONARY ARTERY BYPASS GRAFT N/A 8/8/2019    Procedure: CORONARY ARTERY BYPASS GRAFTING;  Surgeon: Chet Mcarthur MD;  Location: [facility] CVOR;  Service: Cardiothoracic   • ENDOSCOPY N/A 3/15/2020    Procedure: ESOPHAGOGASTRODUODENOSCOPY;  Surgeon: Jaspal Logan MD;  Location: [facility] ENDOSCOPY;  Service: Gastroenterology;  Laterality: N/A;  EPIGASTRIC ABDOMINAL PAIN, ABNORMAL CT SCAN, PANCREATITIS  NORMAL EGD   • HERNIA REPAIR     • OTHER SURGICAL HISTORY  12/2015    2d echo-abstracted cent.      Family History   Problem Relation Age of Onset   • Heart failure Mother    • Hypertension Brother    • Cancer Brother    • Heart disease Brother      Social History     Tobacco Use   • Smoking status: Never   • Smokeless tobacco: Never   Substance Use Topics   • Alcohol use: Yes     Alcohol/week: 1.0 standard drink     Types: 1 Glasses of wine per week       Current Outpatient Medications:   •  amLODIPine (NORVASC) 5 MG tablet, TAKE 1 TABLET BY MOUTH EVERY DAY, Disp: 90 tablet, Rfl: 3  •  aspirin 81 MG chewable tablet, Chew 81 mg Every Night., Disp: , Rfl:   •  atorvastatin (LIPITOR) 10 MG tablet, Take 1 tablet by mouth Every Night., Disp: 90 tablet, Rfl: 3  •  B-D UF III MINI PEN NEEDLES 31G X 5 MM misc, USE ONCE DAILY AS DIRECTED, Disp: 100 each, Rfl: 3  •  CINNAMON PO, Take  by mouth., Disp: ,  Rfl:   •  ferrous sulfate 325 (65 FE) MG tablet, TAKE 1 TABLET BY MOUTH EVERY DAY WITH BREAKFAST, Disp: 90 tablet, Rfl: 1  •  furosemide (LASIX) 20 MG tablet, TAKE 1 TABLET BY MOUTH EVERY DAY. MAY TAKE AN EXTRA DOSE FOR SWELLING ONE TIME DAILY AS NEEDED, Disp: 120 tablet, Rfl: 3  •  gabapentin (NEURONTIN) 100 MG capsule, Take 1 capsule by mouth At Night As Needed (back/leg pain)., Disp: 30 capsule, Rfl: 2  •  glipizide (GLUCOTROL) 5 MG tablet, TAKE 2 TABS IN THE MORNING AND 1 TAB IN THE EVENING, Disp: 270 tablet, Rfl: 1  •  guaiFENesin (MUCINEX) 600 MG 12 hr tablet, Take 1,200 mg by mouth 2 (Two) Times a Day., Disp: , Rfl:   •  hydrALAZINE (APRESOLINE) 25 MG tablet, TAKE 1 TABLET BY MOUTH THREE TIMES A DAY, Disp: 270 tablet, Rfl: 1  •  Insulin Glargine (BASAGLAR KWIKPEN) 100 UNIT/ML injection pen, INJECT 50 UNITS UNDER THE SKIN INTO THE APPROPRIATE AREA AS DIRECTED EVERY MORNING., Disp: 45 mL, Rfl: 1  •  lisinopril (PRINIVIL,ZESTRIL) 40 MG tablet, TAKE 1 TABLET BY MOUTH EVERY DAY, Disp: 90 tablet, Rfl: 3  •  loratadine (CLARITIN) 10 MG tablet, Take 10 mg by mouth Daily., Disp: , Rfl:   •  metFORMIN (GLUCOPHAGE) 1000 MG tablet, TAKE 1 TABLET BY MOUTH TWICE A DAY WITH MEALS, Disp: 180 tablet, Rfl: 1  •  metoprolol tartrate (LOPRESSOR) 25 MG tablet, TAKE 1 TABLET BY MOUTH EVERY 12 HOURS, Disp: 180 tablet, Rfl: 1  •  OneTouch Ultra test strip, 1 each by Other route Daily. Use as instructed to check blood sugar daily, Disp: 100 each, Rfl: 5  •  Pneumococcal 20-Stacie Conj Vacc (Prevnar 20) 0.5 ML suspension prefilled syringe vaccine, Inject  into the appropriate muscle as directed by prescriber., Disp: 0.5 mL, Rfl: 0  •  potassium chloride (MICRO-K) 10 MEQ CR capsule, TAKE 1 CAPSULE BY MOUTH DAILY. AND MAY TAKE EXTRA DOSE WITH LASIX FOR SWELLING AS NEEDED, Disp: 120 capsule, Rfl: 1  •  vitamin C (ASCORBIC ACID) 500 MG tablet, Take 1,000 mg by mouth Every Evening., Disp: , Rfl:   •  Zoster Vac Recomb Adjuvanted (Shingrix) 50  "MCG/0.5ML reconstituted suspension, Inject  into the appropriate muscle as directed by prescriber., Disp: 0.5 mL, Rfl: 1    Objective   Vital Signs:   /74 (BP Location: Left arm, Patient Position: Sitting, Cuff Size: Adult)   Pulse 67   Ht 170.2 cm (67.01\")   Wt 97.2 kg (214 lb 3.2 oz)   SpO2 98%   BMI 33.54 kg/m²           Physical Exam  Vitals and nursing note reviewed.   Constitutional:       General: He is not in acute distress.     Appearance: He is well-developed. He is not diaphoretic.   HENT:      Head: Normocephalic and atraumatic.   Eyes:      Pupils: Pupils are equal, round, and reactive to light.   Neck:      Thyroid: No thyromegaly.   Cardiovascular:      Rate and Rhythm: Normal rate and regular rhythm.      Heart sounds: Normal heart sounds. No murmur heard.  Pulmonary:      Effort: Pulmonary effort is normal. No respiratory distress.      Breath sounds: Normal breath sounds.   Abdominal:      General: Bowel sounds are normal. There is no distension.      Palpations: Abdomen is soft.      Tenderness: There is no abdominal tenderness.   Musculoskeletal:         General: Tenderness (lbp, mild nagy rom, radiation of pain to LLE resolved) present. No swelling. Normal range of motion.      Cervical back: Normal range of motion.   Skin:     General: Skin is warm and dry.      Findings: No erythema.   Neurological:      General: No focal deficit present.      Mental Status: He is alert and oriented to person, place, and time. Mental status is at baseline.   Psychiatric:         Mood and Affect: Mood normal.         Behavior: Behavior normal.         Thought Content: Thought content normal.         Judgment: Judgment normal.          Result Review :     No visits with results within 7 Day(s) from this visit.   Latest known visit with results is:   Clinical Support on 12/06/2022   Component Date Value Ref Range Status   • WBC 12/06/2022 8.26  3.40 - 10.80 10*3/mm3 Final   • RBC 12/06/2022 4.24  4.14 - " 5.80 10*6/mm3 Final   • Hemoglobin 12/06/2022 12.4 (L)  13.0 - 17.7 g/dL Final   • Hematocrit 12/06/2022 36.2 (L)  37.5 - 51.0 % Final   • MCV 12/06/2022 85.4  79.0 - 97.0 fL Final   • MCH 12/06/2022 29.2  26.6 - 33.0 pg Final   • MCHC 12/06/2022 34.3  31.5 - 35.7 g/dL Final   • RDW 12/06/2022 12.3  12.3 - 15.4 % Final   • RDW-SD 12/06/2022 37.7  37.0 - 54.0 fl Final   • MPV 12/06/2022 10.5  6.0 - 12.0 fL Final   • Platelets 12/06/2022 306  140 - 450 10*3/mm3 Final   • Glucose 12/06/2022 90  65 - 99 mg/dL Final   • BUN 12/06/2022 27 (H)  8 - 23 mg/dL Final   • Creatinine 12/06/2022 1.31 (H)  0.76 - 1.27 mg/dL Final   • Sodium 12/06/2022 136  136 - 145 mmol/L Final   • Potassium 12/06/2022 4.7  3.5 - 5.2 mmol/L Final   • Chloride 12/06/2022 99  98 - 107 mmol/L Final   • CO2 12/06/2022 27.0  22.0 - 29.0 mmol/L Final   • Calcium 12/06/2022 10.0  8.6 - 10.5 mg/dL Final   • Total Protein 12/06/2022 7.2  6.0 - 8.5 g/dL Final   • Albumin 12/06/2022 4.30  3.50 - 5.20 g/dL Final   • ALT (SGPT) 12/06/2022 18  1 - 41 U/L Final   • AST (SGOT) 12/06/2022 23  1 - 40 U/L Final   • Alkaline Phosphatase 12/06/2022 60  39 - 117 U/L Final   • Total Bilirubin 12/06/2022 0.4  0.0 - 1.2 mg/dL Final   • Globulin 12/06/2022 2.9  gm/dL Final   • A/G Ratio 12/06/2022 1.5  g/dL Final   • BUN/Creatinine Ratio 12/06/2022 20.6  7.0 - 25.0 Final   • Anion Gap 12/06/2022 10.0  5.0 - 15.0 mmol/L Final   • eGFR 12/06/2022 58.2 (L)  >60.0 mL/min/1.73 Final    National Kidney Foundation and American Society of Nephrology (ASN) Task Force recommended calculation based on the Chronic Kidney Disease Epidemiology Collaboration (CKD-EPI) equation refit without adjustment for race.   • Total Cholesterol 12/06/2022 112  0 - 200 mg/dL Final   • Triglycerides 12/06/2022 87  0 - 150 mg/dL Final   • HDL Cholesterol 12/06/2022 41  40 - 60 mg/dL Final   • LDL Cholesterol  12/06/2022 54  0 - 100 mg/dL Final   • VLDL Cholesterol 12/06/2022 17  5 - 40 mg/dL Final   •  LDL/HDL Ratio 12/06/2022 1.31   Final   • Hemoglobin A1C 12/06/2022 7.4 (H)  3.5 - 5.6 % Final                  BMI is >= 30 and <35. (Class 1 Obesity). The following options were offered after discussion;: exercise counseling/recommendations and nutrition counseling/recommendations           Assessment and Plan    Diagnoses and all orders for this visit:    1. Stage 3a chronic kidney disease (HCC) (Primary)  Comments:  Essentially unchanged, GFR 58.   Recommend increase water intake  We will continue to monitor  consider nephrology referral    2. Essential hypertension  Comments:  BP stable, continue current medication regimen    3. Mixed hyperlipidemia  Comments:  Labs reviewed, lipids stable    4. Type 2 diabetes mellitus with diabetic peripheral angiopathy without gangrene, unspecified whether long term insulin use (HCC)  Comments:  A1c up slightly to 7.4  Recommend work on diabetic diet  No changes to medications at this time  Recheck 6mo  Orders:  -     OneTouch Ultra test strip; 1 each by Other route Daily. Use as instructed to check blood sugar daily  Dispense: 100 each; Refill: 5    5. Gastroesophageal reflux disease without esophagitis  Comments:  Stable    6. Iron deficiency anemia, unspecified iron deficiency anemia type  Comments:  Likely 2/2 CKD.  Recommend increase iron in the diet    7. Lumbar back pain  Comments:  Improving somewhat, continue physical therapy and gabapentin    8. Influenza vaccine administered    Other orders  -     Fluzone High-Dose 65+yrs      Pt to call for BG >250 or <70      I spent 30 minutes caring for Shaka Trinidad on this date of service. This time includes time spent by me in the following activities: preparing for the visit, reviewing tests, performing a medically appropriate examination and/or evaluation , counseling and educating the patient/family/caregiver, ordering medications, tests, or procedures and documenting information in the medical record        Follow  Up     Return in about 3 months (around 3/13/2023) for Recheck DM, HTN, HLD. recheck DM panel in 3mo.  Patient was given instructions and counseling regarding his condition or for health maintenance advice. Please see specific information pulled into the AVS if appropriate.        Part of this note may be an electronic transcription/translation of spoken language to printed text using the Dragon Dictation System

## 2022-12-14 NOTE — PROGRESS NOTES
Venipuncture Blood Specimen Collection  Venipuncture performed by Nighat Cullen MA with good hemostasis. Patient tolerated the procedure well without complications.   12/14/22   Nighat Cullen MA

## 2022-12-16 ENCOUNTER — TREATMENT (OUTPATIENT)
Dept: PHYSICAL THERAPY | Facility: CLINIC | Age: 72
End: 2022-12-16

## 2022-12-16 DIAGNOSIS — M54.50 LUMBAR BACK PAIN: ICD-10-CM

## 2022-12-16 DIAGNOSIS — M79.605 LEFT LEG PAIN: ICD-10-CM

## 2022-12-16 DIAGNOSIS — M54.50 LOW BACK PAIN, UNSPECIFIED BACK PAIN LATERALITY, UNSPECIFIED CHRONICITY, UNSPECIFIED WHETHER SCIATICA PRESENT: Primary | ICD-10-CM

## 2022-12-16 PROCEDURE — 97112 NEUROMUSCULAR REEDUCATION: CPT | Performed by: PHYSICAL THERAPIST

## 2022-12-16 PROCEDURE — 97140 MANUAL THERAPY 1/> REGIONS: CPT | Performed by: PHYSICAL THERAPIST

## 2022-12-16 PROCEDURE — 97530 THERAPEUTIC ACTIVITIES: CPT | Performed by: PHYSICAL THERAPIST

## 2022-12-16 NOTE — PROGRESS NOTES
Physical Therapy Daily Treatment Note    VISIT#: 5    Subjective   Shaka Trinidad reports that he is doing well today and has stopped having issues with his upper/middle back for the most part with remaining impairments in his LB  Pain Rating (0/10): 0    Objective     See Exercise, Manual, and Modality Logs for complete treatment.     Patient Education: Progress of therapy and POC    Assessment/Plan  Pt with limitations in thoracic mobility at the start of the session with improvements following thoracic PA. Pt then performed greater amounts of functional core strengthening with minimal to no increase in pain throughout the session.     Plan  Progress per Plan of Care and Progress strengthening /stabilization /functional activity            Timed:         Manual Therapy:    8     mins  89188;     Therapeutic Exercise:    10     mins  56439;     Neuromuscular Dar:     10   mins  57443;    Therapeutic Activity:     12     mins  43259;     Gait Training:           mins  65148;     Ultrasound:          mins  86502;    Ionto                                   mins   54954  Self Care                            mins   01875    Un-Timed:  Electrical Stimulation:         mins  82202 ( );  Dry Needling          mins self-pay  Traction          mins 94468  Low Eval          Mins  19013  Mod Eval          Mins  31188  High Eval                            Mins  95197  Re-Eval                               mins  71778    Timed Treatment:  40   mins   Total Treatment:      40  mins    Leann Sands PT, DPT  Physical Therapist

## 2022-12-18 DIAGNOSIS — N18.31 STAGE 3A CHRONIC KIDNEY DISEASE: ICD-10-CM

## 2022-12-19 RX ORDER — POTASSIUM CHLORIDE 750 MG/1
CAPSULE, EXTENDED RELEASE ORAL
Qty: 90 CAPSULE | Refills: 2 | Status: SHIPPED | OUTPATIENT
Start: 2022-12-19

## 2022-12-20 ENCOUNTER — TREATMENT (OUTPATIENT)
Dept: PHYSICAL THERAPY | Facility: CLINIC | Age: 72
End: 2022-12-20

## 2022-12-20 ENCOUNTER — OFFICE VISIT (OUTPATIENT)
Dept: CARDIOLOGY | Facility: CLINIC | Age: 72
End: 2022-12-20

## 2022-12-20 VITALS
DIASTOLIC BLOOD PRESSURE: 71 MMHG | WEIGHT: 213 LBS | BODY MASS INDEX: 33.43 KG/M2 | HEART RATE: 79 BPM | HEIGHT: 67 IN | OXYGEN SATURATION: 97 % | SYSTOLIC BLOOD PRESSURE: 133 MMHG

## 2022-12-20 DIAGNOSIS — M54.50 LUMBAR BACK PAIN: ICD-10-CM

## 2022-12-20 DIAGNOSIS — E78.2 MIXED HYPERLIPIDEMIA: ICD-10-CM

## 2022-12-20 DIAGNOSIS — M54.50 LOW BACK PAIN, UNSPECIFIED BACK PAIN LATERALITY, UNSPECIFIED CHRONICITY, UNSPECIFIED WHETHER SCIATICA PRESENT: Primary | ICD-10-CM

## 2022-12-20 DIAGNOSIS — G47.33 OBSTRUCTIVE SLEEP APNEA: ICD-10-CM

## 2022-12-20 DIAGNOSIS — I25.118 CORONARY ARTERY DISEASE OF NATIVE ARTERY OF NATIVE HEART WITH STABLE ANGINA PECTORIS: Primary | ICD-10-CM

## 2022-12-20 DIAGNOSIS — E11.9 TYPE 2 DIABETES MELLITUS WITHOUT COMPLICATION, WITHOUT LONG-TERM CURRENT USE OF INSULIN: ICD-10-CM

## 2022-12-20 DIAGNOSIS — M79.605 LEFT LEG PAIN: ICD-10-CM

## 2022-12-20 DIAGNOSIS — I10 ESSENTIAL HYPERTENSION: ICD-10-CM

## 2022-12-20 PROCEDURE — 97530 THERAPEUTIC ACTIVITIES: CPT | Performed by: PHYSICAL THERAPIST

## 2022-12-20 PROCEDURE — 99214 OFFICE O/P EST MOD 30 MIN: CPT | Performed by: INTERNAL MEDICINE

## 2022-12-20 PROCEDURE — 97110 THERAPEUTIC EXERCISES: CPT | Performed by: PHYSICAL THERAPIST

## 2022-12-20 PROCEDURE — 97112 NEUROMUSCULAR REEDUCATION: CPT | Performed by: PHYSICAL THERAPIST

## 2022-12-20 NOTE — PROGRESS NOTES
Re-Assessment / Progress Note    Patient: Shaka Trinidad   : 1950  Diagnosis/ICD-10 Code:  Low back pain, unspecified back pain laterality, unspecified chronicity, unspecified whether sciatica present [M54.50]  Referring practitioner: ERICA Marlow  Date of Initial Visit: Episode Type: THERAPY  Noted: 2022    Today's Date: 2022  Patient seen for 6 sessions.      Subjective:   Shaka Trinidad reports: that he is doing much better and has been able to sleep on his side again, and has decreased issues while navigating stairs  Subjective Questionnaire: Oswestry:   LEFS: 46/80  Clinical Progress: improved  Home Program Compliance: Yes  Treatment has included: therapeutic exercise, neuromuscular re-education, manual therapy and therapeutic activity    Subjective   Objective   Active Range of Motion     Additional Active Range of Motion Details  Flex: WFL - pain  Ext:WFL - pain  R SB:WFL - pain  L SB:WFL - pain  R ROT:WFL - pain  L ROT:WFL - pain    Strength/Myotome Testing     Left Hip   Planes of Motion   Flexion: 5  External rotation: 5  Internal rotation: 5     Right Hip   Planes of Motion   Flexion: 5  External rotation: 5  Internal rotation: 5    Left Knee   Flexion: 5  Extension: 5    Right Knee   Flexion: 5  Extension: 5    Left Ankle/Foot   Dorsiflexion: 5  Plantar flexion: 5    Right Ankle/Foot   Dorsiflexion: 5  Plantar flexion: 5    Tests     Lumbar     Left   Negative crossed SLR and quadrant.     Right   Negative crossed SLR and quadrant.      General Comments     Lumbar Comments  Decreased thoracic PA mobility, T7-T10 most notably. - improvements noted since initial evaluation     Assessment/Plan     Assessment & Plan     Assessment  Assessment details: Patient is a 72 y.o. year old male who presents to therapy with LBP and LLE pain. At initial evaluation he presented with significant impairments including decreased lumbar and thoracic ROM, decreased BLE strength, decreased  standing, walking, and activity tolerance, impaired Modified Oswestry score, and pain. Pt has demonstrated improvements in ROM, strength, standing, walking, sitting, and activity tolerance, thoracic mobility, and pain level. Pt continues to have pain with specific movements and limitations in activity tolerance at this time.  Impairments affect ADL/IADL's, functional activities, recreational activities, and community activities. Pt will benefit from skilled physical therapy to address impairments, decrease pain and restore function.  Prognosis: good    Goals  Plan Goals: Pt presents to PT with symptoms consistent with mid and LBP, and LLE pain.  Pt would benefit from skilled PT intervention to address the deficits noted.       SHORT TERM GOALS: Time for Goal Achievement: 4 weeks    1.  Patient to be compliant and progression of HEP   - MET (12/20/22)                          2.  Pain level < 5/10 at worst with mentioned activities to improve function- MET (12/20/22)      3.  Increased thoracic, lumbar and SIJ mobility to allow for increased lumbar AROM with less pain.- MET (12/20/22)      4.  Increased lumbar AROM to by 25% in all planes to allow for increased ease with sit-stand transfers and functional activities- MET (12/20/22)        LONG TERM GOALS: Time for Goal Achievement: 2 months  1.  Pt. to score < 30 % on Back Index- NOT  MET (12/20/22)      2.  Pain level < 3/10 with all listed activities to return to normal.- NOT  MET (12/20/22)      3.  Lumbar AROM to WFL to allow for return to household & recreational activities w/o increased symptoms- NOT  MET (12/20/22)      4.  (B) LE and lower abdominal strength to 5/5 to allow for pushing, pulling and activities to occur without pain (driving, sitting, household  & Job requirements)- MET (12/20/22)          Progress toward previous goals: Partially Met  Recommendations: Continue as planned  Timeframe: 1 month  Prognosis to achieve goals: good    PT Signature:  Leann Sands PT, DPT            [place] Lic. # [id number]        Based upon review of the patient's progress and continued therapy plan, it is my medical opinion that Shaka Trinidad should continue physical therapy treatment at [facility]  [address]  [place] [address]  [phone].    Signature: __________________________________  Karly Marmolejo APRN    Timed:         Manual Therapy:         mins  90499;     Therapeutic Exercise:    10     mins  71895;     Neuromuscular Dar:    15    mins  42529;    Therapeutic Activity:     15     mins  20864;     Gait Training:           mins  73516;     Ultrasound:          mins  59690;    Ionto                                   mins   23186  Self Care                            mins   61346        Un-Timed:  Electrical Stimulation:         mins  59068 ( );  Dry Needling          mins self-pay  Traction          mins 67941  Low Eval          Mins  45049  Mod Eval          Mins  07810  High Eval                            Mins  02206  Re-Eval                               mins  91136        Timed Treatment:   40   mins   Total Treatment:     40   mins

## 2022-12-20 NOTE — PROGRESS NOTES
Subjective:     Encounter Date:12/20/2022      Patient ID: Shaka Trinidad is a 72 y.o. male.    Chief Complaint:  History of Present Illness 72-year-old white male with history of coronary disease hypertension hyperlipidemia diabetes and sleep apnea presents to my office for a follow-up.  Patient is currently stable without any symptoms of chest pain or shortness of breath at rest or exertion.  No complains any PND orthopnea.  No palpitation but has some dizziness.  No syncope.  He has some swelling of the feet but is taking meds regularly.  Does not smoke.  Is using his CPAP machine.    The following portions of the patient's history were reviewed and updated as appropriate: allergies, current medications, past family history, past medical history, past social history, past surgical history and problem list.  Past Medical History:   Diagnosis Date   • Acute pancreatitis 3/14/2020   • Coronary artery disease    • Diabetes mellitus, type II (HCC)    • Elevated cholesterol    • H/O cataract     cataracts    • Hyperlipidemia    • Hypertension    • Peripheral edema    • Peripheral edema    • Sleep apnea     oral appliance     Past Surgical History:   Procedure Laterality Date   • CARDIAC CATHETERIZATION N/A 7/19/2019    Procedure: Left Heart Cath with angiogram;  Surgeon: Bautista Lopez MD;  Location: [facility] CATH INVASIVE LOCATION;  Service: Cardiovascular   • CARDIAC CATHETERIZATION N/A 7/19/2019    Procedure: Coronary angiography;  Surgeon: Bautista Lopez MD;  Location: [facility] CATH INVASIVE LOCATION;  Service: Cardiovascular   • CARDIAC CATHETERIZATION N/A 7/19/2019    Procedure: Left ventriculography;  Surgeon: Bautista Lopez MD;  Location: [facility] CATH INVASIVE LOCATION;  Service: Cardiovascular   • CHOLECYSTECTOMY N/A 1/6/2021    Procedure: CHOLECYSTECTOMY LAPAROSCOPIC;  Surgeon: Giacomo Akins MD;  Location: [facility] MAIN OR;  Service: General;  Laterality: N/A;   • COLONOSCOPY     • CORONARY ARTERY  "BYPASS GRAFT N/A 8/8/2019    Procedure: CORONARY ARTERY BYPASS GRAFTING;  Surgeon: Chet Mcarthur MD;  Location: [facility] CVOR;  Service: Cardiothoracic   • ENDOSCOPY N/A 3/15/2020    Procedure: ESOPHAGOGASTRODUODENOSCOPY;  Surgeon: Jaspal Logan MD;  Location: [facility] ENDOSCOPY;  Service: Gastroenterology;  Laterality: N/A;  EPIGASTRIC ABDOMINAL PAIN, ABNORMAL CT SCAN, PANCREATITIS  NORMAL EGD   • HERNIA REPAIR     • OTHER SURGICAL HISTORY  12/2015    2d echo-abstracted cent.      /71   Pulse 79   Ht 170.2 cm (67.01\")   Wt 96.6 kg (213 lb)   SpO2 97%   BMI 33.35 kg/m²   Family History   Problem Relation Age of Onset   • Heart failure Mother    • Hypertension Brother    • Cancer Brother    • Heart disease Brother        Current Outpatient Medications:   •  amLODIPine (NORVASC) 5 MG tablet, TAKE 1 TABLET BY MOUTH EVERY DAY, Disp: 90 tablet, Rfl: 3  •  aspirin 81 MG chewable tablet, Chew 81 mg Every Night., Disp: , Rfl:   •  atorvastatin (LIPITOR) 10 MG tablet, Take 1 tablet by mouth Every Night., Disp: 90 tablet, Rfl: 3  •  B-D UF III MINI PEN NEEDLES 31G X 5 MM misc, USE ONCE DAILY AS DIRECTED, Disp: 100 each, Rfl: 3  •  CINNAMON PO, Take  by mouth., Disp: , Rfl:   •  ferrous sulfate 325 (65 FE) MG tablet, TAKE 1 TABLET BY MOUTH EVERY DAY WITH BREAKFAST, Disp: 90 tablet, Rfl: 1  •  furosemide (LASIX) 20 MG tablet, TAKE 1 TABLET BY MOUTH EVERY DAY. MAY TAKE AN EXTRA DOSE FOR SWELLING ONE TIME DAILY AS NEEDED, Disp: 120 tablet, Rfl: 3  •  gabapentin (NEURONTIN) 100 MG capsule, Take 1 capsule by mouth At Night As Needed (back/leg pain)., Disp: 30 capsule, Rfl: 2  •  glipizide (GLUCOTROL) 5 MG tablet, TAKE 2 TABS IN THE MORNING AND 1 TAB IN THE EVENING, Disp: 270 tablet, Rfl: 1  •  guaiFENesin (MUCINEX) 600 MG 12 hr tablet, Take 1,200 mg by mouth 2 (Two) Times a Day., Disp: , Rfl:   •  hydrALAZINE (APRESOLINE) 25 MG tablet, TAKE 1 TABLET BY MOUTH THREE TIMES A DAY, Disp: 270 tablet, Rfl: 1  •  " Insulin Glargine (BASAGLAR KWIKPEN) 100 UNIT/ML injection pen, INJECT 50 UNITS UNDER THE SKIN INTO THE APPROPRIATE AREA AS DIRECTED EVERY MORNING., Disp: 45 mL, Rfl: 1  •  lisinopril (PRINIVIL,ZESTRIL) 40 MG tablet, TAKE 1 TABLET BY MOUTH EVERY DAY, Disp: 90 tablet, Rfl: 3  •  loratadine (CLARITIN) 10 MG tablet, Take 10 mg by mouth Daily., Disp: , Rfl:   •  metFORMIN (GLUCOPHAGE) 1000 MG tablet, TAKE 1 TABLET BY MOUTH TWICE A DAY WITH MEALS, Disp: 180 tablet, Rfl: 1  •  metoprolol tartrate (LOPRESSOR) 25 MG tablet, TAKE 1 TABLET BY MOUTH EVERY 12 HOURS, Disp: 180 tablet, Rfl: 1  •  OneTouch Ultra test strip, 1 each by Other route Daily. Use as instructed to check blood sugar daily, Disp: 100 each, Rfl: 5  •  Pneumococcal 20-Stacie Conj Vacc (Prevnar 20) 0.5 ML suspension prefilled syringe vaccine, Inject  into the appropriate muscle as directed by prescriber., Disp: 0.5 mL, Rfl: 0  •  potassium chloride (MICRO-K) 10 MEQ CR capsule, TAKE 1 CAPSULE BY MOUTH DAILY. AND MAY TAKE EXTRA DOSE WITH LASIX FOR SWELLING AS NEEDED, Disp: 90 capsule, Rfl: 2  •  vitamin C (ASCORBIC ACID) 500 MG tablet, Take 1,000 mg by mouth Every Evening., Disp: , Rfl:   •  Zoster Vac Recomb Adjuvanted (Shingrix) 50 MCG/0.5ML reconstituted suspension, Inject  into the appropriate muscle as directed by prescriber., Disp: 0.5 mL, Rfl: 1  No Known Allergies  Social History     Socioeconomic History   • Marital status:    Tobacco Use   • Smoking status: Never   • Smokeless tobacco: Never   Vaping Use   • Vaping Use: Never used   Substance and Sexual Activity   • Alcohol use: Yes     Alcohol/week: 1.0 standard drink     Types: 1 Glasses of wine per week   • Drug use: Never   • Sexual activity: Defer     Review of Systems   Constitutional: Negative for malaise/fatigue.   Cardiovascular: Positive for leg swelling. Negative for chest pain, dyspnea on exertion and palpitations.   Respiratory: Negative for cough and shortness of breath.     Gastrointestinal: Negative for abdominal pain, nausea and vomiting.   Neurological: Positive for dizziness. Negative for focal weakness, headaches, light-headedness and numbness.   All other systems reviewed and are negative.             Objective:     Constitutional:       Appearance: Well-developed.   Eyes:      General: No scleral icterus.     Conjunctiva/sclera: Conjunctivae normal.   HENT:      Head: Normocephalic and atraumatic.   Neck:      Vascular: No carotid bruit or JVD.   Pulmonary:      Effort: Pulmonary effort is normal.      Breath sounds: Normal breath sounds. No wheezing. No rales.   Cardiovascular:      Normal rate. Regular rhythm.   Pulses:     Intact distal pulses.   Abdominal:      General: Bowel sounds are normal.      Palpations: Abdomen is soft.   Musculoskeletal:      Cervical back: Normal range of motion and neck supple. Skin:     General: Skin is warm and dry.      Findings: No rash.   Neurological:      Mental Status: Alert.       Procedures    Lab Review:         MDM  1.  Coronary disease  Patient has nonobstructive disease and is currently stable on medications with normal V function  2.  Hypertension  Patient blood pressure currently stable on lisinopril and hydralazine  3.  Hyperlipidemia  Patient is on Lipitor and the lipid levels are well within normal limits  4.  Sleep apnea  Patient is sleep apnea and uses a mouthpiece instead of a CPAP machine  5.  Diabetes  Patient is on oral medicines and followed by the primary care doctor.      Patient's previous medical records, labs, and EKG were reviewed and discussed with the patient at today's visit.

## 2022-12-21 ENCOUNTER — TELEPHONE (OUTPATIENT)
Dept: FAMILY MEDICINE CLINIC | Facility: CLINIC | Age: 72
End: 2022-12-21

## 2022-12-21 NOTE — TELEPHONE ENCOUNTER
Pt and his wife called in this morning. Pt started experiencing the following yesterday: coughing with green/brown phlegm, headache off and on, slightly hoarse when speaking, and bad drainage at night time. I advised of Karly's full schedule today and tomorrow. At first they were wondering if anything could be called in, but due to Karly's schedule, I did also advise . Pt plans on trying to be seen at  today.

## 2022-12-27 ENCOUNTER — TREATMENT (OUTPATIENT)
Dept: PHYSICAL THERAPY | Facility: CLINIC | Age: 72
End: 2022-12-27

## 2022-12-27 DIAGNOSIS — M54.50 LUMBAR BACK PAIN: ICD-10-CM

## 2022-12-27 DIAGNOSIS — M79.605 LEFT LEG PAIN: ICD-10-CM

## 2022-12-27 DIAGNOSIS — M54.50 LOW BACK PAIN, UNSPECIFIED BACK PAIN LATERALITY, UNSPECIFIED CHRONICITY, UNSPECIFIED WHETHER SCIATICA PRESENT: Primary | ICD-10-CM

## 2022-12-27 PROCEDURE — 97110 THERAPEUTIC EXERCISES: CPT | Performed by: PHYSICAL THERAPIST

## 2022-12-27 PROCEDURE — 97530 THERAPEUTIC ACTIVITIES: CPT | Performed by: PHYSICAL THERAPIST

## 2022-12-27 PROCEDURE — 97112 NEUROMUSCULAR REEDUCATION: CPT | Performed by: PHYSICAL THERAPIST

## 2022-12-27 NOTE — PROGRESS NOTES
Physical Therapy Daily Treatment Note    VISIT#: 7    Subjective   Shaka Trinidad reports that he was sick last week but is doing much better now.   Pain Rating (0/10): 0    Objective     See Exercise, Manual, and Modality Logs for complete treatment.     Patient Education: Progress of therapy and POC    Assessment/Plan  Pt continues to progress functional core strengthening and stability with minimal to no increase in pain throughout the session.     Plan  Progress per Plan of Care and Progress strengthening /stabilization /functional activity            Timed:         Manual Therapy:         mins  68289;     Therapeutic Exercise:    10     mins  71947;     Neuromuscular Dar:    15    mins  18686;    Therapeutic Activity:     15     mins  31174;     Gait Training:           mins  96760;     Ultrasound:          mins  96642;    Ionto                                   mins   77933  Self Care                            mins   53088    Un-Timed:  Electrical Stimulation:         mins  02417 ( );  Dry Needling          mins self-pay  Traction          mins 01192  Low Eval          Mins  97998  Mod Eval          Mins  05109  High Eval                            Mins  92847  Re-Eval                               mins  62025    Timed Treatment:   40   mins   Total Treatment:     40   mins    Leann Sands PT, DPT  Physical Therapist

## 2022-12-30 ENCOUNTER — TREATMENT (OUTPATIENT)
Dept: PHYSICAL THERAPY | Facility: CLINIC | Age: 72
End: 2022-12-30

## 2022-12-30 DIAGNOSIS — M54.50 LUMBAR BACK PAIN: ICD-10-CM

## 2022-12-30 DIAGNOSIS — M54.50 LOW BACK PAIN, UNSPECIFIED BACK PAIN LATERALITY, UNSPECIFIED CHRONICITY, UNSPECIFIED WHETHER SCIATICA PRESENT: Primary | ICD-10-CM

## 2022-12-30 DIAGNOSIS — M79.605 LEFT LEG PAIN: ICD-10-CM

## 2022-12-30 PROCEDURE — 97530 THERAPEUTIC ACTIVITIES: CPT | Performed by: PHYSICAL THERAPIST

## 2022-12-30 PROCEDURE — 97110 THERAPEUTIC EXERCISES: CPT | Performed by: PHYSICAL THERAPIST

## 2022-12-30 PROCEDURE — 97112 NEUROMUSCULAR REEDUCATION: CPT | Performed by: PHYSICAL THERAPIST

## 2022-12-30 NOTE — PROGRESS NOTES
Physical Therapy Daily Treatment Note    [address] [place] [address]    VISIT#: 8    Subjective   Shaka Trinidad reports that he was able to walk a little over a mile with minimal LB pain that started as he finished the walk and slight muscle soreness noted following. Pt reports that he has not been able to walk for prolonged distances for a long time.   Pain Rating (0/10): 0    Objective     See Exercise, Manual, and Modality Logs for complete treatment.     Patient Education: Progress of therapy and POC    Assessment/Plan  Pt continues to progress functional core strengthening and stability with greater exercises performed in standing  With no increase in pain throughout the session.     Plan  Progress per Plan of Care and Progress strengthening /stabilization /functional activity            Timed:         Manual Therapy:         mins  13802;     Therapeutic Exercise:    10     mins  87464;     Neuromuscular Dar:    15    mins  35560;    Therapeutic Activity:     15     mins  17351;     Gait Training:           mins  77177;     Ultrasound:          mins  77315;    Ionto                                   mins   59914  Self Care                            mins   38762    Un-Timed:  Electrical Stimulation:         mins  56925 ( );  Dry Needling          mins self-pay  Traction          mins 88210  Low Eval          Mins  60561  Mod Eval         Mins  91161  High Eval                            Mins  35695  Re-Eval                               mins  72986    Timed Treatment:   40   mins   Total Treatment:     40   mins    Leann Sands PT, DPT  Physical Therapist

## 2023-01-04 ENCOUNTER — TREATMENT (OUTPATIENT)
Dept: PHYSICAL THERAPY | Facility: CLINIC | Age: 73
End: 2023-01-04
Payer: MEDICARE

## 2023-01-04 DIAGNOSIS — M54.50 LUMBAR BACK PAIN: ICD-10-CM

## 2023-01-04 DIAGNOSIS — M54.50 LOW BACK PAIN, UNSPECIFIED BACK PAIN LATERALITY, UNSPECIFIED CHRONICITY, UNSPECIFIED WHETHER SCIATICA PRESENT: Primary | ICD-10-CM

## 2023-01-04 DIAGNOSIS — M79.605 LEFT LEG PAIN: ICD-10-CM

## 2023-01-04 PROCEDURE — 97530 THERAPEUTIC ACTIVITIES: CPT | Performed by: PHYSICAL THERAPIST

## 2023-01-04 PROCEDURE — 97110 THERAPEUTIC EXERCISES: CPT | Performed by: PHYSICAL THERAPIST

## 2023-01-04 PROCEDURE — 97112 NEUROMUSCULAR REEDUCATION: CPT | Performed by: PHYSICAL THERAPIST

## 2023-01-04 NOTE — PROGRESS NOTES
Physical Therapy Daily Treatment Note    [address] [place] [address]    VISIT#: 9    Subjective   Shaka Trinidad reports that he had some soreness in his legs following his previous session but overall is doing well  Pain Rating (0/10): 0    Objective     See Exercise, Manual, and Modality Logs for complete treatment.     Patient Education: Progress of therapy and POC    Assessment/Plan  Pt continues to progress functional core strengthening and stability with minimal to no increase in pain throughout the session.     Plan  Progress per Plan of Care and Progress strengthening /stabilization /functional activity            Timed:         Manual Therapy:         mins  55216;     Therapeutic Exercise:    15     mins  10725;     Neuromuscular Dar:    10    mins  89064;    Therapeutic Activity:     15     mins  70569;     Gait Training:           mins  32511;     Ultrasound:          mins  09565;    Ionto                                   mins   70584  Self Care                            mins   84334    Un-Timed:  Electrical Stimulation:         mins  92263 ( );  Dry Needling          mins self-pay  Traction          mins 85882  Low Eval          Mins  11820  Mod Eval          Mins  61158  High Eval                            Mins  97167  Re-Eval                               mins  44299    Timed Treatment:   40   mins   Total Treatment:     40   mins    Leann Sands PT, DPT  Physical Therapist

## 2023-01-06 ENCOUNTER — TREATMENT (OUTPATIENT)
Dept: PHYSICAL THERAPY | Facility: CLINIC | Age: 73
End: 2023-01-06
Payer: MEDICARE

## 2023-01-06 DIAGNOSIS — M54.50 LUMBAR BACK PAIN: ICD-10-CM

## 2023-01-06 DIAGNOSIS — M54.50 LOW BACK PAIN, UNSPECIFIED BACK PAIN LATERALITY, UNSPECIFIED CHRONICITY, UNSPECIFIED WHETHER SCIATICA PRESENT: Primary | ICD-10-CM

## 2023-01-06 DIAGNOSIS — M79.605 LEFT LEG PAIN: ICD-10-CM

## 2023-01-06 PROCEDURE — 97112 NEUROMUSCULAR REEDUCATION: CPT | Performed by: PHYSICAL THERAPIST

## 2023-01-06 PROCEDURE — 97110 THERAPEUTIC EXERCISES: CPT | Performed by: PHYSICAL THERAPIST

## 2023-01-06 PROCEDURE — 97530 THERAPEUTIC ACTIVITIES: CPT | Performed by: PHYSICAL THERAPIST

## 2023-01-06 NOTE — PROGRESS NOTES
Physical Therapy Daily Treatment Note    [address] [place] [address]    VISIT#: 10    Subjective   Shaka Trinidad reports that he is sore today following multiple trips up and down steps yesterday while working to help his sister. Pt reports that he did well the first two trips, then on the third trip he started having pain in his leg.   Pain Rating (0/10): 0    Objective     See Exercise, Manual, and Modality Logs for complete treatment.     Patient Education: Progress of therapy and POC    Assessment/Plan  Pt continues to progress functional core strengthening, with low level exercises performed at the start of the session and gradual progression into standing with decreased resistance on LE exercises due to soreness following  Navigating the stairs.     Plan  Progress per Plan of Care and Progress strengthening /stabilization /functional activity            Timed:         Manual Therapy:         mins  63719;     Therapeutic Exercise:    15     mins  38971;     Neuromuscular Dar:    10    mins  74170;    Therapeutic Activity:     15     mins  62004;     Gait Training:           mins  78681;     Ultrasound:          mins  95832;    Ionto                                   mins   02491  Self Care                           mins   76002    Un-Timed:  Electrical Stimulation:         mins  54500 ( );  Dry Needling          mins self-pay  Traction          mins 37458  Low Eval          Mins  96073  Mod Eval         Mins  86437  High Eval                            Mins  31174  Re-Eval                               mins  21714    Timed Treatment:   40   mins   Total Treatment:     40   mins    Leann Sands PT, DPT  Physical Therapist

## 2023-01-09 ENCOUNTER — TREATMENT (OUTPATIENT)
Dept: PHYSICAL THERAPY | Facility: CLINIC | Age: 73
End: 2023-01-09
Payer: MEDICARE

## 2023-01-09 DIAGNOSIS — M54.50 LUMBAR BACK PAIN: ICD-10-CM

## 2023-01-09 DIAGNOSIS — M54.50 LOW BACK PAIN, UNSPECIFIED BACK PAIN LATERALITY, UNSPECIFIED CHRONICITY, UNSPECIFIED WHETHER SCIATICA PRESENT: Primary | ICD-10-CM

## 2023-01-09 DIAGNOSIS — M79.605 LEFT LEG PAIN: ICD-10-CM

## 2023-01-09 PROCEDURE — 97112 NEUROMUSCULAR REEDUCATION: CPT | Performed by: PHYSICAL THERAPIST

## 2023-01-09 PROCEDURE — 97110 THERAPEUTIC EXERCISES: CPT | Performed by: PHYSICAL THERAPIST

## 2023-01-09 PROCEDURE — 97530 THERAPEUTIC ACTIVITIES: CPT | Performed by: PHYSICAL THERAPIST

## 2023-01-09 NOTE — PROGRESS NOTES
Physical Therapy Daily Treatment Note    [address] [place] [address]    VISIT#: 11    Subjective   Shaka Trinidad reports that he is doing well today with no new complaints.   Pain Rating (0/10): 0    Objective     See Exercise, Manual, and Modality Logs for complete treatment.     Patient Education: Progress of therapy and POC    Assessment/Plan  Pt continues to progress strengthening, stability, proprioception and activity tolerance with higher level functional core strengthening exercises performed today. Pt was instructed to take breaks before his pain starts while doing dishes then return to finish dishes following a 5 min break.     Plan  Progress per Plan of Care and Progress strengthening /stabilization /functional activity            Timed:         Manual Therapy:         mins  56308;     Therapeutic Exercise:    10     mins  73405;     Neuromuscular Dar:    15    mins  44704;    Therapeutic Activity:     15     mins  58673;     Gait Training:           mins  39720;     Ultrasound:          mins  07403;    Ionto                                   mins   43081  Self Care                            mins   28458    Un-Timed:  Electrical Stimulation:         mins  16672 ( );  Dry Needling          mins self-pay  Traction          mins 80957  Low Eval          Mins  86554  Mod Eval          Mins  55142  High Eval                            Mins  69719  Re-Eval                               mins  32959    Timed Treatment:   40   mins   Total Treatment:     40   mins    Leann Sands PT, DPT  Physical Therapist

## 2023-01-13 ENCOUNTER — TREATMENT (OUTPATIENT)
Dept: PHYSICAL THERAPY | Facility: CLINIC | Age: 73
End: 2023-01-13
Payer: MEDICARE

## 2023-01-13 DIAGNOSIS — M54.50 LUMBAR BACK PAIN: ICD-10-CM

## 2023-01-13 DIAGNOSIS — M54.50 LOW BACK PAIN, UNSPECIFIED BACK PAIN LATERALITY, UNSPECIFIED CHRONICITY, UNSPECIFIED WHETHER SCIATICA PRESENT: Primary | ICD-10-CM

## 2023-01-13 DIAGNOSIS — M79.605 LEFT LEG PAIN: ICD-10-CM

## 2023-01-13 PROCEDURE — 97112 NEUROMUSCULAR REEDUCATION: CPT | Performed by: PHYSICAL THERAPIST

## 2023-01-13 PROCEDURE — 97110 THERAPEUTIC EXERCISES: CPT | Performed by: PHYSICAL THERAPIST

## 2023-01-13 PROCEDURE — 97530 THERAPEUTIC ACTIVITIES: CPT | Performed by: PHYSICAL THERAPIST

## 2023-01-13 NOTE — PROGRESS NOTES
Physical Therapy Daily Treatment Note    [address] [place] [address]    VISIT#: 12    Subjective   Shaka Trinidad reports that he has some soreness in his LLE, at his thigh, but he relates that to walkng 3.5 miles a day or two ago but otherwise he is doing well.   Pain Rating (0/10): 1    Objective     See Exercise, Manual, and Modality Logs for complete treatment.     Patient Education: Progress of therapy and POC    Assessment/Plan  Pt continues to progress functional core strengthening, stability, and activity tolerance with minimal rest breaks required throughout the session. Pt performed standing exercises with decreased resistance due to preexisting soreness at the start of the session.     Plan  Progress per Plan of Care and Progress strengthening /stabilization /functional activity            Timed:         Manual Therapy:         mins  49756;     Therapeutic Exercise:    10     mins  10891;     Neuromuscular Dar:    15    mins  16893;    Therapeutic Activity:     15     mins  70469;     Gait Training:           mins  84250;     Ultrasound:          mins  06220;    Ionto                                   mins   93222  Self Care                            mins   71573    Un-Timed:  Electrical Stimulation:         mins  84636 ( );  Dry Needling          mins self-pay  Traction          mins 19157  Low Eval          Mins  21126  Mod Eval          Mins  21314  High Eval                            Mins  30821  Re-Eval                               mins  07456    Timed Treatment:   40   mins   Total Treatment:     40   mins    Leann Sands PT, DPT  Physical Therapist

## 2023-01-16 ENCOUNTER — TREATMENT (OUTPATIENT)
Dept: PHYSICAL THERAPY | Facility: CLINIC | Age: 73
End: 2023-01-16
Payer: MEDICARE

## 2023-01-16 DIAGNOSIS — M54.50 LOW BACK PAIN, UNSPECIFIED BACK PAIN LATERALITY, UNSPECIFIED CHRONICITY, UNSPECIFIED WHETHER SCIATICA PRESENT: Primary | ICD-10-CM

## 2023-01-16 DIAGNOSIS — M79.605 LEFT LEG PAIN: ICD-10-CM

## 2023-01-16 DIAGNOSIS — M54.50 LUMBAR BACK PAIN: ICD-10-CM

## 2023-01-16 PROCEDURE — 97110 THERAPEUTIC EXERCISES: CPT | Performed by: PHYSICAL THERAPIST

## 2023-01-16 PROCEDURE — 97112 NEUROMUSCULAR REEDUCATION: CPT | Performed by: PHYSICAL THERAPIST

## 2023-01-16 PROCEDURE — 97530 THERAPEUTIC ACTIVITIES: CPT | Performed by: PHYSICAL THERAPIST

## 2023-01-16 NOTE — PROGRESS NOTES
Physical Therapy Daily Treatment Note    [address] [place] [address]    VISIT#: 13    Subjective   Shaka Trinidad reports that he is only having difficulty standing in one place for prolonged periods of time.   Pain Rating (0/10): 0    Objective     See Exercise, Manual, and Modality Logs for complete treatment.     Patient Education: Progress of therapy and POC    Assessment/Plan  Pt continues to progress functional core strengthening and was progressed to 1 x a week following today as her progresses to DC home with HEP.     Plan  Progress per Plan of Care and Progress strengthening /stabilization /functional activity            Timed:         Manual Therapy:         mins  66928;     Therapeutic Exercise:    15     mins  26024;     Neuromuscular Dar:    15    mins  09271;    Therapeutic Activity:     15     mins  86035;     Gait Training:           mins  37858;     Ultrasound:          mins  44160;    Ionto                                   mins   12213  Self Care                            mins   04116    Un-Timed:  Electrical Stimulation:        mins  83076 ( );  Dry Needling          mins self-pay  Traction          mins 49822  Low Eval          Mins  20964  Mod Eval          Mins  09287  High Eval                            Mins  51614  Re-Eval                               mins  50812    Timed Treatment:   45   mins   Total Treatment:     45   mins    Leann Sands PT, DPT  Physical Therapist

## 2023-01-23 ENCOUNTER — TREATMENT (OUTPATIENT)
Dept: PHYSICAL THERAPY | Facility: CLINIC | Age: 73
End: 2023-01-23
Payer: MEDICARE

## 2023-01-23 DIAGNOSIS — M54.50 LUMBAR BACK PAIN: ICD-10-CM

## 2023-01-23 DIAGNOSIS — M54.50 LOW BACK PAIN, UNSPECIFIED BACK PAIN LATERALITY, UNSPECIFIED CHRONICITY, UNSPECIFIED WHETHER SCIATICA PRESENT: Primary | ICD-10-CM

## 2023-01-23 DIAGNOSIS — M79.605 LEFT LEG PAIN: ICD-10-CM

## 2023-01-23 PROCEDURE — 97530 THERAPEUTIC ACTIVITIES: CPT | Performed by: PHYSICAL THERAPIST

## 2023-01-23 PROCEDURE — 97112 NEUROMUSCULAR REEDUCATION: CPT | Performed by: PHYSICAL THERAPIST

## 2023-01-23 PROCEDURE — 97110 THERAPEUTIC EXERCISES: CPT | Performed by: PHYSICAL THERAPIST

## 2023-01-23 NOTE — PROGRESS NOTES
Re-Assessment / Progress Note    Patient: Shaka Trinidad   : 1950  Diagnosis/ICD-10 Code:  Low back pain, unspecified back pain laterality, unspecified chronicity, unspecified whether sciatica present [M54.50]  Referring practitioner: ERICA Marlow  Date of Initial Visit: Episode Type: THERAPY  Noted: 2022    Today's Date: 2023  Patient seen for 14 sessions.      Subjective:   Shaka Trinidad reports: that he has been doing well but he did have a day of increased LBP on Friday, more so than he has had in a long periods of time. He is doing much better today.   Subjective Questionnaire: Oswestry:   LEFS: 62/80  Clinical Progress: improved  Home Program Compliance: Yes  Treatment has included: therapeutic exercise, neuromuscular re-education, manual therapy, therapeutic activity, gait training, electrical stimulation, moist heat and cryotherapy    Subjective   Objective   Active Range of Motion     Additional Active Range of Motion Details  Flex: WFL - pain  Ext:WFL - pain  R SB:WFL - pain  L SB:WFL - pain  R ROT:WFL - pain  L ROT:WFL - pain    Strength/Myotome Testing     Left Hip   Planes of Motion   Flexion: 5  External rotation: 5  Internal rotation: 5     Right Hip   Planes of Motion   Flexion: 5  External rotation: 5  Internal rotation: 5    Left Knee   Flexion: 5  Extension: 5    Right Knee   Flexion: 5  Extension: 5    Left Ankle/Foot   Dorsiflexion: 5  Plantar flexion: 5    Right Ankle/Foot   Dorsiflexion: 5  Plantar flexion: 5    Tests     Lumbar     Left   Negative crossed SLR and quadrant.     Right   Negative crossed SLR and quadrant.      General Comments     Lumbar Comments  Decreased thoracic PA mobility, T7-T10 most notably. - improvements noted since initial evaluation      Assessment/Plan     Assessment  Assessment details: Patient is a 72 y.o. year old male who presents to therapy with LBP and LLE pain. At initial evaluation he presented with significant  impairments including decreased lumbar and thoracic ROM, decreased BLE strength, decreased standing, walking, and activity tolerance, impaired Modified Oswestry score, and pain. Pt has demonstrated improvements in ROM, strength, standing, walking, sitting, and activity tolerance, thoracic mobility, and pain level. Pt continues to have pain with prolonged standing in a slightly flexed position and limitations in activity tolerance at this time.  Impairments affect ADL/IADL's, functional activities, recreational activities, and community activities. Pt will benefit from skilled physical therapy to address impairments, decrease pain and restore function.  Prognosis: good    Goals  Plan Goals: Pt presents to PT with symptoms consistent with mid and LBP, and LLE pain.  Pt would benefit from skilled PT intervention to address the deficits noted.       SHORT TERM GOALS: Time for Goal Achievement: 4 weeks    1.  Patient to be compliant and progression of HEP   - MET (12/20/22)                          2.  Pain level < 5/10 at worst with mentioned activities to improve function- MET (12/20/22)      3.  Increased thoracic, lumbar and SIJ mobility to allow for increased lumbar AROM with less pain.- MET (12/20/22)      4.  Increased lumbar AROM to by 25% in all planes to allow for increased ease with sit-stand transfers and functional activities- MET (12/20/22)        LONG TERM GOALS: Time for Goal Achievement: 2 months  1.  Pt. to score < 30 % on Back Index-   MET (1/23/23)      2.  Pain level < 3/10 with all listed activities to return to normal.-MET  (1/23/23)      3.  Lumbar AROM to WFL to allow for return to household & recreational activities w/o increased symptoms- NOT  MET  (1/23/23)          4.  (B) LE and lower abdominal strength to 5/5 to allow for pushing, pulling and activities to occur without pain (driving, sitting, household  & Job requirements)- MET (12/20/22)      Progress toward previous goals: Partially  Met  Recommendations: Continue as planned  Timeframe: 1x a week for 1 week   Prognosis to achieve goals: good    PT Signature: Leann Sands PT, DPT            [place] Lic. # [id number]        Based upon review of the patient's progress and continued therapy plan, it is my medical opinion that Shaka Trinidad should continue physical therapy treatment at [facility]  [address]  [place] [address] [phone].    Signature: __________________________________  Karly Marmolejo, ERICA    Timed:         Manual Therapy:         mins  04391;     Therapeutic Exercise:    15     mins  92563;     Neuromuscular Dar:    15    mins  02796;    Therapeutic Activity:     15     mins  25287;     Gait Training:           mins  46686;     Ultrasound:          mins  61368;    Ionto                                   mins   25198  Self Care                            mins   65343        Un-Timed:  Electrical Stimulation:         mins  62107 ( );  Dry Needling          mins self-pay  Traction          mins 46882  Low Eval          Mins  16679  Mod Eval          Mins  85503  High Eval                            Mins  52410  Re-Eval                               mins  71879        Timed Treatment:   45   mins   Total Treatment:     45   mins

## 2023-01-30 ENCOUNTER — TREATMENT (OUTPATIENT)
Dept: PHYSICAL THERAPY | Facility: CLINIC | Age: 73
End: 2023-01-30
Payer: MEDICARE

## 2023-01-30 DIAGNOSIS — M54.50 LUMBAR BACK PAIN: ICD-10-CM

## 2023-01-30 DIAGNOSIS — M79.605 LEFT LEG PAIN: ICD-10-CM

## 2023-01-30 DIAGNOSIS — M54.50 LOW BACK PAIN, UNSPECIFIED BACK PAIN LATERALITY, UNSPECIFIED CHRONICITY, UNSPECIFIED WHETHER SCIATICA PRESENT: Primary | ICD-10-CM

## 2023-01-30 PROCEDURE — 97112 NEUROMUSCULAR REEDUCATION: CPT | Performed by: PHYSICAL THERAPIST

## 2023-01-30 PROCEDURE — 97530 THERAPEUTIC ACTIVITIES: CPT | Performed by: PHYSICAL THERAPIST

## 2023-01-30 PROCEDURE — 97110 THERAPEUTIC EXERCISES: CPT | Performed by: PHYSICAL THERAPIST

## 2023-01-30 NOTE — PROGRESS NOTES
Physical Therapy Daily Treatment Note    [address] [place] [address]    VISIT#: 15    Subjective   Shaka Trinidad reports that he has some tightness in his back today but overall is doing well.   Pain Rating (0/10): 0    Objective     See Exercise, Manual, and Modality Logs for complete treatment.     Patient Education: Progress of therapy and POC    Assessment/Plan  Pt reports that he has no concerns with DC home with HEP at this time and is appropriate to do so.     Plan  OtherDC home with HEP            Timed:         Manual Therapy:         mins  96523;     Therapeutic Exercise:    15     mins  18940;     Neuromuscular Dar:    10    mins  21202;    Therapeutic Activity:     13     mins  18857;     Gait Training:           mins  30676;     Ultrasound:          mins  40783;    Ionto                                   mins   76912  Self Care                            mins   51287    Un-Timed:  Electrical Stimulation:         mins  70594 ( );  Dry Needling          mins self-pay  Traction          mins 43984  Low Eval          Mins  63284  Mod Eval          Mins  92695  High Eval                            Mins  51404  Re-Eval                               mins  22436    Timed Treatment:   38   mins   Total Treatment:     38   mins    Leann Sands PT, DPT  Physical Therapist

## 2023-02-28 DIAGNOSIS — Z79.4 TYPE 2 DIABETES MELLITUS WITH HYPERGLYCEMIA, WITH LONG-TERM CURRENT USE OF INSULIN: ICD-10-CM

## 2023-02-28 DIAGNOSIS — E11.65 TYPE 2 DIABETES MELLITUS WITH HYPERGLYCEMIA, WITH LONG-TERM CURRENT USE OF INSULIN: ICD-10-CM

## 2023-02-28 DIAGNOSIS — E78.2 MIXED HYPERLIPIDEMIA: ICD-10-CM

## 2023-02-28 RX ORDER — ATORVASTATIN CALCIUM 10 MG/1
TABLET, FILM COATED ORAL
Qty: 90 TABLET | Refills: 3 | Status: SHIPPED | OUTPATIENT
Start: 2023-02-28

## 2023-02-28 RX ORDER — GLIPIZIDE 5 MG/1
TABLET ORAL
Qty: 270 TABLET | Refills: 1 | Status: SHIPPED | OUTPATIENT
Start: 2023-02-28

## 2023-03-06 ENCOUNTER — CLINICAL SUPPORT (OUTPATIENT)
Dept: FAMILY MEDICINE CLINIC | Facility: CLINIC | Age: 73
End: 2023-03-06
Payer: MEDICARE

## 2023-03-06 DIAGNOSIS — I10 ESSENTIAL HYPERTENSION: ICD-10-CM

## 2023-03-06 DIAGNOSIS — E11.65 TYPE 2 DIABETES MELLITUS WITH HYPERGLYCEMIA, WITH LONG-TERM CURRENT USE OF INSULIN: Primary | ICD-10-CM

## 2023-03-06 DIAGNOSIS — E78.2 MIXED HYPERLIPIDEMIA: ICD-10-CM

## 2023-03-06 DIAGNOSIS — Z79.4 TYPE 2 DIABETES MELLITUS WITH HYPERGLYCEMIA, WITH LONG-TERM CURRENT USE OF INSULIN: Primary | ICD-10-CM

## 2023-03-06 PROCEDURE — 80061 LIPID PANEL: CPT | Performed by: NURSE PRACTITIONER

## 2023-03-06 PROCEDURE — 80053 COMPREHEN METABOLIC PANEL: CPT | Performed by: NURSE PRACTITIONER

## 2023-03-06 PROCEDURE — 85027 COMPLETE CBC AUTOMATED: CPT | Performed by: NURSE PRACTITIONER

## 2023-03-06 PROCEDURE — 83036 HEMOGLOBIN GLYCOSYLATED A1C: CPT | Performed by: NURSE PRACTITIONER

## 2023-03-06 PROCEDURE — 36415 COLL VENOUS BLD VENIPUNCTURE: CPT | Performed by: NURSE PRACTITIONER

## 2023-03-06 NOTE — PROGRESS NOTES
Venipuncture Blood Specimen Collection  Venipuncture performed in the right arm by Nighat Cullen MA with good hemostasis. Patient tolerated the procedure well without complications.   03/06/23   Nighat Cullen MA

## 2023-03-07 LAB
ALBUMIN SERPL-MCNC: 4.4 G/DL (ref 3.5–5.2)
ALBUMIN/GLOB SERPL: 1.3 G/DL
ALP SERPL-CCNC: 58 U/L (ref 39–117)
ALT SERPL W P-5'-P-CCNC: 20 U/L (ref 1–41)
ANION GAP SERPL CALCULATED.3IONS-SCNC: 9 MMOL/L (ref 5–15)
AST SERPL-CCNC: 24 U/L (ref 1–40)
BILIRUB SERPL-MCNC: 0.7 MG/DL (ref 0–1.2)
BUN SERPL-MCNC: 23 MG/DL (ref 8–23)
BUN/CREAT SERPL: 16.1 (ref 7–25)
CALCIUM SPEC-SCNC: 10.1 MG/DL (ref 8.6–10.5)
CHLORIDE SERPL-SCNC: 101 MMOL/L (ref 98–107)
CHOLEST SERPL-MCNC: 96 MG/DL (ref 0–200)
CO2 SERPL-SCNC: 27 MMOL/L (ref 22–29)
CREAT SERPL-MCNC: 1.43 MG/DL (ref 0.76–1.27)
DEPRECATED RDW RBC AUTO: 40.2 FL (ref 37–54)
EGFRCR SERPLBLD CKD-EPI 2021: 52.4 ML/MIN/1.73
ERYTHROCYTE [DISTWIDTH] IN BLOOD BY AUTOMATED COUNT: 12.8 % (ref 12.3–15.4)
GLOBULIN UR ELPH-MCNC: 3.3 GM/DL
GLUCOSE SERPL-MCNC: 86 MG/DL (ref 65–99)
HBA1C MFR BLD: 7.4 % (ref 4.8–5.6)
HCT VFR BLD AUTO: 39.8 % (ref 37.5–51)
HDLC SERPL-MCNC: 33 MG/DL (ref 40–60)
HGB BLD-MCNC: 13.8 G/DL (ref 13–17.7)
LDLC SERPL CALC-MCNC: 39 MG/DL (ref 0–100)
LDLC/HDLC SERPL: 1.09 {RATIO}
MCH RBC QN AUTO: 30 PG (ref 26.6–33)
MCHC RBC AUTO-ENTMCNC: 34.7 G/DL (ref 31.5–35.7)
MCV RBC AUTO: 86.5 FL (ref 79–97)
PLATELET # BLD AUTO: 250 10*3/MM3 (ref 140–450)
PMV BLD AUTO: 10.7 FL (ref 6–12)
POTASSIUM SERPL-SCNC: 4.5 MMOL/L (ref 3.5–5.2)
PROT SERPL-MCNC: 7.7 G/DL (ref 6–8.5)
RBC # BLD AUTO: 4.6 10*6/MM3 (ref 4.14–5.8)
SODIUM SERPL-SCNC: 137 MMOL/L (ref 136–145)
TRIGL SERPL-MCNC: 135 MG/DL (ref 0–150)
VLDLC SERPL-MCNC: 24 MG/DL (ref 5–40)
WBC NRBC COR # BLD: 8.47 10*3/MM3 (ref 3.4–10.8)

## 2023-03-13 ENCOUNTER — OFFICE VISIT (OUTPATIENT)
Dept: FAMILY MEDICINE CLINIC | Facility: CLINIC | Age: 73
End: 2023-03-13
Payer: MEDICARE

## 2023-03-13 VITALS
SYSTOLIC BLOOD PRESSURE: 136 MMHG | BODY MASS INDEX: 34.91 KG/M2 | HEART RATE: 67 BPM | OXYGEN SATURATION: 97 % | WEIGHT: 217.2 LBS | DIASTOLIC BLOOD PRESSURE: 80 MMHG | HEIGHT: 66 IN

## 2023-03-13 DIAGNOSIS — K21.9 GASTROESOPHAGEAL REFLUX DISEASE WITHOUT ESOPHAGITIS: ICD-10-CM

## 2023-03-13 DIAGNOSIS — D50.9 IRON DEFICIENCY ANEMIA, UNSPECIFIED IRON DEFICIENCY ANEMIA TYPE: ICD-10-CM

## 2023-03-13 DIAGNOSIS — Z23 PNEUMOCOCCAL VACCINATION ADMINISTERED AT CURRENT VISIT: ICD-10-CM

## 2023-03-13 DIAGNOSIS — N18.31 STAGE 3A CHRONIC KIDNEY DISEASE: ICD-10-CM

## 2023-03-13 DIAGNOSIS — E11.65 TYPE 2 DIABETES MELLITUS WITH HYPERGLYCEMIA, WITH LONG-TERM CURRENT USE OF INSULIN: ICD-10-CM

## 2023-03-13 DIAGNOSIS — E78.2 MIXED HYPERLIPIDEMIA: ICD-10-CM

## 2023-03-13 DIAGNOSIS — Z79.4 TYPE 2 DIABETES MELLITUS WITH HYPERGLYCEMIA, WITH LONG-TERM CURRENT USE OF INSULIN: ICD-10-CM

## 2023-03-13 DIAGNOSIS — M54.50 LUMBAR BACK PAIN: ICD-10-CM

## 2023-03-13 DIAGNOSIS — I10 ESSENTIAL HYPERTENSION: Primary | ICD-10-CM

## 2023-03-13 PROCEDURE — 90677 PCV20 VACCINE IM: CPT | Performed by: NURSE PRACTITIONER

## 2023-03-13 PROCEDURE — 3075F SYST BP GE 130 - 139MM HG: CPT | Performed by: NURSE PRACTITIONER

## 2023-03-13 PROCEDURE — 3079F DIAST BP 80-89 MM HG: CPT | Performed by: NURSE PRACTITIONER

## 2023-03-13 PROCEDURE — G0439 PPPS, SUBSEQ VISIT: HCPCS | Performed by: NURSE PRACTITIONER

## 2023-03-13 PROCEDURE — 1159F MED LIST DOCD IN RCRD: CPT | Performed by: NURSE PRACTITIONER

## 2023-03-13 PROCEDURE — 3051F HG A1C>EQUAL 7.0%<8.0%: CPT | Performed by: NURSE PRACTITIONER

## 2023-03-13 PROCEDURE — 99213 OFFICE O/P EST LOW 20 MIN: CPT | Performed by: NURSE PRACTITIONER

## 2023-03-13 PROCEDURE — G0009 ADMIN PNEUMOCOCCAL VACCINE: HCPCS | Performed by: NURSE PRACTITIONER

## 2023-03-13 RX ORDER — TRAMADOL HYDROCHLORIDE 50 MG/1
50 TABLET ORAL EVERY 8 HOURS PRN
Qty: 20 TABLET | Refills: 0 | Status: SHIPPED | OUTPATIENT
Start: 2023-03-13

## 2023-03-13 NOTE — PROGRESS NOTES
The ABCs of the Annual Wellness Visit  Subsequent Medicare Wellness Visit    Chief Complaint   Patient presents with   • Medicare Wellness-subsequent         Subjective      Shaka Trinidad is a 72 y.o. male who presents for a Subsequent Medicare Wellness Visit and to follow up on chronic conditions.    low back pain radiating to the left leg, improved with gabapentin at night, x-ray completed and he was referred to PT and completed. S/s occur occasionally but are severe at times, would like something for pain to use prn.      Diabetes mellitus type II, feels stable on meds, denies any signs/symptoms of hyper/hypoglycemia, blurry vision, polydipsia, polyuria, nocturia, and unintentional weight loss     GERD, stable on medication, denies nausea, vomiting, constipation, abdominal pain and diarrhea.     Hyperlipidemia, The patient denies muscle aches, constipation, diarrhea, GI upset, fatigue, chest pain/pressure, exercise intolerance, dyspnea, palpitations, syncope and pedal edema.       HTN, stable on meds and takes as directed, denies chest pain, headache, shortness of air, palpitations and swelling of extremities.      CKD, monitoring, he does not follow with nephrology.      Here to review labs    The following portions of the patient's history were reviewed and   updated as appropriate: allergies, current medications, past family history, past medical history, past social history, past surgical history and problem list.    Compared to one year ago, the patient feels his physical   health is the same.    Compared to one year ago, the patient feels his mental   health is the same.    Recent Hospitalizations:  He was not admitted to the hospital during the last year.       Current Medical Providers:  Patient Care Team:  Karly Marmolejo APRN as PCP - General (Nurse Practitioner)  Bautista Lopez MD as Consulting Physician (Cardiology)    Outpatient Medications Prior to Visit   Medication Sig Dispense Refill   •  amLODIPine (NORVASC) 5 MG tablet TAKE 1 TABLET BY MOUTH EVERY DAY 90 tablet 3   • aspirin 81 MG chewable tablet Chew 1 tablet Every Night.     • atorvastatin (LIPITOR) 10 MG tablet TAKE 1 TABLET BY MOUTH EVERY DAY AT NIGHT 90 tablet 3   • B-D UF III MINI PEN NEEDLES 31G X 5 MM misc USE ONCE DAILY AS DIRECTED 100 each 3   • CINNAMON PO Take  by mouth.     • ferrous sulfate 325 (65 FE) MG tablet TAKE 1 TABLET BY MOUTH EVERY DAY WITH BREAKFAST 90 tablet 1   • furosemide (LASIX) 20 MG tablet TAKE 1 TABLET BY MOUTH EVERY DAY. MAY TAKE AN EXTRA DOSE FOR SWELLING ONE TIME DAILY AS NEEDED 120 tablet 3   • glipizide (GLUCOTROL) 5 MG tablet TAKE 2 TABS IN THE MORNING AND 1 TAB IN THE EVENING 270 tablet 1   • hydrALAZINE (APRESOLINE) 25 MG tablet TAKE 1 TABLET BY MOUTH THREE TIMES A  tablet 1   • Insulin Glargine (BASAGLAR KWIKPEN) 100 UNIT/ML injection pen INJECT 50 UNITS UNDER THE SKIN INTO THE APPROPRIATE AREA AS DIRECTED EVERY MORNING. 45 mL 1   • lisinopril (PRINIVIL,ZESTRIL) 40 MG tablet TAKE 1 TABLET BY MOUTH EVERY DAY 90 tablet 3   • OneTouch Ultra test strip 1 each by Other route Daily. Use as instructed to check blood sugar daily 100 each 5   • potassium chloride (MICRO-K) 10 MEQ CR capsule TAKE 1 CAPSULE BY MOUTH DAILY. AND MAY TAKE EXTRA DOSE WITH LASIX FOR SWELLING AS NEEDED 90 capsule 2   • vitamin C (ASCORBIC ACID) 500 MG tablet Take 2 tablets by mouth Every Evening.     • metFORMIN (GLUCOPHAGE) 1000 MG tablet TAKE 1 TABLET BY MOUTH TWICE A DAY WITH MEALS 180 tablet 1   • metoprolol tartrate (LOPRESSOR) 25 MG tablet TAKE 1 TABLET BY MOUTH EVERY 12 HOURS 180 tablet 1   • gabapentin (NEURONTIN) 100 MG capsule Take 1 capsule by mouth At Night As Needed (back/leg pain). 30 capsule 2   • guaiFENesin (MUCINEX) 600 MG 12 hr tablet Take 2 tablets by mouth 2 (Two) Times a Day.     • loratadine (CLARITIN) 10 MG tablet Take 1 tablet by mouth Daily.     • Pneumococcal 20-Stacie Conj Vacc (Prevnar 20) 0.5 ML suspension  prefilled syringe vaccine Inject  into the appropriate muscle as directed by prescriber. 0.5 mL 0   • promethazine-dextromethorphan (PROMETHAZINE-DM) 6.25-15 MG/5ML syrup Take 5 mL by mouth 4 (Four) Times a Day As Needed for Cough. 90 mL 0   • Zoster Vac Recomb Adjuvanted (Shingrix) 50 MCG/0.5ML reconstituted suspension Inject  into the appropriate muscle as directed by prescriber. 0.5 mL 1     No facility-administered medications prior to visit.       Opioid medication/s are on active medication list.  and I have evaluated his active treatment plan and pain score trends (see table).  There were no vitals filed for this visit.  I have reviewed the chart for potential of high risk medication and harmful drug interactions in the elderly.            Aspirin is on active medication list. Aspirin use is indicated based on review of current medical condition/s. Pros and cons of this therapy have been discussed today. Benefits of this medication outweigh potential harm.  Patient has been encouraged to continue taking this medication.  .      Patient Active Problem List   Diagnosis   • Angina at rest ([facility])   • Abnormal nuclear stress test   • Coronary artery disease involving native coronary artery of native heart without angina pectoris   • Encounter for diabetic foot exam ([facility])   • Hyperlipidemia   • Erectile dysfunction   • Essential hypertension   • Iron deficiency anemia   • Type 2 diabetes mellitus with hyperglycemia ([facility])   • S/P CABG (coronary artery bypass graft)   • Acute pancreatitis   • Abnormal CT of the abdomen   • Diabetic peripheral neuropathy ([facility])   • Acute abdominal pain   • JEAN PAUL (obstructive sleep apnea)   • Obesity (BMI 30-39.9)   • Elevated LFTs   • Acute pancreatitis without infection or necrosis   • Morbidly obese ([facility])   • Type 2 diabetes mellitus with diabetic peripheral angiopathy without gangrene ([facility])   • Primary osteoarthritis of right knee     Advance Care Planning  Advance Directive is not on  "file.  ACP discussion was held with the patient during this visit. Patient does not have an advance directive, information provided.     Objective    Vitals:    03/13/23 1129   BP: 136/80   BP Location: Left arm   Patient Position: Sitting   Cuff Size: Large Adult   Pulse: 67   SpO2: 97%   Weight: 98.5 kg (217 lb 3.2 oz)   Height: 167.6 cm (65.98\")     Estimated body mass index is 35.07 kg/m² as calculated from the following:    Height as of this encounter: 167.6 cm (65.98\").    Weight as of this encounter: 98.5 kg (217 lb 3.2 oz).       .Physical Exam  Vitals and nursing note reviewed.   Constitutional:       General: He is not in acute distress.     Appearance: He is well-developed. He is not diaphoretic.   HENT:      Head: Normocephalic and atraumatic.   Eyes:      Pupils: Pupils are equal, round, and reactive to light.   Neck:      Thyroid: No thyromegaly.   Cardiovascular:      Rate and Rhythm: Normal rate and regular rhythm.      Heart sounds: Normal heart sounds. No murmur heard.  Pulmonary:      Effort: Pulmonary effort is normal. No respiratory distress.      Breath sounds: Normal breath sounds.   Abdominal:      General: Bowel sounds are normal. There is no distension.      Palpations: Abdomen is soft.      Tenderness: There is no abdominal tenderness.   Musculoskeletal:         General: Tenderness (lbp, chronic, nontender today on exam) present. No swelling. Normal range of motion.      Cervical back: Normal range of motion.   Skin:     General: Skin is warm and dry.      Findings: No erythema.   Neurological:      General: No focal deficit present.      Mental Status: He is alert and oriented to person, place, and time. Mental status is at baseline.   Psychiatric:         Mood and Affect: Mood normal.         Behavior: Behavior normal.         Thought Content: Thought content normal.         Judgment: Judgment normal.         Class 2 Severe Obesity (BMI >=35 and <=39.9). Obesity-related health conditions " include the following: hypertension, coronary heart disease, diabetes mellitus, dyslipidemias and osteoarthritis. Obesity is unchanged. BMI is is above average; BMI management plan is completed. We discussed portion control and increasing exercise.      Does the patient have evidence of cognitive impairment?   No    Lab Results   Component Value Date    TRIG 135 03/06/2023    HDL 33 (L) 03/06/2023    LDL 39 03/06/2023    VLDL 24 03/06/2023    HGBA1C 7.40 (H) 03/06/2023          HEALTH RISK ASSESSMENT    Smoking Status:  Social History     Tobacco Use   Smoking Status Never   Smokeless Tobacco Never     Alcohol Consumption:  Social History     Substance and Sexual Activity   Alcohol Use Yes   • Alcohol/week: 1.0 standard drink   • Types: 1 Glasses of wine per week     Fall Risk Screen:    BONNIE Fall Risk Assessment was completed, and patient is at LOW risk for falls.Assessment completed on:3/13/2023    Depression Screening:  PHQ-2/PHQ-9 Depression Screening 3/13/2023   Little Interest or Pleasure in Doing Things 0-->not at all   Feeling Down, Depressed or Hopeless 0-->not at all   Trouble Falling or Staying Asleep, or Sleeping Too Much -   Feeling Tired or Having Little Energy -   Poor Appetite or Overeating -   Feeling Bad about Yourself - or that You are a Failure or Have Let Yourself or Your Family Down -   Trouble Concentrating on Things, Such as Reading the Newspaper or Watching Television -   Moving or Speaking So Slowly that Other People Could Have Noticed? Or the Opposite - Being So Fidgety -   Thoughts that You Would be Better Off Dead or of Hurting Yourself in Some Way -   PHQ-9: Brief Depression Severity Measure Score 0   If You Checked Off Any Problems, How Difficult Have These Problems Made It For You to Do Your Work, Take Care of Things at Home, or Get Along with Other People? -       Health Habits and Functional and Cognitive Screening:  Functional & Cognitive Status 3/13/2023   Do you have difficulty  preparing food and eating? No   Do you have difficulty bathing yourself, getting dressed or grooming yourself? No   Do you have difficulty using the toilet? No   Do you have difficulty moving around from place to place? No   Do you have trouble with steps or getting out of a bed or a chair? No   Do you need help using the phone?  No   Are you deaf or do you have serious difficulty hearing?  No   Do you need help with transportation? No   Do you need help shopping? No   Do you need help preparing meals?  No   Do you need help with housework?  No   Do you need help with laundry? No   Do you need help taking your medications? No   Do you need help managing money? No   Do you ever drive or ride in a car without wearing a seat belt? No   Have you felt unusual stress, anger or loneliness in the last month? No   Who do you live with? Spouse   If you need help, do you have trouble finding someone available to you? No   Have you been bothered in the last four weeks by sexual problems? No   Do you have difficulty concentrating, remembering or making decisions? No         ATTENTION  What is the year: correct  What is the month of the year: correct  What is the day of the week?: correct  What is the date?: correct  MEMORY  Repeat address three times, only score third attempt: Matthew Gee [address] [place] [address]: 7  HOW MANY ANIMALS DID THE PATIENT NAME  Verbal Fluency -- Animal Names (0-25): 17-21  CLOCK DRAWING  Clock Drawing: All Correct  MEMORY RECALL  Tell me what you remember about that name and address we were repeating at the beginnin  ACE TOTAL SCORE  Total ACE Score - <25/30 strongly suggests cognitive impairment; <21/30 almost certainly shows dementia: 29      Age-appropriate Screening Schedule:  Refer to the list below for future screening recommendations based on patient's age, sex and/or medical conditions. Orders for these recommended tests are listed in the plan section. The patient has been  provided with a written plan.    Health Maintenance   Topic Date Due   • ZOSTER VACCINE (1 of 2) Never done   • DIABETIC FOOT EXAM  10/06/2022   • COVID-19 Vaccine (4 - Booster for Pfizer series) 03/15/2023 (Originally 1/26/2022)   • URINE MICROALBUMIN  06/07/2023   • COLORECTAL CANCER SCREENING  07/13/2023   • HEMOGLOBIN A1C  09/06/2023   • DIABETIC EYE EXAM  12/14/2023   • LIPID PANEL  03/06/2024   • ANNUAL WELLNESS VISIT  03/13/2024   • TDAP/TD VACCINES (2 - Td or Tdap) 06/25/2030   • HEPATITIS C SCREENING  Completed   • INFLUENZA VACCINE  Completed   • Pneumococcal Vaccine 65+  Completed                CMS Preventative Services Quick Reference  Risk Factors Identified During Encounter:    Chronic Pain: Natural history and expected course discussed. Questions answered.  Immunizations Discussed/Encouraged: Prevnar 20 (Pneumococcal 20-valent conjugate)    The above risks/problems have been discussed with the patient.  Pertinent information has been shared with the patient in the After Visit Summary.    Diagnoses and all orders for this visit:    1. Essential hypertension (Primary)    2. Mixed hyperlipidemia    3. Type 2 diabetes mellitus with hyperglycemia, with long-term current use of insulin (HCC)    4. Gastroesophageal reflux disease without esophagitis    5. Iron deficiency anemia, unspecified iron deficiency anemia type    6. Stage 3a chronic kidney disease (HCC)    7. Lumbar back pain  -     traMADol (Ultram) 50 MG tablet; Take 1 tablet by mouth Every 8 (Eight) Hours As Needed for Severe Pain.  Dispense: 20 tablet; Refill: 0    8. Pneumococcal vaccination administered at current visit    Other orders  -     Pneumococcal Conjugate Vaccine 20-Valent All  -     metFORMIN (GLUCOPHAGE) 1000 MG tablet; Take 1 tablet by mouth 2 (Two) Times a Day With Meals.  Dispense: 180 tablet; Refill: 1  -     metoprolol tartrate (LOPRESSOR) 25 MG tablet; Take 1 tablet by mouth Every 12 (Twelve) Hours.  Dispense: 180 tablet;  Refill: 1    ok for trial of tramadol prn for severe lbp, continue gabapentin  [id number] today  Med refills as above  Labs reviewed and stable/unchanged  Increase water intake and limit nsaids for renal function  Age appropriate preventative counseling provided, including healthy lifestyle modifications and exercise      Follow Up:     Return in about 6 months (around 9/13/2023) for Recheck.    Next Medicare Wellness visit to be scheduled in 1 year.      An After Visit Summary and PPPS were made available to the patient.      EMR Dragon transcription disclaimer:  Part of this note may be an electronic transcription/translation of spoken language to printed text using the Dragon Dictation System.

## 2023-05-24 RX ORDER — FERROUS SULFATE 325(65) MG
TABLET ORAL
Qty: 90 TABLET | Refills: 1 | OUTPATIENT
Start: 2023-05-24 | End: 2023-05-24

## 2023-05-24 RX ORDER — INSULIN GLARGINE 100 [IU]/ML
50 INJECTION, SOLUTION SUBCUTANEOUS EVERY MORNING
Qty: 45 ML | Refills: 1 | OUTPATIENT
Start: 2023-05-24 | End: 2023-05-24

## 2023-05-26 RX ORDER — HYDRALAZINE HYDROCHLORIDE 25 MG/1
TABLET, FILM COATED ORAL
Qty: 270 TABLET | Refills: 1 | Status: SHIPPED | OUTPATIENT
Start: 2023-05-26

## 2023-08-02 ENCOUNTER — OFFICE VISIT (OUTPATIENT)
Dept: FAMILY MEDICINE CLINIC | Facility: CLINIC | Age: 73
End: 2023-08-02
Payer: MEDICARE

## 2023-08-02 VITALS
BODY MASS INDEX: 34.37 KG/M2 | OXYGEN SATURATION: 97 % | TEMPERATURE: 98.2 F | HEIGHT: 67 IN | SYSTOLIC BLOOD PRESSURE: 134 MMHG | HEART RATE: 69 BPM | DIASTOLIC BLOOD PRESSURE: 82 MMHG | WEIGHT: 219 LBS

## 2023-08-02 DIAGNOSIS — E11.65 TYPE 2 DIABETES MELLITUS WITH HYPERGLYCEMIA, WITH LONG-TERM CURRENT USE OF INSULIN: Primary | Chronic | ICD-10-CM

## 2023-08-02 DIAGNOSIS — E11.9 ENCOUNTER FOR DIABETIC FOOT EXAM: ICD-10-CM

## 2023-08-02 DIAGNOSIS — Z79.4 TYPE 2 DIABETES MELLITUS WITH HYPERGLYCEMIA, WITH LONG-TERM CURRENT USE OF INSULIN: Primary | Chronic | ICD-10-CM

## 2023-08-28 DIAGNOSIS — I10 ESSENTIAL HYPERTENSION: ICD-10-CM

## 2023-08-28 RX ORDER — LISINOPRIL 40 MG/1
40 TABLET ORAL DAILY
Qty: 90 TABLET | Refills: 3 | Status: SHIPPED | OUTPATIENT
Start: 2023-08-28

## 2023-08-28 NOTE — TELEPHONE ENCOUNTER
Caller: PATRICK SHI    Relationship: Emergency Contact    Best call back number: [phone]     Requested Prescriptions:   Requested Prescriptions     Pending Prescriptions Disp Refills    lisinopril (PRINIVIL,ZESTRIL) 40 MG tablet 90 tablet 3     Sig: Take 1 tablet by mouth Daily.        Pharmacy where request should be sent: [facility]Y #[id number] - [place] - [address] - [phone]  - [phone] FX     Last office visit with prescribing clinician: 3/13/2023   Last telemedicine visit with prescribing clinician: Visit date not found   Next office visit with prescribing clinician: 9/13/2023     Additional details provided by patient: THE PATIENT HAS 1 PILL LEFT.  PATRICK CLAIMS PHARMACY HAS BEEN CONTACTING [place] TEAM WITH NO RESPONSE    Does the patient have less than a 3 day supply:  [x] Yes  [] No    Aide Stewart Rep   08/28/23 11:48 EDT

## 2023-08-30 DIAGNOSIS — I10 ESSENTIAL HYPERTENSION: Chronic | ICD-10-CM

## 2023-08-30 DIAGNOSIS — E11.51 TYPE 2 DIABETES MELLITUS WITH DIABETIC PERIPHERAL ANGIOPATHY WITHOUT GANGRENE, UNSPECIFIED WHETHER LONG TERM INSULIN USE: ICD-10-CM

## 2023-08-30 DIAGNOSIS — E78.2 MIXED HYPERLIPIDEMIA: Primary | Chronic | ICD-10-CM

## 2023-08-31 DIAGNOSIS — E11.65 TYPE 2 DIABETES MELLITUS WITH HYPERGLYCEMIA, WITH LONG-TERM CURRENT USE OF INSULIN: ICD-10-CM

## 2023-08-31 DIAGNOSIS — Z79.4 TYPE 2 DIABETES MELLITUS WITH HYPERGLYCEMIA, WITH LONG-TERM CURRENT USE OF INSULIN: ICD-10-CM

## 2023-09-01 RX ORDER — GLIPIZIDE 5 MG/1
TABLET ORAL
Qty: 270 TABLET | Refills: 1 | Status: SHIPPED | OUTPATIENT
Start: 2023-09-01

## 2023-09-03 DIAGNOSIS — J40 BRONCHITIS: ICD-10-CM

## 2023-09-05 RX ORDER — MONTELUKAST SODIUM 10 MG/1
TABLET ORAL
Qty: 90 TABLET | Refills: 1 | Status: SHIPPED | OUTPATIENT
Start: 2023-09-05

## 2023-09-07 ENCOUNTER — CLINICAL SUPPORT (OUTPATIENT)
Dept: FAMILY MEDICINE CLINIC | Facility: CLINIC | Age: 73
End: 2023-09-07
Payer: MEDICARE

## 2023-09-07 DIAGNOSIS — E78.2 MIXED HYPERLIPIDEMIA: Primary | Chronic | ICD-10-CM

## 2023-09-07 LAB
DEPRECATED RDW RBC AUTO: 41.3 FL (ref 37–54)
ERYTHROCYTE [DISTWIDTH] IN BLOOD BY AUTOMATED COUNT: 12.7 % (ref 12.3–15.4)
HCT VFR BLD AUTO: 38.3 % (ref 37.5–51)
HGB BLD-MCNC: 12.9 G/DL (ref 13–17.7)
MCH RBC QN AUTO: 29.9 PG (ref 26.6–33)
MCHC RBC AUTO-ENTMCNC: 33.7 G/DL (ref 31.5–35.7)
MCV RBC AUTO: 88.9 FL (ref 79–97)
PLATELET # BLD AUTO: 220 10*3/MM3 (ref 140–450)
PMV BLD AUTO: 10.5 FL (ref 6–12)
RBC # BLD AUTO: 4.31 10*6/MM3 (ref 4.14–5.8)
WBC NRBC COR # BLD: 7.9 10*3/MM3 (ref 3.4–10.8)

## 2023-09-07 PROCEDURE — 85027 COMPLETE CBC AUTOMATED: CPT | Performed by: NURSE PRACTITIONER

## 2023-09-07 PROCEDURE — 36415 COLL VENOUS BLD VENIPUNCTURE: CPT | Performed by: NURSE PRACTITIONER

## 2023-09-07 PROCEDURE — 80053 COMPREHEN METABOLIC PANEL: CPT | Performed by: NURSE PRACTITIONER

## 2023-09-07 PROCEDURE — 83036 HEMOGLOBIN GLYCOSYLATED A1C: CPT | Performed by: NURSE PRACTITIONER

## 2023-09-07 PROCEDURE — 80061 LIPID PANEL: CPT | Performed by: NURSE PRACTITIONER

## 2023-09-07 NOTE — PROGRESS NOTES
Venipuncture Blood Specimen Collection  Venipuncture performed in the right arm by Nighat Cullen MA with good hemostasis. Patient tolerated the procedure well without complications.   09/07/23   Nighat Cullen MA

## 2023-09-08 LAB
ALBUMIN SERPL-MCNC: 4.4 G/DL (ref 3.5–5.2)
ALBUMIN/GLOB SERPL: 1.8 G/DL
ALP SERPL-CCNC: 61 U/L (ref 39–117)
ALT SERPL W P-5'-P-CCNC: 20 U/L (ref 1–41)
ANION GAP SERPL CALCULATED.3IONS-SCNC: 10 MMOL/L (ref 5–15)
AST SERPL-CCNC: 25 U/L (ref 1–40)
BILIRUB SERPL-MCNC: 0.5 MG/DL (ref 0–1.2)
BUN SERPL-MCNC: 26 MG/DL (ref 8–23)
BUN/CREAT SERPL: 19.3 (ref 7–25)
CALCIUM SPEC-SCNC: 10.2 MG/DL (ref 8.6–10.5)
CHLORIDE SERPL-SCNC: 101 MMOL/L (ref 98–107)
CHOLEST SERPL-MCNC: 94 MG/DL (ref 0–200)
CO2 SERPL-SCNC: 25 MMOL/L (ref 22–29)
CREAT SERPL-MCNC: 1.35 MG/DL (ref 0.76–1.27)
EGFRCR SERPLBLD CKD-EPI 2021: 55.8 ML/MIN/1.73
GLOBULIN UR ELPH-MCNC: 2.5 GM/DL
GLUCOSE SERPL-MCNC: 130 MG/DL (ref 65–99)
HBA1C MFR BLD: 7.8 % (ref 4.8–5.6)
HDLC SERPL-MCNC: 35 MG/DL (ref 40–60)
LDLC SERPL CALC-MCNC: 36 MG/DL (ref 0–100)
LDLC/HDLC SERPL: 0.94 {RATIO}
POTASSIUM SERPL-SCNC: 5.1 MMOL/L (ref 3.5–5.2)
PROT SERPL-MCNC: 6.9 G/DL (ref 6–8.5)
SODIUM SERPL-SCNC: 136 MMOL/L (ref 136–145)
TRIGL SERPL-MCNC: 131 MG/DL (ref 0–150)
VLDLC SERPL-MCNC: 23 MG/DL (ref 5–40)

## 2023-09-13 ENCOUNTER — OFFICE VISIT (OUTPATIENT)
Dept: FAMILY MEDICINE CLINIC | Facility: CLINIC | Age: 73
End: 2023-09-13
Payer: MEDICARE

## 2023-09-13 VITALS
DIASTOLIC BLOOD PRESSURE: 77 MMHG | HEART RATE: 64 BPM | SYSTOLIC BLOOD PRESSURE: 127 MMHG | OXYGEN SATURATION: 97 % | HEIGHT: 67 IN | WEIGHT: 216.4 LBS | BODY MASS INDEX: 33.97 KG/M2

## 2023-09-13 DIAGNOSIS — I10 ESSENTIAL HYPERTENSION: ICD-10-CM

## 2023-09-13 DIAGNOSIS — E78.2 MIXED HYPERLIPIDEMIA: Primary | ICD-10-CM

## 2023-09-13 DIAGNOSIS — E11.51 TYPE 2 DIABETES MELLITUS WITH DIABETIC PERIPHERAL ANGIOPATHY WITHOUT GANGRENE, UNSPECIFIED WHETHER LONG TERM INSULIN USE: ICD-10-CM

## 2023-09-13 DIAGNOSIS — N18.31 STAGE 3A CHRONIC KIDNEY DISEASE: ICD-10-CM

## 2023-09-13 DIAGNOSIS — K21.9 GASTROESOPHAGEAL REFLUX DISEASE WITHOUT ESOPHAGITIS: ICD-10-CM

## 2023-09-13 DIAGNOSIS — M54.50 LUMBAR BACK PAIN: ICD-10-CM

## 2023-09-13 PROCEDURE — 1159F MED LIST DOCD IN RCRD: CPT | Performed by: NURSE PRACTITIONER

## 2023-09-13 PROCEDURE — 3078F DIAST BP <80 MM HG: CPT | Performed by: NURSE PRACTITIONER

## 2023-09-13 PROCEDURE — 3051F HG A1C>EQUAL 7.0%<8.0%: CPT | Performed by: NURSE PRACTITIONER

## 2023-09-13 PROCEDURE — 1160F RVW MEDS BY RX/DR IN RCRD: CPT | Performed by: NURSE PRACTITIONER

## 2023-09-13 PROCEDURE — 3074F SYST BP LT 130 MM HG: CPT | Performed by: NURSE PRACTITIONER

## 2023-09-13 PROCEDURE — 99214 OFFICE O/P EST MOD 30 MIN: CPT | Performed by: NURSE PRACTITIONER

## 2023-09-13 RX ORDER — TRAMADOL HYDROCHLORIDE 50 MG/1
50 TABLET ORAL EVERY 8 HOURS PRN
Qty: 20 TABLET | Refills: 0 | Status: SHIPPED | OUTPATIENT
Start: 2023-09-13

## 2023-09-13 RX ORDER — BLOOD SUGAR DIAGNOSTIC
1 STRIP MISCELLANEOUS DAILY
Qty: 100 EACH | Refills: 5 | Status: SHIPPED | OUTPATIENT
Start: 2023-09-13

## 2023-09-13 RX ORDER — FLURBIPROFEN SODIUM 0.3 MG/ML
SOLUTION/ DROPS OPHTHALMIC
Qty: 100 EACH | Refills: 3 | Status: SHIPPED | OUTPATIENT
Start: 2023-09-13

## 2023-09-13 NOTE — PROGRESS NOTES
Chief Complaint  Chief Complaint   Patient presents with    Follow-up           Subjective          Shaka Trinidad presents to [facility] for   History of Present Illness    Low back pain, left sided, completed PT. S/s occur occasionally but are severe at times, tramadol helps, he uses rarely.       Diabetes mellitus type II, feels stable on meds, denies any signs/symptoms of hyper/hypoglycemia, blurry vision, polydipsia, polyuria, nocturia, and unintentional weight loss     GERD, stable on medication, denies nausea, vomiting, constipation, abdominal pain and diarrhea.     Hyperlipidemia, The patient denies muscle aches, constipation, diarrhea, GI upset, fatigue, chest pain/pressure, exercise intolerance, dyspnea, palpitations, syncope and pedal edema.       HTN, stable on meds and takes as directed, denies chest pain, headache, shortness of air, palpitations and swelling of extremities.      CKD, monitoring, he does not follow with nephrology.        The following portions of the patient's history were reviewed and updated as appropriate: allergies, current medications, past family history, past medical history, past social history, past surgical history and problem list.    Past Medical History:   Diagnosis Date    Acute pancreatitis 3/14/2020    Coronary artery disease     Diabetes mellitus, type II     Elevated cholesterol     H/O cataract     Hyperlipidemia     Hypertension     Peripheral edema     Peripheral edema     Sleep apnea      Past Surgical History:   Procedure Laterality Date    CARDIAC CATHETERIZATION N/A 7/19/2019    Procedure: Left Heart Cath with angiogram;  Surgeon: Bautista Lopez MD;  Location: [facility] CATH INVASIVE LOCATION;  Service: Cardiovascular    CARDIAC CATHETERIZATION N/A 7/19/2019    Procedure: Coronary angiography;  Surgeon: Bautista Lopez MD;  Location: [facility] CATH INVASIVE LOCATION;  Service: Cardiovascular    CARDIAC CATHETERIZATION N/A 7/19/2019     Procedure: Left ventriculography;  Surgeon: Bautista Lopez MD;  Location: [facility] CATH INVASIVE LOCATION;  Service: Cardiovascular    CHOLECYSTECTOMY N/A 1/6/2021    Procedure: CHOLECYSTECTOMY LAPAROSCOPIC;  Surgeon: Giacomo Akins MD;  Location: [facility] MAIN OR;  Service: General;  Laterality: N/A;    COLONOSCOPY      CORONARY ARTERY BYPASS GRAFT N/A 8/8/2019    Procedure: CORONARY ARTERY BYPASS GRAFTING;  Surgeon: Chet Mcarthur MD;  Location: [facility] CVOR;  Service: Cardiothoracic    ENDOSCOPY N/A 3/15/2020    Procedure: ESOPHAGOGASTRODUODENOSCOPY;  Surgeon: Jaspal Logan MD;  Location: [facility] ENDOSCOPY;  Service: Gastroenterology;  Laterality: N/A;  EPIGASTRIC ABDOMINAL PAIN, ABNORMAL CT SCAN, PANCREATITIS  NORMAL EGD    HERNIA REPAIR      OTHER SURGICAL HISTORY  12/2015    2d echo-abstracted cent.      Family History   Problem Relation Age of Onset    Heart failure Mother     Hypertension Brother     Cancer Brother     Heart disease Brother      Social History     Tobacco Use    Smoking status: Never    Smokeless tobacco: Never   Substance Use Topics    Alcohol use: Yes     Alcohol/week: 1.0 standard drink     Types: 1 Glasses of wine per week       Current Outpatient Medications:     amLODIPine (NORVASC) 5 MG tablet, TAKE 1 TABLET BY MOUTH EVERY DAY, Disp: 90 tablet, Rfl: 3    aspirin 81 MG chewable tablet, Chew 1 tablet Every Night., Disp: , Rfl:     atorvastatin (LIPITOR) 10 MG tablet, TAKE 1 TABLET BY MOUTH EVERY DAY AT NIGHT, Disp: 90 tablet, Rfl: 3    CINNAMON PO, Take  by mouth., Disp: , Rfl:     ferrous sulfate 325 (65 FE) MG tablet, Take 1 tablet by mouth Daily With Breakfast., Disp: , Rfl:     furosemide (LASIX) 20 MG tablet, TAKE 1 TABLET BY MOUTH EVERY DAY. MAY TAKE AN EXTRA DOSE FOR SWELLING ONE TIME DAILY AS NEEDED, Disp: 120 tablet, Rfl: 3    glipizide (GLUCOTROL) 5 MG tablet, TAKE 2 TABS IN THE MORNING AND 1 TAB IN THE EVENING, Disp: 270 tablet, Rfl: 1    hydrALAZINE  "(APRESOLINE) 25 MG tablet, TAKE 1 TABLET BY MOUTH THREE TIMES A DAY, Disp: 270 tablet, Rfl: 1    Insulin Glargine (BASAGLAR KWIKPEN) 100 UNIT/ML injection pen, INJECT 50 UNITS UNDER THE SKIN INTO THE APPROPRIATE AREA AS DIRECTED EVERY MORNING., Disp: 15 mL, Rfl: 1    Insulin Pen Needle (B-D UF III MINI PEN NEEDLES) 31G X 5 MM misc, Use to inject insulin daily, Disp: 100 each, Rfl: 3    lisinopril (PRINIVIL,ZESTRIL) 40 MG tablet, Take 1 tablet by mouth Daily., Disp: 90 tablet, Rfl: 3    metFORMIN (GLUCOPHAGE) 1000 MG tablet, TAKE 1 TABLET BY MOUTH TWICE A DAY WITH MEALS, Disp: 180 tablet, Rfl: 1    metoprolol tartrate (LOPRESSOR) 25 MG tablet, Take 1 tablet by mouth Every 12 (Twelve) Hours., Disp: 180 tablet, Rfl: 1    montelukast (SINGULAIR) 10 MG tablet, TAKE 1 TABLET BY MOUTH EVERY DAY AT NIGHT, Disp: 90 tablet, Rfl: 1    OneTouch Ultra test strip, 1 each by Other route Daily. Use as instructed to check blood sugar daily, Disp: 100 each, Rfl: 5    potassium chloride (MICRO-K) 10 MEQ CR capsule, TAKE 1 CAPSULE BY MOUTH DAILY. AND MAY TAKE EXTRA DOSE WITH LASIX FOR SWELLING AS NEEDED, Disp: 90 capsule, Rfl: 2    traMADol (Ultram) 50 MG tablet, Take 1 tablet by mouth Every 8 (Eight) Hours As Needed for Severe Pain., Disp: 20 tablet, Rfl: 0    vitamin C (ASCORBIC ACID) 500 MG tablet, Take 2 tablets by mouth Every Evening., Disp: , Rfl:     Objective   Vital Signs:   /77   Pulse 64   Ht 170.2 cm (67\")   Wt 98.2 kg (216 lb 6.4 oz)   SpO2 97%   BMI 33.89 kg/m²           Physical Exam  Constitutional:       General: He is not in acute distress.     Appearance: Normal appearance. He is well-developed. He is not ill-appearing or diaphoretic.   HENT:      Head: Normocephalic.   Eyes:      Conjunctiva/sclera: Conjunctivae normal.      Pupils: Pupils are equal, round, and reactive to light.   Neck:      Thyroid: No thyromegaly.      Vascular: No JVD.   Cardiovascular:      Rate and Rhythm: Normal rate and regular " rhythm.      Heart sounds: Normal heart sounds. No murmur heard.  Pulmonary:      Effort: Pulmonary effort is normal. No respiratory distress.      Breath sounds: Normal breath sounds. No wheezing or rhonchi.   Abdominal:      General: Bowel sounds are normal. There is no distension.      Palpations: Abdomen is soft.      Tenderness: There is no abdominal tenderness.   Musculoskeletal:         General: Tenderness (L-spine mild ttp, good rom) present. No swelling. Normal range of motion.      Cervical back: Normal range of motion and neck supple. No tenderness.   Lymphadenopathy:      Cervical: No cervical adenopathy.   Skin:     General: Skin is warm and dry.      Coloration: Skin is not jaundiced.      Findings: No erythema or rash.   Neurological:      General: No focal deficit present.      Mental Status: He is alert and oriented to person, place, and time. Mental status is at baseline.      Sensory: No sensory deficit.   Psychiatric:         Mood and Affect: Mood normal.         Behavior: Behavior normal.         Thought Content: Thought content normal.         Judgment: Judgment normal.        Result Review :     No visits with results within 7 Day(s) from this visit.   Latest known visit with results is:   Orders Only on 08/30/2023   Component Date Value Ref Range Status    WBC 09/07/2023 7.90  3.40 - 10.80 10*3/mm3 Final    RBC 09/07/2023 4.31  4.14 - 5.80 10*6/mm3 Final    Hemoglobin 09/07/2023 12.9 (L)  13.0 - 17.7 g/dL Final    Hematocrit 09/07/2023 38.3  37.5 - 51.0 % Final    MCV 09/07/2023 88.9  79.0 - 97.0 fL Final    MCH 09/07/2023 29.9  26.6 - 33.0 pg Final    MCHC 09/07/2023 33.7  31.5 - 35.7 g/dL Final    RDW 09/07/2023 12.7  12.3 - 15.4 % Final    RDW-SD 09/07/2023 41.3  37.0 - 54.0 fl Final    MPV 09/07/2023 10.5  6.0 - 12.0 fL Final    Platelets 09/07/2023 220  140 - 450 10*3/mm3 Final    Glucose 09/07/2023 130 (H)  65 - 99 mg/dL Final    BUN 09/07/2023 26 (H)  8 - 23 mg/dL Final    Creatinine  09/07/2023 1.35 (H)  0.76 - 1.27 mg/dL Final    Sodium 09/07/2023 136  136 - 145 mmol/L Final    Potassium 09/07/2023 5.1  3.5 - 5.2 mmol/L Final    Chloride 09/07/2023 101  98 - 107 mmol/L Final    CO2 09/07/2023 25.0  22.0 - 29.0 mmol/L Final    Calcium 09/07/2023 10.2  8.6 - 10.5 mg/dL Final    Total Protein 09/07/2023 6.9  6.0 - 8.5 g/dL Final    Albumin 09/07/2023 4.4  3.5 - 5.2 g/dL Final    ALT (SGPT) 09/07/2023 20  1 - 41 U/L Final    AST (SGOT) 09/07/2023 25  1 - 40 U/L Final    Alkaline Phosphatase 09/07/2023 61  39 - 117 U/L Final    Total Bilirubin 09/07/2023 0.5  0.0 - 1.2 mg/dL Final    Globulin 09/07/2023 2.5  gm/dL Final    A/G Ratio 09/07/2023 1.8  g/dL Final    BUN/Creatinine Ratio 09/07/2023 19.3  7.0 - 25.0 Final    Anion Gap 09/07/2023 10.0  5.0 - 15.0 mmol/L Final    eGFR 09/07/2023 55.8 (L)  >60.0 mL/min/1.73 Final    Total Cholesterol 09/07/2023 94  0 - 200 mg/dL Final    Triglycerides 09/07/2023 131  0 - 150 mg/dL Final    HDL Cholesterol 09/07/2023 35 (L)  40 - 60 mg/dL Final    LDL Cholesterol  09/07/2023 36  0 - 100 mg/dL Final    VLDL Cholesterol 09/07/2023 23  5 - 40 mg/dL Final    LDL/HDL Ratio 09/07/2023 0.94   Final    Hemoglobin A1C 09/07/2023 7.80 (H)  4.80 - 5.60 % Final                              Assessment and Plan    Diagnoses and all orders for this visit:    1. Mixed hyperlipidemia (Primary)  Comments:  lipids stable    2. Type 2 diabetes mellitus with diabetic peripheral angiopathy without gangrene, unspecified whether long term insulin use  Comments:  A1c up to 7.8  Recommend work on diabetic diet  increase basglar to 55 u  Recheck 6mo  Orders:  -     OneTouch Ultra test strip; 1 each by Other route Daily. Use as instructed to check blood sugar daily  Dispense: 100 each; Refill: 5    3. Essential hypertension  Comments:  bp stable    4. Stage 3a chronic kidney disease  Comments:  RFT stable, unchanged from baseline    5. Gastroesophageal reflux disease without  esophagitis    6. Lumbar back pain  Comments:  Okay to continue and refill tramadol prn. ok to take anacin or extra tylenol with tramadol  Orders:  -     traMADol (Ultram) 50 MG tablet; Take 1 tablet by mouth Every 8 (Eight) Hours As Needed for Severe Pain.  Dispense: 20 tablet; Refill: 0    Other orders  -     Insulin Pen Needle (B-D UF III MINI PEN NEEDLES) 31G X 5 MM misc; Use to inject insulin daily  Dispense: 100 each; Refill: 3      Conditions mostly stable  Continue current medication regimen  Labs reviewed           I spent 30 minutes caring for Shaka Trinidad on this date of service. This time includes time spent by me in the following activities: preparing for the visit, reviewing tests, performing a medically appropriate examination and/or evaluation , counseling and educating the patient/family/caregiver, ordering medications, tests, or procedures and documenting information in the medical record        Follow Up     Return in about 6 months (around 3/13/2024) for Recheck , Medicare Wellness. DM panel, TSH, PSA and urine micro.  Patient was given instructions and counseling regarding his condition or for health maintenance advice. Please see specific information pulled into the AVS if appropriate.        Part of this note may be an electronic transcription/translation of spoken language to printed text using the Dragon Dictation System

## 2023-09-26 DIAGNOSIS — I10 ESSENTIAL HYPERTENSION: ICD-10-CM

## 2023-09-26 DIAGNOSIS — N18.31 STAGE 3A CHRONIC KIDNEY DISEASE: ICD-10-CM

## 2023-09-27 RX ORDER — POTASSIUM CHLORIDE 750 MG/1
CAPSULE, EXTENDED RELEASE ORAL
Qty: 90 CAPSULE | Refills: 2 | Status: SHIPPED | OUTPATIENT
Start: 2023-09-27

## 2023-09-27 RX ORDER — FUROSEMIDE 20 MG/1
TABLET ORAL
Qty: 120 TABLET | Refills: 3 | Status: SHIPPED | OUTPATIENT
Start: 2023-09-27

## 2023-10-23 DIAGNOSIS — I10 ESSENTIAL HYPERTENSION: ICD-10-CM

## 2023-10-23 RX ORDER — AMLODIPINE BESYLATE 5 MG/1
TABLET ORAL
Qty: 90 TABLET | Refills: 3 | Status: SHIPPED | OUTPATIENT
Start: 2023-10-23

## 2023-10-23 RX ORDER — HYDRALAZINE HYDROCHLORIDE 25 MG/1
TABLET, FILM COATED ORAL
Qty: 270 TABLET | Refills: 1 | Status: SHIPPED | OUTPATIENT
Start: 2023-10-23

## 2023-10-23 RX ORDER — FERROUS SULFATE 325(65) MG
1 TABLET ORAL
Qty: 90 TABLET | Refills: 1 | Status: SHIPPED | OUTPATIENT
Start: 2023-10-23

## 2023-11-06 RX ORDER — INSULIN GLARGINE 100 [IU]/ML
50 INJECTION, SOLUTION SUBCUTANEOUS EVERY MORNING
Qty: 15 ML | Refills: 1 | Status: SHIPPED | OUTPATIENT
Start: 2023-11-06

## 2023-11-06 RX ORDER — GABAPENTIN 100 MG/1
100 CAPSULE ORAL NIGHTLY PRN
Qty: 30 CAPSULE | Refills: 2 | Status: SHIPPED | OUTPATIENT
Start: 2023-11-06

## 2024-01-08 DIAGNOSIS — M54.50 LUMBAR BACK PAIN: ICD-10-CM

## 2024-01-08 RX ORDER — TRAMADOL HYDROCHLORIDE 50 MG/1
50 TABLET ORAL EVERY 8 HOURS PRN
Qty: 20 TABLET | Refills: 0 | Status: SHIPPED | OUTPATIENT
Start: 2024-01-08

## 2024-01-19 RX ORDER — INSULIN GLARGINE 100 [IU]/ML
50 INJECTION, SOLUTION SUBCUTANEOUS EVERY MORNING
Qty: 15 ML | Refills: 1 | Status: SHIPPED | OUTPATIENT
Start: 2024-01-19

## 2024-01-24 RX ORDER — INSULIN GLARGINE 100 [IU]/ML
50 INJECTION, SOLUTION SUBCUTANEOUS EVERY MORNING
Qty: 45 ML | Refills: 1 | Status: SHIPPED | OUTPATIENT
Start: 2024-01-24

## 2024-02-28 DIAGNOSIS — I10 ESSENTIAL HYPERTENSION: ICD-10-CM

## 2024-02-28 DIAGNOSIS — E78.2 MIXED HYPERLIPIDEMIA: ICD-10-CM

## 2024-02-28 DIAGNOSIS — Z12.5 SCREENING PSA (PROSTATE SPECIFIC ANTIGEN): ICD-10-CM

## 2024-02-28 DIAGNOSIS — E11.51 TYPE 2 DIABETES MELLITUS WITH DIABETIC PERIPHERAL ANGIOPATHY WITHOUT GANGRENE, UNSPECIFIED WHETHER LONG TERM INSULIN USE: Primary | ICD-10-CM

## 2024-03-04 ENCOUNTER — HOSPITAL ENCOUNTER (OUTPATIENT)
Dept: CARDIOLOGY | Facility: HOSPITAL | Age: 74
Discharge: HOME | End: 2024-03-04
Payer: MEDICARE

## 2024-03-04 ENCOUNTER — OFFICE VISIT (OUTPATIENT)
Dept: CARDIOLOGY | Facility: CLINIC | Age: 74
End: 2024-03-04
Payer: MEDICARE

## 2024-03-04 VITALS
SYSTOLIC BLOOD PRESSURE: 129 MMHG | BODY MASS INDEX: 33.9 KG/M2 | HEART RATE: 90 BPM | DIASTOLIC BLOOD PRESSURE: 55 MMHG | OXYGEN SATURATION: 98 % | WEIGHT: 216 LBS | HEIGHT: 67 IN

## 2024-03-04 VITALS
SYSTOLIC BLOOD PRESSURE: 129 MMHG | HEIGHT: 67 IN | BODY MASS INDEX: 33.9 KG/M2 | WEIGHT: 216 LBS | DIASTOLIC BLOOD PRESSURE: 55 MMHG

## 2024-03-04 DIAGNOSIS — E78.2 MIXED HYPERLIPIDEMIA: ICD-10-CM

## 2024-03-04 DIAGNOSIS — E11.9 TYPE 2 DIABETES MELLITUS WITHOUT COMPLICATION, WITHOUT LONG-TERM CURRENT USE OF INSULIN: ICD-10-CM

## 2024-03-04 DIAGNOSIS — I10 ESSENTIAL HYPERTENSION: ICD-10-CM

## 2024-03-04 DIAGNOSIS — I25.118 CORONARY ARTERY DISEASE OF NATIVE ARTERY OF NATIVE HEART WITH STABLE ANGINA PECTORIS: Primary | ICD-10-CM

## 2024-03-04 DIAGNOSIS — G47.33 OBSTRUCTIVE SLEEP APNEA: ICD-10-CM

## 2024-03-04 DIAGNOSIS — I25.118 CORONARY ARTERY DISEASE OF NATIVE ARTERY OF NATIVE HEART WITH STABLE ANGINA PECTORIS: ICD-10-CM

## 2024-03-04 LAB
BH CV ECHO MEAS - ACS: 1.84 CM
BH CV ECHO MEAS - AO MAX PG: 8.8 MMHG
BH CV ECHO MEAS - AO MEAN PG: 4.4 MMHG
BH CV ECHO MEAS - AO ROOT DIAM: 3.3 CM
BH CV ECHO MEAS - AO V2 MAX: 148.2 CM/SEC
BH CV ECHO MEAS - AO V2 VTI: 28.1 CM
BH CV ECHO MEAS - AVA(I,D): 3.2 CM2
BH CV ECHO MEAS - EDV(CUBED): 100.1 ML
BH CV ECHO MEAS - EDV(MOD-SP2): 77.5 ML
BH CV ECHO MEAS - EDV(MOD-SP4): 81.2 ML
BH CV ECHO MEAS - EF(MOD-BP): 57 %
BH CV ECHO MEAS - EF(MOD-SP2): 55.1 %
BH CV ECHO MEAS - EF(MOD-SP4): 56 %
BH CV ECHO MEAS - ESV(CUBED): 28.1 ML
BH CV ECHO MEAS - ESV(MOD-SP2): 34.8 ML
BH CV ECHO MEAS - ESV(MOD-SP4): 35.8 ML
BH CV ECHO MEAS - FS: 34.5 %
BH CV ECHO MEAS - IVS/LVPW: 0.99 CM
BH CV ECHO MEAS - IVSD: 0.91 CM
BH CV ECHO MEAS - LA DIMENSION: 4.4 CM
BH CV ECHO MEAS - LV MASS(C)D: 143.1 GRAMS
BH CV ECHO MEAS - LV MAX PG: 6.4 MMHG
BH CV ECHO MEAS - LV MEAN PG: 3.2 MMHG
BH CV ECHO MEAS - LV V1 MAX: 126 CM/SEC
BH CV ECHO MEAS - LV V1 VTI: 26.8 CM
BH CV ECHO MEAS - LVIDD: 4.6 CM
BH CV ECHO MEAS - LVIDS: 3 CM
BH CV ECHO MEAS - LVOT AREA: 3.4 CM2
BH CV ECHO MEAS - LVOT DIAM: 2.07 CM
BH CV ECHO MEAS - LVPWD: 0.92 CM
BH CV ECHO MEAS - MV A MAX VEL: 102.2 CM/SEC
BH CV ECHO MEAS - MV DEC SLOPE: 547.9 CM/SEC2
BH CV ECHO MEAS - MV DEC TIME: 0.2 SEC
BH CV ECHO MEAS - MV E MAX VEL: 110.3 CM/SEC
BH CV ECHO MEAS - MV E/A: 1.08
BH CV ECHO MEAS - MV MAX PG: 5.5 MMHG
BH CV ECHO MEAS - MV MEAN PG: 2.44 MMHG
BH CV ECHO MEAS - MV V2 VTI: 33 CM
BH CV ECHO MEAS - MVA(VTI): 2.7 CM2
BH CV ECHO MEAS - PA V2 MAX: 121.5 CM/SEC
BH CV ECHO MEAS - PULM A REVS DUR: 0.16 SEC
BH CV ECHO MEAS - PULM A REVS VEL: 24.1 CM/SEC
BH CV ECHO MEAS - PULM DIAS VEL: 72.7 CM/SEC
BH CV ECHO MEAS - PULM S/D: 0.56
BH CV ECHO MEAS - PULM SYS VEL: 40.7 CM/SEC
BH CV ECHO MEAS - RAP SYSTOLE: 3 MMHG
BH CV ECHO MEAS - RV MAX PG: 2.3 MMHG
BH CV ECHO MEAS - RV V1 MAX: 75.8 CM/SEC
BH CV ECHO MEAS - RV V1 VTI: 15.6 CM
BH CV ECHO MEAS - RVDD: 4 CM
BH CV ECHO MEAS - RVSP: 32.9 MMHG
BH CV ECHO MEAS - SV(LVOT): 90.5 ML
BH CV ECHO MEAS - SV(MOD-SP2): 42.7 ML
BH CV ECHO MEAS - SV(MOD-SP4): 45.4 ML
BH CV ECHO MEAS - TR MAX PG: 29.9 MMHG
BH CV ECHO MEAS - TR MAX VEL: 273.3 CM/SEC
BH CV REST NUCLEAR ISOTOPE DOSE: 10.6 MCI
BH CV STRESS COMMENTS STAGE 1: NORMAL
BH CV STRESS DOSE REGADENOSON STAGE 1: 0.4
BH CV STRESS DURATION MIN STAGE 1: 0
BH CV STRESS DURATION SEC STAGE 1: 10
BH CV STRESS NUCLEAR ISOTOPE DOSE: 32.4 MCI
BH CV STRESS PROTOCOL 1: NORMAL
BH CV STRESS RECOVERY BP: NORMAL MMHG
BH CV STRESS RECOVERY HR: 100 BPM
BH CV STRESS STAGE 1: 1
LV EF NUC BP: 64 %
MAXIMAL PREDICTED HEART RATE: 148 BPM
STRESS BASELINE BP: NORMAL MMHG
STRESS BASELINE HR: 75 BPM
STRESS TARGET HR: 126 BPM

## 2024-03-04 PROCEDURE — 99214 OFFICE O/P EST MOD 30 MIN: CPT | Performed by: INTERNAL MEDICINE

## 2024-03-04 PROCEDURE — 1160F RVW MEDS BY RX/DR IN RCRD: CPT | Performed by: INTERNAL MEDICINE

## 2024-03-04 PROCEDURE — 3078F DIAST BP <80 MM HG: CPT | Performed by: INTERNAL MEDICINE

## 2024-03-04 PROCEDURE — 93016 CV STRESS TEST SUPVJ ONLY: CPT | Performed by: NURSE PRACTITIONER

## 2024-03-04 PROCEDURE — 93017 CV STRESS TEST TRACING ONLY: CPT

## 2024-03-04 PROCEDURE — A9500 TC99M SESTAMIBI: HCPCS | Performed by: INTERNAL MEDICINE

## 2024-03-04 PROCEDURE — 93356 MYOCRD STRAIN IMG SPCKL TRCK: CPT | Performed by: INTERNAL MEDICINE

## 2024-03-04 PROCEDURE — 93306 TTE W/DOPPLER COMPLETE: CPT

## 2024-03-04 PROCEDURE — 1159F MED LIST DOCD IN RCRD: CPT | Performed by: INTERNAL MEDICINE

## 2024-03-04 PROCEDURE — 78452 HT MUSCLE IMAGE SPECT MULT: CPT

## 2024-03-04 PROCEDURE — 78452 HT MUSCLE IMAGE SPECT MULT: CPT | Performed by: INTERNAL MEDICINE

## 2024-03-04 PROCEDURE — 25010000002 REGADENOSON 0.4 MG/5ML SOLUTION: Performed by: INTERNAL MEDICINE

## 2024-03-04 PROCEDURE — 93018 CV STRESS TEST I&R ONLY: CPT | Performed by: INTERNAL MEDICINE

## 2024-03-04 PROCEDURE — 93356 MYOCRD STRAIN IMG SPCKL TRCK: CPT

## 2024-03-04 PROCEDURE — 3074F SYST BP LT 130 MM HG: CPT | Performed by: INTERNAL MEDICINE

## 2024-03-04 PROCEDURE — 93306 TTE W/DOPPLER COMPLETE: CPT | Performed by: INTERNAL MEDICINE

## 2024-03-04 PROCEDURE — 0 TECHNETIUM SESTAMIBI: Performed by: INTERNAL MEDICINE

## 2024-03-04 RX ORDER — REGADENOSON 0.08 MG/ML
0.4 INJECTION, SOLUTION INTRAVENOUS
Status: COMPLETED | OUTPATIENT
Start: 2024-03-04 | End: 2024-03-04

## 2024-03-04 RX ADMIN — REGADENOSON 0.4 MG: 0.08 INJECTION, SOLUTION INTRAVENOUS at 14:27

## 2024-03-04 RX ADMIN — TECHNETIUM TC 99M SESTAMIBI 1 DOSE: 1 INJECTION INTRAVENOUS at 12:24

## 2024-03-04 RX ADMIN — TECHNETIUM TC 99M SESTAMIBI 1 DOSE: 1 INJECTION INTRAVENOUS at 14:27

## 2024-03-04 NOTE — PROGRESS NOTES
Subjective:     Encounter Date:03/04/2024      Patient ID: Shaka Trinidad is a 73 y.o. male.    Chief Complaint:  History of Present Illness 71-year-old white male with history of coronary disease status post coronary bypass surgery history of hypertension hyperlipidemia diabetes and sleep apnea presents to my office for follow-up.  Patient is currently stable without any symptoms of chest pain or shortness of breath at rest or exertion.  No complaint of any PND orthopnea.  No palpitation dizziness syncope.  Patient has some swelling of the feet.  Patient is taking all her medicines regularly.  Patient does not smoke.    The following portions of the patient's history were reviewed and updated as appropriate: allergies, current medications, past family history, past medical history, past social history, past surgical history, and problem list.  Past Medical History:   Diagnosis Date    Acute pancreatitis 3/14/2020    Coronary artery disease     Diabetes mellitus, type II     Elevated cholesterol     H/O cataract     cataracts     Hyperlipidemia     Hypertension     Peripheral edema     Peripheral edema     Sleep apnea     oral appliance     Past Surgical History:   Procedure Laterality Date    CARDIAC CATHETERIZATION N/A 7/19/2019    Procedure: Left Heart Cath with angiogram;  Surgeon: Bautista Lopez MD;  Location: [facility] CATH INVASIVE LOCATION;  Service: Cardiovascular    CARDIAC CATHETERIZATION N/A 7/19/2019    Procedure: Coronary angiography;  Surgeon: Bautista Lopez MD;  Location: [facility] CATH INVASIVE LOCATION;  Service: Cardiovascular    CARDIAC CATHETERIZATION N/A 7/19/2019    Procedure: Left ventriculography;  Surgeon: Bautista Lopez MD;  Location: [facility] CATH INVASIVE LOCATION;  Service: Cardiovascular    CHOLECYSTECTOMY N/A 1/6/2021    Procedure: CHOLECYSTECTOMY LAPAROSCOPIC;  Surgeon: Giacomo Akins MD;  Location: [facility] MAIN OR;  Service: General;  Laterality: N/A;    COLONOSCOPY       "CORONARY ARTERY BYPASS GRAFT N/A 8/8/2019    Procedure: CORONARY ARTERY BYPASS GRAFTING;  Surgeon: Chet Mcarthur MD;  Location: [facility] CVOR;  Service: Cardiothoracic    ENDOSCOPY N/A 3/15/2020    Procedure: ESOPHAGOGASTRODUODENOSCOPY;  Surgeon: Jaspal Logan MD;  Location: [facility] ENDOSCOPY;  Service: Gastroenterology;  Laterality: N/A;  EPIGASTRIC ABDOMINAL PAIN, ABNORMAL CT SCAN, PANCREATITIS  NORMAL EGD    HERNIA REPAIR      OTHER SURGICAL HISTORY  12/2015    2d echo-abstracted cent.      /55   Pulse 90   Ht 170.2 cm (67.01\")   Wt 98 kg (216 lb)   SpO2 98%   BMI 33.82 kg/m²   Family History   Problem Relation Age of Onset    Heart failure Mother     Hypertension Brother     Cancer Brother     Heart disease Brother        Current Outpatient Medications:     amLODIPine (NORVASC) 5 MG tablet, TAKE 1 TABLET BY MOUTH EVERY DAY, Disp: 90 tablet, Rfl: 3    aspirin 81 MG chewable tablet, Chew 1 tablet Every Night., Disp: , Rfl:     atorvastatin (LIPITOR) 10 MG tablet, TAKE 1 TABLET BY MOUTH EVERY DAY AT NIGHT, Disp: 90 tablet, Rfl: 3    CINNAMON PO, Take  by mouth., Disp: , Rfl:     ferrous sulfate 325 (65 FE) MG tablet, TAKE 1 TABLET BY MOUTH EVERY DAY WITH BREAKFAST, Disp: 90 tablet, Rfl: 1    furosemide (LASIX) 20 MG tablet, Take 1 tablet by mouth daily. May take an extra dose for swelling one time daily as needed., Disp: 120 tablet, Rfl: 3    gabapentin (NEURONTIN) 100 MG capsule, TAKE 1 CAPSULE BY MOUTH AT NIGHT AS NEEDED (BACK/LEG PAIN)., Disp: 30 capsule, Rfl: 2    glipizide (GLUCOTROL) 5 MG tablet, TAKE 2 TABS IN THE MORNING AND 1 TAB IN THE EVENING, Disp: 270 tablet, Rfl: 1    hydrALAZINE (APRESOLINE) 25 MG tablet, TAKE 1 TABLET BY MOUTH THREE TIMES A DAY, Disp: 270 tablet, Rfl: 1    Insulin Glargine (BASAGLAR KWIKPEN) 100 UNIT/ML injection pen, Inject 50 Units under the skin into the appropriate area as directed Every Morning., Disp: 45 mL, Rfl: 1    Insulin Pen Needle (B-D UF III " MINI PEN NEEDLES) 31G X 5 MM misc, Use to inject insulin daily, Disp: 100 each, Rfl: 3    lisinopril (PRINIVIL,ZESTRIL) 40 MG tablet, Take 1 tablet by mouth Daily., Disp: 90 tablet, Rfl: 3    metFORMIN (GLUCOPHAGE) 1000 MG tablet, TAKE 1 TABLET BY MOUTH TWICE A DAY WITH MEALS, Disp: 180 tablet, Rfl: 1    metoprolol tartrate (LOPRESSOR) 25 MG tablet, Take 1 tablet by mouth Every 12 (Twelve) Hours., Disp: 180 tablet, Rfl: 1    montelukast (SINGULAIR) 10 MG tablet, TAKE 1 TABLET BY MOUTH EVERY DAY AT NIGHT, Disp: 90 tablet, Rfl: 1    OneTouch Ultra test strip, 1 each by Other route Daily. Use as instructed to check blood sugar daily, Disp: 100 each, Rfl: 5    potassium chloride (MICRO-K) 10 MEQ CR capsule, Take 1 capsule by mouth daily. May take an extra dose with lasix for swelling as needed., Disp: 90 capsule, Rfl: 2    traMADol (Ultram) 50 MG tablet, Take 1 tablet by mouth Every 8 (Eight) Hours As Needed for Severe Pain., Disp: 20 tablet, Rfl: 0    vitamin C (ASCORBIC ACID) 500 MG tablet, Take 2 tablets by mouth Every Evening., Disp: , Rfl:   No current facility-administered medications for this visit.  No Known Allergies  Social History     Socioeconomic History    Marital status:    Tobacco Use    Smoking status: Never    Smokeless tobacco: Never   Vaping Use    Vaping status: Never Used   Substance and Sexual Activity    Alcohol use: Yes     Alcohol/week: 1.0 standard drink of alcohol     Types: 1 Glasses of wine per week    Drug use: Never    Sexual activity: Defer     Review of Systems   Constitutional: Negative for malaise/fatigue.   Cardiovascular:  Positive for leg swelling. Negative for chest pain, dyspnea on exertion and palpitations.   Respiratory:  Negative for cough and shortness of breath.    Gastrointestinal:  Negative for abdominal pain, nausea and vomiting.   Neurological:  Negative for dizziness, focal weakness, headaches, light-headedness and numbness.   All other systems reviewed and are  negative.             Objective:     Constitutional:       Appearance: Well-developed.   Eyes:      General: No scleral icterus.     Conjunctiva/sclera: Conjunctivae normal.   HENT:      Head: Normocephalic and atraumatic.   Neck:      Vascular: No carotid bruit or JVD.   Pulmonary:      Effort: Pulmonary effort is normal.      Breath sounds: Normal breath sounds. No wheezing. No rales.   Cardiovascular:      Normal rate. Regular rhythm.   Pulses:     Intact distal pulses.   Edema:     Peripheral edema present.  Abdominal:      General: Bowel sounds are normal.      Palpations: Abdomen is soft.   Musculoskeletal:      Cervical back: Normal range of motion and neck supple. Skin:     General: Skin is warm and dry.      Findings: No rash.   Neurological:      Mental Status: Alert.       Procedures    Lab Review:         MDM  #1 coronary disease  Patient had coronary bypass surgery as well as with a LIMA to LAD and saphenous graft to the diagonal branch marginal branch and RCA and is currently stable with normal function.  He underwent a stress Myoview study which did not show any ischemia.  2.  Diabetes  Patient is on insulin as well as oral medicines and followed by the primary care doctor  3.  Hypertension  Patient blood pressure currently stable on metoprolol lisinopril hydralazine and amlodipine  4.  Hyperlipidemia  Patient is on statins and the lipid levels are well within normal limits  5.  Sleep apnea  Patient has sleep apnea and uses a CPAP machine.      Patient's previous medical records, labs, and EKG were reviewed and discussed with the patient at today's visit.

## 2024-03-06 ENCOUNTER — CLINICAL SUPPORT (OUTPATIENT)
Dept: FAMILY MEDICINE CLINIC | Facility: CLINIC | Age: 74
End: 2024-03-06
Payer: MEDICARE

## 2024-03-06 DIAGNOSIS — Z12.5 SCREENING PSA (PROSTATE SPECIFIC ANTIGEN): ICD-10-CM

## 2024-03-06 DIAGNOSIS — E78.2 MIXED HYPERLIPIDEMIA: ICD-10-CM

## 2024-03-06 DIAGNOSIS — I10 ESSENTIAL HYPERTENSION: ICD-10-CM

## 2024-03-06 DIAGNOSIS — E11.51 TYPE 2 DIABETES MELLITUS WITH DIABETIC PERIPHERAL ANGIOPATHY WITHOUT GANGRENE, UNSPECIFIED WHETHER LONG TERM INSULIN USE: ICD-10-CM

## 2024-03-06 DIAGNOSIS — N18.31 STAGE 3A CHRONIC KIDNEY DISEASE: Primary | ICD-10-CM

## 2024-03-06 LAB
DEPRECATED RDW RBC AUTO: 41 FL (ref 37–54)
ERYTHROCYTE [DISTWIDTH] IN BLOOD BY AUTOMATED COUNT: 12.7 % (ref 12.3–15.4)
HCT VFR BLD AUTO: 42.1 % (ref 37.5–51)
HGB BLD-MCNC: 14 G/DL (ref 13–17.7)
MCH RBC QN AUTO: 29.5 PG (ref 26.6–33)
MCHC RBC AUTO-ENTMCNC: 33.3 G/DL (ref 31.5–35.7)
MCV RBC AUTO: 88.8 FL (ref 79–97)
PLATELET # BLD AUTO: 249 10*3/MM3 (ref 140–450)
PMV BLD AUTO: 10.8 FL (ref 6–12)
RBC # BLD AUTO: 4.74 10*6/MM3 (ref 4.14–5.8)
WBC NRBC COR # BLD AUTO: 7.23 10*3/MM3 (ref 3.4–10.8)

## 2024-03-06 PROCEDURE — 85027 COMPLETE CBC AUTOMATED: CPT | Performed by: NURSE PRACTITIONER

## 2024-03-06 PROCEDURE — 36415 COLL VENOUS BLD VENIPUNCTURE: CPT | Performed by: NURSE PRACTITIONER

## 2024-03-06 PROCEDURE — 84443 ASSAY THYROID STIM HORMONE: CPT | Performed by: NURSE PRACTITIONER

## 2024-03-06 PROCEDURE — 83036 HEMOGLOBIN GLYCOSYLATED A1C: CPT | Performed by: NURSE PRACTITIONER

## 2024-03-06 PROCEDURE — 80061 LIPID PANEL: CPT | Performed by: NURSE PRACTITIONER

## 2024-03-06 PROCEDURE — 82043 UR ALBUMIN QUANTITATIVE: CPT | Performed by: NURSE PRACTITIONER

## 2024-03-06 PROCEDURE — G0103 PSA SCREENING: HCPCS | Performed by: NURSE PRACTITIONER

## 2024-03-06 PROCEDURE — 80053 COMPREHEN METABOLIC PANEL: CPT | Performed by: NURSE PRACTITIONER

## 2024-03-06 NOTE — PROGRESS NOTES
Venipuncture Blood Specimen Collection  Venipuncture performed in the left arm by Nighat Cullen MA with good hemostasis. Patient tolerated the procedure well without complications.   03/06/24   Nighat Cullen MA

## 2024-03-07 LAB
ALBUMIN SERPL-MCNC: 4.5 G/DL (ref 3.5–5.2)
ALBUMIN UR-MCNC: <1.2 MG/DL
ALBUMIN/GLOB SERPL: 1.7 G/DL
ALP SERPL-CCNC: 67 U/L (ref 39–117)
ALT SERPL W P-5'-P-CCNC: 24 U/L (ref 1–41)
ANION GAP SERPL CALCULATED.3IONS-SCNC: 12 MMOL/L (ref 5–15)
AST SERPL-CCNC: 26 U/L (ref 1–40)
BILIRUB SERPL-MCNC: 0.5 MG/DL (ref 0–1.2)
BUN SERPL-MCNC: 28 MG/DL (ref 8–23)
BUN/CREAT SERPL: 19.2 (ref 7–25)
CALCIUM SPEC-SCNC: 9.6 MG/DL (ref 8.6–10.5)
CHLORIDE SERPL-SCNC: 99 MMOL/L (ref 98–107)
CHOLEST SERPL-MCNC: 101 MG/DL (ref 0–200)
CO2 SERPL-SCNC: 26 MMOL/L (ref 22–29)
CREAT SERPL-MCNC: 1.46 MG/DL (ref 0.76–1.27)
EGFRCR SERPLBLD CKD-EPI 2021: 50.8 ML/MIN/1.73
GLOBULIN UR ELPH-MCNC: 2.7 GM/DL
GLUCOSE SERPL-MCNC: 93 MG/DL (ref 65–99)
HBA1C MFR BLD: 7.7 % (ref 4.8–5.6)
HDLC SERPL-MCNC: 38 MG/DL (ref 40–60)
LDLC SERPL CALC-MCNC: 45 MG/DL (ref 0–100)
LDLC/HDLC SERPL: 1.18 {RATIO}
POTASSIUM SERPL-SCNC: 4.7 MMOL/L (ref 3.5–5.2)
PROT SERPL-MCNC: 7.2 G/DL (ref 6–8.5)
PSA SERPL-MCNC: 1.05 NG/ML (ref 0–4)
SODIUM SERPL-SCNC: 137 MMOL/L (ref 136–145)
TRIGL SERPL-MCNC: 91 MG/DL (ref 0–150)
TSH SERPL DL<=0.05 MIU/L-ACNC: 2.74 UIU/ML (ref 0.27–4.2)
VLDLC SERPL-MCNC: 18 MG/DL (ref 5–40)

## 2024-03-13 ENCOUNTER — OFFICE VISIT (OUTPATIENT)
Dept: FAMILY MEDICINE CLINIC | Facility: CLINIC | Age: 74
End: 2024-03-13
Payer: MEDICARE

## 2024-03-13 VITALS
DIASTOLIC BLOOD PRESSURE: 77 MMHG | HEART RATE: 70 BPM | OXYGEN SATURATION: 96 % | RESPIRATION RATE: 16 BRPM | HEIGHT: 67 IN | WEIGHT: 213.2 LBS | TEMPERATURE: 98.1 F | SYSTOLIC BLOOD PRESSURE: 132 MMHG | BODY MASS INDEX: 33.46 KG/M2

## 2024-03-13 DIAGNOSIS — E78.2 MIXED HYPERLIPIDEMIA: ICD-10-CM

## 2024-03-13 DIAGNOSIS — E11.65 TYPE 2 DIABETES MELLITUS WITH HYPERGLYCEMIA, WITH LONG-TERM CURRENT USE OF INSULIN: ICD-10-CM

## 2024-03-13 DIAGNOSIS — K21.9 GASTROESOPHAGEAL REFLUX DISEASE WITHOUT ESOPHAGITIS: ICD-10-CM

## 2024-03-13 DIAGNOSIS — N18.31 STAGE 3A CHRONIC KIDNEY DISEASE: ICD-10-CM

## 2024-03-13 DIAGNOSIS — Z12.11 COLON CANCER SCREENING: ICD-10-CM

## 2024-03-13 DIAGNOSIS — Z00.00 MEDICARE ANNUAL WELLNESS VISIT, SUBSEQUENT: Primary | ICD-10-CM

## 2024-03-13 DIAGNOSIS — M54.50 LUMBAR BACK PAIN: ICD-10-CM

## 2024-03-13 DIAGNOSIS — Z79.4 TYPE 2 DIABETES MELLITUS WITH HYPERGLYCEMIA, WITH LONG-TERM CURRENT USE OF INSULIN: ICD-10-CM

## 2024-03-13 DIAGNOSIS — I10 ESSENTIAL HYPERTENSION: ICD-10-CM

## 2024-03-13 DIAGNOSIS — D50.9 IRON DEFICIENCY ANEMIA, UNSPECIFIED IRON DEFICIENCY ANEMIA TYPE: ICD-10-CM

## 2024-03-13 RX ORDER — ATORVASTATIN CALCIUM 10 MG/1
10 TABLET, FILM COATED ORAL
Qty: 90 TABLET | Refills: 3 | Status: SHIPPED | OUTPATIENT
Start: 2024-03-13

## 2024-03-13 RX ORDER — TRAMADOL HYDROCHLORIDE 50 MG/1
50 TABLET ORAL EVERY 8 HOURS PRN
Qty: 20 TABLET | Refills: 0 | Status: SHIPPED | OUTPATIENT
Start: 2024-03-13

## 2024-03-13 RX ORDER — FERROUS SULFATE 325(65) MG
1 TABLET ORAL EVERY OTHER DAY
Qty: 90 TABLET | Refills: 1 | Status: SHIPPED | OUTPATIENT
Start: 2024-03-13

## 2024-03-13 RX ORDER — GLIPIZIDE 5 MG/1
TABLET ORAL
Qty: 270 TABLET | Refills: 1 | Status: SHIPPED | OUTPATIENT
Start: 2024-03-13

## 2024-03-13 RX ORDER — HYDRALAZINE HYDROCHLORIDE 25 MG/1
25 TABLET, FILM COATED ORAL 3 TIMES DAILY
Qty: 270 TABLET | Refills: 1 | Status: SHIPPED | OUTPATIENT
Start: 2024-03-13

## 2024-03-13 NOTE — PROGRESS NOTES
The ABCs of the Annual Wellness Visit  Subsequent Medicare Wellness Visit    Chief Complaint   Patient presents with    Medicare Wellness-subsequent     Follow up 6 months , no concerns.         Subjective      Shaka Trinidad is a 73 y.o. male who presents for a Subsequent Medicare Wellness Visit and to f/u on chronic conditions.     Pt with chronic low back pain, worse lately with standing too long. He uses tramadol very rarely for severe pain    Diabetes mellitus type II, feels stable on meds, denies any signs/symptoms of hyper/hypoglycemia, blurry vision, polydipsia, polyuria, nocturia, and unintentional weight loss    Hyperlipidemia, The patient denies muscle aches, constipation, diarrhea, GI upset, fatigue, chest pain/pressure, exercise intolerance, dyspnea, palpitations, syncope and pedal edema.      Anemia on ferrous sulfate daily    CKD, does not follow with nephrology, monitoring. Last GFR 55.8    HTN, stable on meds and takes as directed, denies chest pain, headache, shortness of air, palpitations and swelling of extremities.       The following portions of the patient's history were reviewed and   updated as appropriate: allergies, current medications, past family history, past medical history, past social history, past surgical history, and problem list.    Compared to one year ago, the patient feels his physical   health is better.    Compared to one year ago, the patient feels his mental   health is better.    Recent Hospitalizations:  He was not admitted to the hospital during the last year.       Current Medical Providers:  Patient Care Team:  Karly Marmolejo APRN as PCP - General (Nurse Practitioner)  Bautista Lopez MD as Consulting Physician (Cardiology)    Outpatient Medications Prior to Visit   Medication Sig Dispense Refill    amLODIPine (NORVASC) 5 MG tablet TAKE 1 TABLET BY MOUTH EVERY DAY 90 tablet 3    aspirin 81 MG chewable tablet Chew 1 tablet Every Night.      CINNAMON PO Take  by  mouth.      furosemide (LASIX) 20 MG tablet Take 1 tablet by mouth daily. May take an extra dose for swelling one time daily as needed. 120 tablet 3    Insulin Glargine (BASAGLAR KWIKPEN) 100 UNIT/ML injection pen Inject 50 Units under the skin into the appropriate area as directed Every Morning. 45 mL 1    Insulin Pen Needle (B-D UF III MINI PEN NEEDLES) 31G X 5 MM misc Use to inject insulin daily 100 each 3    lisinopril (PRINIVIL,ZESTRIL) 40 MG tablet Take 1 tablet by mouth Daily. 90 tablet 3    OneTouch Ultra test strip 1 each by Other route Daily. Use as instructed to check blood sugar daily 100 each 5    potassium chloride (MICRO-K) 10 MEQ CR capsule Take 1 capsule by mouth daily. May take an extra dose with lasix for swelling as needed. 90 capsule 2    vitamin C (ASCORBIC ACID) 500 MG tablet Take 2 tablets by mouth Every Evening.      atorvastatin (LIPITOR) 10 MG tablet TAKE 1 TABLET BY MOUTH EVERY DAY AT NIGHT 90 tablet 3    ferrous sulfate 325 (65 FE) MG tablet TAKE 1 TABLET BY MOUTH EVERY DAY WITH BREAKFAST 90 tablet 1    glipizide (GLUCOTROL) 5 MG tablet TAKE 2 TABS IN THE MORNING AND 1 TAB IN THE EVENING 270 tablet 1    hydrALAZINE (APRESOLINE) 25 MG tablet TAKE 1 TABLET BY MOUTH THREE TIMES A  tablet 1    metFORMIN (GLUCOPHAGE) 1000 MG tablet TAKE 1 TABLET BY MOUTH TWICE A DAY WITH MEALS 180 tablet 1    metoprolol tartrate (LOPRESSOR) 25 MG tablet Take 1 tablet by mouth Every 12 (Twelve) Hours. 180 tablet 1    gabapentin (NEURONTIN) 100 MG capsule TAKE 1 CAPSULE BY MOUTH AT NIGHT AS NEEDED (BACK/LEG PAIN). (Patient not taking: Reported on 3/13/2024) 30 capsule 2    montelukast (SINGULAIR) 10 MG tablet TAKE 1 TABLET BY MOUTH EVERY DAY AT NIGHT (Patient not taking: Reported on 3/13/2024) 90 tablet 1    traMADol (Ultram) 50 MG tablet Take 1 tablet by mouth Every 8 (Eight) Hours As Needed for Severe Pain. (Patient not taking: Reported on 3/13/2024) 20 tablet 0     No facility-administered medications  "prior to visit.       Opioid medication/s are on active medication list.  and I have evaluated his active treatment plan and pain score trends (see table).  There were no vitals filed for this visit.  I have reviewed the chart for potential of high risk medication and harmful drug interactions in the elderly.          Aspirin is on active medication list. Aspirin use is not indicated based on review of current medical condition/s. Risk of harm outweighs potential benefits. Patient instructed to discontinue this medication.  .      Patient Active Problem List   Diagnosis    Angina at rest    Abnormal nuclear stress test    Coronary artery disease involving native coronary artery of native heart without angina pectoris    Encounter for diabetic foot exam    Hyperlipidemia    Erectile dysfunction    Essential hypertension    Iron deficiency anemia    Type 2 diabetes mellitus with hyperglycemia    S/P CABG (coronary artery bypass graft)    Acute pancreatitis    Abnormal CT of the abdomen    Diabetic peripheral neuropathy    Acute abdominal pain    JEAN PAUL (obstructive sleep apnea)    Obesity (BMI 30-39.9)    Elevated LFTs    Acute pancreatitis without infection or necrosis    Morbidly obese    Type 2 diabetes mellitus with diabetic peripheral angiopathy without gangrene    Primary osteoarthritis of right knee     Advance Care Planning   Advance Care Planning     Advance Directive is not on file.  ACP discussion was held with the patient during this visit. Patient does not have an advance directive, information provided.     Objective    Vitals:    03/13/24 1351   BP: 132/77   BP Location: Right arm   Patient Position: Sitting   Cuff Size: Adult   Pulse: 70   Resp: 16   Temp: 98.1 °F (36.7 °C)   TempSrc: Oral   SpO2: 96%   Weight: 96.7 kg (213 lb 3.2 oz)   Height: 170.2 cm (67\")     Estimated body mass index is 33.39 kg/m² as calculated from the following:    Height as of this encounter: 170.2 cm (67\").    Weight as of this " encounter: 96.7 kg (213 lb 3.2 oz).    BMI is >= 30 and <35. (Class 1 Obesity). The following options were offered after discussion;: exercise counseling/recommendations and nutrition counseling/recommendations    Physical Exam  Constitutional:       General: He is not in acute distress.     Appearance: Normal appearance. He is well-developed. He is not ill-appearing or diaphoretic.   HENT:      Head: Normocephalic.   Eyes:      Conjunctiva/sclera: Conjunctivae normal.      Pupils: Pupils are equal, round, and reactive to light.   Neck:      Thyroid: No thyromegaly.      Vascular: No JVD.   Cardiovascular:      Rate and Rhythm: Normal rate and regular rhythm.      Heart sounds: Normal heart sounds. No murmur heard.  Pulmonary:      Effort: Pulmonary effort is normal. No respiratory distress.      Breath sounds: Normal breath sounds. No wheezing or rhonchi.   Abdominal:      General: Bowel sounds are normal. There is no distension.      Palpations: Abdomen is soft.      Tenderness: There is no abdominal tenderness.   Musculoskeletal:         General: Tenderness (L-spine mild ttp, good rom) present. No swelling. Normal range of motion.      Cervical back: Normal range of motion and neck supple. No tenderness.   Lymphadenopathy:      Cervical: No cervical adenopathy.   Skin:     General: Skin is warm and dry.      Coloration: Skin is not jaundiced.      Findings: No erythema or rash.   Neurological:      General: No focal deficit present.      Mental Status: He is alert and oriented to person, place, and time. Mental status is at baseline.      Sensory: No sensory deficit.      Motor: No weakness.      Gait: Gait abnormal.   Psychiatric:         Mood and Affect: Mood normal.         Behavior: Behavior normal.         Thought Content: Thought content normal.         Judgment: Judgment normal.           Does the patient have evidence of cognitive impairment?   No    Lab Results   Component Value Date    TRIG 91  03/06/2024    HDL 38 (L) 03/06/2024    LDL 45 03/06/2024    VLDL 18 03/06/2024    HGBA1C 7.70 (H) 03/06/2024          HEALTH RISK ASSESSMENT    Smoking Status:  Social History     Tobacco Use   Smoking Status Never   Smokeless Tobacco Never     Alcohol Consumption:  Social History     Substance and Sexual Activity   Alcohol Use Yes    Alcohol/week: 1.0 standard drink of alcohol    Types: 1 Glasses of wine per week     Fall Risk Screen:    FROYADI Fall Risk Assessment was completed, and patient is at LOW risk for falls.Assessment completed on:3/13/2024    Depression Screening:      3/13/2024     1:47 PM   PHQ-2/PHQ-9 Depression Screening   Little Interest or Pleasure in Doing Things 0-->not at all   Feeling Down, Depressed or Hopeless 0-->not at all   PHQ-9: Brief Depression Severity Measure Score 0       Health Habits and Functional and Cognitive Screening:      3/13/2024     1:48 PM   Functional & Cognitive Status   Do you have difficulty preparing food and eating? No   Do you have difficulty bathing yourself, getting dressed or grooming yourself? No   Do you have difficulty using the toilet? No   Do you have difficulty moving around from place to place? No   Do you have trouble with steps or getting out of a bed or a chair? No   Current Diet Well Balanced Diet   Dental Exam Up to date   Eye Exam Up to date   Exercise (times per week) 3 times per week   Current Exercises Include Walking;House Cleaning   Do you need help using the phone?  No   Are you deaf or do you have serious difficulty hearing?  No   Do you need help to go to places out of walking distance? No   Do you need help shopping? No   Do you need help preparing meals?  No   Do you need help with housework?  No   Do you need help with laundry? No   Do you need help taking your medications? No   Do you need help managing money? No   Do you ever drive or ride in a car without wearing a seat belt? No   Have you felt unusual stress, anger or loneliness in the  last month? No   Who do you live with? Spouse   If you need help, do you have trouble finding someone available to you? No   Have you been bothered in the last four weeks by sexual problems? No   Do you have difficulty concentrating, remembering or making decisions? No     ATTENTION  What is the year: correct  What is the month of the year: correct  What is the day of the week?: correct  What is the date?: correct  MEMORY  Repeat address three times, only score third attempt: [address] [place], [place]: 7  HOW MANY ANIMALS DID THE PATIENT NAME  Verbal Fluency -- Animal Names (0-25): 22+  CLOCK DRAWING  Clock Drawing: All Correct  MEMORY RECALL  Tell me what you remember about that name and address we were repeating at the beginnin  ACE TOTAL SCORE  Total ACE Score - <25/30 strongly suggests cognitive impairment; <21/30 almost certainly shows dementia: 30     Age-appropriate Screening Schedule:  Refer to the list below for future screening recommendations based on patient's age, sex and/or medical conditions. Orders for these recommended tests are listed in the plan section. The patient has been provided with a written plan.    Health Maintenance   Topic Date Due    RSV Vaccine - Adults (1 - 1-dose 60+ series) Never done    COLORECTAL CANCER SCREENING  2023    COVID-19 Vaccine ( - -24 season) 2023    ZOSTER VACCINE (2 of 2) 2024    INFLUENZA VACCINE  2024 (Originally 2023)    DIABETIC FOOT EXAM  2024    HEMOGLOBIN A1C  2024    DIABETIC EYE EXAM  2024    LIPID PANEL  2025    URINE MICROALBUMIN  2025    ANNUAL WELLNESS VISIT  2025    BMI FOLLOWUP  2025    TDAP/TD VACCINES (2 - Td or Tdap) 2030    HEPATITIS C SCREENING  Completed    Pneumococcal Vaccine 65+  Completed                  CMS Preventative Services Quick Reference  Risk Factors Identified During Encounter:    Immunizations Discussed/Encouraged:  Influenza, Shingrix, and RSV (Respiratory Syncytial Virus)  Inactivity/Sedentary: Patient was advised to exercise at least 150 minutes a week per CDC recommendations.  Dental Screening Recommended  Vision Screening Recommended    The above risks/problems have been discussed with the patient.  Pertinent information has been shared with the patient in the After Visit Summary.    Diagnoses and all orders for this visit:    1. Medicare annual wellness visit, subsequent (Primary)    2. Lumbar back pain  Comments:  Okay to continue and refill tramadol prn. ok to take anacin or extra tylenol with tramadol  consider restarting PT  Orders:  -     traMADol (Ultram) 50 MG tablet; Take 1 tablet by mouth Every 8 (Eight) Hours As Needed for Severe Pain.  Dispense: 20 tablet; Refill: 0    3. Mixed hyperlipidemia  -     atorvastatin (LIPITOR) 10 MG tablet; Take 1 tablet by mouth every night at bedtime.  Dispense: 90 tablet; Refill: 3  -     Comprehensive Metabolic Panel; Future  -     CBC (No Diff); Future  -     Lipid Panel; Future    4. Type 2 diabetes mellitus with hyperglycemia, with long-term current use of insulin  -     glipizide (GLUCOTROL) 5 MG tablet; TAKE 2 TABS IN THE MORNING AND 1 TAB IN THE EVENING  Dispense: 270 tablet; Refill: 1  -     metFORMIN (GLUCOPHAGE) 1000 MG tablet; Take 1 tablet by mouth 2 (Two) Times a Day With Meals.  Dispense: 180 tablet; Refill: 1  -     Hemoglobin A1c; Future    5. Stage 3a chronic kidney disease    6. Essential hypertension  -     hydrALAZINE (APRESOLINE) 25 MG tablet; Take 1 tablet by mouth 3 (Three) Times a Day.  Dispense: 270 tablet; Refill: 1  -     metoprolol tartrate (LOPRESSOR) 25 MG tablet; Take 1 tablet by mouth Every 12 (Twelve) Hours.  Dispense: 180 tablet; Refill: 1  -     TSH; Future    7. Colon cancer screening  -     Cologuard - Stool, Per Rectum; Future    8. Gastroesophageal reflux disease without esophagitis    9. Iron deficiency anemia, unspecified iron deficiency  anemia type  -     ferrous sulfate 325 (65 FE) MG tablet; Take 1 tablet by mouth Every Other Day.  Dispense: 90 tablet; Refill: 1      Conditions stable, rf meds as above  Labs reviewed and essentially stable  Rec shingrix #2 at pharmacy      Follow Up:     Return in about 6 months (around 9/13/2024) for Recheck. DM panel and TSH prior to appt.    Next Medicare Wellness visit to be scheduled in 1 year.      An After Visit Summary and PPPS were made available to the patient.    EMR Dragon transcription disclaimer:  Part of this note may be an electronic transcription/translation of spoken language to printed text using the Dragon Dictation System.

## 2024-03-20 RX ORDER — FLURBIPROFEN SODIUM 0.3 MG/ML
SOLUTION/ DROPS OPHTHALMIC
Qty: 100 EACH | Refills: 3 | OUTPATIENT
Start: 2024-03-20

## 2024-03-20 NOTE — TELEPHONE ENCOUNTER
As it appears patient has refills available . Spoke w/ patient and has plenty on hand no refill needed at this time.

## 2024-03-29 DIAGNOSIS — R19.5 POSITIVE COLORECTAL CANCER SCREENING USING COLOGUARD TEST: Primary | ICD-10-CM

## 2024-04-17 DIAGNOSIS — D50.9 IRON DEFICIENCY ANEMIA, UNSPECIFIED IRON DEFICIENCY ANEMIA TYPE: ICD-10-CM

## 2024-04-17 RX ORDER — FERROUS SULFATE 325(65) MG
1 TABLET ORAL
Qty: 90 TABLET | Refills: 1 | OUTPATIENT
Start: 2024-04-17

## 2024-05-28 DIAGNOSIS — M54.50 LUMBAR BACK PAIN: ICD-10-CM

## 2024-05-28 RX ORDER — TRAMADOL HYDROCHLORIDE 50 MG/1
50 TABLET ORAL EVERY 8 HOURS PRN
Qty: 20 TABLET | Refills: 0 | Status: SHIPPED | OUTPATIENT
Start: 2024-05-28

## 2024-06-19 ENCOUNTER — TELEPHONE (OUTPATIENT)
Dept: CARDIOLOGY | Facility: CLINIC | Age: 74
End: 2024-06-19
Payer: MEDICARE

## 2024-06-19 NOTE — TELEPHONE ENCOUNTER
FACILITY: Gastroenterology  [facility]  PHONE: [phone]  FAX: [phone]  PROCEDURE: Colonoscopy  SCHEDULED: TBD  MEDS TO HOLD: ASA

## 2024-07-02 ENCOUNTER — PREP FOR SURGERY (OUTPATIENT)
Dept: OTHER | Facility: HOSPITAL | Age: 74
End: 2024-07-02
Payer: MEDICARE

## 2024-07-02 DIAGNOSIS — Z12.11 ENCOUNTER FOR SCREENING COLONOSCOPY: Primary | ICD-10-CM

## 2024-07-03 PROBLEM — Z12.11 ENCOUNTER FOR SCREENING COLONOSCOPY: Status: ACTIVE | Noted: 2024-07-02

## 2024-07-03 RX ORDER — SODIUM CHLORIDE, SODIUM LACTATE, POTASSIUM CHLORIDE, CALCIUM CHLORIDE 600; 310; 30; 20 MG/100ML; MG/100ML; MG/100ML; MG/100ML
30 INJECTION, SOLUTION INTRAVENOUS CONTINUOUS
OUTPATIENT
Start: 2024-07-03

## 2024-07-03 RX ORDER — SODIUM, POTASSIUM,MAG SULFATES 17.5-3.13G
SOLUTION, RECONSTITUTED, ORAL ORAL
Qty: 177 ML | Refills: 0 | Status: SHIPPED | OUTPATIENT
Start: 2024-07-03

## 2024-07-13 DIAGNOSIS — N18.31 STAGE 3A CHRONIC KIDNEY DISEASE: ICD-10-CM

## 2024-07-15 RX ORDER — POTASSIUM CHLORIDE 750 MG/1
CAPSULE, EXTENDED RELEASE ORAL
Qty: 90 CAPSULE | Refills: 2 | Status: SHIPPED | OUTPATIENT
Start: 2024-07-15

## 2024-07-24 ENCOUNTER — ANESTHESIA EVENT (OUTPATIENT)
Dept: GASTROENTEROLOGY | Facility: HOSPITAL | Age: 74
End: 2024-07-24
Payer: MEDICARE

## 2024-07-24 ENCOUNTER — HOSPITAL ENCOUNTER (OUTPATIENT)
Facility: HOSPITAL | Age: 74
Setting detail: HOSPITAL OUTPATIENT SURGERY
Discharge: HOME | End: 2024-07-24
Attending: STUDENT IN AN ORGANIZED HEALTH CARE EDUCATION/TRAINING PROGRAM | Admitting: STUDENT IN AN ORGANIZED HEALTH CARE EDUCATION/TRAINING PROGRAM
Payer: MEDICARE

## 2024-07-24 ENCOUNTER — ANESTHESIA (OUTPATIENT)
Dept: GASTROENTEROLOGY | Facility: HOSPITAL | Age: 74
End: 2024-07-24
Payer: MEDICARE

## 2024-07-24 VITALS
OXYGEN SATURATION: 97 % | SYSTOLIC BLOOD PRESSURE: 162 MMHG | WEIGHT: 209.66 LBS | DIASTOLIC BLOOD PRESSURE: 80 MMHG | RESPIRATION RATE: 15 BRPM | BODY MASS INDEX: 33.69 KG/M2 | TEMPERATURE: 98.3 F | HEART RATE: 70 BPM | HEIGHT: 66 IN

## 2024-07-24 DIAGNOSIS — Z12.11 ENCOUNTER FOR SCREENING COLONOSCOPY: ICD-10-CM

## 2024-07-24 LAB — GLUCOSE BLDC GLUCOMTR-MCNC: 174 MG/DL (ref 70–105)

## 2024-07-24 PROCEDURE — 25810000003 SODIUM CHLORIDE 0.9 % SOLUTION: Performed by: NURSE ANESTHETIST, CERTIFIED REGISTERED

## 2024-07-24 PROCEDURE — 25010000002 PROPOFOL 200 MG/20ML EMULSION: Performed by: NURSE ANESTHETIST, CERTIFIED REGISTERED

## 2024-07-24 PROCEDURE — 88305 TISSUE EXAM BY PATHOLOGIST: CPT | Performed by: STUDENT IN AN ORGANIZED HEALTH CARE EDUCATION/TRAINING PROGRAM

## 2024-07-24 PROCEDURE — 25010000002 PROPOFOL 10 MG/ML EMULSION: Performed by: NURSE ANESTHETIST, CERTIFIED REGISTERED

## 2024-07-24 PROCEDURE — 82948 REAGENT STRIP/BLOOD GLUCOSE: CPT

## 2024-07-24 RX ORDER — SODIUM CHLORIDE 9 MG/ML
INJECTION, SOLUTION INTRAVENOUS CONTINUOUS PRN
Status: DISCONTINUED | OUTPATIENT
Start: 2024-07-24 | End: 2024-07-24 | Stop reason: SURG

## 2024-07-24 RX ORDER — PROPOFOL 10 MG/ML
INJECTION, EMULSION INTRAVENOUS AS NEEDED
Status: DISCONTINUED | OUTPATIENT
Start: 2024-07-24 | End: 2024-07-24 | Stop reason: SURG

## 2024-07-24 RX ORDER — LIDOCAINE HYDROCHLORIDE 10 MG/ML
INJECTION, SOLUTION EPIDURAL; INFILTRATION; INTRACAUDAL; PERINEURAL AS NEEDED
Status: DISCONTINUED | OUTPATIENT
Start: 2024-07-24 | End: 2024-07-24 | Stop reason: SURG

## 2024-07-24 RX ADMIN — PROPOFOL 125 MCG/KG/MIN: 10 INJECTION, EMULSION INTRAVENOUS at 10:20

## 2024-07-24 RX ADMIN — PROPOFOL 80 MG: 10 INJECTION, EMULSION INTRAVENOUS at 10:20

## 2024-07-24 RX ADMIN — LIDOCAINE HYDROCHLORIDE 50 MG: 10 INJECTION, SOLUTION EPIDURAL; INFILTRATION; INTRACAUDAL; PERINEURAL at 10:20

## 2024-07-24 RX ADMIN — SODIUM CHLORIDE: 9 INJECTION, SOLUTION INTRAVENOUS at 10:18

## 2024-07-24 NOTE — DISCHARGE INSTRUCTIONS
A responsible adult should stay with you and you should rest quietly for the rest of the day.    Do not drink alcohol, drive, operate any heavy machinery or power tools or make any legal/important decisions for the next 24 hours.     Progress your diet as tolerated.  If you begin to experience severe pain, increased shortness of breath, racing heartbeat or a fever above 101 F, seek immediate medical attention.     Follow up with MD as instructed. Call office for results in 3 to 5 days if needed.       One polyp removed. No need for another colonoscopy unless having issues with bowels

## 2024-07-24 NOTE — OP NOTE
[facility]  Colonoscopy Examination     Name: Shaka Trinidad  : 1950  Date of Colonoscopy: 2024  Procedure: Average Risk Screening Colonoscopy  Surgeon: Isreal Keene MD   Anesthesia: Sedation   IV Fluids: refer to anesthesia record    Procedure Details:    Prior to the procedure, history and physical exam was performed. Patient's medication and allergies were reviewed. The risk and benefits of the procedure and sedation options and risks were discussed with the patient. All questions were answered and informed consent was obtained.    The patient was brought to the endoscopy suite and placed in the left lateral decubitus position. Conscious sedation was administered by the anesthesia team. Vital signs including blood pressure, heart rate, and oxygen saturation were monitored continuously throughout the procedure.    The colonoscope was introduced through the rectum and advanced through the entire colon under direct visualization. The scope was maneuvered carefully, and the mucosa of the rectum, sigmoid colon, descending colon, transverse colon, ascending colon, and cecum were thoroughly inspected. Adequate insufflation was maintained throughout the procedure for optimal visualization.    Findings:    Rectum: Normal appearance with no lesions,polyps, or abnormalities.  Sigmoid colon: Normal appearance. 7 mm sigmoid poly removed with cold biopsy forceps                                                 Descending colon: Normal appearance with no lesions,polyps, or abnormalities.  Transverse colon: Normal appearance with no lesions,polyps, or abnormalities.  Ascending colon: Normal appearance with no lesions,polyps, or abnormalities.  Cecum: Normal appearance with no lesions,polyps, or abnormalities.    Procedure Images:      Attached in a separate file      Interventions:  During the procedure, a single diminutive polyp measuring 7 mm in size was identified in the sigmoid colon. The polyp  was visualized and removed using cold biopsy forceps. Hemostasis was achieved successfully at the site of polypectomy without any immediate complications.     Specimens:  The removed polyp was retrieved and sent for histopathological examination to the pathology department for further analysis.    Recommendation:     No further colonoscopy unless patient is symptomatic     Conclusion:  The screening colonoscopy was completed successfully, and the entire colon was visualized without any complications. The patient tolerated the procedure well and was transferred to the recovery area in stable condition. Post-procedure instructions and follow-up were discussed with the patient and will be provided in written form.    Isreal Keene MD  Colon and Rectal Surgery   [address], [address]  T: [phone]

## 2024-07-24 NOTE — H&P
Colorectal Surgery Preop Note    ID:  Shaka Trinidad;     Chief Complaint  Screening colonoscopy     History of Present Illness  Shaka Trinidad is a 74 y.o. male who is here for Screening colonoscopy      Past Medical History  Past Medical History:   Diagnosis Date    Acute pancreatitis 3/14/2020    Coronary artery disease     Diabetes mellitus, type II     Elevated cholesterol     H/O cataract     cataracts     Hyperlipidemia     Hypertension     Peripheral edema     Peripheral edema     Sleep apnea     oral appliance     For further details please see prior notes and Health History Questionnaire scanned in Epic.  Past Surgical History  Past Surgical History:   Procedure Laterality Date    CARDIAC CATHETERIZATION N/A 7/19/2019    Procedure: Left Heart Cath with angiogram;  Surgeon: Bautista Lopez MD;  Location: [facility] CATH INVASIVE LOCATION;  Service: Cardiovascular    CARDIAC CATHETERIZATION N/A 7/19/2019    Procedure: Coronary angiography;  Surgeon: Bautista Lopez MD;  Location: [facility] CATH INVASIVE LOCATION;  Service: Cardiovascular    CARDIAC CATHETERIZATION N/A 7/19/2019    Procedure: Left ventriculography;  Surgeon: Bautista Lopez MD;  Location: [facility] CATH INVASIVE LOCATION;  Service: Cardiovascular    CHOLECYSTECTOMY N/A 1/6/2021    Procedure: CHOLECYSTECTOMY LAPAROSCOPIC;  Surgeon: Giacomo Akins MD;  Location: [facility] MAIN OR;  Service: General;  Laterality: N/A;    COLONOSCOPY      CORONARY ARTERY BYPASS GRAFT N/A 8/8/2019    Procedure: CORONARY ARTERY BYPASS GRAFTING;  Surgeon: Chet Mcarthur MD;  Location: [facility] CVOR;  Service: Cardiothoracic    ENDOSCOPY N/A 3/15/2020    Procedure: ESOPHAGOGASTRODUODENOSCOPY;  Surgeon: Jaspal Logan MD;  Location: [facility] ENDOSCOPY;  Service: Gastroenterology;  Laterality: N/A;  EPIGASTRIC ABDOMINAL PAIN, ABNORMAL CT SCAN, PANCREATITIS  NORMAL EGD    HERNIA REPAIR      OTHER SURGICAL HISTORY  12/2015    2d echo-abstracted cent.       For further details please see prior notes and Health History Questionnaire scanned in Epic.  Family History  Family History   Problem Relation Age of Onset    Heart failure Mother     Hypertension Brother     Cancer Brother     Heart disease Brother      For further details please see prior notes and Health History Questionnaire scanned in Epic.  Social History  Social History     Tobacco Use    Smoking status: Never    Smokeless tobacco: Never   Vaping Use    Vaping status: Never Used   Substance Use Topics    Alcohol use: Yes     Alcohol/week: 1.0 standard drink of alcohol     Types: 1 Glasses of wine per week    Drug use: Never     For further details please see prior notes and Health History Questionnaire scanned in Epic.  Medication List  No current facility-administered medications for this encounter.  For further details please see prior notes and Health History Questionnaire scanned in Epic.  Allergies  No Known Allergies  Review of Systems  Pertinent positives noted in HPI.    Physical Exam  General:  No acute distress  Head: Normocephalic, atraumatic  CV: Regular rate, no edema, extremities warm and well perfused  Pulm: Symmetric chest rise, non-labored breathing  Neuro: Alert and oriented     Abdomen: Please see clinic note  Anorectal: Please see clinic note    Assessment  -Screening colonoscopy       Plan / Recommendations    1. Proceed to endo for screening colonoscopy       Isreal Keene MD  Colon and Rectal Surgery   [facility]

## 2024-07-24 NOTE — ANESTHESIA PREPROCEDURE EVALUATION
Anesthesia Evaluation     Patient summary reviewed and Nursing notes reviewed   NPO Solid Status: > 8 hours  NPO Liquid Status: > 8 hours           Airway   Mallampati: II  TM distance: >3 FB  Neck ROM: full  No difficulty expected  Dental - normal exam     Pulmonary    (+) ,sleep apnea  Cardiovascular     (+) hypertension, CAD, CABG, PVD, hyperlipidemia      Neuro/Psych  (+) numbness  GI/Hepatic/Renal/Endo    (+) obesity, diabetes mellitus    Musculoskeletal     Abdominal    Substance History      OB/GYN          Other   arthritis,     ROS/Med Hx Other: ·  Left ventricular ejection fraction appears to be 56 - 60%.  ·  Left ventricular diastolic function is consistent with (grade I) impaired relaxation.  ·  The right ventricular cavity is moderately dilated.  ·  The left atrial cavity is mildly dilated.  ·  Estimated right ventricular systolic pressure from tricuspid regurgitation is normal (<35 mmHg).                   Anesthesia Plan    ASA 3     general and MAC     intravenous induction     Anesthetic plan, risks, benefits, and alternatives have been provided, discussed and informed consent has been obtained with: patient.    Plan discussed with CRNA.    CODE STATUS:

## 2024-07-24 NOTE — ANESTHESIA POSTPROCEDURE EVALUATION
Patient: Shaka Trinidad    Procedure Summary       Date: 07/24/24 Room / Location: [facility] ENDOSCOPY 4 / [facility] ENDOSCOPY    Anesthesia Start: 1016 Anesthesia Stop: 1041    Procedure: COLONOSCOPY WITH POLYPECTOMY X 1 (Rectum) Diagnosis:       Encounter for screening colonoscopy      (Encounter for screening colonoscopy [Z12.11])    Surgeons: Isreal Keene MD Provider: Jann Tobin MD    Anesthesia Type: general, MAC ASA Status: 3            Anesthesia Type: general, MAC    Vitals  Vitals Value Taken Time   /80 07/24/24 1104   Temp     Pulse 70 07/24/24 1104   Resp 15 07/24/24 1104   SpO2 97 % 07/24/24 1104           Post Anesthesia Care and Evaluation    Patient location during evaluation: PACU  Patient participation: complete - patient participated  Level of consciousness: awake  Pain scale: See nurse's notes for pain score.  Pain management: adequate    Airway patency: patent  Anesthetic complications: No anesthetic complications  PONV Status: none  Cardiovascular status: acceptable  Respiratory status: acceptable and spontaneous ventilation  Hydration status: acceptable    Comments: Patient seen and examined postoperatively; vital signs stable; SpO2 greater than or equal to 90%; cardiopulmonary status stable; nausea/vomiting adequately controlled; pain adequately controlled; no apparent anesthesia complications; patient discharged from anesthesia care when discharge criteria were met

## 2024-07-25 LAB
LAB AP CASE REPORT: NORMAL
PATH REPORT.FINAL DX SPEC: NORMAL
PATH REPORT.GROSS SPEC: NORMAL

## 2024-08-13 RX ORDER — INSULIN GLARGINE 100 [IU]/ML
50 INJECTION, SOLUTION SUBCUTANEOUS EVERY MORNING
Qty: 45 ML | Refills: 1 | Status: SHIPPED | OUTPATIENT
Start: 2024-08-13

## 2024-08-13 RX ORDER — FLURBIPROFEN SODIUM 0.3 MG/ML
SOLUTION/ DROPS OPHTHALMIC
Qty: 100 EACH | Refills: 3 | Status: SHIPPED | OUTPATIENT
Start: 2024-08-13

## 2024-08-16 DIAGNOSIS — I10 ESSENTIAL HYPERTENSION: ICD-10-CM

## 2024-08-16 RX ORDER — LISINOPRIL 40 MG/1
40 TABLET ORAL DAILY
Qty: 90 TABLET | Refills: 3 | Status: SHIPPED | OUTPATIENT
Start: 2024-08-16

## 2024-08-26 DIAGNOSIS — E11.65 TYPE 2 DIABETES MELLITUS WITH HYPERGLYCEMIA, WITH LONG-TERM CURRENT USE OF INSULIN: ICD-10-CM

## 2024-08-26 DIAGNOSIS — J40 BRONCHITIS: ICD-10-CM

## 2024-08-26 DIAGNOSIS — I10 ESSENTIAL HYPERTENSION: ICD-10-CM

## 2024-08-26 DIAGNOSIS — Z79.4 TYPE 2 DIABETES MELLITUS WITH HYPERGLYCEMIA, WITH LONG-TERM CURRENT USE OF INSULIN: ICD-10-CM

## 2024-08-26 RX ORDER — MONTELUKAST SODIUM 10 MG/1
TABLET ORAL
Qty: 90 TABLET | Refills: 1 | Status: SHIPPED | OUTPATIENT
Start: 2024-08-26

## 2024-08-26 RX ORDER — GLIPIZIDE 5 MG/1
TABLET ORAL
Qty: 270 TABLET | Refills: 1 | Status: SHIPPED | OUTPATIENT
Start: 2024-08-26

## 2024-08-26 RX ORDER — HYDRALAZINE HYDROCHLORIDE 25 MG/1
25 TABLET, FILM COATED ORAL 3 TIMES DAILY
Qty: 270 TABLET | Refills: 1 | Status: SHIPPED | OUTPATIENT
Start: 2024-08-26

## 2024-08-26 RX ORDER — AMLODIPINE BESYLATE 5 MG/1
TABLET ORAL
Qty: 90 TABLET | Refills: 3 | Status: SHIPPED | OUTPATIENT
Start: 2024-08-26

## 2024-08-26 RX ORDER — METOPROLOL TARTRATE 25 MG/1
25 TABLET, FILM COATED ORAL EVERY 12 HOURS
Qty: 180 TABLET | Refills: 1 | Status: SHIPPED | OUTPATIENT
Start: 2024-08-26

## 2024-09-04 ENCOUNTER — OFFICE VISIT (OUTPATIENT)
Dept: CARDIOLOGY | Facility: CLINIC | Age: 74
End: 2024-09-04
Payer: MEDICARE

## 2024-09-04 VITALS
DIASTOLIC BLOOD PRESSURE: 69 MMHG | OXYGEN SATURATION: 97 % | BODY MASS INDEX: 34.39 KG/M2 | HEIGHT: 66 IN | SYSTOLIC BLOOD PRESSURE: 123 MMHG | HEART RATE: 66 BPM | WEIGHT: 214 LBS

## 2024-09-04 DIAGNOSIS — G47.33 OBSTRUCTIVE SLEEP APNEA: ICD-10-CM

## 2024-09-04 DIAGNOSIS — I10 ESSENTIAL HYPERTENSION: ICD-10-CM

## 2024-09-04 DIAGNOSIS — E78.2 MIXED HYPERLIPIDEMIA: ICD-10-CM

## 2024-09-04 DIAGNOSIS — E11.9 TYPE 2 DIABETES MELLITUS WITHOUT COMPLICATION, WITHOUT LONG-TERM CURRENT USE OF INSULIN: ICD-10-CM

## 2024-09-04 DIAGNOSIS — I25.118 CORONARY ARTERY DISEASE OF NATIVE ARTERY OF NATIVE HEART WITH STABLE ANGINA PECTORIS: Primary | ICD-10-CM

## 2024-09-04 NOTE — PROGRESS NOTES
Subjective:     Encounter Date:09/04/2024      Patient ID: Shaka Trinidad is a 74 y.o. male.    Chief Complaint:  History of Present Illness 74-year-old white male with history of coronary disease hypertension hyperlipidemia diabetes and sleep apnea presents to the office for a follow-up.  Patient is currently stable without any symptoms of chest pain or shortness of breath at rest or exertion.  No complaint any PND orthopnea.  No palpitations but has some dizziness.  No syncope.  Patient has some mild swelling of the feet.  Patient is taking all the medicines regular.  Patient does not smoke    The following portions of the patient's history were reviewed and updated as appropriate: allergies, current medications, past family history, past medical history, past social history, past surgical history, and problem list.  Past Medical History:   Diagnosis Date    Acute pancreatitis 3/14/2020    Coronary artery disease     Diabetes mellitus, type II     Elevated cholesterol     H/O cataract     cataracts     Hyperlipidemia     Hypertension     Peripheral edema     Peripheral edema     Sleep apnea     oral appliance     Past Surgical History:   Procedure Laterality Date    CARDIAC CATHETERIZATION N/A 7/19/2019    Procedure: Left Heart Cath with angiogram;  Surgeon: Bautista Lopez MD;  Location: [facility] CATH INVASIVE LOCATION;  Service: Cardiovascular    CARDIAC CATHETERIZATION N/A 7/19/2019    Procedure: Coronary angiography;  Surgeon: Bautista Lopez MD;  Location: [facility] CATH INVASIVE LOCATION;  Service: Cardiovascular    CARDIAC CATHETERIZATION N/A 7/19/2019    Procedure: Left ventriculography;  Surgeon: Bautista Lopez MD;  Location: [facility] CATH INVASIVE LOCATION;  Service: Cardiovascular    CHOLECYSTECTOMY N/A 1/6/2021    Procedure: CHOLECYSTECTOMY LAPAROSCOPIC;  Surgeon: Giacomo Akins MD;  Location: [facility] MAIN OR;  Service: General;  Laterality: N/A;    COLONOSCOPY      COLONOSCOPY N/A 7/24/2024     "Procedure: COLONOSCOPY WITH POLYPECTOMY X 1;  Surgeon: Isreal Keene MD;  Location: [facility] ENDOSCOPY;  Service: General;  Laterality: N/A;  POLYP X 1    CORONARY ARTERY BYPASS GRAFT N/A 8/8/2019    Procedure: CORONARY ARTERY BYPASS GRAFTING;  Surgeon: Chet Mcarthur MD;  Location: [facility] CVOR;  Service: Cardiothoracic    ENDOSCOPY N/A 3/15/2020    Procedure: ESOPHAGOGASTRODUODENOSCOPY;  Surgeon: Jaspal Logan MD;  Location: [facility] ENDOSCOPY;  Service: Gastroenterology;  Laterality: N/A;  EPIGASTRIC ABDOMINAL PAIN, ABNORMAL CT SCAN, PANCREATITIS  NORMAL EGD    HERNIA REPAIR      OTHER SURGICAL HISTORY  12/2015    2d echo-abstracted cent.      /69   Pulse 66   Ht 167.6 cm (65.98\")   Wt 97.1 kg (214 lb)   SpO2 97%   BMI 34.56 kg/m²   Family History   Problem Relation Age of Onset    Heart failure Mother     Hypertension Brother     Cancer Brother     Heart disease Brother        Current Outpatient Medications:     amLODIPine (NORVASC) 5 MG tablet, TAKE 1 TABLET BY MOUTH EVERY DAY, Disp: 90 tablet, Rfl: 3    aspirin 81 MG chewable tablet, Chew 1 tablet Every Night., Disp: , Rfl:     atorvastatin (LIPITOR) 10 MG tablet, Take 1 tablet by mouth every night at bedtime., Disp: 90 tablet, Rfl: 3    B-D UF III MINI PEN NEEDLES 31G X 5 MM misc, USE TO INJECT INSULIN DAILY, Disp: 100 each, Rfl: 3    CINNAMON PO, Take  by mouth., Disp: , Rfl:     ferrous sulfate 325 (65 FE) MG tablet, Take 1 tablet by mouth Every Other Day., Disp: 90 tablet, Rfl: 1    furosemide (LASIX) 20 MG tablet, Take 1 tablet by mouth daily. May take an extra dose for swelling one time daily as needed., Disp: 120 tablet, Rfl: 3    glipizide (GLUCOTROL) 5 MG tablet, TAKE 2 TABS IN THE MORNING AND 1 TAB IN THE EVENING, Disp: 270 tablet, Rfl: 1    hydrALAZINE (APRESOLINE) 25 MG tablet, TAKE 1 TABLET BY MOUTH THREE TIMES A DAY, Disp: 270 tablet, Rfl: 1    Insulin Glargine (BASAGLAR KWIKPEN) 100 UNIT/ML injection pen, Inject 50 Units " under the skin into the appropriate area as directed Every Morning., Disp: 45 mL, Rfl: 1    lisinopril (PRINIVIL,ZESTRIL) 40 MG tablet, TAKE 1 TABLET BY MOUTH EVERY DAY, Disp: 90 tablet, Rfl: 3    metFORMIN (GLUCOPHAGE) 1000 MG tablet, TAKE 1 TABLET BY MOUTH TWICE A DAY WITH MEALS, Disp: 180 tablet, Rfl: 1    metoprolol tartrate (LOPRESSOR) 25 MG tablet, TAKE 1 TABLET BY MOUTH EVERY 12 HOURS (Patient taking differently: Take 1 tablet by mouth Daily.), Disp: 180 tablet, Rfl: 1    montelukast (SINGULAIR) 10 MG tablet, TAKE 1 TABLET BY MOUTH EVERY DAY AT NIGHT, Disp: 90 tablet, Rfl: 1    OneTouch Ultra test strip, 1 each by Other route Daily. Use as instructed to check blood sugar daily, Disp: 100 each, Rfl: 5    potassium chloride (MICRO-K) 10 MEQ CR capsule, TAKE 1 CAPSULE BY MOUTH DAILY. MAY TAKE AN EXTRA DOSE WITH LASIX FOR SWELLING AS NEEDED., Disp: 90 capsule, Rfl: 2    sodium-potassium-magnesium sulfates (SUPREP) 17.5-3.13-1.6 GM/177ML solution oral solution, PLEASE REPLACE WITH GOLYTELY 4000ML IF SUPREP IS NOT APPROVED BY INSURANCE 5 PM the day before your scheduled colonoscopy - Take your 1st dose of bowel prep 5 AM - Take your 2nd dose of bowel prep You may still have watery bowel movements after you finish drinking the solution., Disp: 177 mL, Rfl: 0    traMADol (Ultram) 50 MG tablet, Take 1 tablet by mouth Every 8 (Eight) Hours As Needed for Severe Pain., Disp: 20 tablet, Rfl: 0    vitamin C (ASCORBIC ACID) 500 MG tablet, Take 2 tablets by mouth Every Evening., Disp: , Rfl:   No Known Allergies  Social History     Socioeconomic History    Marital status:    Tobacco Use    Smoking status: Never    Smokeless tobacco: Never   Vaping Use    Vaping status: Never Used   Substance and Sexual Activity    Alcohol use: Yes     Alcohol/week: 1.0 standard drink of alcohol     Types: 1 Glasses of wine per week    Drug use: Never    Sexual activity: Defer     Review of Systems   Constitutional: Negative for  malaise/fatigue.   Cardiovascular:  Positive for leg swelling. Negative for chest pain, dyspnea on exertion and palpitations.   Respiratory:  Negative for cough and shortness of breath.    Gastrointestinal:  Negative for abdominal pain, nausea and vomiting.   Neurological:  Positive for dizziness and light-headedness. Negative for focal weakness, headaches and numbness.   All other systems reviewed and are negative.             Objective:     Constitutional:       Appearance: Well-developed.   Eyes:      General: No scleral icterus.     Conjunctiva/sclera: Conjunctivae normal.   HENT:      Head: Normocephalic and atraumatic.   Neck:      Vascular: No carotid bruit or JVD.   Pulmonary:      Effort: Pulmonary effort is normal.      Breath sounds: Normal breath sounds. No wheezing. No rales.   Cardiovascular:      Normal rate. Regular rhythm.   Pulses:     Intact distal pulses.   Abdominal:      General: Bowel sounds are normal.      Palpations: Abdomen is soft.   Musculoskeletal:      Cervical back: Normal range of motion and neck supple. Skin:     General: Skin is warm and dry.      Findings: No rash.   Neurological:      Mental Status: Alert.       Procedures    Lab Review:         MDM    #1 coronary artery disease  Patient has nonobstructive disease in the past and is currently stable with medical therapy without any angina  2.  Hypertension  Patient blood pressure currently stable on hydralazine and lisinopril and metoprolol  3.  Hyperlipidemia  Patient is currently on oral medicines including atorvastatin  4.  Diabetes  Patient is on insulin as well as oral medicines and followed by primary care doctor  5.  Sleep apnea  .  Sleep apnea uses a CPAP machine    Patient's previous medical records, labs, and EKG were reviewed and discussed with the patient at today's visit.

## 2024-09-10 ENCOUNTER — LAB (OUTPATIENT)
Dept: FAMILY MEDICINE CLINIC | Facility: CLINIC | Age: 74
End: 2024-09-10
Payer: MEDICARE

## 2024-09-10 DIAGNOSIS — E78.2 MIXED HYPERLIPIDEMIA: ICD-10-CM

## 2024-09-10 DIAGNOSIS — Z79.4 TYPE 2 DIABETES MELLITUS WITH HYPERGLYCEMIA, WITH LONG-TERM CURRENT USE OF INSULIN: ICD-10-CM

## 2024-09-10 DIAGNOSIS — E11.65 TYPE 2 DIABETES MELLITUS WITH HYPERGLYCEMIA, WITH LONG-TERM CURRENT USE OF INSULIN: ICD-10-CM

## 2024-09-10 DIAGNOSIS — I10 ESSENTIAL HYPERTENSION: ICD-10-CM

## 2024-09-10 PROCEDURE — 83036 HEMOGLOBIN GLYCOSYLATED A1C: CPT | Performed by: NURSE PRACTITIONER

## 2024-09-10 PROCEDURE — 36415 COLL VENOUS BLD VENIPUNCTURE: CPT

## 2024-09-10 PROCEDURE — 80053 COMPREHEN METABOLIC PANEL: CPT | Performed by: NURSE PRACTITIONER

## 2024-09-10 PROCEDURE — 80061 LIPID PANEL: CPT | Performed by: NURSE PRACTITIONER

## 2024-09-10 PROCEDURE — 85027 COMPLETE CBC AUTOMATED: CPT | Performed by: NURSE PRACTITIONER

## 2024-09-10 PROCEDURE — 84443 ASSAY THYROID STIM HORMONE: CPT | Performed by: NURSE PRACTITIONER

## 2024-09-11 LAB
ALBUMIN SERPL-MCNC: 4.4 G/DL (ref 3.5–5.2)
ALBUMIN/GLOB SERPL: 1.4 G/DL
ALP SERPL-CCNC: 66 U/L (ref 39–117)
ALT SERPL W P-5'-P-CCNC: 23 U/L (ref 1–41)
ANION GAP SERPL CALCULATED.3IONS-SCNC: 10.3 MMOL/L (ref 5–15)
AST SERPL-CCNC: 25 U/L (ref 1–40)
BILIRUB SERPL-MCNC: 0.5 MG/DL (ref 0–1.2)
BUN SERPL-MCNC: 22 MG/DL (ref 8–23)
BUN/CREAT SERPL: 16.7 (ref 7–25)
CALCIUM SPEC-SCNC: 9.9 MG/DL (ref 8.6–10.5)
CHLORIDE SERPL-SCNC: 98 MMOL/L (ref 98–107)
CHOLEST SERPL-MCNC: 102 MG/DL (ref 0–200)
CO2 SERPL-SCNC: 27.7 MMOL/L (ref 22–29)
CREAT SERPL-MCNC: 1.32 MG/DL (ref 0.76–1.27)
DEPRECATED RDW RBC AUTO: 43 FL (ref 37–54)
EGFRCR SERPLBLD CKD-EPI 2021: 57 ML/MIN/1.73
ERYTHROCYTE [DISTWIDTH] IN BLOOD BY AUTOMATED COUNT: 13.1 % (ref 12.3–15.4)
GLOBULIN UR ELPH-MCNC: 3.1 GM/DL
GLUCOSE SERPL-MCNC: 108 MG/DL (ref 65–99)
HBA1C MFR BLD: 7.5 % (ref 4.8–5.6)
HCT VFR BLD AUTO: 42.8 % (ref 37.5–51)
HDLC SERPL-MCNC: 36 MG/DL (ref 40–60)
HGB BLD-MCNC: 13.9 G/DL (ref 13–17.7)
LDLC SERPL CALC-MCNC: 43 MG/DL (ref 0–100)
LDLC/HDLC SERPL: 1.13 {RATIO}
MCH RBC QN AUTO: 29.8 PG (ref 26.6–33)
MCHC RBC AUTO-ENTMCNC: 32.5 G/DL (ref 31.5–35.7)
MCV RBC AUTO: 91.6 FL (ref 79–97)
PLATELET # BLD AUTO: 242 10*3/MM3 (ref 140–450)
PMV BLD AUTO: 10.6 FL (ref 6–12)
POTASSIUM SERPL-SCNC: 4.5 MMOL/L (ref 3.5–5.2)
PROT SERPL-MCNC: 7.5 G/DL (ref 6–8.5)
RBC # BLD AUTO: 4.67 10*6/MM3 (ref 4.14–5.8)
SODIUM SERPL-SCNC: 136 MMOL/L (ref 136–145)
TRIGL SERPL-MCNC: 126 MG/DL (ref 0–150)
TSH SERPL DL<=0.05 MIU/L-ACNC: 2.97 UIU/ML (ref 0.27–4.2)
VLDLC SERPL-MCNC: 23 MG/DL (ref 5–40)
WBC NRBC COR # BLD AUTO: 8.9 10*3/MM3 (ref 3.4–10.8)

## 2024-09-17 ENCOUNTER — OFFICE VISIT (OUTPATIENT)
Dept: FAMILY MEDICINE CLINIC | Facility: CLINIC | Age: 74
End: 2024-09-17
Payer: MEDICARE

## 2024-09-17 VITALS
SYSTOLIC BLOOD PRESSURE: 138 MMHG | HEIGHT: 66 IN | TEMPERATURE: 98.7 F | DIASTOLIC BLOOD PRESSURE: 77 MMHG | BODY MASS INDEX: 34.42 KG/M2 | RESPIRATION RATE: 18 BRPM | WEIGHT: 214.2 LBS | OXYGEN SATURATION: 96 % | HEART RATE: 77 BPM

## 2024-09-17 DIAGNOSIS — E11.65 TYPE 2 DIABETES MELLITUS WITH HYPERGLYCEMIA, WITH LONG-TERM CURRENT USE OF INSULIN: Primary | ICD-10-CM

## 2024-09-17 DIAGNOSIS — N18.31 STAGE 3A CHRONIC KIDNEY DISEASE: ICD-10-CM

## 2024-09-17 DIAGNOSIS — Z23 INFLUENZA VACCINE ADMINISTERED: ICD-10-CM

## 2024-09-17 DIAGNOSIS — E78.2 MIXED HYPERLIPIDEMIA: ICD-10-CM

## 2024-09-17 DIAGNOSIS — M54.50 LUMBAR BACK PAIN: ICD-10-CM

## 2024-09-17 DIAGNOSIS — K21.9 GASTROESOPHAGEAL REFLUX DISEASE WITHOUT ESOPHAGITIS: ICD-10-CM

## 2024-09-17 DIAGNOSIS — Z79.4 TYPE 2 DIABETES MELLITUS WITH HYPERGLYCEMIA, WITH LONG-TERM CURRENT USE OF INSULIN: Primary | ICD-10-CM

## 2024-09-17 DIAGNOSIS — D50.9 IRON DEFICIENCY ANEMIA, UNSPECIFIED IRON DEFICIENCY ANEMIA TYPE: ICD-10-CM

## 2024-09-17 DIAGNOSIS — I10 ESSENTIAL HYPERTENSION: ICD-10-CM

## 2024-09-17 PROCEDURE — 90662 IIV NO PRSV INCREASED AG IM: CPT | Performed by: NURSE PRACTITIONER

## 2024-09-17 PROCEDURE — 3078F DIAST BP <80 MM HG: CPT | Performed by: NURSE PRACTITIONER

## 2024-09-17 PROCEDURE — 1160F RVW MEDS BY RX/DR IN RCRD: CPT | Performed by: NURSE PRACTITIONER

## 2024-09-17 PROCEDURE — 1126F AMNT PAIN NOTED NONE PRSNT: CPT | Performed by: NURSE PRACTITIONER

## 2024-09-17 PROCEDURE — 1159F MED LIST DOCD IN RCRD: CPT | Performed by: NURSE PRACTITIONER

## 2024-09-17 PROCEDURE — 3075F SYST BP GE 130 - 139MM HG: CPT | Performed by: NURSE PRACTITIONER

## 2024-09-17 PROCEDURE — 3051F HG A1C>EQUAL 7.0%<8.0%: CPT | Performed by: NURSE PRACTITIONER

## 2024-09-17 PROCEDURE — G0008 ADMIN INFLUENZA VIRUS VAC: HCPCS | Performed by: NURSE PRACTITIONER

## 2024-09-17 PROCEDURE — 99214 OFFICE O/P EST MOD 30 MIN: CPT | Performed by: NURSE PRACTITIONER

## 2024-09-17 RX ORDER — FERROUS SULFATE 325(65) MG
1 TABLET ORAL EVERY OTHER DAY
Qty: 90 TABLET | Refills: 1 | Status: CANCELLED | OUTPATIENT
Start: 2024-09-17

## 2024-09-17 RX ORDER — ATORVASTATIN CALCIUM 10 MG/1
10 TABLET, FILM COATED ORAL
Qty: 90 TABLET | Refills: 3 | Status: CANCELLED | OUTPATIENT
Start: 2024-09-17

## 2024-09-17 RX ORDER — TRAMADOL HYDROCHLORIDE 50 MG/1
50 TABLET ORAL EVERY 8 HOURS PRN
Qty: 20 TABLET | Refills: 0 | Status: SHIPPED | OUTPATIENT
Start: 2024-09-17

## 2024-09-18 DIAGNOSIS — E11.51 TYPE 2 DIABETES MELLITUS WITH DIABETIC PERIPHERAL ANGIOPATHY WITHOUT GANGRENE, UNSPECIFIED WHETHER LONG TERM INSULIN USE: ICD-10-CM

## 2024-09-18 RX ORDER — BLOOD SUGAR DIAGNOSTIC
STRIP MISCELLANEOUS
Qty: 100 EACH | Refills: 5 | Status: SHIPPED | OUTPATIENT
Start: 2024-09-18

## 2024-09-30 DIAGNOSIS — I10 ESSENTIAL HYPERTENSION: ICD-10-CM

## 2024-09-30 RX ORDER — FUROSEMIDE 20 MG
TABLET ORAL
Qty: 120 TABLET | Refills: 3 | Status: SHIPPED | OUTPATIENT
Start: 2024-09-30

## 2024-11-07 ENCOUNTER — TELEPHONE (OUTPATIENT)
Dept: FAMILY MEDICINE CLINIC | Facility: CLINIC | Age: 74
End: 2024-11-07
Payer: MEDICARE

## 2024-11-07 NOTE — TELEPHONE ENCOUNTER
Pt. Came by:  Pharmacy can't get Basaglar in stock anymore, they suggested Lantus solostar, Glargine, or Semglee.  Would you send in Rx for the Lantus or other for him, he has maybe a week left of Basaglar.

## 2025-01-02 ENCOUNTER — TELEPHONE (OUTPATIENT)
Dept: FAMILY MEDICINE CLINIC | Facility: CLINIC | Age: 75
End: 2025-01-02
Payer: MEDICARE

## 2025-01-02 NOTE — TELEPHONE ENCOUNTER
Pt came into office today, has had insurance change. Needs pharmacy changed to [facility]. Also needs basaglar insulin changed to lantus, which is preferred.

## 2025-01-13 ENCOUNTER — PATIENT OUTREACH (OUTPATIENT)
Dept: FAMILY MEDICINE CLINIC | Facility: CLINIC | Age: 75
End: 2025-01-13
Payer: MEDICARE

## 2025-01-13 NOTE — OUTREACH NOTE
LVM regarding diabetic eye exam. Inquiring when last eye exam was performed and where, need to obtain a copy for chart.    Relay

## 2025-01-20 DIAGNOSIS — M54.50 LUMBAR BACK PAIN: ICD-10-CM

## 2025-01-20 RX ORDER — TRAMADOL HYDROCHLORIDE 50 MG/1
50 TABLET ORAL EVERY 8 HOURS PRN
Qty: 20 TABLET | Refills: 0 | Status: SHIPPED | OUTPATIENT
Start: 2025-01-20

## 2025-02-05 NOTE — PLAN OF CARE
Problem: Patient Care Overview  Goal: Plan of Care Review  Outcome: Ongoing (interventions implemented as appropriate)      Problem: Skin Injury Risk (Adult)  Goal: Identify Related Risk Factors and Signs and Symptoms  Outcome: Ongoing (interventions implemented as appropriate)    Goal: Skin Health and Integrity  Outcome: Ongoing (interventions implemented as appropriate)      Problem: Fall Risk (Adult)  Goal: Absence of Fall  Outcome: Ongoing (interventions implemented as appropriate)         Chart(s)/Child

## 2025-02-20 DIAGNOSIS — J40 BRONCHITIS: ICD-10-CM

## 2025-02-20 RX ORDER — MONTELUKAST SODIUM 10 MG/1
TABLET ORAL
Qty: 90 TABLET | Refills: 1 | Status: SHIPPED | OUTPATIENT
Start: 2025-02-20

## 2025-03-11 DIAGNOSIS — E11.65 TYPE 2 DIABETES MELLITUS WITH HYPERGLYCEMIA, WITH LONG-TERM CURRENT USE OF INSULIN: ICD-10-CM

## 2025-03-11 DIAGNOSIS — Z79.4 TYPE 2 DIABETES MELLITUS WITH HYPERGLYCEMIA, WITH LONG-TERM CURRENT USE OF INSULIN: ICD-10-CM

## 2025-03-11 RX ORDER — GLIPIZIDE 5 MG/1
TABLET ORAL
Qty: 270 TABLET | Refills: 1 | Status: SHIPPED | OUTPATIENT
Start: 2025-03-11

## 2025-03-13 DIAGNOSIS — E78.2 MIXED HYPERLIPIDEMIA: ICD-10-CM

## 2025-03-13 DIAGNOSIS — D50.9 IRON DEFICIENCY ANEMIA, UNSPECIFIED IRON DEFICIENCY ANEMIA TYPE: ICD-10-CM

## 2025-03-13 RX ORDER — ATORVASTATIN CALCIUM 10 MG/1
10 TABLET, FILM COATED ORAL
Qty: 90 TABLET | Refills: 3 | Status: SHIPPED | OUTPATIENT
Start: 2025-03-13

## 2025-03-13 RX ORDER — FERROUS SULFATE 325(65) MG
1 TABLET ORAL EVERY OTHER DAY
Qty: 45 TABLET | Refills: 3 | Status: SHIPPED | OUTPATIENT
Start: 2025-03-13

## 2025-03-14 DIAGNOSIS — E11.65 TYPE 2 DIABETES MELLITUS WITH HYPERGLYCEMIA, WITH LONG-TERM CURRENT USE OF INSULIN: ICD-10-CM

## 2025-03-14 DIAGNOSIS — I10 ESSENTIAL HYPERTENSION: ICD-10-CM

## 2025-03-14 DIAGNOSIS — Z79.4 TYPE 2 DIABETES MELLITUS WITH HYPERGLYCEMIA, WITH LONG-TERM CURRENT USE OF INSULIN: ICD-10-CM

## 2025-03-14 DIAGNOSIS — N18.31 STAGE 3A CHRONIC KIDNEY DISEASE: ICD-10-CM

## 2025-03-14 RX ORDER — POTASSIUM CHLORIDE 750 MG/1
CAPSULE, EXTENDED RELEASE ORAL
Qty: 90 CAPSULE | Refills: 2 | Status: SHIPPED | OUTPATIENT
Start: 2025-03-14

## 2025-03-14 RX ORDER — METOPROLOL TARTRATE 25 MG/1
25 TABLET, FILM COATED ORAL EVERY 12 HOURS
Qty: 180 TABLET | Refills: 1 | Status: SHIPPED | OUTPATIENT
Start: 2025-03-14

## 2025-03-14 RX ORDER — HYDRALAZINE HYDROCHLORIDE 25 MG/1
25 TABLET, FILM COATED ORAL 3 TIMES DAILY
Qty: 270 TABLET | Refills: 1 | Status: SHIPPED | OUTPATIENT
Start: 2025-03-14

## 2025-03-21 DIAGNOSIS — I10 ESSENTIAL HYPERTENSION: ICD-10-CM

## 2025-03-21 DIAGNOSIS — E78.2 MIXED HYPERLIPIDEMIA: ICD-10-CM

## 2025-03-21 DIAGNOSIS — Z12.5 SCREENING PSA (PROSTATE SPECIFIC ANTIGEN): ICD-10-CM

## 2025-03-21 DIAGNOSIS — E11.65 TYPE 2 DIABETES MELLITUS WITH HYPERGLYCEMIA, WITH LONG-TERM CURRENT USE OF INSULIN: Primary | ICD-10-CM

## 2025-03-21 DIAGNOSIS — N18.31 STAGE 3A CHRONIC KIDNEY DISEASE: ICD-10-CM

## 2025-03-21 DIAGNOSIS — Z79.4 TYPE 2 DIABETES MELLITUS WITH HYPERGLYCEMIA, WITH LONG-TERM CURRENT USE OF INSULIN: Primary | ICD-10-CM

## 2025-03-25 ENCOUNTER — LAB (OUTPATIENT)
Dept: FAMILY MEDICINE CLINIC | Facility: CLINIC | Age: 75
End: 2025-03-25
Payer: MEDICARE

## 2025-03-25 PROCEDURE — 82570 ASSAY OF URINE CREATININE: CPT | Performed by: NURSE PRACTITIONER

## 2025-03-25 PROCEDURE — 82043 UR ALBUMIN QUANTITATIVE: CPT | Performed by: NURSE PRACTITIONER

## 2025-03-25 PROCEDURE — 83036 HEMOGLOBIN GLYCOSYLATED A1C: CPT | Performed by: NURSE PRACTITIONER

## 2025-03-25 PROCEDURE — G0103 PSA SCREENING: HCPCS | Performed by: NURSE PRACTITIONER

## 2025-03-25 PROCEDURE — 80061 LIPID PANEL: CPT | Performed by: NURSE PRACTITIONER

## 2025-03-25 PROCEDURE — 85027 COMPLETE CBC AUTOMATED: CPT | Performed by: NURSE PRACTITIONER

## 2025-03-25 PROCEDURE — 36415 COLL VENOUS BLD VENIPUNCTURE: CPT | Performed by: NURSE PRACTITIONER

## 2025-03-25 PROCEDURE — 80053 COMPREHEN METABOLIC PANEL: CPT | Performed by: NURSE PRACTITIONER

## 2025-03-26 LAB
ALBUMIN SERPL-MCNC: 4.1 G/DL (ref 3.5–5.2)
ALBUMIN UR-MCNC: <1.2 MG/DL
ALBUMIN/GLOB SERPL: 1.5 G/DL
ALP SERPL-CCNC: 57 U/L (ref 39–117)
ALT SERPL W P-5'-P-CCNC: 26 U/L (ref 1–41)
ANION GAP SERPL CALCULATED.3IONS-SCNC: 11.9 MMOL/L (ref 5–15)
AST SERPL-CCNC: 28 U/L (ref 1–40)
BILIRUB SERPL-MCNC: 0.6 MG/DL (ref 0–1.2)
BUN SERPL-MCNC: 18 MG/DL (ref 8–23)
BUN/CREAT SERPL: 14.6 (ref 7–25)
CALCIUM SPEC-SCNC: 9.7 MG/DL (ref 8.6–10.5)
CHLORIDE SERPL-SCNC: 99 MMOL/L (ref 98–107)
CHOLEST SERPL-MCNC: 116 MG/DL (ref 0–200)
CO2 SERPL-SCNC: 25.1 MMOL/L (ref 22–29)
CREAT SERPL-MCNC: 1.23 MG/DL (ref 0.76–1.27)
CREAT UR-MCNC: 9.8 MG/DL
DEPRECATED RDW RBC AUTO: 40.8 FL (ref 37–54)
EGFRCR SERPLBLD CKD-EPI 2021: 62 ML/MIN/1.73
ERYTHROCYTE [DISTWIDTH] IN BLOOD BY AUTOMATED COUNT: 12.6 % (ref 12.3–15.4)
GLOBULIN UR ELPH-MCNC: 2.8 GM/DL
GLUCOSE SERPL-MCNC: 166 MG/DL (ref 65–99)
HBA1C MFR BLD: 8.5 % (ref 4.8–5.6)
HCT VFR BLD AUTO: 41.8 % (ref 37.5–51)
HDLC SERPL-MCNC: 36 MG/DL (ref 40–60)
HGB BLD-MCNC: 13.6 G/DL (ref 13–17.7)
LDLC SERPL CALC-MCNC: 58 MG/DL (ref 0–100)
LDLC/HDLC SERPL: 1.54 {RATIO}
MCH RBC QN AUTO: 29.4 PG (ref 26.6–33)
MCHC RBC AUTO-ENTMCNC: 32.5 G/DL (ref 31.5–35.7)
MCV RBC AUTO: 90.3 FL (ref 79–97)
MICROALBUMIN/CREAT UR: NORMAL MG/G{CREAT}
PLATELET # BLD AUTO: 256 10*3/MM3 (ref 140–450)
PMV BLD AUTO: 10.5 FL (ref 6–12)
POTASSIUM SERPL-SCNC: 4.6 MMOL/L (ref 3.5–5.2)
PROT SERPL-MCNC: 6.9 G/DL (ref 6–8.5)
PSA SERPL-MCNC: 1.05 NG/ML (ref 0–4)
RBC # BLD AUTO: 4.63 10*6/MM3 (ref 4.14–5.8)
SODIUM SERPL-SCNC: 136 MMOL/L (ref 136–145)
TRIGL SERPL-MCNC: 122 MG/DL (ref 0–150)
VLDLC SERPL-MCNC: 22 MG/DL (ref 5–40)
WBC NRBC COR # BLD AUTO: 7.95 10*3/MM3 (ref 3.4–10.8)

## 2025-04-01 ENCOUNTER — OFFICE VISIT (OUTPATIENT)
Dept: FAMILY MEDICINE CLINIC | Facility: CLINIC | Age: 75
End: 2025-04-01
Payer: MEDICARE

## 2025-04-01 VITALS
OXYGEN SATURATION: 96 % | HEIGHT: 66 IN | HEART RATE: 69 BPM | SYSTOLIC BLOOD PRESSURE: 153 MMHG | TEMPERATURE: 98.4 F | WEIGHT: 212.6 LBS | DIASTOLIC BLOOD PRESSURE: 86 MMHG | BODY MASS INDEX: 34.17 KG/M2

## 2025-04-01 DIAGNOSIS — I10 ESSENTIAL HYPERTENSION: ICD-10-CM

## 2025-04-01 DIAGNOSIS — D50.9 IRON DEFICIENCY ANEMIA, UNSPECIFIED IRON DEFICIENCY ANEMIA TYPE: ICD-10-CM

## 2025-04-01 DIAGNOSIS — N18.2 STAGE 2 CHRONIC KIDNEY DISEASE: ICD-10-CM

## 2025-04-01 DIAGNOSIS — M54.50 LUMBAR BACK PAIN: ICD-10-CM

## 2025-04-01 DIAGNOSIS — K21.9 GASTROESOPHAGEAL REFLUX DISEASE WITHOUT ESOPHAGITIS: ICD-10-CM

## 2025-04-01 DIAGNOSIS — E11.65 TYPE 2 DIABETES MELLITUS WITH HYPERGLYCEMIA, WITH LONG-TERM CURRENT USE OF INSULIN: ICD-10-CM

## 2025-04-01 DIAGNOSIS — Z79.4 TYPE 2 DIABETES MELLITUS WITH HYPERGLYCEMIA, WITH LONG-TERM CURRENT USE OF INSULIN: ICD-10-CM

## 2025-04-01 DIAGNOSIS — E78.2 MIXED HYPERLIPIDEMIA: ICD-10-CM

## 2025-04-01 DIAGNOSIS — Z00.00 MEDICARE ANNUAL WELLNESS VISIT, SUBSEQUENT: Primary | ICD-10-CM

## 2025-04-01 RX ORDER — TRAMADOL HYDROCHLORIDE 50 MG/1
50 TABLET ORAL EVERY 8 HOURS PRN
Qty: 20 TABLET | Refills: 0 | Status: SHIPPED | OUTPATIENT
Start: 2025-04-01

## 2025-04-01 NOTE — PROGRESS NOTES
Chief Complaint  Chief Complaint   Patient presents with    Medicare Wellness-subsequent     Labs drawn 3/25/25  Pt states doing well today  Requesting rf tramadol           Subjective     {CC  Problem List  Visit Diagnosis   Encounters  Notes  Medications  Labs  Result Review Imaging  Media :23}     Shaka Trinidad presents to [facility] for   History of Present Illness    Pt with chronic low back pain, worse mainly with standing too long. He uses tramadol very rarely for severe pain     HTN, stable on meds and takes as directed, denies chest pain, headache, shortness of air, palpitations and swelling of extremities.      Diabetes mellitus type II, last A1c 7.7, feels stable on meds, denies any signs/symptoms of hyper/hypoglycemia, blurry vision, polydipsia, polyuria, nocturia, and unintentional weight loss     Hyperlipidemia, The patient denies muscle aches, constipation, diarrhea, GI upset, fatigue, chest pain/pressure, exercise intolerance, dyspnea, palpitations, syncope and pedal edema.       Anemia on ferrous sulfate every other day      CKD, does not follow with nephrology, monitoring. GFR ranging 55.8-57.0     BPH, he follows with Dr. Wood yearly for PSAs     Here to review labs      The following portions of the patient's history were reviewed and updated as appropriate: allergies, current medications, past family history, past medical history, past social history, past surgical history and problem list.    Past Medical History:   Diagnosis Date    Acute pancreatitis 3/14/2020    Coronary artery disease     Diabetes mellitus, type II     Elevated cholesterol     H/O cataract     Hyperlipidemia     Hypertension     Peripheral edema     Peripheral edema     Sleep apnea      Past Surgical History:   Procedure Laterality Date    CARDIAC CATHETERIZATION N/A 7/19/2019    Procedure: Left Heart Cath with angiogram;  Surgeon: Bautista Lopez MD;  Location: [facility]  INVASIVE LOCATION;  Service: Cardiovascular    CARDIAC CATHETERIZATION N/A 7/19/2019    Procedure: Coronary angiography;  Surgeon: Bautista Lopez MD;  [facility];  Service: Cardiovascular    CARDIAC CATHETERIZATION N/A 7/19/2019    Procedure: Left ventriculography;  Surgeon: Bautista Lopez MD;  [facility];  Service: Cardiovascular    CHOLECYSTECTOMY N/A 1/6/2021    Procedure: CHOLECYSTECTOMY LAPAROSCOPIC;  Surgeon: Giacomo Akins MD;  [facility];  Service: General;  Laterality: N/A;    COLONOSCOPY      COLONOSCOPY N/A 7/24/2024    Procedure: COLONOSCOPY WITH POLYPECTOMY X 1;  Surgeon: Isreal Keene MD;  [facility];  Service: General;  Laterality: N/A;  POLYP X 1    CORONARY ARTERY BYPASS GRAFT N/A 8/8/2019    Procedure: CORONARY ARTERY BYPASS GRAFTING;  Surgeon: Chet Mcarthur MD;  [facility];  Service: Cardiothoracic    ENDOSCOPY N/A 3/15/2020    Procedure: ESOPHAGOGASTRODUODENOSCOPY;  Surgeon: Jaspal Logan MD;  [facility];  Service: Gastroenterology;  Laterality: N/A;  EPIGASTRIC ABDOMINAL PAIN, ABNORMAL CT SCAN, PANCREATITIS  NORMAL EGD    HERNIA REPAIR      OTHER SURGICAL HISTORY  12/2015    2d echo-abstracted cent.      Family History   Problem Relation Age of Onset    Heart failure Mother     Hypertension Brother     Cancer Brother     Heart disease Brother      Social History     Tobacco Use    Smoking status: Never    Smokeless tobacco: Never   Substance Use Topics    Alcohol use: Yes     Alcohol/week: 1.0 standard drink of alcohol     Types: 1 Glasses of wine per week       Current Outpatient Medications:     amLODIPine (NORVASC) 5 MG tablet, TAKE 1 TABLET BY MOUTH EVERY DAY, Disp: 90 tablet, Rfl: 3    aspirin 81 MG chewable tablet, Chew 1 tablet Every Night., Disp: , Rfl:     atorvastatin (LIPITOR) 10 MG tablet, TAKE 1 TABLET BY MOUTH EVERYDAY AT BEDTIME, Disp: 90  tablet, Rfl: 3    B-D UF III MINI PEN NEEDLES 31G X 5 MM misc, USE TO INJECT INSULIN DAILY, Disp: 100 each, Rfl: 3    CINNAMON PO, Take  by mouth., Disp: , Rfl:     ferrous sulfate 325 (65 FE) MG tablet, TAKE 1 TABLET BY MOUTH EVERY OTHER DAY, Disp: 45 tablet, Rfl: 3    furosemide (LASIX) 20 MG tablet, TAKE 1 TABLET BY MOUTH DAILY. MAY TAKE AN EXTRA DOSE FOR SWELLING ONE TIME DAILY AS NEEDED., Disp: 120 tablet, Rfl: 3    glipizide (GLUCOTROL) 5 MG tablet, TAKE 2 TABS IN THE MORNING AND 1 TAB IN THE EVENING, Disp: 270 tablet, Rfl: 1    hydrALAZINE (APRESOLINE) 25 MG tablet, TAKE 1 TABLET BY MOUTH THREE TIMES A DAY, Disp: 270 tablet, Rfl: 1    Insulin Glargine (LANTUS SOLOSTAR) 100 UNIT/ML injection pen, Inject 50 Units under the skin into the appropriate area as directed Every Morning., Disp: 45 mL, Rfl: 1    lisinopril (PRINIVIL,ZESTRIL) 40 MG tablet, TAKE 1 TABLET BY MOUTH EVERY DAY, Disp: 90 tablet, Rfl: 3    metFORMIN (GLUCOPHAGE) 1000 MG tablet, TAKE 1 TABLET BY MOUTH TWICE A DAY WITH MEALS, Disp: 180 tablet, Rfl: 1    metoprolol tartrate (LOPRESSOR) 25 MG tablet, TAKE 1 TABLET BY MOUTH EVERY 12 HOURS, Disp: 180 tablet, Rfl: 1    montelukast (SINGULAIR) 10 MG tablet, TAKE 1 TABLET BY MOUTH EVERY DAY AT NIGHT, Disp: 90 tablet, Rfl: 1    OneTouch Ultra test strip, To test blood sugar once daily. E11.9, Disp: 100 each, Rfl: 5    potassium chloride (MICRO-K) 10 MEQ CR capsule, TAKE 1 CAPSULE BY MOUTH DAILY. MAY TAKE AN EXTRA DOSE WITH LASIX FOR SWELLING AS NEEDED., Disp: 90 capsule, Rfl: 2    traMADol (Ultram) 50 MG tablet, Take 1 tablet by mouth Every 8 (Eight) Hours As Needed for Severe Pain., Disp: 20 tablet, Rfl: 0    vitamin C (ASCORBIC ACID) 500 MG tablet, Take 2 tablets by mouth Every Evening., Disp: , Rfl:     Objective   Vital Signs:   Vitals:    04/01/25 1026   BP: 153/86   BP Location: Left arm   Patient Position: Sitting   Cuff Size: Adult   Pulse: 69   Temp: 98.4 °F (36.9 °C)   TempSrc: Oral   SpO2: 96%  "  Weight: 96.4 kg (212 lb 9.6 oz)   Height: 167.6 cm (66\")   PainSc: 0-No pain       Body mass index is 34.31 kg/m².    Physical Exam     Result Review :{ Labs  Result Review  Imaging  Med Tab  Media :23}     No visits with results within 7 Day(s) from this visit.   Latest known visit with results is:   Orders Only on 03/21/2025   Component Date Value Ref Range Status    WBC 03/25/2025 7.95  3.40 - 10.80 10*3/mm3 Final    RBC 03/25/2025 4.63  4.14 - 5.80 10*6/mm3 Final    Hemoglobin 03/25/2025 13.6  13.0 - 17.7 g/dL Final    Hematocrit 03/25/2025 41.8  37.5 - 51.0 % Final    MCV 03/25/2025 90.3  79.0 - 97.0 fL Final    MCH 03/25/2025 29.4  26.6 - 33.0 pg Final    MCHC 03/25/2025 32.5  31.5 - 35.7 g/dL Final    RDW 03/25/2025 12.6  12.3 - 15.4 % Final    RDW-SD 03/25/2025 40.8  37.0 - 54.0 fl Final    MPV 03/25/2025 10.5  6.0 - 12.0 fL Final    Platelets 03/25/2025 256  140 - 450 10*3/mm3 Final    Glucose 03/25/2025 166 (H)  65 - 99 mg/dL Final    BUN 03/25/2025 18  8 - 23 mg/dL Final    Creatinine 03/25/2025 1.23  0.76 - 1.27 mg/dL Final    Sodium 03/25/2025 136  136 - 145 mmol/L Final    Potassium 03/25/2025 4.6  3.5 - 5.2 mmol/L Final    Chloride 03/25/2025 99  98 - 107 mmol/L Final    CO2 03/25/2025 25.1  22.0 - 29.0 mmol/L Final    Calcium 03/25/2025 9.7  8.6 - 10.5 mg/dL Final    Total Protein 03/25/2025 6.9  6.0 - 8.5 g/dL Final    Albumin 03/25/2025 4.1  3.5 - 5.2 g/dL Final    ALT (SGPT) 03/25/2025 26  1 - 41 U/L Final    AST (SGOT) 03/25/2025 28  1 - 40 U/L Final    Alkaline Phosphatase 03/25/2025 57  39 - 117 U/L Final    Total Bilirubin 03/25/2025 0.6  0.0 - 1.2 mg/dL Final    Globulin 03/25/2025 2.8  gm/dL Final    A/G Ratio 03/25/2025 1.5  g/dL Final    BUN/Creatinine Ratio 03/25/2025 14.6  7.0 - 25.0 Final    Anion Gap 03/25/2025 11.9  5.0 - 15.0 mmol/L Final    eGFR 03/25/2025 62.0  >60.0 mL/min/1.73 Final    Total Cholesterol 03/25/2025 116  0 - 200 mg/dL Final    Triglycerides 03/25/2025 122  0 " - 150 mg/dL Final    HDL Cholesterol 03/25/2025 36 (L)  40 - 60 mg/dL Final    LDL Cholesterol  03/25/2025 58  0 - 100 mg/dL Final    VLDL Cholesterol 03/25/2025 22  5 - 40 mg/dL Final    LDL/HDL Ratio 03/25/2025 1.54   Final    Hemoglobin A1C 03/25/2025 8.50 (H)  4.80 - 5.60 % Final    PSA 03/25/2025 1.050  0.000 - 4.000 ng/mL Final    Microalbumin/Creatinine Ratio 03/25/2025    Final    Unable to calculate    Creatinine, Urine 03/25/2025 9.8  mg/dL Final    Microalbumin, Urine 03/25/2025 <1.2  mg/dL Final     {The following data was reviewed by (Optional):13852}  {Ambulatory Labs (Optional):78806}  {Data reviewed (Optional):13338:::1}         {BMI is >= 30 and <35. (Class 1 Obesity). The following options were offered after discussion;:[id number]}           Assessment and Plan {CC Problem List  Visit Diagnosis  ROS  Review (Popup)  Health Maintenance  Quality  BestPractice  Medications  SmartSets  SnapShot Encounters  Media :23}   There are no diagnoses linked to this encounter.    I spent *** minutes caring for Shaka Trinidad on this date of service. This time includes time spent by me in the following activities: preparing for the visit, reviewing tests, performing a medically appropriate examination and/or evaluation , counseling and educating the patient/family/caregiver, ordering medications, tests, or procedures and documenting information in the medical record    {Time Spent (Optional):26347}    Follow Up {Instructions Charge Capture  Follow-up Communications :23}    No follow-ups on file.  Patient was given instructions and counseling regarding his condition or for health maintenance advice. Please see specific information pulled into the AVS if appropriate.        Part of this note may be an electronic transcription/translation of spoken language to printed text using the Dragon Dictation System

## 2025-04-01 NOTE — PROGRESS NOTES
The ABCs of the Annual Wellness Visit  Subsequent Medicare Wellness Visit    Chief Complaint   Patient presents with    Medicare Wellness-subsequent     Labs drawn 3/25/25  Pt states doing well today  Requesting rf tramadol     Subjective     History of Present Illness:  Shaka Trinidad is a 74 y.o. male who presents for a Subsequent Medicare Wellness Visit and follow up on chronic conditions.  HPI      Pt with chronic low back pain, worse mainly with standing too long. He uses tramadol very rarely for severe pain     HTN, borderline elevated today, feels stable on meds and takes as directed, denies chest pain, headache, shortness of air, palpitations and swelling of extremities.      Diabetes mellitus type II, last A1c 7.5, now up to 8.5, his wife is in a nursing home and he has been eating on the go, diet has not been normal, more bread/sandwiches than usual. He is currently taking 55 units of Lantus daily, metformin and glipizide. Denies any signs/symptoms of hyper/hypoglycemia, blurry vision, polydipsia, polyuria, nocturia, and unintentional weight loss     Hyperlipidemia, The patient denies muscle aches, constipation, diarrhea, GI upset, fatigue, chest pain/pressure, exercise intolerance, dyspnea, palpitations, syncope and pedal edema.       Anemia on ferrous sulfate every other day and tolerating well     CKD, does not follow with nephrology, monitoring. GFR ranging 55.8-57.0.  He has been drinking more water lately     BPH, he follows with Dr. Wood yearly for PSAs     Here to review labs      The following portions of the patient's history were reviewed and   updated as appropriate: allergies, current medications, past family history, past medical history, past social history, past surgical history, and problem list.        Compared to one year ago, the patient feels his physical   health is the same.    Compared to one year ago, the patient feels his mental   health is the same.    Recent  Hospitalizations:  He was not admitted to the hospital during the last year.       Current Medical Providers:  Patient Care Team:  Karly Marmolejo APRN as PCP - General (Nurse Practitioner)  Bautista Lopez MD as Consulting Physician (Cardiology)  Jayda Lucas DPM (Podiatry)  Roger Wood MD as Consulting Physician (Urology)    Outpatient Medications Prior to Visit   Medication Sig Dispense Refill    amLODIPine (NORVASC) 5 MG tablet TAKE 1 TABLET BY MOUTH EVERY DAY 90 tablet 3    aspirin 81 MG chewable tablet Chew 1 tablet Every Night.      atorvastatin (LIPITOR) 10 MG tablet TAKE 1 TABLET BY MOUTH EVERYDAY AT BEDTIME 90 tablet 3    B-D UF III MINI PEN NEEDLES 31G X 5 MM misc USE TO INJECT INSULIN DAILY 100 each 3    CINNAMON PO Take  by mouth.      ferrous sulfate 325 (65 FE) MG tablet TAKE 1 TABLET BY MOUTH EVERY OTHER DAY 45 tablet 3    furosemide (LASIX) 20 MG tablet TAKE 1 TABLET BY MOUTH DAILY. MAY TAKE AN EXTRA DOSE FOR SWELLING ONE TIME DAILY AS NEEDED. 120 tablet 3    glipizide (GLUCOTROL) 5 MG tablet TAKE 2 TABS IN THE MORNING AND 1 TAB IN THE EVENING 270 tablet 1    hydrALAZINE (APRESOLINE) 25 MG tablet TAKE 1 TABLET BY MOUTH THREE TIMES A  tablet 1    Insulin Glargine (LANTUS SOLOSTAR) 100 UNIT/ML injection pen Inject 50 Units under the skin into the appropriate area as directed Every Morning. 45 mL 1    lisinopril (PRINIVIL,ZESTRIL) 40 MG tablet TAKE 1 TABLET BY MOUTH EVERY DAY 90 tablet 3    metFORMIN (GLUCOPHAGE) 1000 MG tablet TAKE 1 TABLET BY MOUTH TWICE A DAY WITH MEALS 180 tablet 1    metoprolol tartrate (LOPRESSOR) 25 MG tablet TAKE 1 TABLET BY MOUTH EVERY 12 HOURS 180 tablet 1    montelukast (SINGULAIR) 10 MG tablet TAKE 1 TABLET BY MOUTH EVERY DAY AT NIGHT 90 tablet 1    OneTouch Ultra test strip To test blood sugar once daily. E11.9 100 each 5    potassium chloride (MICRO-K) 10 MEQ CR capsule TAKE 1 CAPSULE BY MOUTH DAILY. MAY TAKE AN EXTRA DOSE WITH LASIX FOR SWELLING AS  NEEDED. 90 capsule 2    vitamin C (ASCORBIC ACID) 500 MG tablet Take 2 tablets by mouth Every Evening.      traMADol (Ultram) 50 MG tablet Take 1 tablet by mouth Every 8 (Eight) Hours As Needed for Severe Pain. 20 tablet 0     No facility-administered medications prior to visit.       Opioid medication/s are on active medication list.  and I have evaluated his active treatment plan and pain score trends (see table).  Vitals:    04/01/25 1026   PainSc: 0-No pain     I have reviewed the chart for potential of high risk medication and harmful drug interactions in the elderly.          Aspirin is on active medication list. Aspirin use is indicated based on review of current medical condition/s. Pros and cons of this therapy have been discussed today. Benefits of this medication outweigh potential harm.  Patient has been encouraged to continue taking this medication.  .      Patient Active Problem List   Diagnosis    Angina at rest    Abnormal nuclear stress test    Coronary artery disease involving native coronary artery of native heart without angina pectoris    Encounter for diabetic foot exam    Hyperlipidemia    Erectile dysfunction    Essential hypertension    Iron deficiency anemia    Type 2 diabetes mellitus with hyperglycemia    S/P CABG (coronary artery bypass graft)    Acute pancreatitis    Abnormal CT of the abdomen    Diabetic peripheral neuropathy    Acute abdominal pain    JEAN PAUL (obstructive sleep apnea)    Obesity (BMI 30-39.9)    Elevated LFTs    Acute pancreatitis without infection or necrosis    Morbidly obese    Type 2 diabetes mellitus with diabetic peripheral angiopathy without gangrene    Primary osteoarthritis of right knee    Encounter for screening colonoscopy     Advance Care Planning   Advance Directive is not on file.  ACP discussion was held with the patient during this visit. Patient does not have an advance directive, information provided.    Reviewed chart for potential of high risk  "medication in the elderly: yes  Reviewed chart for potential of harmful drug interactions in the elderly:yes       Objective       Vitals:    04/01/25 1026   BP: 153/86   BP Location: Left arm   Patient Position: Sitting   Cuff Size: Adult   Pulse: 69   Temp: 98.4 °F (36.9 °C)   TempSrc: Oral   SpO2: 96%   Weight: 96.4 kg (212 lb 9.6 oz)   Height: 167.6 cm (66\")   PainSc: 0-No pain     BMI Readings from Last 1 Encounters:   04/01/25 34.31 kg/m²   BMI is within normal parameters. No follow-up required.  BMI Readings from Last 1 Encounters:   04/01/25 34.31 kg/m²   BMI is above normal parameters. Recommendations include: educational material, exercise counseling, and nutrition counseling    Does the patient have evidence of cognitive impairment? No    Physical Exam  Constitutional:       General: He is not in acute distress.     Appearance: Normal appearance. He is well-developed. He is not ill-appearing or diaphoretic.   HENT:      Head: Normocephalic.   Eyes:      Conjunctiva/sclera: Conjunctivae normal.      Pupils: Pupils are equal, round, and reactive to light.   Neck:      Thyroid: No thyromegaly.      Vascular: No JVD.   Cardiovascular:      Rate and Rhythm: Normal rate and regular rhythm.      Heart sounds: Normal heart sounds. No murmur heard.  Pulmonary:      Effort: Pulmonary effort is normal. No respiratory distress.      Breath sounds: Normal breath sounds. No wheezing or rhonchi.   Abdominal:      General: Bowel sounds are normal. There is no distension.      Palpations: Abdomen is soft.      Tenderness: There is no abdominal tenderness.   Musculoskeletal:         General: Tenderness (L-spine mild ttp, good rom) present. No swelling. Normal range of motion.      Cervical back: Normal range of motion and neck supple. No tenderness.   Lymphadenopathy:      Cervical: No cervical adenopathy.   Skin:     General: Skin is warm and dry.      Coloration: Skin is not jaundiced.      Findings: No erythema or rash. "   Neurological:      General: No focal deficit present.      Mental Status: He is alert and oriented to person, place, and time. Mental status is at baseline.      Sensory: No sensory deficit.      Motor: No weakness.      Gait: Gait abnormal.   Psychiatric:         Mood and Affect: Mood normal.         Behavior: Behavior normal.         Thought Content: Thought content normal.         Judgment: Judgment normal.       Lab Results   Component Value Date    TRIG 122 2025    HDL 36 (L) 2025    LDL 58 2025    VLDL 22 2025    HGBA1C 8.50 (H) 2025          HEALTH RISK ASSESSMENT    Smoking Status:  Social History     Tobacco Use   Smoking Status Never   Smokeless Tobacco Never     Alcohol Consumption:  Social History     Substance and Sexual Activity   Alcohol Use Yes    Alcohol/week: 1.0 standard drink of alcohol    Types: 1 Glasses of wine per week     Fall Risk Screen:    BONNIE Fall Risk Assessment was completed, and patient is at LOW risk for falls.Assessment completed on:2025    Depression Screen:       2025    10:22 AM   PHQ-2/PHQ-9 Depression Screening   Little interest or pleasure in doing things Not at all   Feeling down, depressed, or hopeless Not at all       Health Habits and Functional and Cognitive Screenin/1/2025    10:00 AM   Functional & Cognitive Status   Do you have difficulty preparing food and eating? No   Do you have difficulty bathing yourself, getting dressed or grooming yourself? No   Do you have difficulty using the toilet? No   Do you have difficulty moving around from place to place? No   Do you have trouble with steps or getting out of a bed or a chair? No   Current Diet Unhealthy Diet   Dental Exam Up to date   Eye Exam Up to date   Exercise (times per week) 4 times per week   Current Exercises Include Other   Do you need help using the phone?  No   Are you deaf or do you have serious difficulty hearing?  No   Do you need help to go to places  out of walking distance? No   Do you need help shopping? No   Do you need help preparing meals?  No   Do you need help with housework?  No   Do you need help with laundry? No   Do you need help taking your medications? No   Do you need help managing money? No   Do you ever drive or ride in a car without wearing a seat belt? No   Have you felt unusual stress, anger or loneliness in the last month? Yes   Who do you live with? Spouse   If you need help, do you have trouble finding someone available to you? No   Have you been bothered in the last four weeks by sexual problems? No   Do you have difficulty concentrating, remembering or making decisions? No       ATTENTION  What is the year: correct  What is the month of the year: correct  What is the day of the week?: incorrect  What is the date?: correct  MEMORY  Repeat address three times, only score third attempt: [address] G[address], [place]: 6  HOW MANY ANIMALS DID THE PATIENT NAME  Verbal Fluency -- Animal Names (0-25): 22+  CLOCK DRAWING  Clock Drawing: All Correct  MEMORY RECALL  Tell me what you remember about that name and address we were repeating at the beginnin  ACE TOTAL SCORE  Total ACE Score - <25/30 strongly suggests cognitive impairment; <21/30 almost certainly shows dementia: 27    Age-appropriate Screening Schedule:  Refer to the list below for future screening recommendations based on patient's age, sex and/or medical conditions. Orders for these recommended tests are listed in the plan section. The patient has been provided with a written plan.    Health Maintenance   Topic Date Due    COVID-19 Vaccine ( season) 10/01/2025 (Originally 2024)    INFLUENZA VACCINE  2025    HEMOGLOBIN A1C  2025    DIABETIC FOOT EXAM  10/02/2025    DIABETIC EYE EXAM  01/15/2026    LIPID PANEL  2026    URINE MICROALBUMIN-CREATININE RATIO (uACR)  2026    ANNUAL WELLNESS VISIT  2026    TDAP/TD VACCINES (2  - Td or Tdap) 06/25/2030    HEPATITIS C SCREENING  Completed    Pneumococcal Vaccine 50+  Completed    ZOSTER VACCINE  Completed    COLORECTAL CANCER SCREENING  Completed            Assessment & Plan      CMS Preventative Services Quick Reference  Risk Factors Identified During Encounter  Chronic Pain: Natural history and expected course discussed. Questions answered.  The above risks/problems have been discussed with the patient.  Follow up actions/plans if indicated are seen below in the Assessment/Plan Section.  Pertinent information has been shared with the patient in the After Visit Summary.    Diagnoses and all orders for this visit:    1. Medicare annual wellness visit, subsequent (Primary)    2. Lumbar back pain  Comments:  Okay to continue and refill tramadol prn.   may take anacin or extra tylenol with tramadol  Orders:  -     traMADol (Ultram) 50 MG tablet; Take 1 tablet by mouth Every 8 (Eight) Hours As Needed for Severe Pain.  Dispense: 20 tablet; Refill: 0    3. Essential hypertension  Comments:  bp borderline elevated today, continue monitoring at home and notify for SBP consistently >150    4. Mixed hyperlipidemia  Comments:  lipids stable    5. Stage 2 chronic kidney disease  Comments:  improvement in renal function, cont with increased water intake    6. Iron deficiency anemia, unspecified iron deficiency anemia type  Comments:  h/h stable, continue iron every other day    7. Gastroesophageal reflux disease without esophagitis    8. Type 2 diabetes mellitus with hyperglycemia, with long-term current use of insulin  Comments:  A1c 8.5  Increase Lantus to 60 units daily  Continue metformin and glipizide  work on diabetic diet modifications      Labs reviewed with patient  Continue current medication regimen  Vaccines up-to-date  Age appropriate preventative counseling provided, including healthy lifestyle modifications and exercise      Follow Up:   Return in about 6 months (around 10/1/2025) for  Recheck. Dm panel prior to appt .     An After Visit Summary and PPPS were given to the patient.            EMR Dragon transcription disclaimer:  Part of this note may be an electronic transcription/translation of spoken language to printed text using the Dragon Dictation System.

## 2025-05-01 ENCOUNTER — APPOINTMENT (OUTPATIENT)
Dept: GENERAL RADIOLOGY | Facility: HOSPITAL | Age: 75
End: 2025-05-01
Payer: MEDICARE

## 2025-05-01 ENCOUNTER — HOSPITAL ENCOUNTER (EMERGENCY)
Facility: HOSPITAL | Age: 75
Discharge: HOME | End: 2025-05-01
Attending: EMERGENCY MEDICINE
Payer: MEDICARE

## 2025-05-01 VITALS
SYSTOLIC BLOOD PRESSURE: 134 MMHG | OXYGEN SATURATION: 94 % | DIASTOLIC BLOOD PRESSURE: 66 MMHG | HEART RATE: 66 BPM | WEIGHT: 212.3 LBS | HEIGHT: 66 IN | RESPIRATION RATE: 16 BRPM | TEMPERATURE: 98.6 F | BODY MASS INDEX: 34.12 KG/M2

## 2025-05-01 DIAGNOSIS — M54.2 ACUTE NECK PAIN: Primary | ICD-10-CM

## 2025-05-01 PROCEDURE — 99283 EMERGENCY DEPT VISIT LOW MDM: CPT

## 2025-05-01 PROCEDURE — 72050 X-RAY EXAM NECK SPINE 4/5VWS: CPT

## 2025-05-01 RX ORDER — TRAMADOL HYDROCHLORIDE 50 MG/1
50 TABLET ORAL EVERY 6 HOURS PRN
Qty: 12 TABLET | Refills: 0 | Status: SHIPPED | OUTPATIENT
Start: 2025-05-01

## 2025-05-01 RX ORDER — PREDNISONE 20 MG/1
20 TABLET ORAL DAILY
Qty: 5 TABLET | Refills: 0 | Status: SHIPPED | OUTPATIENT
Start: 2025-05-01

## 2025-05-01 NOTE — ED PROVIDER NOTES
Subjective   History of Present Illness  Patient is a 75-year-old male complaint of several day history of neck pain.  He denies recent injury numbness tingling dizziness vomiting or other complaint.      Review of Systems    Past Medical History:   Diagnosis Date    Acute pancreatitis 3/14/2020    Coronary artery disease     Diabetes mellitus, type II     Elevated cholesterol     H/O cataract     cataracts     Hyperlipidemia     Hypertension     Peripheral edema     Peripheral edema     Sleep apnea     oral appliance       No Known Allergies    Past Surgical History:   Procedure Laterality Date    CARDIAC CATHETERIZATION N/A 7/19/2019    Procedure: Left Heart Cath with angiogram;  Surgeon: Bautista Lopez MD;  Location: [facility] CATH INVASIVE LOCATION;  Service: Cardiovascular    CARDIAC CATHETERIZATION N/A 7/19/2019    Procedure: Coronary angiography;  Surgeon: Bautista Lopez MD;  Location: [facility] CATH INVASIVE LOCATION;  Service: Cardiovascular    CARDIAC CATHETERIZATION N/A 7/19/2019    Procedure: Left ventriculography;  Surgeon: Bautista Lopez MD;  Location: [facility] CATH INVASIVE LOCATION;  Service: Cardiovascular    CHOLECYSTECTOMY N/A 1/6/2021    Procedure: CHOLECYSTECTOMY LAPAROSCOPIC;  Surgeon: Giacomo Akins MD;  Location: [facility] MAIN OR;  Service: General;  Laterality: N/A;    COLONOSCOPY      COLONOSCOPY N/A 7/24/2024    Procedure: COLONOSCOPY WITH POLYPECTOMY X 1;  Surgeon: Isreal Keene MD;  Location: [facility] ENDOSCOPY;  Service: General;  Laterality: N/A;  POLYP X 1    CORONARY ARTERY BYPASS GRAFT N/A 8/8/2019    Procedure: CORONARY ARTERY BYPASS GRAFTING;  Surgeon: Chet Mcarthur MD;  Location: [facility] CVOR;  Service: Cardiothoracic    ENDOSCOPY N/A 3/15/2020    Procedure: ESOPHAGOGASTRODUODENOSCOPY;  Surgeon: Jaspal Logan MD;  Location: [facility] ENDOSCOPY;  Service: Gastroenterology;  Laterality: N/A;  EPIGASTRIC ABDOMINAL PAIN, ABNORMAL CT SCAN, PANCREATITIS  NORMAL EGD    HERNIA  REPAIR      OTHER SURGICAL HISTORY  12/2015    2d echo-abstracted cent.        Family History   Problem Relation Age of Onset    Heart failure Mother     Hypertension Brother     Cancer Brother     Heart disease Brother        Social History     Socioeconomic History    Marital status:    Tobacco Use    Smoking status: Never    Smokeless tobacco: Never   Vaping Use    Vaping status: Never Used   Substance and Sexual Activity    Alcohol use: Yes     Alcohol/week: 1.0 standard drink of alcohol     Types: 1 Glasses of wine per week    Drug use: Never    Sexual activity: Defer           Objective   Physical Exam  Neck has diffuse tenderness on palpation.  Has decreased range of motion due to pain.  Neurologic exam is nonfocal.  Extremities Amcill range of motion is neurovasc intact with 2+ pulses.  Procedures           ED Course    XR Spine Cervical Complete 4 or 5 View  Result Date: 5/1/2025  Impression: Degenerative changes of the cervical spine greatest at C5-6. Electronically Signed: New Patel MD  5/1/2025 12:46 PM EDT  Workstation ID: [id number]                                                       Medical Decision Making  My interpretation of the patient's cervical spine x-rays show degenerative change without fracture or dislocation.  Patient be discharged with a prescription for Ultram and prednisone.  Will see his MD for recheck.    Amount and/or Complexity of Data Reviewed  Radiology: ordered and independent interpretation performed.    Risk  Prescription drug management.        Final diagnoses:   Acute neck pain       ED Disposition  ED Disposition       ED Disposition   Discharge    Condition   Stable    Comment   --               No follow-up provider specified.       Medication List        New Prescriptions      predniSONE 20 MG tablet  Commonly known as: DELTASONE  Take 1 tablet by mouth Daily.            Changed      * traMADol 50 MG tablet  Commonly known as: Ultram  Take 1 tablet by mouth Every  8 (Eight) Hours As Needed for Severe Pain.  What changed: Another medication with the same name was added. Make sure you understand how and when to take each.     * traMADol 50 MG tablet  Commonly known as: ULTRAM  Take 1 tablet by mouth Every 6 (Six) Hours As Needed for Severe Pain.  What changed: You were already taking a medication with the same name, and this prescription was added. Make sure you understand how and when to take each.           * This list has 2 medication(s) that are the same as other medications prescribed for you. Read the directions carefully, and ask your doctor or other care provider to review them with you.                   Where to Get Your Medications        These medications were sent to [facility]/pharmacy #[id number] - [place], [place] - [address] - [phone] [facility] [phone]   [address] [place] [address]      Phone: [phone]   predniSONE 20 MG tablet  traMADol 50 MG tablet            Alex Johnson MD  05/01/25 [id number]

## 2025-05-06 ENCOUNTER — OFFICE VISIT (OUTPATIENT)
Dept: CARDIOLOGY | Facility: CLINIC | Age: 75
End: 2025-05-06
Payer: MEDICARE

## 2025-05-06 VITALS
OXYGEN SATURATION: 99 % | SYSTOLIC BLOOD PRESSURE: 136 MMHG | WEIGHT: 209 LBS | DIASTOLIC BLOOD PRESSURE: 73 MMHG | HEIGHT: 66 IN | HEART RATE: 76 BPM | BODY MASS INDEX: 33.59 KG/M2

## 2025-05-06 DIAGNOSIS — I10 ESSENTIAL HYPERTENSION: ICD-10-CM

## 2025-05-06 DIAGNOSIS — E78.2 MIXED HYPERLIPIDEMIA: ICD-10-CM

## 2025-05-06 DIAGNOSIS — G47.33 OBSTRUCTIVE SLEEP APNEA: ICD-10-CM

## 2025-05-06 DIAGNOSIS — E11.9 TYPE 2 DIABETES MELLITUS WITHOUT COMPLICATION, WITHOUT LONG-TERM CURRENT USE OF INSULIN: ICD-10-CM

## 2025-05-06 DIAGNOSIS — I25.118 CORONARY ARTERY DISEASE OF NATIVE ARTERY OF NATIVE HEART WITH STABLE ANGINA PECTORIS: Primary | ICD-10-CM

## 2025-05-06 NOTE — PROGRESS NOTES
Subjective:     Encounter Date:05/06/2025      Patient ID: Shaka Trinidad is a 75 y.o. male.    Chief Complaint:  Coronary Artery Disease  Pertinent negatives include no chest pain, dizziness, leg swelling, palpitations or shortness of breath.   75-year-old white male with history of coronary disease hypertension hyperlipidemia diabetes and sleep apnea presents to my office for a follow-up.  Patient is currently stable without any symptoms of chest pain or shortness of breath at rest or exertion.  No complaint of any PND orthopnea.  No palpitation dizziness syncope or swelling of the feet.  Patient is taking all the medicines regular.  Patient does not smoke.    The following portions of the patient's history were reviewed and updated as appropriate: allergies, current medications, past family history, past medical history, past social history, past surgical history, and problem list.  Past Medical History:   Diagnosis Date    Acute pancreatitis 3/14/2020    Coronary artery disease     Diabetes mellitus, type II     Elevated cholesterol     H/O cataract     cataracts     Hyperlipidemia     Hypertension     Peripheral edema     Peripheral edema     Sleep apnea     oral appliance     Past Surgical History:   Procedure Laterality Date    CARDIAC CATHETERIZATION N/A 7/19/2019    Procedure: Left Heart Cath with angiogram;  Surgeon: Bautista Lopez MD;  Location: [facility] CATH INVASIVE LOCATION;  Service: Cardiovascular    CARDIAC CATHETERIZATION N/A 7/19/2019    Procedure: Coronary angiography;  Surgeon: Bautista Lopez MD;  Location: [facility] CATH INVASIVE LOCATION;  Service: Cardiovascular    CARDIAC CATHETERIZATION N/A 7/19/2019    Procedure: Left ventriculography;  Surgeon: Bautista Lopez MD;  Location: [facility] CATH INVASIVE LOCATION;  Service: Cardiovascular    CHOLECYSTECTOMY N/A 1/6/2021    Procedure: CHOLECYSTECTOMY LAPAROSCOPIC;  Surgeon: Giacomo Akins MD;  Location: [facility] MAIN OR;  Service:  "General;  Laterality: N/A;    COLONOSCOPY      COLONOSCOPY N/A 7/24/2024    Procedure: COLONOSCOPY WITH POLYPECTOMY X 1;  Surgeon: Isreal Keene MD;  Location: [facility] ENDOSCOPY;  Service: General;  Laterality: N/A;  POLYP X 1    CORONARY ARTERY BYPASS GRAFT N/A 8/8/2019    Procedure: CORONARY ARTERY BYPASS GRAFTING;  Surgeon: Chet Mcarthur MD;  Location: [facility] CVOR;  Service: Cardiothoracic    ENDOSCOPY N/A 3/15/2020    Procedure: ESOPHAGOGASTRODUODENOSCOPY;  Surgeon: Jaspal Logan MD;  Location: [facility] ENDOSCOPY;  Service: Gastroenterology;  Laterality: N/A;  EPIGASTRIC ABDOMINAL PAIN, ABNORMAL CT SCAN, PANCREATITIS  NORMAL EGD    HERNIA REPAIR      OTHER SURGICAL HISTORY  12/2015    2d echo-abstracted cent.      /73   Pulse 76   Ht 167.6 cm (65.98\")   Wt 94.8 kg (209 lb)   SpO2 99%   BMI 33.75 kg/m²   Family History   Problem Relation Age of Onset    Heart failure Mother     Hypertension Brother     Cancer Brother     Heart disease Brother        Current Outpatient Medications:     amLODIPine (NORVASC) 5 MG tablet, TAKE 1 TABLET BY MOUTH EVERY DAY, Disp: 90 tablet, Rfl: 3    aspirin 81 MG chewable tablet, Chew 1 tablet Every Night., Disp: , Rfl:     atorvastatin (LIPITOR) 10 MG tablet, TAKE 1 TABLET BY MOUTH EVERYDAY AT BEDTIME, Disp: 90 tablet, Rfl: 3    B-D UF III MINI PEN NEEDLES 31G X 5 MM misc, USE TO INJECT INSULIN DAILY, Disp: 100 each, Rfl: 3    CINNAMON PO, Take  by mouth., Disp: , Rfl:     ferrous sulfate 325 (65 FE) MG tablet, TAKE 1 TABLET BY MOUTH EVERY OTHER DAY, Disp: 45 tablet, Rfl: 3    furosemide (LASIX) 20 MG tablet, TAKE 1 TABLET BY MOUTH DAILY. MAY TAKE AN EXTRA DOSE FOR SWELLING ONE TIME DAILY AS NEEDED., Disp: 120 tablet, Rfl: 3    glipizide (GLUCOTROL) 5 MG tablet, TAKE 2 TABS IN THE MORNING AND 1 TAB IN THE EVENING, Disp: 270 tablet, Rfl: 1    hydrALAZINE (APRESOLINE) 25 MG tablet, TAKE 1 TABLET BY MOUTH THREE TIMES A DAY, Disp: 270 tablet, Rfl: 1    Insulin " Glargine (LANTUS SOLOSTAR) 100 UNIT/ML injection pen, Inject 50 Units under the skin into the appropriate area as directed Every Morning., Disp: 45 mL, Rfl: 1    lisinopril (PRINIVIL,ZESTRIL) 40 MG tablet, TAKE 1 TABLET BY MOUTH EVERY DAY, Disp: 90 tablet, Rfl: 3    metFORMIN (GLUCOPHAGE) 1000 MG tablet, TAKE 1 TABLET BY MOUTH TWICE A DAY WITH MEALS, Disp: 180 tablet, Rfl: 1    metoprolol tartrate (LOPRESSOR) 25 MG tablet, TAKE 1 TABLET BY MOUTH EVERY 12 HOURS, Disp: 180 tablet, Rfl: 1    montelukast (SINGULAIR) 10 MG tablet, TAKE 1 TABLET BY MOUTH EVERY DAY AT NIGHT, Disp: 90 tablet, Rfl: 1    OneTouch Ultra test strip, To test blood sugar once daily. E11.9, Disp: 100 each, Rfl: 5    potassium chloride (MICRO-K) 10 MEQ CR capsule, TAKE 1 CAPSULE BY MOUTH DAILY. MAY TAKE AN EXTRA DOSE WITH LASIX FOR SWELLING AS NEEDED., Disp: 90 capsule, Rfl: 2    predniSONE (DELTASONE) 20 MG tablet, Take 1 tablet by mouth Daily., Disp: 5 tablet, Rfl: 0    traMADol (Ultram) 50 MG tablet, Take 1 tablet by mouth Every 8 (Eight) Hours As Needed for Severe Pain., Disp: 20 tablet, Rfl: 0    traMADol (ULTRAM) 50 MG tablet, Take 1 tablet by mouth Every 6 (Six) Hours As Needed for Severe Pain., Disp: 12 tablet, Rfl: 0    vitamin C (ASCORBIC ACID) 500 MG tablet, Take 2 tablets by mouth Every Evening., Disp: , Rfl:   No Known Allergies  Social History     Socioeconomic History    Marital status:    Tobacco Use    Smoking status: Never    Smokeless tobacco: Never   Vaping Use    Vaping status: Never Used   Substance and Sexual Activity    Alcohol use: Yes     Alcohol/week: 1.0 standard drink of alcohol     Types: 1 Glasses of wine per week    Drug use: Never    Sexual activity: Defer     Review of Systems   Constitutional: Negative for malaise/fatigue.   Cardiovascular:  Negative for chest pain, dyspnea on exertion, leg swelling and palpitations.   Respiratory:  Negative for cough and shortness of breath.    Gastrointestinal:  Negative  for abdominal pain, nausea and vomiting.   Neurological:  Negative for dizziness, headaches, light-headedness, numbness and weakness.   All other systems reviewed and are negative.             Objective:     Constitutional:       Appearance: Well-developed.   Eyes:      General: No scleral icterus.     Conjunctiva/sclera: Conjunctivae normal.   HENT:      Head: Normocephalic and atraumatic.   Neck:      Vascular: No carotid bruit or JVD.   Pulmonary:      Effort: Pulmonary effort is normal.      Breath sounds: Normal breath sounds. No wheezing. No rales.   Cardiovascular:      Normal rate. Regular rhythm.   Pulses:     Intact distal pulses.   Abdominal:      General: Bowel sounds are normal.      Palpations: Abdomen is soft.   Musculoskeletal:      Cervical back: Normal range of motion and neck supple. Skin:     General: Skin is warm and dry.      Findings: No rash.   Neurological:      Mental Status: Alert.       Procedures    Lab Review:         MDM    #1 coronary disease  Patient had coronary bypass surgery x 3 vessels with a LIMA to LAD and SVG to the marginal branch and RCA and is currently stable on medications and has normal AV function  2.  Hypertension  Patient blood pressure currently stable on amlodipine hydralazine lisinopril and metoprolol  3.  Hyperlipidemia  Patient is currently on atorvastatin the lipid levels are well within normal limits  4.  Diabetes  Patient is on oral medicines as well as insulin and followed by the primary care doctor  5.  Sleep apnea  Patient has sleep apnea and uses a CPAP machine.    Patient's previous medical records, labs, and EKG were reviewed and discussed with the patient at today's visit.

## 2025-05-19 DIAGNOSIS — M54.50 LUMBAR BACK PAIN: ICD-10-CM

## 2025-05-20 RX ORDER — TRAMADOL HYDROCHLORIDE 50 MG/1
50 TABLET ORAL EVERY 8 HOURS PRN
Qty: 20 TABLET | Refills: 2 | Status: SHIPPED | OUTPATIENT
Start: 2025-05-20

## 2025-06-11 DIAGNOSIS — I10 ESSENTIAL HYPERTENSION: ICD-10-CM

## 2025-06-11 RX ORDER — LISINOPRIL 40 MG/1
40 TABLET ORAL DAILY
Qty: 90 TABLET | Refills: 3 | Status: SHIPPED | OUTPATIENT
Start: 2025-06-11

## 2025-06-27 RX ORDER — INSULIN GLARGINE 100 [IU]/ML
INJECTION, SOLUTION SUBCUTANEOUS
Qty: 45 ML | Refills: 1 | Status: SHIPPED | OUTPATIENT
Start: 2025-06-27

## 2025-07-06 ENCOUNTER — HOSPITAL ENCOUNTER (EMERGENCY)
Facility: HOSPITAL | Age: 75
Discharge: HOME | End: 2025-07-06
Admitting: EMERGENCY MEDICINE
Payer: MEDICARE

## 2025-07-06 ENCOUNTER — APPOINTMENT (OUTPATIENT)
Dept: GENERAL RADIOLOGY | Facility: HOSPITAL | Age: 75
End: 2025-07-06
Payer: MEDICARE

## 2025-07-06 VITALS
BODY MASS INDEX: 34.58 KG/M2 | TEMPERATURE: 98.9 F | HEIGHT: 66 IN | SYSTOLIC BLOOD PRESSURE: 123 MMHG | RESPIRATION RATE: 18 BRPM | HEART RATE: 64 BPM | DIASTOLIC BLOOD PRESSURE: 61 MMHG | OXYGEN SATURATION: 91 % | WEIGHT: 215.17 LBS

## 2025-07-06 DIAGNOSIS — M25.461 EFFUSION OF RIGHT KNEE: Primary | ICD-10-CM

## 2025-07-06 PROCEDURE — 73562 X-RAY EXAM OF KNEE 3: CPT

## 2025-07-06 PROCEDURE — 25010000002 METHYLPREDNISOLONE PER 40 MG: Performed by: NURSE PRACTITIONER

## 2025-07-06 PROCEDURE — 25010000002 LIDOCAINE 1 % SOLUTION: Performed by: NURSE PRACTITIONER

## 2025-07-06 PROCEDURE — 99283 EMERGENCY DEPT VISIT LOW MDM: CPT

## 2025-07-06 RX ORDER — LIDOCAINE HYDROCHLORIDE 10 MG/ML
10 INJECTION, SOLUTION INFILTRATION; PERINEURAL ONCE
Status: COMPLETED | OUTPATIENT
Start: 2025-07-06 | End: 2025-07-06

## 2025-07-06 RX ORDER — METHYLPREDNISOLONE ACETATE 40 MG/ML
20 INJECTION, SUSPENSION INTRA-ARTICULAR; INTRALESIONAL; INTRAMUSCULAR; SOFT TISSUE ONCE
Status: COMPLETED | OUTPATIENT
Start: 2025-07-06 | End: 2025-07-06

## 2025-07-06 RX ADMIN — METHYLPREDNISOLONE ACETATE 20 MG: 40 INJECTION, SUSPENSION INTRA-ARTICULAR; INTRALESIONAL; INTRAMUSCULAR; INTRASYNOVIAL; SOFT TISSUE at 12:45

## 2025-07-06 RX ADMIN — LIDOCAINE HYDROCHLORIDE 10 ML: 10 INJECTION, SOLUTION INFILTRATION; PERINEURAL at 12:45

## 2025-07-06 NOTE — ED PROVIDER NOTES
Subjective   History of Present Illness    Review of Systems    Past Medical History:   Diagnosis Date    Acute pancreatitis 3/14/2020    Coronary artery disease     Diabetes mellitus, type II     Elevated cholesterol     H/O cataract     cataracts     Hyperlipidemia     Hypertension     Peripheral edema     Peripheral edema     Sleep apnea     oral appliance       No Known Allergies    Past Surgical History:   Procedure Laterality Date    CARDIAC CATHETERIZATION N/A 7/19/2019    Procedure: Left Heart Cath with angiogram;  Surgeon: Bautista Lopez MD;  Location: [facility] CATH INVASIVE LOCATION;  Service: Cardiovascular    CARDIAC CATHETERIZATION N/A 7/19/2019    Procedure: Coronary angiography;  Surgeon: Bautista Lopez MD;  Location: [facility] CATH INVASIVE LOCATION;  Service: Cardiovascular    CARDIAC CATHETERIZATION N/A 7/19/2019    Procedure: Left ventriculography;  Surgeon: Bautista Lopez MD;  Location: [facility] CATH INVASIVE LOCATION;  Service: Cardiovascular    CHOLECYSTECTOMY N/A 1/6/2021    Procedure: CHOLECYSTECTOMY LAPAROSCOPIC;  Surgeon: Giacomo Akins MD;  Location: [facility] MAIN OR;  Service: General;  Laterality: N/A;    COLONOSCOPY      COLONOSCOPY N/A 7/24/2024    Procedure: COLONOSCOPY WITH POLYPECTOMY X 1;  Surgeon: Isreal Keene MD;  Location: [facility] ENDOSCOPY;  Service: General;  Laterality: N/A;  POLYP X 1    CORONARY ARTERY BYPASS GRAFT N/A 8/8/2019    Procedure: CORONARY ARTERY BYPASS GRAFTING;  Surgeon: Chet Mcarthur MD;  Location: [facility] CVOR;  Service: Cardiothoracic    ENDOSCOPY N/A 3/15/2020    Procedure: ESOPHAGOGASTRODUODENOSCOPY;  Surgeon: Jaspal Logan MD;  Location: [facility] ENDOSCOPY;  Service: Gastroenterology;  Laterality: N/A;  EPIGASTRIC ABDOMINAL PAIN, ABNORMAL CT SCAN, PANCREATITIS  NORMAL EGD    HERNIA REPAIR      OTHER SURGICAL HISTORY  12/2015    2d echo-abstracted cent.        Family History   Problem Relation Age of Onset    Heart failure Mother      Hypertension Brother     Cancer Brother     Heart disease Brother        Social History     Socioeconomic History    Marital status:    Tobacco Use    Smoking status: Never    Smokeless tobacco: Never   Vaping Use    Vaping status: Never Used   Substance and Sexual Activity    Alcohol use: Yes     Alcohol/week: 1.0 standard drink of alcohol     Types: 1 Glasses of wine per week    Drug use: Never    Sexual activity: Defer           Objective   Physical Exam    Procedures  After consent was obtained and timeout performed the patient's knee was prepped with chlorhexidine.  Skin was infiltrated with Xylocaine 2% from a new bottle.  A 18-gauge needle was used to enter the patient's right knee joint.  53 cc of yellow inflammatory appearing fluid were withdrawn and the needle was removed intact.  Sterile dressing was applied the patient tolerated the procedure well.  There is no external blood loss.  I performed the procedure.  JEFF De Paz MD         ED Course                                                       Medical Decision Making  Problems Addressed:  Effusion of right knee: complicated acute illness or injury    Amount and/or Complexity of Data Reviewed  Radiology: ordered.    Risk  Prescription drug management.        Final diagnoses:   Effusion of right knee       ED Disposition  ED Disposition       ED Disposition   Discharge    Condition   Stable    Comment   --               Karly Marmolejo L, APRN  [address]  [place] [id number] [phone]          Yo Pond II, MD  [address] [facility] [place] [id number] [phone]    Schedule an appointment as soon as possible for a visit   orthopedics         Medication List      No changes were made to your prescriptions during this visit.            Kevin De Paz MD  07/06/25 2256

## 2025-07-06 NOTE — ED PROVIDER NOTES
Subjective   History of Present Illness  Chief complaint: Knee pain      Context: 75-year-old male with a history of right atraumatic knee pain that got worse yesterday.  States he has previously had to have it drained about 5 years ago. No fever redness or systemic illness.         PCP:           Review of Systems   Constitutional:  Negative for fever.       Past Medical History:   Diagnosis Date    Acute pancreatitis 3/14/2020    Coronary artery disease     Diabetes mellitus, type II     Elevated cholesterol     H/O cataract     cataracts     Hyperlipidemia     Hypertension     Peripheral edema     Peripheral edema     Sleep apnea     oral appliance       No Known Allergies    Past Surgical History:   Procedure Laterality Date    CARDIAC CATHETERIZATION N/A 7/19/2019    Procedure: Left Heart Cath with angiogram;  Surgeon: Bautista Lopez MD;  Location: [facility] CATH INVASIVE LOCATION;  Service: Cardiovascular    CARDIAC CATHETERIZATION N/A 7/19/2019    Procedure: Coronary angiography;  Surgeon: Bautista Lopez MD;  Location: [facility] CATH INVASIVE LOCATION;  Service: Cardiovascular    CARDIAC CATHETERIZATION N/A 7/19/2019    Procedure: Left ventriculography;  Surgeon: Bautista Lopez MD;  Location: [facility] CATH INVASIVE LOCATION;  Service: Cardiovascular    CHOLECYSTECTOMY N/A 1/6/2021    Procedure: CHOLECYSTECTOMY LAPAROSCOPIC;  Surgeon: Giacomo Akins MD;  Location: [facility] MAIN OR;  Service: General;  Laterality: N/A;    COLONOSCOPY      COLONOSCOPY N/A 7/24/2024    Procedure: COLONOSCOPY WITH POLYPECTOMY X 1;  Surgeon: Isreal Keene MD;  Location: [facility] ENDOSCOPY;  Service: General;  Laterality: N/A;  POLYP X 1    CORONARY ARTERY BYPASS GRAFT N/A 8/8/2019    Procedure: CORONARY ARTERY BYPASS GRAFTING;  Surgeon: Chet Mcarthur MD;  Location: [facility] CVOR;  Service: Cardiothoracic    ENDOSCOPY N/A 3/15/2020    Procedure: ESOPHAGOGASTRODUODENOSCOPY;  Surgeon: Jaspal Logan MD;  Location: [facility]  ENDOSCOPY;  Service: Gastroenterology;  Laterality: N/A;  EPIGASTRIC ABDOMINAL PAIN, ABNORMAL CT SCAN, PANCREATITIS  NORMAL EGD    HERNIA REPAIR      OTHER SURGICAL HISTORY  12/2015    2d echo-abstracted cent.        Family History   Problem Relation Age of Onset    Heart failure Mother     Hypertension Brother     Cancer Brother     Heart disease Brother        Social History     Socioeconomic History    Marital status:    Tobacco Use    Smoking status: Never    Smokeless tobacco: Never   Vaping Use    Vaping status: Never Used   Substance and Sexual Activity    Alcohol use: Yes     Alcohol/week: 1.0 standard drink of alcohol     Types: 1 Glasses of wine per week    Drug use: Never    Sexual activity: Defer           Objective   Physical Exam    Vital signs and traige nurse note reviewed.  Constitutional:  Awake, alert; well developed and well nourished.  No acute distress, the patient is examined in hospital gown.  Bedside hard of hearing  HEENT:  Normocephalic, atraumatic;  with intact EOM; oropharynx is pink and moist without edema or erythema.  Neck: Supple, full range of motion without pain;   Cardiovascular: Regular rate and rhythm,    Pulmonary: Respiratory effort regular, nonlabored;   Musculoskeletal: There is a suprapatellar swelling palpable joint effusion on the right knee without any erythema.  No pain over the deep venous system.  Distal vascular and sensorimotor intact  Neuro: Alert oriented x3, speech is clear and appropriate.      Procedures           ED Course                                           Labs Reviewed - No data to display  Medications   methylPREDNISolone acetate (DEPO-medrol) injection 20 mg (has no administration in time range)   lidocaine (XYLOCAINE) 1 % injection 10 mL (has no administration in time range)     XR Knee 3 View Right  Result Date: 7/6/2025  Impression: Osteoarthritis with large joint effusion and ossified intra-articular loose bodies. Chondrocalcinosis of the  knee could be seen with routine osteoarthritis or less likely CPPD arthropathy. Electronically Signed: Eloisa Barragan MD  7/6/2025 11:16 AM EDT  Workstation ID: [id number]    Prior to Admission medications    Medication Sig Start Date End Date Taking? Authorizing Provider   amLODIPine (NORVASC) 5 MG tablet TAKE 1 TABLET BY MOUTH EVERY DAY 8/26/24   Karly Marmolejo APRN   aspirin 81 MG chewable tablet Chew 1 tablet Every Night.    ProviderJono MD   atorvastatin (LIPITOR) 10 MG tablet TAKE 1 TABLET BY MOUTH EVERYDAY AT BEDTIME 3/13/25   Karly Marmolejo APRN   B-D UF III MINI PEN NEEDLES 31G X 5 MM misc USE TO INJECT INSULIN DAILY 8/13/24   Karly Marmolejo APRN   CINNAMON PO Take  by mouth.    ProviderJono MD   ferrous sulfate 325 (65 FE) MG tablet TAKE 1 TABLET BY MOUTH EVERY OTHER DAY 3/13/25   Karly Marmolejo APRN   furosemide (LASIX) 20 MG tablet TAKE 1 TABLET BY MOUTH DAILY. MAY TAKE AN EXTRA DOSE FOR SWELLING ONE TIME DAILY AS NEEDED. 9/30/24   Karly Marmolejo APRN   glipizide (GLUCOTROL) 5 MG tablet TAKE 2 TABS IN THE MORNING AND 1 TAB IN THE EVENING 3/11/25   Karly Marmolejo APRN   hydrALAZINE (APRESOLINE) 25 MG tablet TAKE 1 TABLET BY MOUTH THREE TIMES A DAY 3/14/25   Karly Marmolejo APRN   Insulin Glargine (Lantus SoloStar) 100 UNIT/ML injection pen INJECT 50 UNITS UNDER THE SKIN INTO THE APPROPRIATE AREA AS DIRECTED EVERY MORNING. 6/27/25   Lyell, Reggie Duane, MD   lisinopril (PRINIVIL,ZESTRIL) 40 MG tablet TAKE 1 TABLET BY MOUTH EVERY DAY 6/11/25   Karly Marmolejo APRN   metFORMIN (GLUCOPHAGE) 1000 MG tablet TAKE 1 TABLET BY MOUTH TWICE A DAY WITH MEALS 3/14/25   Karly Marmolejo APRN   metoprolol tartrate (LOPRESSOR) 25 MG tablet TAKE 1 TABLET BY MOUTH EVERY 12 HOURS 3/14/25   Karly Marmolejo APRN   montelukast (SINGULAIR) 10 MG tablet TAKE 1 TABLET BY MOUTH EVERY DAY AT NIGHT 2/20/25   Karly Marmolejo APRN   OneTouch Ultra test strip To test blood sugar once daily. E11.9 9/18/24   Jaleel  "ERICA Varma   potassium chloride (MICRO-K) 10 MEQ CR capsule TAKE 1 CAPSULE BY MOUTH DAILY. MAY TAKE AN EXTRA DOSE WITH LASIX FOR SWELLING AS NEEDED. 3/14/25   Karly Marmolejo APRN   predniSONE (DELTASONE) 20 MG tablet Take 1 tablet by mouth Daily. 5/1/25   Alex Johnson MD   traMADol (Ultram) 50 MG tablet Take 1 tablet by mouth Every 8 (Eight) Hours As Needed for Severe Pain. 5/20/25   Karly Marmolejo APRN   vitamin C (ASCORBIC ACID) 500 MG tablet Take 2 tablets by mouth Every Evening.    Provider, MD Jono                 Medical Decision Making      /57   Pulse 62   Temp 98.9 °F (37.2 °C)   Resp 18   Ht 167.6 cm (65.98\")   Wt 97.6 kg (215 lb 2.7 oz)   SpO2 94%   BMI 34.75 kg/m²           Radiology interpretation:  X-rays reviewed and interpreted by madan: Large joint effusion  Further interpretation by radiologist as above         Appropriate PPE worn during exam.  X-ray was obtained to evaluate for effusion and arthritic changes, noted to have a large joint effusion.  He has no infectious symptoms.  Discussed with Dr. De Paz and arthrocentesis was performed by Dr. De Paz with clear fluid.  Placed in a knee splint, states he has a walker at home.  Was given Ortho information for follow-up      i discussed findings with patient and family who voices understanding of discharge instructions, signs and symptoms requiring return to ED; discharged improved and in stable condition with follow up for re-evaluation.  This document is intended for medical expert use only. Reading of this document by patients and/or patient's family without participating medical staff guidance may result in misinterpretation and unintended morbidity.  Any interpretation of such data is the responsibility of the patient and/or family member responsible for the patient in concert with their primary or specialist providers, not to be left for sources of online searches such as [employer], [employer] or similar queries. Relying on " these approaches to knowledge may result in misinterpretation, misguided goals of care and even death should patients or family members try recommendations outside of the realm of professional medical care in a supervised inpatient environment.     Seen by Dr. De Paz on the ER who performed arthrocentesis.    Problems Addressed:  Effusion of right knee: complicated acute illness or injury    Amount and/or Complexity of Data Reviewed  Radiology: ordered.    Risk  Prescription drug management.        Final diagnoses:   Effusion of right knee       ED Disposition  ED Disposition       ED Disposition   Discharge    Condition   Stable    Comment   --               No follow-up provider specified.       Medication List      No changes were made to your prescriptions during this visit.            Keshia Bah, APRN  07/06/25 2592

## 2025-07-06 NOTE — DISCHARGE INSTRUCTIONS
Watch for signs of infection.  Follow-up with orthopedics.  Return for any new or worsening symptoms.

## 2025-07-16 DIAGNOSIS — E11.51 TYPE 2 DIABETES MELLITUS WITH DIABETIC PERIPHERAL ANGIOPATHY WITHOUT GANGRENE, UNSPECIFIED WHETHER LONG TERM INSULIN USE: ICD-10-CM

## 2025-07-16 DIAGNOSIS — M54.50 LUMBAR BACK PAIN: ICD-10-CM

## 2025-07-16 RX ORDER — BLOOD SUGAR DIAGNOSTIC
STRIP MISCELLANEOUS
Qty: 100 EACH | Refills: 5 | Status: SHIPPED | OUTPATIENT
Start: 2025-07-16

## 2025-07-16 RX ORDER — TRAMADOL HYDROCHLORIDE 50 MG/1
50 TABLET ORAL EVERY 8 HOURS PRN
Qty: 20 TABLET | Refills: 2 | Status: SHIPPED | OUTPATIENT
Start: 2025-07-16

## 2025-08-14 DIAGNOSIS — J40 BRONCHITIS: ICD-10-CM

## 2025-08-14 RX ORDER — MONTELUKAST SODIUM 10 MG/1
10 TABLET ORAL
Qty: 90 TABLET | Refills: 1 | Status: SHIPPED | OUTPATIENT
Start: 2025-08-14

## (undated) DEVICE — PK PERFUS CUST W/CARDIOPLEGIA

## (undated) DEVICE — INSUFFLATION TUBING,LAPAROSCOPIC: Brand: DEROYAL

## (undated) DEVICE — SUT PROLN 4/0 V7 36IN 8975H

## (undated) DEVICE — Device

## (undated) DEVICE — BITEBLOCK ENDO W/STRAP 60F A/ LF DISP

## (undated) DEVICE — SOL IRRIG H2O 1000ML STRL

## (undated) DEVICE — ELECTRD BLD EZ CLN STD 6.5IN

## (undated) DEVICE — SUT PDS 0 CT-1 Z340H

## (undated) DEVICE — GLV SURG SENSICARE SLT PF LF 8 STRL

## (undated) DEVICE — GENERAL LAPAROSCOPY CDS: Brand: MEDLINE INDUSTRIES, INC.

## (undated) DEVICE — SUT SILK 2/0 SH CR8 18IN CR8 C012D

## (undated) DEVICE — 3M™ WARMING BLANKET, UPPER BODY, 10 PER CASE, 42268: Brand: BAIR HUGGER™

## (undated) DEVICE — ELECTRD DEFIB M/FUNC PROPADZ STRL 2PK

## (undated) DEVICE — CABL BIPOL W/ALLGTR CLIP/SM 12FT

## (undated) DEVICE — GLV SURG BIOGEL LTX PF 7 1/2

## (undated) DEVICE — BNDG ELAS ELITE V/CLOSE 4IN 5YD LF STRL

## (undated) DEVICE — CORONARY ARTERY BYPASS GRAFT MARKERS, STAINLESS STEEL, DISTAL, WITHOUT HOLDER: Brand: ANASTOMARK CORONARY ARTERY BYPASS GRAFT MARKERS, STAINLESS STEEL, DISTAL

## (undated) DEVICE — SLV SCD CALF HEMOFORCE DVT THERP REPROC MD

## (undated) DEVICE — PAPR PRNT PK SONY W RIBN UPC55

## (undated) DEVICE — SUT SILK 2 SUTUPAK TIE 60IN SA8H 2STRAND

## (undated) DEVICE — SKIN AFFIX SURG ADHESIVE 72/CS 0.55ML: Brand: MEDLINE

## (undated) DEVICE — BNDG ELAS ELITE V/CLOSE 6IN 5YD LF STRL

## (undated) DEVICE — KT SURG TURNOVER 050

## (undated) DEVICE — PK ENDO GI 50

## (undated) DEVICE — 28 FR STRAIGHT – SOFT PVC CATHETER: Brand: PVC THORACIC CATHETERS

## (undated) DEVICE — CANN VESL FREE FLO BLNT TP 3MM

## (undated) DEVICE — HEMOCONCENTRATOR PERFUS LPS06

## (undated) DEVICE — TOWEL,OR,DSP,ST,WHITE,DLX,4/PK,20PK/CS: Brand: MEDLINE

## (undated) DEVICE — SUT PROLN 5/0 V5 36IN 8934H

## (undated) DEVICE — SUCTION CANISTER, 1500CC,SAFELINER: Brand: DEROYAL

## (undated) DEVICE — PK OPN HEART WHT WRP 50

## (undated) DEVICE — CONNECT Y INTERSEPT W/LL 3/8 X 3/8 X 3/8IN

## (undated) DEVICE — SUT PROLN 4/0 V5 36IN 8935H

## (undated) DEVICE — BLOWER MISTER CLEARVIEW W/TBG

## (undated) DEVICE — CANN ART EOPA 3D NV W/CONN 20F

## (undated) DEVICE — SUT PROLENE PP 7/0 BV175-6 24IN

## (undated) DEVICE — ANTIBACTERIAL UNDYED BRAIDED (POLYGLACTIN 910), SYNTHETIC ABSORBABLE SUTURE: Brand: COATED VICRYL

## (undated) DEVICE — BLD SCLPL BEAVR MINI STR 2BVL 180D LF

## (undated) DEVICE — SUT PROLN 8/0 BV130/5 D/A 24IN 8732H

## (undated) DEVICE — PK ATS CUST W CARDIOTOMY RESEVOIR

## (undated) DEVICE — SOL NACL 0.9PCT 1000ML

## (undated) DEVICE — PINNACLE INTRODUCER SHEATH: Brand: PINNACLE

## (undated) DEVICE — 1/2 FORCE SURGICAL SPRING CLIP: Brand: STEALTH® SPRING CLIP

## (undated) DEVICE — SENSR CERBRL O2 PK/2

## (undated) DEVICE — SUT PROLN 3/0 V7 D/A 36IN 8976H

## (undated) DEVICE — BG BLD SYS

## (undated) DEVICE — ROTATING SURGICAL PUNCHES, 1 PER POUCH: Brand: A&E MEDICAL / ROTATING SURGICAL PUNCHES

## (undated) DEVICE — SINGLE-USE BIOPSY FORCEPS: Brand: RADIAL JAW 4

## (undated) DEVICE — VASOVIEW HEMOPRO: Brand: VASOVIEW HEMOPRO

## (undated) DEVICE — LAPAROSCOPIC GAS CONDITIONING DEVICE.: Brand: INSUFLOW

## (undated) DEVICE — CATH DIAG IMPULSE FR4 6F 100CM

## (undated) DEVICE — GOWN,REINFRCE,POLY,SIRUS,BREATH SLV,XXLG: Brand: MEDLINE

## (undated) DEVICE — ENDOPATH XCEL WITH OPTIVIEW TECHNOLOGY BLADELESS TROCARS WITH STABILITY SLEEVES: Brand: ENDOPATH XCEL OPTIVIEW

## (undated) DEVICE — SUT VIC 0/0 UR6 27IN DYED J603H

## (undated) DEVICE — SUT VIC 3/0 FS1 27IN J442H

## (undated) DEVICE — SYS PERFUS SEP PLATLT W TIPS CUST

## (undated) DEVICE — TEMP PACING WIRE: Brand: MYO/WIRE

## (undated) DEVICE — SOL IRRIG NACL 9PCT 1000ML BTL

## (undated) DEVICE — TUBING, SUCTION, 1/4" X 12', STRAIGHT: Brand: MEDLINE

## (undated) DEVICE — ENDOPATH 5MM CURVED SCISSORS WITH MONOPOLAR CAUTERY: Brand: ENDOPATH

## (undated) DEVICE — ENDOPATH XCEL BLUNT TIP TROCARS WITH SMOOTH SLEEVES: Brand: ENDOPATH XCEL

## (undated) DEVICE — SUT SILK 0 CT1 CR8 18IN C021D

## (undated) DEVICE — SUT PROLN 6/0 RB2 D/A 30IN 8711H

## (undated) DEVICE — SUT SILK 4/0 TIES 18IN A183H

## (undated) DEVICE — GW DIAG EMERALD HEPCOAT MOVE JTIP STD .035 3MM 150CM

## (undated) DEVICE — CATH DIAG IMPULSE FL4 6F 100CM

## (undated) DEVICE — BLOOD TRANSFUSION FILTER: Brand: HAEMONETICS

## (undated) DEVICE — SUT SILK 2/0 TIES 18IN A185H

## (undated) DEVICE — TBG LAP CONN MEGADYNE EVAC SMOKE STRL

## (undated) DEVICE — SUT PROLN 7/0 BV1 D/A 30IN 8703H

## (undated) DEVICE — ENDOPATH XCEL WITH OPTIVIEW TECHNOLOGY UNIVERSAL TROCAR STABILITY SLEEVES: Brand: ENDOPATH XCEL OPTIVIEW

## (undated) DEVICE — CATH DIAG IMPULSE PIG .056 6F 110CM

## (undated) DEVICE — SUT MONOCRYL 4/0 PS2 27IN Y426H ETY426H

## (undated) DEVICE — CANN AORT ROOT DLP VNT/8IN 14G 7F

## (undated) DEVICE — GLV SURG DERMASSURE GRN LF PF 8.5

## (undated) DEVICE — DRAPE SHEET ULTRAGARD: Brand: MEDLINE

## (undated) DEVICE — SPNG GZ AVANT 6PLY 4X4IN STRL PK/2